# Patient Record
Sex: FEMALE | Race: BLACK OR AFRICAN AMERICAN | Employment: OTHER | RURAL
[De-identification: names, ages, dates, MRNs, and addresses within clinical notes are randomized per-mention and may not be internally consistent; named-entity substitution may affect disease eponyms.]

---

## 2016-06-06 LAB — HBA1C MFR BLD HPLC: 5.7 %

## 2017-06-23 LAB
HBA1C MFR BLD HPLC: 6.2 %
MICROALBUMIN UR TEST STR-MCNC: 3 MG/DL

## 2017-10-25 ENCOUNTER — OFFICE VISIT (OUTPATIENT)
Dept: FAMILY MEDICINE CLINIC | Age: 67
End: 2017-10-25

## 2017-10-25 VITALS
RESPIRATION RATE: 16 BRPM | OXYGEN SATURATION: 99 % | HEART RATE: 76 BPM | HEIGHT: 64 IN | WEIGHT: 155.4 LBS | DIASTOLIC BLOOD PRESSURE: 75 MMHG | BODY MASS INDEX: 26.53 KG/M2 | SYSTOLIC BLOOD PRESSURE: 158 MMHG

## 2017-10-25 DIAGNOSIS — Z23 ENCOUNTER FOR IMMUNIZATION: ICD-10-CM

## 2017-10-25 DIAGNOSIS — Z12.11 SCREENING FOR COLON CANCER: ICD-10-CM

## 2017-10-25 DIAGNOSIS — E11.9 CONTROLLED TYPE 2 DIABETES MELLITUS WITHOUT COMPLICATION, WITHOUT LONG-TERM CURRENT USE OF INSULIN (HCC): Primary | ICD-10-CM

## 2017-10-25 RX ORDER — BISMUTH SUBSALICYLATE 262 MG
1 TABLET,CHEWABLE ORAL DAILY
COMMUNITY
End: 2020-02-20 | Stop reason: ALTCHOICE

## 2017-10-25 RX ORDER — ATORVASTATIN CALCIUM 10 MG/1
10 TABLET, FILM COATED ORAL DAILY
COMMUNITY
Start: 2017-10-24 | End: 2018-03-29 | Stop reason: SDUPTHER

## 2017-10-25 NOTE — MR AVS SNAPSHOT
Visit Information Date & Time Provider Department Dept. Phone Encounter #  
 10/25/2017  3:00 PM Boni العلي Martell  Follow-up Instructions Return in about 2 months (around 12/25/2017), or if symptoms worsen or fail to improve. Upcoming Health Maintenance Date Due Hepatitis C Screening 1950 FOOT EXAM Q1 12/20/1961 EYE EXAM RETINAL OR DILATED Q1 12/20/1961 DTaP/Tdap/Td series (1 - Tdap) 12/20/1972 FOBT Q 1 YEAR AGE 50-75 12/20/2001 ZOSTER VACCINE AGE 60> 10/20/2011 BREAST CANCER SCRN MAMMOGRAM 6/29/2013 GLAUCOMA SCREENING Q2Y 12/20/2016 OSTEOPOROSIS SCREENING (DEXA) 12/20/2016 Pneumococcal 65+ Low/Medium Risk (1 of 2 - PCV13) 12/20/2016 MEDICARE YEARLY EXAM 12/20/2016 HEMOGLOBIN A1C Q6M 4/17/2018 LIPID PANEL Q1 10/17/2018 MICROALBUMIN Q1 10/25/2018 Allergies as of 10/25/2017  Review Complete On: 10/25/2017 By: Cyndee Adrian MD  
 No Known Allergies Current Immunizations  Reviewed on 4/18/2015 No immunizations on file. Not reviewed this visit You Were Diagnosed With   
  
 Codes Comments Controlled type 2 diabetes mellitus without complication, without long-term current use of insulin [facility]    -  Primary ICD-10-CM: E11.9 ICD-9-CM: 250.00 Screening for colon cancer     ICD-10-CM: Z12.11 ICD-9-CM: V76.51 Vitals BP Pulse Resp Height(growth percentile) Weight(growth percentile) SpO2  
 158/75 (BP 1 Location: Left arm, BP Patient Position: Sitting) 76 16 5' 4\" (1.626 m) 155 lb 6.4 oz (70.5 kg) 99% BMI OB Status Smoking Status 26.67 kg/m2 Hysterectomy Never Smoker Vitals History BMI and BSA Data Body Mass Index Body Surface Area  
 26.67 kg/m 2 1.78 m 2 Preferred Pharmacy Pharmacy Name Phone [facility], [address] [place] [address] [phone] Your Updated Medication List  
  
 This list is accurate as of: 10/25/17  4:04 PM.  Always use your most recent med list.  
  
  
  
  
 aspirin delayed-release 81 mg tablet Take 81 mg by mouth daily. atorvastatin 10 mg tablet Commonly known as:  LIPITOR Take 10 mg by mouth daily. DAILY PROBIOTIC PO Take  by mouth.  
  
 empagliflozin 25 mg tablet Commonly known as:  Glorizonia Manuel Take 1 Tab by mouth daily for 90 days. glipiZIDE 5 mg tablet Commonly known as:  Karol Factor Take 5 mg by mouth daily. lisinopril 5 mg tablet Commonly known as:  Janie Reasons Take 5 mg by mouth daily. multivitamin tablet Commonly known as:  ONE A DAY Take 1 Tab by mouth daily. pneumococcal 23-valent 25 mcg/0.5 mL injection Commonly known as:  PNEUMOVAX 23  
0.5 mL by IntraMUSCular route PRIOR TO DISCHARGE for 1 dose. PriLOSEC OTC 20 mg tablet Generic drug:  omeprazole Take 20 mg by mouth daily. varicella zoster vacine live 19,400 unit/0.65 mL Susr injection Commonly known as:  ZOSTAVAX  
1 Vial by SubCUTAneous route once for 1 dose. Prescriptions Printed Refills  
 pneumococcal 23-valent (PNEUMOVAX 23) 25 mcg/0.5 mL injection 0 Si.5 mL by IntraMUSCular route PRIOR TO DISCHARGE for 1 dose. Class: Print Route: IntraMUSCular  
 varicella zoster vacine live (ZOSTAVAX) 19,400 unit/0.65 mL susr injection 0 Si Vial by SubCUTAneous route once for 1 dose. Class: Print Route: SubCUTAneous Prescriptions Sent to Pharmacy Refills  
 empagliflozin (JARDIANCE) 25 mg tablet 0 Sig: Take 1 Tab by mouth daily for 90 days. Class: Normal  
 Pharmacy: [facility] [address] [place] [address], [address] [facility] #: [phone] Route: Oral  
  
We Performed the Following MICROALBUMIN, UR, RAND W/ MICROALBUMIN/CREA RATIO [id number] CPT(R)] OCCULT BLOOD, IMMUNOASSAY (FIT) [id number] CPT(R)] REFERRAL TO OPHTHALMOLOGY [REF57 Custom] Comments:  
 Needs annual screening diabetic eye exam.  
  
Follow-up Instructions Return in about 2 months (around 12/25/2017), or if symptoms worsen or fail to improve. Referral Information Referral ID Referred By Referred To [id number] [place] Doris [facility] [address] [facility], [address] Visits Status Start Date End Date 1 New Request 10/25/17 10/25/18 If your referral has a status of pending review or denied, additional information will be sent to support the outcome of this decision. Patient Instructions Diabetes Foot Health: Care Instructions Your Care Instructions When you have diabetes, your feet need extra care and attention. Diabetes can damage the nerve endings and blood vessels in your feet, making you less likely to notice when your feet are injured. Diabetes also limits your body's ability to fight infection and get blood to areas that need it. If you get a minor foot injury, it could become an ulcer or a serious infection. With good foot care, you can prevent most of these problems. Caring for your feet can be quick and easy. Most of the care can be done when you are bathing or getting ready for bed. Follow-up care is a key part of your treatment and safety. Be sure to make and go to all appointments, and call your doctor if you are having problems. Its also a good idea to know your test results and keep a list of the medicines you take. How can you care for yourself at home? · Keep your blood sugar close to normal by watching what and how much you eat, monitoring blood sugar, taking medicines if prescribed, and getting regular exercise. · Do not smoke. Smoking affects blood flow and can make foot problems worse. If you need help quitting, talk to your doctor about stop-smoking programs and medicines. These can increase your chances of quitting for good. · Eat a diet that is low in fats. High fat intake can cause fat to build up in your blood vessels and decrease blood flow. · Inspect your feet daily for blisters, cuts, cracks, or sores. If you cannot see well, use a mirror or have someone help you. · Take care of your feet: 
[facility] your feet every day. Use warm (not hot) water. Check the water temperature with your wrists or other part of your body, not your feet. ¨ Dry your feet well. Pat them dry. Do not rub the skin on your feet too hard. Dry well between your toes. If the skin on your feet stays moist, bacteria or a fungus can grow, which can lead to infection. ¨ Keep your skin soft. Use moisturizing skin cream to keep the skin on your feet soft and prevent calluses and cracks. But do not put the cream between your toes, and stop using any cream that causes a rash. ¨ Clean underneath your toenails carefully. Do not use a sharp object to clean underneath your toenails. Use the blunt end of a nail file or other rounded tool. ¨ Trim and file your toenails straight across to prevent ingrown toenails. Use a nail clipper, not scissors. Use an emery board to smooth the edges. · Change socks daily. Socks without seams are best, because seams often rub the feet. You can find socks for people with diabetes from specialty catalogs. · Look inside your shoes every day for things like gravel or torn linings, which could cause blisters or sores. · Buy shoes that fit well: 
¨ Look for shoes that have plenty of space around the toes. This helps prevent bunions and blisters. ¨ Try on shoes while wearing the kind of socks you will usually wear with the shoes. ¨ Avoid plastic shoes. They may rub your feet and cause blisters. Good shoes should be made of materials that are flexible and breathable, such as leather or cloth. ¨ Break in new shoes slowly by wearing them for no more than an hour a day for several days.  Take extra time to check your feet for red areas, blisters, or other problems after you wear new shoes. · Do not go barefoot. Do not wear sandals, and do not wear shoes with very thin soles. Thin soles are easy to puncture. They also do not protect your feet from hot pavement or cold weather. · Have your doctor check your feet during each visit. If you have a foot problem, see your doctor. Do not try to treat an early foot problem at home. Home remedies or treatments that you can buy without a prescription (such as corn removers) can be harmful. · Always get early treatment for foot problems. A minor irritation can lead to a major problem if not properly cared for early. When should you call for help? Call your doctor now or seek immediate medical care if: 
· You have a foot sore, an ulcer or break in the skin that is not healing after 4 days, bleeding corns or calluses, or an ingrown toenail. · You have blue or black areas, which can mean bruising or blood flow problems. · You have peeling skin or tiny blisters between your toes or cracking or oozing of the skin. · You have a fever for more than 24 hours and a foot sore. · You have new numbness or tingling in your feet that does not go away after you move your feet or change positions. · You have unexplained or unusual swelling of the foot or ankle. Watch closely for changes in your health, and be sure to contact your doctor if: 
· You cannot do proper foot care. Where can you learn more? Go to http://libby-raeann.info/. Enter A739 in the search box to learn more about \"Diabetes Foot Health: Care Instructions. \" Current as of: March 13, 2017 Content Version: 11.3 © 3264-4491 [employer]. Care instructions adapted under license by [employer] (which disclaims liability or warranty for this information).  If you have questions about a medical condition or this instruction, always ask your healthcare professional. Le[employer] disclaims any warranty or liability for your use of this information. Diabetes Foot Health: Care Instructions Your Care Instructions When you have diabetes, your feet need extra care and attention. Diabetes can damage the nerve endings and blood vessels in your feet, making you less likely to notice when your feet are injured. Diabetes also limits your body's ability to fight infection and get blood to areas that need it. If you get a minor foot injury, it could become an ulcer or a serious infection. With good foot care, you can prevent most of these problems. Caring for your feet can be quick and easy. Most of the care can be done when you are bathing or getting ready for bed. Follow-up care is a key part of your treatment and safety. Be sure to make and go to all appointments, and call your doctor if you are having problems. Its also a good idea to know your test results and keep a list of the medicines you take. How can you care for yourself at home? · Keep your blood sugar close to normal by watching what and how much you eat, monitoring blood sugar, taking medicines if prescribed, and getting regular exercise. · Do not smoke. Smoking affects blood flow and can make foot problems worse. If you need help quitting, talk to your doctor about stop-smoking programs and medicines. These can increase your chances of quitting for good. · Eat a diet that is low in fats. High fat intake can cause fat to build up in your blood vessels and decrease blood flow. · Inspect your feet daily for blisters, cuts, cracks, or sores. If you cannot see well, use a mirror or have someone help you. · Take care of your feet: 
[facility] your feet every day. Use warm (not hot) water. Check the water temperature with your wrists or other part of your body, not your feet. ¨ Dry your feet well. Pat them dry.  Do not rub the skin on your feet too hard. Dry well between your toes. If the skin on your feet stays moist, bacteria or a fungus can grow, which can lead to infection. ¨ Keep your skin soft. Use moisturizing skin cream to keep the skin on your feet soft and prevent calluses and cracks. But do not put the cream between your toes, and stop using any cream that causes a rash. ¨ Clean underneath your toenails carefully. Do not use a sharp object to clean underneath your toenails. Use the blunt end of a nail file or other rounded tool. ¨ Trim and file your toenails straight across to prevent ingrown toenails. Use a nail clipper, not scissors. Use an emery board to smooth the edges. · Change socks daily. Socks without seams are best, because seams often rub the feet. You can find socks for people with diabetes from specialty catalogs. · Look inside your shoes every day for things like gravel or torn linings, which could cause blisters or sores. · Buy shoes that fit well: 
¨ Look for shoes that have plenty of space around the toes. This helps prevent bunions and blisters. ¨ Try on shoes while wearing the kind of socks you will usually wear with the shoes. ¨ Avoid plastic shoes. They may rub your feet and cause blisters. Good shoes should be made of materials that are flexible and breathable, such as leather or cloth. ¨ Break in new shoes slowly by wearing them for no more than an hour a day for several days. Take extra time to check your feet for red areas, blisters, or other problems after you wear new shoes. · Do not go barefoot. Do not wear sandals, and do not wear shoes with very thin soles. Thin soles are easy to puncture. They also do not protect your feet from hot pavement or cold weather. · Have your doctor check your feet during each visit. If you have a foot problem, see your doctor. Do not try to treat an early foot problem at home.  Home remedies or treatments that you can buy without a prescription (such as corn removers) can be harmful. · Always get early treatment for foot problems. A minor irritation can lead to a major problem if not properly cared for early. When should you call for help? Call your doctor now or seek immediate medical care if: 
· You have a foot sore, an ulcer or break in the skin that is not healing after 4 days, bleeding corns or calluses, or an ingrown toenail. · You have blue or black areas, which can mean bruising or blood flow problems. · You have peeling skin or tiny blisters between your toes or cracking or oozing of the skin. · You have a fever for more than 24 hours and a foot sore. · You have new numbness or tingling in your feet that does not go away after you move your feet or change positions. · You have unexplained or unusual swelling of the foot or ankle. Watch closely for changes in your health, and be sure to contact your doctor if: 
· You cannot do proper foot care. Where can you learn more? Go to http://libby-raeann.info/. Enter A739 in the search box to learn more about \"Diabetes Foot Health: Care Instructions. \" Current as of: March 13, 2017 Content Version: 11.3 © 2700-4229 [employer]. Care instructions adapted under license by [employer]App[employer]Me (which disclaims liability or warranty for this information). If you have questions about a medical condition or this instruction, always ask your healthcare professional. [place] [address] any warranty or liability for your use of this information. Introducing [facility]! Jazmyne Breaux introduces Tag & See patient portal. Now you can access parts of your medical record, email your doctor's office, and request medication refills online. 1. In your internet browser, go to [email or web address] 2. Click on the First Time User? Click Here link in the Sign In box. You will see the New Member Sign Up page. 3. Enter your OrdrIt Access Code exactly as it appears below. You will not need to use this code after youve completed the sign-up process. If you do not sign up before the expiration date, you must request a new code. · OrdrIt Access Code: Prince Salcido Expires: 1/23/2018  3:53 PM 
 
4. Enter the last four digits of your Social Security Number (xxxx) and Date of Birth (mm/dd/yyyy) as indicated and click Submit. You will be taken to the next sign-up page. 5. Create a [employer] ID. This will be your OrdrIt login ID and cannot be changed, so think of one that is secure and easy to remember. 6. Create a OrdrIt password. You can change your password at any time. 7. Enter your Password Reset Question and Answer. This can be used at a later time if you forget your password. 8. Enter your e-mail address. You will receive e-mail notification when new information is available in [facility] [address] [facility] [address]. 9. Click Sign Up. You can now view and download portions of your medical record. 10. Click the Download Summary menu link to download a portable copy of your medical information. If you have questions, please visit the Frequently Asked Questions section of the OrdrIt website. Remember, OrdrIt is NOT to be used for urgent needs. For medical emergencies, dial 911. Now available from your iPhone and Android! Please provide this summary of care documentation to your next provider. Your primary care clinician is listed as Natalie Walsh. If you have any questions after today's visit, please call [phone].

## 2017-10-25 NOTE — PROGRESS NOTES
Chief Complaint   Patient presents with   [address]     new patient, would like to discuss elevated blood sugars

## 2017-10-25 NOTE — PROGRESS NOTES
CHIEF COMPLAINT:   Chief Complaint   Patient presents with   [facility]     new patient, would like to discuss elevated blood sugars       HISTORY OF PRESENT ILLNESS:       PAST MEDICAL HISTORY:  Past Medical History:   Diagnosis Date    Acid reflux     Colitis 04/16/2015    Diabetes mellitus, type II ([address]     Diverticulosis of colon 04/20/2015    Hypertension        PAST SURGICAL HISTORY:  Past Surgical History:   Procedure Laterality Date    ABDOMEN SURGERY PROC UNLISTED      HX COLONOSCOPY  04/20/2015    HX GYN      HX HYSTERECTOMY         MEDICATIONS:  Current Outpatient Prescriptions   Medication Sig Dispense Refill    atorvastatin (LIPITOR) 10 mg tablet Take 10 mg by mouth daily.  multivitamin (ONE A DAY) tablet Take 1 Tab by mouth daily.  L. ACIDOPHILUS/BIFID. ANIMALIS (DAILY PROBIOTIC PO) Take  by mouth.  lisinopril (PRINIVIL, ZESTRIL) 5 mg tablet Take 5 mg by mouth daily.  glipiZIDE (GLUCOTROL) 5 mg tablet Take 5 mg by mouth daily.  aspirin delayed-release 81 mg tablet Take 81 mg by mouth daily.  omeprazole (PRILOSEC OTC) 20 mg tablet Take 20 mg by mouth daily.  ESTROGENS, CONJUGATED/MEPROBAM (ESTROGENS CONJ-MEPROBAMATE PO) Take  by mouth. ALLERGIES:  No Known Allergies       SOCIAL HISTORY:   Social History    Marital status:      Spouse name: Ava Geren Number of children: 2    Years of education: N/A     Occupational History    Retired     Social History Main Topics    Smoking status: Never Smoker    Smokeless tobacco: Never Used    Alcohol use No    Drug use: No    Sexual activity: Not on file       FAMILY HISTORY:   Family History   Problem Relation Age of Onset    Other Mother      diverticulosis       REVIEW OF SYSTEMS:  Review of Systems - Negative except for documented in the HPI. PHYSICAL EXAMINATION:      LABORATORY DATA:    IMPRESSION AND PLAN:     ICD-10-CM ICD-9-CM    1.  Controlled type 2 diabetes mellitus without complication, without long-term current use of insulin (HCC) E11.9 250.00 REFERRAL TO OPHTHALMOLOGY      empagliflozin (JARDIANCE) 25 mg tablet      MICROALBUMIN, UR, RAND W/ MICROALBUMIN/CREA RATIO   2. Screening for colon cancer Z12.11 V76.51 OCCULT BLOOD, IMMUNOASSAY (FIT)   3. Encounter for immunization Z23 V03.89 ADMIN INFLUENZA VIRUS VAC      INFLUENZA VIRUS VACCINE, HIGH DOSE SEASONAL, PRESERVATIVE FREE     I have discussed the diagnosis with the patient and the intended treatment plan as seen in the above orders. The patient has received an after-visit summary and questions were answered concerning future plans. Asked to return should symptoms worsen or not improve with treatment. Any pending labs and studies will be relayed to patient when they become available. Pt verbalizes understanding of plan of care and denies further questions or concerns at this time. Follow-up Disposition:  Return in about 2 months (around 12/25/2017), or if symptoms worsen or fail to improve. Macie Aldrich

## 2017-10-25 NOTE — PATIENT INSTRUCTIONS
Diabetes Foot Health: Care Instructions  Your Care Instructions    When you have diabetes, your feet need extra care and attention. Diabetes can damage the nerve endings and blood vessels in your feet, making you less likely to notice when your feet are injured. Diabetes also limits your body's ability to fight infection and get blood to areas that need it. If you get a minor foot injury, it could become an ulcer or a serious infection. With good foot care, you can prevent most of these problems. Caring for your feet can be quick and easy. Most of the care can be done when you are bathing or getting ready for bed. Follow-up care is a key part of your treatment and safety. Be sure to make and go to all appointments, and call your doctor if you are having problems. Its also a good idea to know your test results and keep a list of the medicines you take. How can you care for yourself at home? · Keep your blood sugar close to normal by watching what and how much you eat, monitoring blood sugar, taking medicines if prescribed, and getting regular exercise. · Do not smoke. Smoking affects blood flow and can make foot problems worse. If you need help quitting, talk to your doctor about stop-smoking programs and medicines. These can increase your chances of quitting for good. · Eat a diet that is low in fats. High fat intake can cause fat to build up in your blood vessels and decrease blood flow. · Inspect your feet daily for blisters, cuts, cracks, or sores. If you cannot see well, use a mirror or have someone help you. · Take care of your feet:  [facility] your feet every day. Use warm (not hot) water. Check the water temperature with your wrists or other part of your body, not your feet. ¨ Dry your feet well. Pat them dry. Do not rub the skin on your feet too hard. Dry well between your toes. If the skin on your feet stays moist, bacteria or a fungus can grow, which can lead to infection.   ¨ Keep your skin soft. Use moisturizing skin cream to keep the skin on your feet soft and prevent calluses and cracks. But do not put the cream between your toes, and stop using any cream that causes a rash. ¨ Clean underneath your toenails carefully. Do not use a sharp object to clean underneath your toenails. Use the blunt end of a nail file or other rounded tool. ¨ Trim and file your toenails straight across to prevent ingrown toenails. Use a nail clipper, not scissors. Use an emery board to smooth the edges. · Change socks daily. Socks without seams are best, because seams often rub the feet. You can find socks for people with diabetes from specialty catalogs. · Look inside your shoes every day for things like gravel or torn linings, which could cause blisters or sores. · Buy shoes that fit well:  ¨ Look for shoes that have plenty of space around the toes. This helps prevent bunions and blisters. ¨ Try on shoes while wearing the kind of socks you will usually wear with the shoes. ¨ Avoid plastic shoes. They may rub your feet and cause blisters. Good shoes should be made of materials that are flexible and breathable, such as leather or cloth. ¨ Break in new shoes slowly by wearing them for no more than an hour a day for several days. Take extra time to check your feet for red areas, blisters, or other problems after you wear new shoes. · Do not go barefoot. Do not wear sandals, and do not wear shoes with very thin soles. Thin soles are easy to puncture. They also do not protect your feet from hot pavement or cold weather. · Have your doctor check your feet during each visit. If you have a foot problem, see your doctor. Do not try to treat an early foot problem at home. Home remedies or treatments that you can buy without a prescription (such as corn removers) can be harmful. · Always get early treatment for foot problems. A minor irritation can lead to a major problem if not properly cared for early.   When should you call for help? Call your doctor now or seek immediate medical care if:  · You have a foot sore, an ulcer or break in the skin that is not healing after 4 days, bleeding corns or calluses, or an ingrown toenail. · You have blue or black areas, which can mean bruising or blood flow problems. · You have peeling skin or tiny blisters between your toes or cracking or oozing of the skin. · You have a fever for more than 24 hours and a foot sore. · You have new numbness or tingling in your feet that does not go away after you move your feet or change positions. · You have unexplained or unusual swelling of the foot or ankle. Watch closely for changes in your health, and be sure to contact your doctor if:  · You cannot do proper foot care. Where can you learn more? Go to http://libbyAoxing Pharmaceuticalraeann.info/. Enter A739 in the search box to learn more about \"Diabetes Foot Health: Care Instructions. \"  Current as of: March 13, 2017  Content Version: 11.3  © 4703-6119 [employer]. Care instructions adapted under license by [employer] (which disclaims liability or warranty for this information). If you have questions about a medical condition or this instruction, always ask your healthcare professional. [address] any warranty or liability for your use of this information. Diabetes Foot Health: Care Instructions  Your Care Instructions    When you have diabetes, your feet need extra care and attention. Diabetes can damage the nerve endings and blood vessels in your feet, making you less likely to notice when your feet are injured. Diabetes also limits your body's ability to fight infection and get blood to areas that need it. If you get a minor foot injury, it could become an ulcer or a serious infection. With good foot care, you can prevent most of these problems. Caring for your feet can be quick and easy.  Most of the care can be done when you are bathing or getting ready for bed. Follow-up care is a key part of your treatment and safety. Be sure to make and go to all appointments, and call your doctor if you are having problems. Its also a good idea to know your test results and keep a list of the medicines you take. How can you care for yourself at home? · Keep your blood sugar close to normal by watching what and how much you eat, monitoring blood sugar, taking medicines if prescribed, and getting regular exercise. · Do not smoke. Smoking affects blood flow and can make foot problems worse. If you need help quitting, talk to your doctor about stop-smoking programs and medicines. These can increase your chances of quitting for good. · Eat a diet that is low in fats. High fat intake can cause fat to build up in your blood vessels and decrease blood flow. · Inspect your feet daily for blisters, cuts, cracks, or sores. If you cannot see well, use a mirror or have someone help you. · Take care of your feet:  [facility] your feet every day. Use warm (not hot) water. Check the water temperature with your wrists or other part of your body, not your feet. ¨ Dry your feet well. Pat them dry. Do not rub the skin on your feet too hard. Dry well between your toes. If the skin on your feet stays moist, bacteria or a fungus can grow, which can lead to infection. ¨ Keep your skin soft. Use moisturizing skin cream to keep the skin on your feet soft and prevent calluses and cracks. But do not put the cream between your toes, and stop using any cream that causes a rash. ¨ Clean underneath your toenails carefully. Do not use a sharp object to clean underneath your toenails. Use the blunt end of a nail file or other rounded tool. ¨ Trim and file your toenails straight across to prevent ingrown toenails. Use a nail clipper, not scissors. Use an emery board to smooth the edges. · Change socks daily. Socks without seams are best, because seams often rub the feet.  You can find socks for people with diabetes from specialty catalogs. · Look inside your shoes every day for things like gravel or torn linings, which could cause blisters or sores. · Buy shoes that fit well:  ¨ Look for shoes that have plenty of space around the toes. This helps prevent bunions and blisters. ¨ Try on shoes while wearing the kind of socks you will usually wear with the shoes. ¨ Avoid plastic shoes. They may rub your feet and cause blisters. Good shoes should be made of materials that are flexible and breathable, such as leather or cloth. ¨ Break in new shoes slowly by wearing them for no more than an hour a day for several days. Take extra time to check your feet for red areas, blisters, or other problems after you wear new shoes. · Do not go barefoot. Do not wear sandals, and do not wear shoes with very thin soles. Thin soles are easy to puncture. They also do not protect your feet from hot pavement or cold weather. · Have your doctor check your feet during each visit. If you have a foot problem, see your doctor. Do not try to treat an early foot problem at home. Home remedies or treatments that you can buy without a prescription (such as corn removers) can be harmful. · Always get early treatment for foot problems. A minor irritation can lead to a major problem if not properly cared for early. When should you call for help? Call your doctor now or seek immediate medical care if:  · You have a foot sore, an ulcer or break in the skin that is not healing after 4 days, bleeding corns or calluses, or an ingrown toenail. · You have blue or black areas, which can mean bruising or blood flow problems. · You have peeling skin or tiny blisters between your toes or cracking or oozing of the skin. · You have a fever for more than 24 hours and a foot sore. · You have new numbness or tingling in your feet that does not go away after you move your feet or change positions.   · You have unexplained or unusual swelling of the foot or ankle. Watch closely for changes in your health, and be sure to contact your doctor if:  · You cannot do proper foot care. Where can you learn more? Go to http://libby-raeann.info/. Enter A739 in the search box to learn more about \"Diabetes Foot Health: Care Instructions. \"  Current as of: March 13, 2017  Content Version: 11.3  © 2411-0038 [employer]. Care instructions adapted under license by [employer] (which disclaims liability or warranty for this information). If you have questions about a medical condition or this instruction, always ask your healthcare professional. [address] any warranty or liability for your use of this information.

## 2017-10-26 LAB
ALBUMIN/CREAT UR: ABNORMAL MG/G CREAT (ref 0–30)
CREAT UR-MCNC: 50 MG/DL
MICROALBUMIN UR-MCNC: <3 UG/ML

## 2017-11-21 ENCOUNTER — HOSPITAL ENCOUNTER (OUTPATIENT)
Dept: LAB | Age: 67
Discharge: HOME | End: 2017-11-21
Payer: MEDICARE

## 2017-11-21 ENCOUNTER — OFFICE VISIT (OUTPATIENT)
Dept: FAMILY MEDICINE CLINIC | Age: 67
End: 2017-11-21

## 2017-11-21 VITALS
BODY MASS INDEX: 25.08 KG/M2 | RESPIRATION RATE: 16 BRPM | HEIGHT: 64 IN | HEART RATE: 71 BPM | OXYGEN SATURATION: 100 % | SYSTOLIC BLOOD PRESSURE: 134 MMHG | TEMPERATURE: 97.5 F | WEIGHT: 146.9 LBS | DIASTOLIC BLOOD PRESSURE: 70 MMHG

## 2017-11-21 DIAGNOSIS — G89.29 CHRONIC MIDLINE LOW BACK PAIN WITHOUT SCIATICA: ICD-10-CM

## 2017-11-21 DIAGNOSIS — M54.50 CHRONIC MIDLINE LOW BACK PAIN WITHOUT SCIATICA: ICD-10-CM

## 2017-11-21 DIAGNOSIS — E11.9 CONTROLLED TYPE 2 DIABETES MELLITUS WITHOUT COMPLICATION, WITHOUT LONG-TERM CURRENT USE OF INSULIN (HCC): Primary | ICD-10-CM

## 2017-11-21 DIAGNOSIS — I10 BENIGN ESSENTIAL HYPERTENSION: ICD-10-CM

## 2017-11-21 PROBLEM — M85.862 OSTEOPENIA OF BOTH LOWER LEGS: Status: ACTIVE | Noted: 2017-11-21

## 2017-11-21 PROBLEM — M85.861 OSTEOPENIA OF BOTH LOWER LEGS: Status: ACTIVE | Noted: 2017-11-21

## 2017-11-21 PROBLEM — Z87.39 HISTORY OF RHEUMATOID ARTHRITIS: Status: ACTIVE | Noted: 2017-11-21

## 2017-11-21 PROCEDURE — 82270 OCCULT BLOOD FECES: CPT

## 2017-11-21 RX ORDER — CALCIUM CARBONATE 200(500)MG
1 TABLET,CHEWABLE ORAL DAILY
COMMUNITY
End: 2022-04-27

## 2017-11-21 RX ORDER — LANOLIN ALCOHOL/MO/W.PET/CERES
1000 CREAM (GRAM) TOPICAL DAILY
COMMUNITY
End: 2022-04-27

## 2017-11-21 NOTE — MR AVS SNAPSHOT
Visit Information Date & Time Provider Department Dept. Phone Encounter #  
 11/21/2017 11:00 AM Emma AndrewsBoni [phone] [id number] Upcoming Health Maintenance Date Due Hepatitis C Screening 1950 FOOT EXAM Q1 12/20/1961 EYE EXAM RETINAL OR DILATED Q1 12/20/1961 DTaP/Tdap/Td series (1 - Tdap) 12/20/1972 FOBT Q 1 YEAR AGE 50-75 12/20/2001 ZOSTER VACCINE AGE 60> 10/20/2011 BREAST CANCER SCRN MAMMOGRAM 6/29/2013 GLAUCOMA SCREENING Q2Y 12/20/2016 OSTEOPOROSIS SCREENING (DEXA) 12/20/2016 Pneumococcal 65+ Low/Medium Risk (1 of 2 - PCV13) 12/20/2016 MEDICARE YEARLY EXAM 12/20/2016 HEMOGLOBIN A1C Q6M 4/17/2018 LIPID PANEL Q1 10/17/2018 MICROALBUMIN Q1 10/25/2018 Allergies as of 11/21/2017  Review Complete On: 11/21/2017 By: Ade Aldridge No Known Allergies Current Immunizations  Reviewed on 11/21/2017 Name Date Influenza High Dose Vaccine PF 10/25/2017 Reviewed by Emma Anrdews MD on 11/21/2017 at 11:48 AM  
You Were Diagnosed With   
  
 Codes Comments Controlled type 2 diabetes mellitus without complication, without long-term current use of insulin ([facility]    -  Primary ICD-10-CM: E11.9 ICD-9-CM: 250.00 Benign essential hypertension     ICD-10-CM: I10 
ICD-9-CM: 048. 1 Chronic midline low back pain without sciatica     ICD-10-CM: M54.5, G89.29 ICD-9-CM: 724.2, 338.29 Vitals BP Pulse Temp Resp Height(growth percentile) Weight(growth percentile) 134/70 (BP 1 Location: Right arm, BP Patient Position: Sitting) 71 97.5 °F (36.4 °C) (Oral) 16 5' 4\" (1.626 m) 146 lb 14.4 oz (66.6 kg) SpO2 BMI OB Status Smoking Status 100% 25.22 kg/m2 Hysterectomy Never Smoker Vitals History BMI and BSA Data Body Mass Index Body Surface Area  
 25.22 kg/m 2 1.73 m 2 Preferred Pharmacy Pharmacy Name Phone [facility] [address] [place] [address] Guido Bowers [phone] Your Updated Medication List  
  
   
This list is accurate as of: 11/21/17 12:02 PM.  Always use your most recent med list.  
  
  
  
  
 aspirin delayed-release 81 mg tablet Take 81 mg by mouth daily. atorvastatin 10 mg tablet Commonly known as:  LIPITOR Take 10 mg by mouth daily. calcium carbonate 200 mg calcium (500 mg) Chew Commonly known as:  TUMS Take 1 Tab by mouth daily. cyanocobalamin 1,000 mcg tablet Take 1,000 mcg by mouth daily. DAILY PROBIOTIC PO Take  by mouth.  
  
 empagliflozin 25 mg tablet Commonly known as:  Lilia Thibodeaux Take 1 Tab by mouth daily for 90 days. glipiZIDE 5 mg tablet Commonly known as:  Faby Jorje Take 5 mg by mouth daily. lisinopril 5 mg tablet Commonly known as:  James Gan Take 5 mg by mouth daily. multivitamin tablet Commonly known as:  ONE A DAY Take 1 Tab by mouth daily. pneumococcal 23-valent 25 mcg/0.5 mL injection Commonly known as:  PNEUMOVAX 23  
0.5 mL by IntraMUSCular route PRIOR TO DISCHARGE for 1 dose. PriLOSEC OTC 20 mg tablet Generic drug:  omeprazole Take 20 mg by mouth daily. Patient Instructions Back Stretches: Exercises Your Care Instructions Here are some examples of exercises for stretching your back. Start each exercise slowly. Ease off the exercise if you start to have pain. Your doctor or physical therapist will tell you when you can start these exercises and which ones will work best for you. How to do the exercises Overhead stretch 1. Stand comfortably with your feet shoulder-width apart. 2. Looking straight ahead, raise both arms over your head and reach toward the ceiling. Do not allow your head to tilt back. 3. Hold for 15 to 30 seconds, then lower your arms to your sides. 4. Repeat 2 to 4 times. Side stretch 1. Stand comfortably with your feet shoulder-width apart. 2. Raise one arm over your head, and then lean to the other side. 3. Slide your hand down your leg as you let the weight of your arm gently stretch your side muscles. Hold for 15 to 30 seconds. 4. Repeat 2 to 4 times on each side. Press-up 1. Lie on your stomach, supporting your body with your forearms. 2. Press your elbows down into the floor to raise your upper back. As you do this, relax your stomach muscles and allow your back to arch without using your back muscles. As your press up, do not let your hips or pelvis come off the floor. 3. Hold for 15 to 30 seconds, then relax. 4. Repeat 2 to 4 times. Relax and rest 
 
1. Lie on your back with a rolled towel under your neck and a pillow under your knees. Extend your arms comfortably to your sides. 2. Relax and breathe normally. 3. Remain in this position for about 10 minutes. 4. If you can, do this 2 or 3 times each day. Follow-up care is a key part of your treatment and safety. Be sure to make and go to all appointments, and call your doctor if you are having problems. It's also a good idea to know your test results and keep a list of the medicines you take. Where can you learn more? Go to http://libby-raeann.info/. Enter L055 in the search box to learn more about \"Back Stretches: Exercises. \" Current as of: March 21, 2017 Content Version: 11.4 © 8761-7624 [employer]. Care instructions adapted under license by [employer] (which disclaims liability or warranty for this information). If you have questions about a medical condition or this instruction, always ask your healthcare professional. [place] [address] any warranty or liability for your use of this information. Introducing [facility]!    
 Paulo Keyes introduces [employer] patient portal. Now you can access parts of your medical record, email your doctor's office, and request medication refills online. 1. In your internet browser, go to [email or web address] 2. Click on the First Time User? Click Here link in the Sign In box. You will see the New Member Sign Up page. 3. Enter your ZenCard Access Code exactly as it appears below. You will not need to use this code after youve completed the sign-up process. If you do not sign up before the expiration date, you must request a new code. · ZenCard Access Code: Marie  Expires: 1/23/2018  2:53 PM 
 
4. Enter the last four digits of your Social Security Number (xxxx) and Date of Birth (mm/dd/yyyy) as indicated and click Submit. You will be taken to the next sign-up page. 5. Create a ZenCard ID. This will be your ZenCard login ID and cannot be changed, so think of one that is secure and easy to remember. 6. Create a ZenCard password. You can change your password at any time. 7. Enter your Password Reset Question and Answer. This can be used at a later time if you forget your password. 8. Enter your e-mail address. You will receive e-mail notification when new information is available in [address] 9. Click Sign Up. You can now view and download portions of your medical record. 10. Click the Download Summary menu link to download a portable copy of your medical information. If you have questions, please visit the Frequently Asked Questions section of the ZenCard website. Remember, ZenCard is NOT to be used for urgent needs. For medical emergencies, dial 911. Now available from your iPhone and Android! Please provide this summary of care documentation to your next provider. Your primary care clinician is listed as Natalie Walsh. If you have any questions after today's visit, please call [phone].

## 2017-11-21 NOTE — PROGRESS NOTES
Chief Complaint   Patient presents with    Medication Evaluation     discuss [place]   did not get the pneumovax 23 because [place] wanted to know if she \"got the first pneumococcal yet\"    Health Maintenance Due   Topic Date Due    Hepatitis C Screening  1950    FOOT EXAM Q1  12/20/1961    EYE EXAM RETINAL OR DILATED Q1  12/20/1961    DTaP/Tdap/Td series (1 - Tdap) 12/20/1972    FOBT Q 1 YEAR AGE 50-75  12/20/2001    ZOSTER VACCINE AGE 60>  10/20/2011    BREAST CANCER SCRN MAMMOGRAM  06/29/2013    GLAUCOMA SCREENING Q2Y  12/20/2016    OSTEOPOROSIS SCREENING (DEXA)  12/20/2016    Pneumococcal 65+ Low/Medium Risk (1 of 2 - PCV13) 12/20/2016    MEDICARE YEARLY EXAM  12/20/2016     Coordination of Care Questions    1. Have you been to the ER, urgent care clinic since your last visit? No       Hospitalized since your last visit? No    2. Have you seen or consulted any other health care providers outside of the [address] [place] [address] since your last visit? Include any pap smears or colon screening.  No

## 2017-11-21 NOTE — PATIENT INSTRUCTIONS

## 2017-11-28 LAB — HEMOCCULT STL QL IA: NEGATIVE

## 2017-12-08 ENCOUNTER — TELEPHONE (OUTPATIENT)
Dept: FAMILY MEDICINE CLINIC | Age: 67
End: 2017-12-08

## 2017-12-08 NOTE — TELEPHONE ENCOUNTER
Patient is call to see if medical records had come over from [facility] practice about the vaccines she had done. Please call patient back to advise.  [phone]

## 2017-12-11 NOTE — TELEPHONE ENCOUNTER
Ms. Clementina Ovalles was advised that we rec'd records of her office visit notes, however, the vaccines were not included. She was advised by the pharmacist at [facility] that she should have the Prevnar 13 done prior to the pneumococcal 23 and isn't certain if she ever had the prevnar 13 done. I called [facility] and they have faxed me a copy of her vaccine record and also her BDT, mammogram and breast bx reports. Pt rec'd Prevnar 13 on 06/23/2017. She questions if she should still have the Pneumococcal 23 done this year?

## 2017-12-11 NOTE — PROGRESS NOTES
CC:  Chief Complaint   Patient presents with    Medication Evaluation     discuss [address] Farhan Garcia is a 73 y.o. female. HPI Comments: 73F who presents for follow up. [facility] and this is her second visit. She has a h/o Type II DM and was started on Jardiance at her last visit. She reports that her BS have improved and that she did have a HBA1c done recently and it was 6.7. She also lost 10-lbs and feels better. No negative side effects from the medication. Medication Evaluation           ROS:  Review of Systems   All other systems reviewed and are negative. OBJECTIVE:  /70 (BP 1 Location: Right arm, BP Patient Position: Sitting)  Pulse 71  Temp 97.5 °F (36.4 °C) (Oral)   Resp 16  Ht 5' 4\" (1.626 m)  Wt 146 lb 14.4 oz (66.6 kg)  SpO2 100%  BMI 25.22 kg/m2  Physical Exam   Constitutional: She appears well-developed. HENT:   Head: Normocephalic. Eyes: Pupils are equal, round, and reactive to light. Neck: Normal range of motion. Cardiovascular: Normal rate and regular rhythm. Pulmonary/Chest: Effort normal and breath sounds normal.   Musculoskeletal: Normal range of motion. Neurological: She is alert. Nursing note and vitals reviewed. ASSESSMENT and PLAN    ICD-10-CM ICD-9-CM    1. Controlled type 2 diabetes mellitus without complication, without long-term current use of insulin (HCC) E11.9 250.00    2. Benign essential hypertension I10 401.1    3. Chronic midline low back pain without sciatica M54.5 724.2     G89.29 338.29      66F withe a h/o Type II DM, HLD, HTN and here for follow up after recent visit and starting Jardiance. She is doing well. Happy with the results and no concerning side effects. I have discussed the diagnosis with the patient and the intended treatment plan as seen in the above orders. The patient has received an after-visit summary and questions were answered concerning future plans. Asked to return should symptoms worsen or not improve with treatment. Any pending labs and studies will be relayed to patient when they become available. Pt verbalizes understanding of plan of care and denies further questions or concerns at this time. Follow-up Disposition:  Return in about 4 months (around 3/21/2018), or if symptoms worsen or fail to improve.

## 2017-12-12 NOTE — TELEPHONE ENCOUNTER
Pt was advised via detailed telephone message. She was advised to return call if she has any questions/concerns.

## 2018-01-24 ENCOUNTER — OFFICE VISIT (OUTPATIENT)
Dept: FAMILY MEDICINE CLINIC | Age: 68
End: 2018-01-24

## 2018-01-24 VITALS
SYSTOLIC BLOOD PRESSURE: 128 MMHG | HEART RATE: 88 BPM | WEIGHT: 145.2 LBS | OXYGEN SATURATION: 98 % | DIASTOLIC BLOOD PRESSURE: 70 MMHG | BODY MASS INDEX: 24.79 KG/M2 | HEIGHT: 64 IN | RESPIRATION RATE: 20 BRPM | TEMPERATURE: 98.2 F

## 2018-01-24 DIAGNOSIS — M79.641 BILATERAL HAND PAIN: ICD-10-CM

## 2018-01-24 DIAGNOSIS — M79.641 RIGHT HAND PAIN: Primary | ICD-10-CM

## 2018-01-24 DIAGNOSIS — R30.0 DYSURIA: ICD-10-CM

## 2018-01-24 DIAGNOSIS — R21 RASH OF GENITALIA: ICD-10-CM

## 2018-01-24 DIAGNOSIS — E11.9 CONTROLLED TYPE 2 DIABETES MELLITUS WITHOUT COMPLICATION, WITHOUT LONG-TERM CURRENT USE OF INSULIN (HCC): ICD-10-CM

## 2018-01-24 DIAGNOSIS — M79.642 BILATERAL HAND PAIN: ICD-10-CM

## 2018-01-24 LAB
BILIRUB UR QL STRIP: NEGATIVE
GLUCOSE UR-MCNC: ABNORMAL MG/DL
KETONES P FAST UR STRIP-MCNC: ABNORMAL MG/DL
PH UR STRIP: 5 [PH] (ref 4.6–8)
PROT UR QL STRIP: NEGATIVE
SP GR UR STRIP: 1 (ref 1–1.03)
UA UROBILINOGEN AMB POC: ABNORMAL (ref 0.2–1)
URINALYSIS CLARITY POC: CLEAR
URINALYSIS COLOR POC: YELLOW
URINE BLOOD POC: ABNORMAL
URINE LEUKOCYTES POC: NEGATIVE
URINE NITRITES POC: NEGATIVE

## 2018-01-24 RX ORDER — NYSTATIN 100000 [USP'U]/G
POWDER TOPICAL 4 TIMES DAILY
Qty: 1 BOTTLE | Refills: 3 | Status: SHIPPED | OUTPATIENT
Start: 2018-01-24 | End: 2018-01-31 | Stop reason: SDUPTHER

## 2018-01-24 RX ORDER — PREDNISONE 10 MG/1
TABLET ORAL
Qty: 21 TAB | Refills: 0 | Status: SHIPPED | OUTPATIENT
Start: 2018-01-24 | End: 2018-02-21 | Stop reason: ALTCHOICE

## 2018-01-24 RX ORDER — EMPAGLIFLOZIN 25 MG/1
TABLET, FILM COATED ORAL
Qty: 90 TAB | Refills: 0 | Status: SHIPPED | OUTPATIENT
Start: 2018-01-24 | End: 2018-04-24 | Stop reason: SDUPTHER

## 2018-01-24 NOTE — PATIENT INSTRUCTIONS
Rheumatoid Arthritis: Care Instructions  Your Care Instructions    Arthritis is a common health problem in which the joints are inflamed. There are many types of arthritis. In rheumatoid arthritis, the body's own immune system attacks the joints. This causes pain, stiffness, and swelling in the joints, especially in the hands and feet. It can become hard to open jars, write, and do other daily tasks. Sometimes rheumatoid arthritis can also cause bumps to form under the skin. Over time, rheumatoid arthritis can damage and deform joints. Early treatment with medicines may reduce your chances of having a lasting disability. Follow-up care is a key part of your treatment and safety. Be sure to make and go to all appointments, and call your doctor if you are having problems. It's also a good idea to know your test results and keep a list of the medicines you take. How can you care for yourself at home? · If your doctor recommends it, get more exercise. Walking is a good choice. If your knees or ankles hurt, try riding a stationary bike or swimming. · Move each joint gently through its full range of motion once or twice a day. · Rest joints when they are sore or overworked. Short rest breaks may help more than staying in bed. · Reach and stay at a healthy weight. Regular exercise and a healthy diet will help you do this. Extra weight can strain the joints, especially the knees and hips, and make the pain worse. Losing even a few pounds may help. · Get enough calcium and vitamin D to help prevent osteoporosis, which causes thin bones. Talk to your doctor about how much you should take. · Protect your joints from injury. Do not overuse them. Try to limit or avoid activities that cause joint pain or swelling. Use special kitchen tools and other self-help devices as well as walkers, splints, or canes if needed. · Use heat to ease pain. Take warm showers or baths. Use hot packs or a heating pad set on low.  Sleep under a warm electric blanket. · Put ice or a cold pack on the area for 10 to 20 minutes at a time. Put a thin cloth between the ice and your skin. · Take pain medicines exactly as directed. ¨ If the doctor gave you a prescription medicine for pain, take it as prescribed. ¨ If you are not taking a prescription pain medicine, ask your doctor if you can take an over-the-counter medicine. · Take an active role in managing your condition. Set up a treatment plan with your doctor, and learn as much as you can about rheumatoid arthritis. This will help you control pain and stay active. When should you call for help? Call your doctor now or seek immediate medical care if:  ? · You have a fever or a rash along with joint pain. ? · You have joint pain that is so severe that you cannot use the joint at all. ? · You have sudden swelling, redness, or pain in one or more joints, and you do not know why.   ? · You have back or neck pain along with weakness in your arms or legs. ? · You have a loss of bowel or bladder control. ? Watch closely for changes in your health, and be sure to contact your doctor if:  ? · You have joint pain that lasts for more than 6 weeks. ? · You have side effects from your arthritis medicines, such as stomach pain, nausea, heartburn, or dark and tarlike stools. Where can you learn more? Go to http://libby-raeann.info/. Enter K205 in the search box to learn more about \"Rheumatoid Arthritis: Care Instructions. \"  Current as of: October 31, 2016  Content Version: 11.4  © 8381-2166 [employer]. Care instructions adapted under license by [employer] (which disclaims liability or warranty for this information). If you have questions about a medical condition or this instruction, always ask your healthcare professional. [place] [address] any warranty or liability for your use of this information.        Rheumatoid Arthritis: Care Instructions  Your Care Instructions    Arthritis is a common health problem in which the joints are inflamed. There are many types of arthritis. In rheumatoid arthritis, the body's own immune system attacks the joints. This causes pain, stiffness, and swelling in the joints, especially in the hands and feet. It can become hard to open jars, write, and do other daily tasks. Sometimes rheumatoid arthritis can also cause bumps to form under the skin. Over time, rheumatoid arthritis can damage and deform joints. Early treatment with medicines may reduce your chances of having a lasting disability. Follow-up care is a key part of your treatment and safety. Be sure to make and go to all appointments, and call your doctor if you are having problems. It's also a good idea to know your test results and keep a list of the medicines you take. How can you care for yourself at home? · If your doctor recommends it, get more exercise. Walking is a good choice. If your knees or ankles hurt, try riding a stationary bike or swimming. · Move each joint gently through its full range of motion once or twice a day. · Rest joints when they are sore or overworked. Short rest breaks may help more than staying in bed. · Reach and stay at a healthy weight. Regular exercise and a healthy diet will help you do this. Extra weight can strain the joints, especially the knees and hips, and make the pain worse. Losing even a few pounds may help. · Get enough calcium and vitamin D to help prevent osteoporosis, which causes thin bones. Talk to your doctor about how much you should take. · Protect your joints from injury. Do not overuse them. Try to limit or avoid activities that cause joint pain or swelling. Use special kitchen tools and other self-help devices as well as walkers, splints, or canes if needed. · Use heat to ease pain. Take warm showers or baths. Use hot packs or a heating pad set on low.  Sleep under a warm electric blanket. · Put ice or a cold pack on the area for 10 to 20 minutes at a time. Put a thin cloth between the ice and your skin. · Take pain medicines exactly as directed. ¨ If the doctor gave you a prescription medicine for pain, take it as prescribed. ¨ If you are not taking a prescription pain medicine, ask your doctor if you can take an over-the-counter medicine. · Take an active role in managing your condition. Set up a treatment plan with your doctor, and learn as much as you can about rheumatoid arthritis. This will help you control pain and stay active. When should you call for help? Call your doctor now or seek immediate medical care if:  ? · You have a fever or a rash along with joint pain. ? · You have joint pain that is so severe that you cannot use the joint at all. ? · You have sudden swelling, redness, or pain in one or more joints, and you do not know why.   ? · You have back or neck pain along with weakness in your arms or legs. ? · You have a loss of bowel or bladder control. ? Watch closely for changes in your health, and be sure to contact your doctor if:  ? · You have joint pain that lasts for more than 6 weeks. ? · You have side effects from your arthritis medicines, such as stomach pain, nausea, heartburn, or dark and tarlike stools. Where can you learn more? Go to http://libby-raeann.info/. Enter K205 in the search box to learn more about \"Rheumatoid Arthritis: Care Instructions. \"  Current as of: October 31, 2016  Content Version: 11.4  © 1590-4286 [employer]. Care instructions adapted under license by [employer] (which disclaims liability or warranty for this information). If you have questions about a medical condition or this instruction, always ask your healthcare professional. [place] [address] any warranty or liability for your use of this information.        Vaginal Yeast Infection: Care Instructions  Your Care Instructions    A vaginal yeast infection is caused by too many yeast cells in the vagina. This is common in women of all ages. Itching, vaginal discharge and irritation, and other symptoms can bother you. But yeast infections don't often cause other health problems. Some medicines can increase your risk of getting a yeast infection. These include antibiotics, birth control pills, hormones, and steroids. You may also be more likely to get a yeast infection if you are pregnant, have diabetes, douche, or wear tight clothes. With treatment, most yeast infections get better in 2 to 3 days. Follow-up care is a key part of your treatment and safety. Be sure to make and go to all appointments, and call your doctor if you are having problems. It's also a good idea to know your test results and keep a list of the medicines you take. How can you care for yourself at home? · Take your medicines exactly as prescribed. Call your doctor if you think you are having a problem with your medicine. · Ask your doctor about over-the-counter (OTC) medicines for yeast infections. They may cost less than prescription medicines. If you use an OTC treatment, read and follow all instructions on the label. · Do not use tampons while using a vaginal cream or suppository. The tampons can absorb the medicine. Use pads instead. · Wear loose cotton clothing. Do not wear nylon or other fabric that holds body heat and moisture close to the skin. · Try sleeping without underwear. · Do not scratch. Relieve itching with a cold pack or a cool bath. · Do not wash your vaginal area more than once a day. Use plain water or a mild, unscented soap. Air-dry the vaginal area. · Change out of wet swimsuits after swimming. · Do not have sex until you have finished your treatment. · Do not douche. When should you call for help? Call your doctor now or seek immediate medical care if:  ? · You have unexpected vaginal bleeding.    ? · You have new or increased pain in your vagina or pelvis. ? Watch closely for changes in your health, and be sure to contact your doctor if:  ? · You have a fever. ? · You are not getting better after 2 days. ? · Your symptoms come back after you finish your medicines. Where can you learn more? Go to http://libby-raeann.info/. Enter S836 in the search box to learn more about \"Vaginal Yeast Infection: Care Instructions. \"  Current as of: October 13, 2016  Content Version: 11.4  © 7490-1981 [employer]. Care instructions adapted under license by [employer] (which disclaims liability or warranty for this information). If you have questions about a medical condition or this instruction, always ask your healthcare professional. [address] any warranty or liability for your use of this information. Vaginal Yeast Infection: Care Instructions  Your Care Instructions    A vaginal yeast infection is caused by too many yeast cells in the vagina. This is common in women of all ages. Itching, vaginal discharge and irritation, and other symptoms can bother you. But yeast infections don't often cause other health problems. Some medicines can increase your risk of getting a yeast infection. These include antibiotics, birth control pills, hormones, and steroids. You may also be more likely to get a yeast infection if you are pregnant, have diabetes, douche, or wear tight clothes. With treatment, most yeast infections get better in 2 to 3 days. Follow-up care is a key part of your treatment and safety. Be sure to make and go to all appointments, and call your doctor if you are having problems. It's also a good idea to know your test results and keep a list of the medicines you take. How can you care for yourself at home? · Take your medicines exactly as prescribed. Call your doctor if you think you are having a problem with your medicine.   · Ask your doctor about over-the-counter (OTC) medicines for yeast infections. They may cost less than prescription medicines. If you use an OTC treatment, read and follow all instructions on the label. · Do not use tampons while using a vaginal cream or suppository. The tampons can absorb the medicine. Use pads instead. · Wear loose cotton clothing. Do not wear nylon or other fabric that holds body heat and moisture close to the skin. · Try sleeping without underwear. · Do not scratch. Relieve itching with a cold pack or a cool bath. · Do not wash your vaginal area more than once a day. Use plain water or a mild, unscented soap. Air-dry the vaginal area. · Change out of wet swimsuits after swimming. · Do not have sex until you have finished your treatment. · Do not douche. When should you call for help? Call your doctor now or seek immediate medical care if:  ? · You have unexpected vaginal bleeding. ? · You have new or increased pain in your vagina or pelvis. ? Watch closely for changes in your health, and be sure to contact your doctor if:  ? · You have a fever. ? · You are not getting better after 2 days. ? · Your symptoms come back after you finish your medicines. Where can you learn more? Go to http://libby-raeann.info/. Enter I760 in the search box to learn more about \"Vaginal Yeast Infection: Care Instructions. \"  Current as of: October 13, 2016  Content Version: 11.4  © 0564-3919 [employer]. Care instructions adapted under license by [employer] (which disclaims liability or warranty for this information). If you have questions about a medical condition or this instruction, always ask your healthcare professional. [place] [address] any warranty or liability for your use of this information.

## 2018-01-24 NOTE — PROGRESS NOTES
CC:  Chief Complaint   Patient presents with    Joint Pain     Complains of left knee and hand pain; swelling in both hands     HISTORY OF PRESENT ILLNESS  Rody Che is a 78 y.o. female. HPI Comments: 70F with h/o Type II DM, well controlled. She also has a latent h/o RA and in the past, had been treated with MTX. Her previous Rheumatologist was a Dr. Babita Moreau. She was last on MTX in the 90's. Her symptoms and RA have been in remission until recently. She has started to notice swelling and tenderness of the MCP joints of the right hand especially along the 2nd and 3rd fingers. Her thumb on the right side is also swollen, but it is mostly the L-hand and she has noticed some hyperpigmentation over the joints. ROM is decreased. The area is tender and painful to touch. Joint Pain          ROS:  Review of Systems   Musculoskeletal: Positive for joint pain. All other systems reviewed and are negative. OBJECTIVE:  /70 (BP 1 Location: Left arm, BP Patient Position: Sitting) Comment: Manual  Pulse 88  Temp 98.2 °F (36.8 °C) (Oral)   Resp 20  Ht 5' 4\" (1.626 m)  Wt 145 lb 3.2 oz (65.9 kg)  SpO2 98%  BMI 24.92 kg/m2  Physical Exam   Cardiovascular: Normal rate and regular rhythm. Pulmonary/Chest: Effort normal and breath sounds normal.   Musculoskeletal:        Left knee: She exhibits bony tenderness. Tenderness found. Hands:  Nursing note and vitals reviewed.       LABS:  Results for orders placed or performed in visit on 01/24/18   AMB POC URINALYSIS DIP STICK AUTO W/O MICRO   Result Value Ref Range    Color (UA POC) Yellow     Clarity (UA POC) Clear     Glucose (UA POC) 2+ Negative    Bilirubin (UA POC) Negative Negative    Ketones (UA POC) Trace Negative    Specific gravity (UA POC) 1.005 1.001 - 1.035    Blood (UA POC) Trace Negative    pH (UA POC) 5.0 4.6 - 8.0    Protein (UA POC) Negative Negative    Urobilinogen (UA POC) 0.2 mg/dL 0.2 - 1    Nitrites (UA POC) Negative Negative Leukocyte esterase (UA POC) Negative Negative       ASSESSMENT and PLAN    ICD-10-CM ICD-9-CM    1. Right hand pain M79.641 729.5 XR HAND RT MIN 3 V      predniSONE (STERAPRED DS) 10 mg dose pack   2. Dysuria R30.0 788.1 AMB POC URINALYSIS DIP STICK AUTO W/O MICRO   3. Bilateral hand pain M79.641 729.5 XR HAND LT MIN 3 V    M79.642  XR HAND RT MIN 3 V   4. Rash of genitalia R21 782.1 nystatin (MYCOSTATIN) powder     67F with h/o RA and having increased pain and swelling in her hands especially over the MCP joints of the L-hand. She had been on MTX in the past. Has not had a flare of symptoms since 1990's. Also she c/o some dysuria, but UA dip was normal.     I have discussed the diagnosis with the patient and the intended treatment plan as seen in the above orders. The patient has received an after-visit summary and questions were answered concerning future plans. Asked to return should symptoms worsen or not improve with treatment. Any pending labs and studies will be relayed to patient when they become available. Pt verbalizes understanding of plan of care and denies further questions or concerns at this time. Follow-up Disposition:  Return if symptoms worsen or fail to improve.

## 2018-01-24 NOTE — PROGRESS NOTES
Identified pt with two pt identifiers(name and ). Chief Complaint   Patient presents with    Joint Pain     Complains of left knee and hand pain; swelling in both hands        Health Maintenance Due   Topic    FOOT EXAM Q1     EYE EXAM RETINAL OR DILATED Q1     DTaP/Tdap/Td series (1 - Tdap)    ZOSTER VACCINE AGE 60>     GLAUCOMA SCREENING Q2Y     MEDICARE YEARLY EXAM        Wt Readings from Last 3 Encounters:   18 145 lb 3.2 oz (65.9 kg)   17 146 lb 14.4 oz (66.6 kg)   10/25/17 155 lb 6.4 oz (70.5 kg)     Temp Readings from Last 3 Encounters:   18 98.2 °F (36.8 °C) (Oral)   17 97.5 °F (36.4 °C) (Oral)   04/20/15 98.1 °F (36.7 °C)     BP Readings from Last 3 Encounters:   18 128/70   17 134/70   10/25/17 158/75     Pulse Readings from Last 3 Encounters:   18 88   17 71   10/25/17 76         Learning Assessment:  :     No flowsheet data found. Depression Screening:  :     PHQ over the last two weeks 2018   Little interest or pleasure in doing things Not at all   Feeling down, depressed or hopeless Not at all   Total Score PHQ 2 0       Fall Risk Assessment:  :     Fall Risk Assessment, last 12 mths 2018   Able to walk? Yes   Fall in past 12 months? No       Abuse Screening:  :     No flowsheet data found. Coordination of Care Questionnaire:  :     1) Have you been to an emergency room, urgent care clinic since your last visit? no   Hospitalized since your last visit? no             2) Have you seen or consulted any other health care providers outside of [address] [place] [address] since your last visit? no  (Include any pap smears or colon screenings in this section.)    3) Do you have an Advance Directive on file? no  Are you interested in receiving information about Advance Directives? no    Reviewed record in preparation for visit and have obtained necessary documentation.   Medication reconciliation up to date and corrected with patient at this time.

## 2018-01-24 NOTE — MR AVS SNAPSHOT
[address] [place] [address] 
 
 
 [facility] [address] [facility] 
[phone] Patient: Reggie Mandujano MRN: [id number] GET:[id number]/41/[id number] Visit Information Date & Time Provider Department Dept. Phone Encounter #  
 1/24/2018  3:45 PM Yara [facility]janG[facility] [phone] [id number] Upcoming Health Maintenance Date Due  
 FOOT EXAM Q1 12/20/1961 EYE EXAM RETINAL OR DILATED Q1 12/20/1961 DTaP/Tdap/Td series (1 - Tdap) 7/17/2011 ZOSTER VACCINE AGE 60> 10/20/2011 GLAUCOMA SCREENING Q2Y 12/20/2016 MEDICARE YEARLY EXAM 12/20/2016 HEMOGLOBIN A1C Q6M 4/18/2018 Pneumococcal 65+ Low/Medium Risk (2 of 2 - PPSV23) 8/23/2018 LIPID PANEL Q1 10/17/2018 MICROALBUMIN Q1 10/25/2018 BREAST CANCER SCRN MAMMOGRAM 1/19/2019 Bone Densitometry 6/27/2019 COLONOSCOPY 4/16/2025 Allergies as of 1/24/2018  Review Complete On: 1/24/2018 By: Yara Lujan MD  
 No Known Allergies Current Immunizations  Reviewed on 11/21/2017 Name Date Influenza High Dose Vaccine PF 10/25/2017 Pneumococcal Conjugate (PCV-13) 8/23/2017, 6/23/2017 Td 7/16/2011 Not reviewed this visit You Were Diagnosed With   
  
 Codes Comments Right hand pain    -  Primary ICD-10-CM: L04.924 ICD-9-CM: 729.5 Dysuria     ICD-10-CM: R30.0 ICD-9-CM: 788.1 Bilateral hand pain     ICD-10-CM: M79.641, [id number] ICD-9-CM: 729.5 Rash of genitalia     ICD-10-CM: R21 
ICD-9-CM: 782.1 Vitals BP Pulse Temp Resp Height(growth percentile) Weight(growth percentile) 128/70 (BP 1 Location: Left arm, BP Patient Position: Sitting) 88 98.2 °F (36.8 °C) (Oral) 20 5' 4\" (1.626 m) 145 lb 3.2 oz (65.9 kg) SpO2 BMI OB Status Smoking Status 98% 24.92 kg/m2 Hysterectomy Never Smoker BMI and BSA Data Body Mass Index Body Surface Area 24.92 kg/m 2 1.73 m 2 Preferred Pharmacy Pharmacy Name Phone [address], [facility] Andry Arambula [phone] Your Updated Medication List  
  
   
This list is accurate as of: 1/24/18  5:09 PM.  Always use your most recent med list.  
  
  
  
  
 aspirin delayed-release 81 mg tablet Take 81 mg by mouth daily. atorvastatin 10 mg tablet Commonly known as:  LIPITOR Take 10 mg by mouth daily. calcium carbonate 200 mg calcium (500 mg) Chew Commonly known as:  TUMS Take 1 Tab by mouth daily. cyanocobalamin 1,000 mcg tablet Take 1,000 mcg by mouth daily. DAILY PROBIOTIC PO Take  by mouth. glipiZIDE 5 mg tablet Commonly known as:  Bridgett Nestle Take 5 mg by mouth daily. lisinopril 5 mg tablet Commonly known as:  Kaylyn Jose De Jesus Take 5 mg by mouth daily. multivitamin tablet Commonly known as:  ONE A DAY Take 1 Tab by mouth daily. nystatin powder Commonly known as:  MYCOSTATIN Apply  to affected area four (4) times daily. Apply until rash resolves. pneumococcal 23-valent 25 mcg/0.5 mL injection Commonly known as:  PNEUMOVAX 23  
0.5 mL by IntraMUSCular route PRIOR TO DISCHARGE for 1 dose. PriLOSEC OTC 20 mg tablet Generic drug:  omeprazole Take 20 mg by mouth daily. Prescriptions Sent to Pharmacy Refills  
 nystatin (MYCOSTATIN) powder 3 Sig: Apply  to affected area four (4) times daily. Apply until rash resolves. Class: Normal  
 [facility] [address], [address] [facility] #: [phone] Route: Topical  
  
We Performed the Following AMB POC URINALYSIS DIP STICK AUTO W/O MICRO [36052 CPT(R)] To-Do List   
 01/25/2018 Imaging:  XR HAND LT MIN 3 V   
  
 01/25/2018 Imaging:  XR HAND RT MIN 3 V Patient Instructions Rheumatoid Arthritis: Care Instructions Your Care Instructions Arthritis is a common health problem in which the joints are inflamed. There are many types of arthritis. In rheumatoid arthritis, the body's own immune system attacks the joints. This causes pain, stiffness, and swelling in the joints, especially in the hands and feet. It can become hard to open jars, write, and do other daily tasks. Sometimes rheumatoid arthritis can also cause bumps to form under the skin. Over time, rheumatoid arthritis can damage and deform joints. Early treatment with medicines may reduce your chances of having a lasting disability. Follow-up care is a key part of your treatment and safety. Be sure to make and go to all appointments, and call your doctor if you are having problems. It's also a good idea to know your test results and keep a list of the medicines you take. How can you care for yourself at home? · If your doctor recommends it, get more exercise. Walking is a good choice. If your knees or ankles hurt, try riding a stationary bike or swimming. · Move each joint gently through its full range of motion once or twice a day. · Rest joints when they are sore or overworked. Short rest breaks may help more than staying in bed. · Reach and stay at a healthy weight. Regular exercise and a healthy diet will help you do this. Extra weight can strain the joints, especially the knees and hips, and make the pain worse. Losing even a few pounds may help. · Get enough calcium and vitamin D to help prevent osteoporosis, which causes thin bones. Talk to your doctor about how much you should take. · Protect your joints from injury. Do not overuse them. Try to limit or avoid activities that cause joint pain or swelling. Use special kitchen tools and other self-help devices as well as walkers, splints, or canes if needed. · Use heat to ease pain. Take warm showers or baths. Use hot packs or a heating pad set on low. Sleep under a warm electric blanket. · Put ice or a cold pack on the area for 10 to 20 minutes at a time.  Put a thin cloth between the ice and your skin. · Take pain medicines exactly as directed. ¨ If the doctor gave you a prescription medicine for pain, take it as prescribed. ¨ If you are not taking a prescription pain medicine, ask your doctor if you can take an over-the-counter medicine. · Take an active role in managing your condition. Set up a treatment plan with your doctor, and learn as much as you can about rheumatoid arthritis. This will help you control pain and stay active. When should you call for help? Call your doctor now or seek immediate medical care if: 
? · You have a fever or a rash along with joint pain. ? · You have joint pain that is so severe that you cannot use the joint at all. ? · You have sudden swelling, redness, or pain in one or more joints, and you do not know why.  
? · You have back or neck pain along with weakness in your arms or legs. ? · You have a loss of bowel or bladder control. ? Watch closely for changes in your health, and be sure to contact your doctor if: 
? · You have joint pain that lasts for more than 6 weeks. ? · You have side effects from your arthritis medicines, such as stomach pain, nausea, heartburn, or dark and tarlike stools. Where can you learn more? Go to http://libby-raeann.info/. Enter K205 in the search box to learn more about \"Rheumatoid Arthritis: Care Instructions. \" Current as of: October 31, 2016 Content Version: 11.4 © 9941-2378 Micello. Care instructions adapted under license by [employer] (which disclaims liability or warranty for this information). If you have questions about a medical condition or this instruction, always ask your healthcare professional. [place] [address] any warranty or liability for your use of this information. Rheumatoid Arthritis: Care Instructions Your Care Instructions Arthritis is a common health problem in which the joints are inflamed. There are many types of arthritis. In rheumatoid arthritis, the body's own immune system attacks the joints. This causes pain, stiffness, and swelling in the joints, especially in the hands and feet. It can become hard to open jars, write, and do other daily tasks. Sometimes rheumatoid arthritis can also cause bumps to form under the skin. Over time, rheumatoid arthritis can damage and deform joints. Early treatment with medicines may reduce your chances of having a lasting disability. Follow-up care is a key part of your treatment and safety. Be sure to make and go to all appointments, and call your doctor if you are having problems. It's also a good idea to know your test results and keep a list of the medicines you take. How can you care for yourself at home? · If your doctor recommends it, get more exercise. Walking is a good choice. If your knees or ankles hurt, try riding a stationary bike or swimming. · Move each joint gently through its full range of motion once or twice a day. · Rest joints when they are sore or overworked. Short rest breaks may help more than staying in bed. · Reach and stay at a healthy weight. Regular exercise and a healthy diet will help you do this. Extra weight can strain the joints, especially the knees and hips, and make the pain worse. Losing even a few pounds may help. · Get enough calcium and vitamin D to help prevent osteoporosis, which causes thin bones. Talk to your doctor about how much you should take. · Protect your joints from injury. Do not overuse them. Try to limit or avoid activities that cause joint pain or swelling. Use special kitchen tools and other self-help devices as well as walkers, splints, or canes if needed. · Use heat to ease pain. Take warm showers or baths. Use hot packs or a heating pad set on low. Sleep under a warm electric blanket. · Put ice or a cold pack on the area for 10 to 20 minutes at a time. Put a thin cloth between the ice and your skin. · Take pain medicines exactly as directed. ¨ If the doctor gave you a prescription medicine for pain, take it as prescribed. ¨ If you are not taking a prescription pain medicine, ask your doctor if you can take an over-the-counter medicine. · Take an active role in managing your condition. Set up a treatment plan with your doctor, and learn as much as you can about rheumatoid arthritis. This will help you control pain and stay active. When should you call for help? Call your doctor now or seek immediate medical care if: 
? · You have a fever or a rash along with joint pain. ? · You have joint pain that is so severe that you cannot use the joint at all. ? · You have sudden swelling, redness, or pain in one or more joints, and you do not know why.  
? · You have back or neck pain along with weakness in your arms or legs. ? · You have a loss of bowel or bladder control. ? Watch closely for changes in your health, and be sure to contact your doctor if: 
? · You have joint pain that lasts for more than 6 weeks. ? · You have side effects from your arthritis medicines, such as stomach pain, nausea, heartburn, or dark and tarlike stools. Where can you learn more? Go to http://libby-raeann.info/. Enter K205 in the search box to learn more about \"Rheumatoid Arthritis: Care Instructions. \" Current as of: October 31, 2016 Content Version: 11.4 © 3843-2132 gdgt. Care instructions adapted under license by Robert Applebaum MD (which disclaims liability or warranty for this information). If you have questions about a medical condition or this instruction, always ask your healthcare professional. [place] [address] any warranty or liability for your use of this information. Vaginal Yeast Infection: Care Instructions Your Care Instructions A vaginal yeast infection is caused by too many yeast cells in the vagina. This is common in women of all ages. Itching, vaginal discharge and irritation, and other symptoms can bother you. But yeast infections don't often cause other health problems. Some medicines can increase your risk of getting a yeast infection. These include antibiotics, birth control pills, hormones, and steroids. You may also be more likely to get a yeast infection if you are pregnant, have diabetes, douche, or wear tight clothes. With treatment, most yeast infections get better in 2 to 3 days. Follow-up care is a key part of your treatment and safety. Be sure to make and go to all appointments, and call your doctor if you are having problems. It's also a good idea to know your test results and keep a list of the medicines you take. How can you care for yourself at home? · Take your medicines exactly as prescribed. Call your doctor if you think you are having a problem with your medicine. · Ask your doctor about over-the-counter (OTC) medicines for yeast infections. They may cost less than prescription medicines. If you use an OTC treatment, read and follow all instructions on the label. · Do not use tampons while using a vaginal cream or suppository. The tampons can absorb the medicine. Use pads instead. · Wear loose cotton clothing. Do not wear nylon or other fabric that holds body heat and moisture close to the skin. · Try sleeping without underwear. · Do not scratch. Relieve itching with a cold pack or a cool bath. · Do not wash your vaginal area more than once a day. Use plain water or a mild, unscented soap. Air-dry the vaginal area. · Change out of wet swimsuits after swimming. · Do not have sex until you have finished your treatment. · Do not douche. When should you call for help? Call your doctor now or seek immediate medical care if: 
? · You have unexpected vaginal bleeding. ? · You have new or increased pain in your vagina or pelvis. ? Watch closely for changes in your health, and be sure to contact your doctor if: 
? · You have a fever. ? · You are not getting better after 2 days. ? · Your symptoms come back after you finish your medicines. Where can you learn more? Go to http://libby-raeann.info/. Enter R324 in the search box to learn more about \"Vaginal Yeast Infection: Care Instructions. \" Current as of: October 13, 2016 Content Version: 11.4 © 5795-2567 Qpixel Technology. Care instructions adapted under license by Jukely (which disclaims liability or warranty for this information). If you have questions about a medical condition or this instruction, always ask your healthcare professional. [address] any warranty or liability for your use of this information. Vaginal Yeast Infection: Care Instructions Your Care Instructions A vaginal yeast infection is caused by too many yeast cells in the vagina. This is common in women of all ages. Itching, vaginal discharge and irritation, and other symptoms can bother you. But yeast infections don't often cause other health problems. Some medicines can increase your risk of getting a yeast infection. These include antibiotics, birth control pills, hormones, and steroids. You may also be more likely to get a yeast infection if you are pregnant, have diabetes, douche, or wear tight clothes. With treatment, most yeast infections get better in 2 to 3 days. Follow-up care is a key part of your treatment and safety. Be sure to make and go to all appointments, and call your doctor if you are having problems. It's also a good idea to know your test results and keep a list of the medicines you take. How can you care for yourself at home? · Take your medicines exactly as prescribed. Call your doctor if you think you are having a problem with your medicine. · Ask your doctor about over-the-counter (OTC) medicines for yeast infections. They may cost less than prescription medicines. If you use an OTC treatment, read and follow all instructions on the label. · Do not use tampons while using a vaginal cream or suppository. The tampons can absorb the medicine. Use pads instead. · Wear loose cotton clothing. Do not wear nylon or other fabric that holds body heat and moisture close to the skin. · Try sleeping without underwear. · Do not scratch. Relieve itching with a cold pack or a cool bath. · Do not wash your vaginal area more than once a day. Use plain water or a mild, unscented soap. Air-dry the vaginal area. · Change out of wet swimsuits after swimming. · Do not have sex until you have finished your treatment. · Do not douche. When should you call for help? Call your doctor now or seek immediate medical care if: 
? · You have unexpected vaginal bleeding. ? · You have new or increased pain in your vagina or pelvis. ? Watch closely for changes in your health, and be sure to contact your doctor if: 
? · You have a fever. ? · You are not getting better after 2 days. ? · Your symptoms come back after you finish your medicines. Where can you learn more? Go to http://libby-raeann.info/. Enter T177 in the search box to learn more about \"Vaginal Yeast Infection: Care Instructions. \" Current as of: October 13, 2016 Content Version: 11.4 © 7766-9596 InMyShow. Care instructions adapted under license by AdMoment (which disclaims liability or warranty for this information). If you have questions about a medical condition or this instruction, always ask your healthcare professional. [address] any warranty or liability for your use of this information. Introducing \[facility]\"" & HEALTH SERVICES!    
 Cande Roman introduces [facility] patient portal. Now you can access parts of your medical record, email your doctor's office, and request medication refills online. 1. In your internet browser, go to [email or web address]://[email or web address]. Cequint/[employer] 2. Click on the First Time User? Click Here link in the Sign In box. You will see the New Member Sign Up page. 3. Enter your Saborstudio Access Code exactly as it appears below. You will not need to use this code after youve completed the sign-up process. If you do not sign up before the expiration date, you must request a new code. · Saborstud[employer] Access Code: [email or web address] Expires: 4/24/2018  5:09 PM 
 
4. Enter the last four digits of your Social Security Number (xxxx) and Date of Birth (mm/dd/yyyy) as indicated and click Submit. You will be taken to the next sign-up page. 5. Create a Saborstudio ID. This will be your Saborstudio login ID and cannot be changed, so think of one that is secure and easy to remember. 6. Create a Saborstudio password. You can change your password at any time. 7. Enter your Password Reset Question and Answer. This can be used at a later time if you forget your password. 8. Enter your e-mail address. You will receive e-mail notification when new information is available in [address] 9. Click Sign Up. You can now view and download portions of your medical record. 10. Click the Download Summary menu link to download a portable copy of your medical information. If you have questions, please visit the Frequently Asked Questions section of the Saborstudio website. Remember, Sabor[employer] is NOT to be used for urgent needs. For medical emergencies, dial 911. Now available from your iPhone and Android! Please provide this summary of care documentation to your next provider. Your primary care clinician is listed as [address]. If you have any questions after today's visit, please call [phone].

## 2018-01-25 ENCOUNTER — TELEPHONE (OUTPATIENT)
Dept: FAMILY MEDICINE CLINIC | Age: 68
End: 2018-01-25

## 2018-01-25 ENCOUNTER — HOSPITAL ENCOUNTER (OUTPATIENT)
Dept: GENERAL RADIOLOGY | Age: 68
Discharge: HOME | End: 2018-01-25
Attending: INTERNAL MEDICINE
Payer: MEDICARE

## 2018-01-25 DIAGNOSIS — M79.641 BILATERAL HAND PAIN: ICD-10-CM

## 2018-01-25 DIAGNOSIS — R21 RASH OF GENITALIA: ICD-10-CM

## 2018-01-25 DIAGNOSIS — M79.642 BILATERAL HAND PAIN: ICD-10-CM

## 2018-01-25 DIAGNOSIS — M79.641 RIGHT HAND PAIN: ICD-10-CM

## 2018-01-25 DIAGNOSIS — B35.6 TINEA CRURIS: Primary | ICD-10-CM

## 2018-01-25 PROCEDURE — 73130 X-RAY EXAM OF HAND: CPT

## 2018-01-25 NOTE — TELEPHONE ENCOUNTER
Pharmacy needs a call about medication nystatin (MYCOSTATIN) powder needs more detailed instructions

## 2018-01-26 RX ORDER — NYSTATIN 100000 [USP'U]/G
POWDER TOPICAL 4 TIMES DAILY
Qty: 1 BOTTLE | Refills: 3 | Status: CANCELLED | OUTPATIENT
Start: 2018-01-26

## 2018-01-30 ENCOUNTER — TELEPHONE (OUTPATIENT)
Dept: FAMILY MEDICINE CLINIC | Age: 68
End: 2018-01-30

## 2018-01-31 DIAGNOSIS — R21 RASH OF GENITALIA: ICD-10-CM

## 2018-01-31 NOTE — TELEPHONE ENCOUNTER
Rec'd notification from pt's pharmacy yesterday that per insurance they required #grams/day and bottle size. This was corrected on paper and faxed back to luzmaria. I have entered the details so her next script will go through without incident.

## 2018-02-02 RX ORDER — NYSTATIN 100000 [USP'U]/G
POWDER TOPICAL 4 TIMES DAILY
Qty: 1 BOTTLE | Refills: 3
Start: 2018-02-02 | End: 2020-04-21 | Stop reason: ALTCHOICE

## 2018-02-21 ENCOUNTER — OFFICE VISIT (OUTPATIENT)
Dept: FAMILY MEDICINE CLINIC | Age: 68
End: 2018-02-21

## 2018-02-21 VITALS
HEART RATE: 77 BPM | TEMPERATURE: 98.1 F | RESPIRATION RATE: 20 BRPM | WEIGHT: 147.8 LBS | BODY MASS INDEX: 25.23 KG/M2 | DIASTOLIC BLOOD PRESSURE: 68 MMHG | SYSTOLIC BLOOD PRESSURE: 134 MMHG | OXYGEN SATURATION: 97 % | HEIGHT: 64 IN

## 2018-02-21 DIAGNOSIS — J01.90 ACUTE SINUSITIS, RECURRENCE NOT SPECIFIED, UNSPECIFIED LOCATION: Primary | ICD-10-CM

## 2018-02-21 RX ORDER — CEPHALEXIN 500 MG/1
500 CAPSULE ORAL 3 TIMES DAILY
Qty: 30 CAP | Refills: 0 | Status: SHIPPED | OUTPATIENT
Start: 2018-02-21 | End: 2018-03-03

## 2018-02-21 RX ORDER — PETROLATUM,WHITE/LANOLIN
OINTMENT (GRAM) TOPICAL 3 TIMES DAILY
COMMUNITY
End: 2018-05-16 | Stop reason: ALTCHOICE

## 2018-02-21 NOTE — PROGRESS NOTES
Subjective:   Can Ag is a 78 y.o. female who complains of congestion, sore throat, cough and hoarseness for 3 days, gradually worsening since that time. Brownish mucus produced. She denies a history of fevers, shortness of breath and wheezing. Evaluation to date: none. Treatment to date: OTC products. Patient does not smoke cigarettes. Relevant PMH: No pertinent additional PMH. Current Outpatient Prescriptions   Medication Sig Dispense Refill    glucosamine sulfate 500 mg capsule Take  by mouth three (3) times daily.  nystatin (MYCOSTATIN) powder Apply  to affected area four (4) times daily. Apply 1-4 grams every day until rash resolves. 1 bottle = 60 grams 1 Bottle 3    JARDIANCE 25 mg tablet TAKE ONE TABLET BY MOUTH DAILY 90 Tab 0    calcium carbonate (TUMS) 200 mg calcium (500 mg) chew Take 1 Tab by mouth daily.  cyanocobalamin 1,000 mcg tablet Take 1,000 mcg by mouth daily.  atorvastatin (LIPITOR) 10 mg tablet Take 10 mg by mouth daily.  multivitamin (ONE A DAY) tablet Take 1 Tab by mouth daily.  L. ACIDOPHILUS/BIFID. ANIMALIS (DAILY PROBIOTIC PO) Take  by mouth.  lisinopril (PRINIVIL, ZESTRIL) 5 mg tablet Take 5 mg by mouth daily.  glipiZIDE (GLUCOTROL) 5 mg tablet Take 5 mg by mouth daily.  aspirin delayed-release 81 mg tablet Take 81 mg by mouth daily.  omeprazole (PRILOSEC OTC) 20 mg tablet Take 20 mg by mouth daily.  pneumococcal 23-valent (PNEUMOVAX 23) 25 mcg/0.5 mL injection 0.5 mL by IntraMUSCular route PRIOR TO DISCHARGE for 1 dose. 0.5 mL 0     No Known Allergies     Review of Systems  Pertinent items are noted in HPI.     Objective:     Visit Vitals    /68 (BP 1 Location: Left arm, BP Patient Position: Sitting)  Comment: manual    Pulse 77    Temp 98.1 °F (36.7 °C) (Oral)    Resp 20    Ht 5' 4\" (1.626 m)    Wt 147 lb 12.8 oz (67 kg)    SpO2 97%    BMI 25.37 kg/m2     General:  alert, cooperative, no distress Eyes: negative   Ears: normal TM's and external ear canals AU   Sinuses: tenderness over bilateral maxillary and frontal   Mouth:  Lips, mucosa, and tongue normal. Teeth and gums normal   Neck: supple, symmetrical, trachea midline and no adenopathy. Heart: S1 and S2 normal, no murmurs noted. Lungs: clear to auscultation bilaterally   Abdomen:         Assessment/Plan:   sinusitis      ICD-10-CM ICD-9-CM    1. Acute sinusitis, recurrence not specified, unspecified location J01.90 461.9 cephALEXin (KEFLEX) 500 mg capsule   .

## 2018-02-21 NOTE — PROGRESS NOTES
Identified pt with two pt identifiers(name and ). Chief Complaint   Patient presents with    Sinus Infection     started this past weekend - took robitussin - [facility] even though he was negative for the flu     Ear Fullness     ears started feeling funny this morning     Laryngitis        Health Maintenance Due   Topic    FOOT EXAM Q1     EYE EXAM RETINAL OR DILATED Q1     DTaP/Tdap/Td series (1 - Tdap)    ZOSTER VACCINE AGE 60>     GLAUCOMA SCREENING Q2Y     MEDICARE YEARLY EXAM        Wt Readings from Last 3 Encounters:   18 147 lb 12.8 oz (67 kg)   18 145 lb 3.2 oz (65.9 kg)   17 146 lb 14.4 oz (66.6 kg)     Temp Readings from Last 3 Encounters:   18 98.1 °F (36.7 °C) (Oral)   18 98.2 °F (36.8 °C) (Oral)   17 97.5 °F (36.4 °C) (Oral)     BP Readings from Last 3 Encounters:   18 134/68   18 128/70   17 134/70     Pulse Readings from Last 3 Encounters:   18 77   18 88   17 71         Learning Assessment:  :     Learning Assessment 2018   PRIMARY LEARNER Patient   HIGHEST LEVEL OF EDUCATION - PRIMARY LEARNER  2 YEARS OF COLLEGE   BARRIERS PRIMARY LEARNER NONE   CO-LEARNER CAREGIVER No   PRIMARY LANGUAGE ENGLISH   LEARNER PREFERENCE PRIMARY OTHER (COMMENT)   ANSWERED BY self   RELATIONSHIP SELF       Depression Screening:  :     PHQ over the last two weeks 2018   Little interest or pleasure in doing things Not at all   Feeling down, depressed or hopeless Not at all   Total Score PHQ 2 0       Fall Risk Assessment:  :     Fall Risk Assessment, last 12 mths 2018   Able to walk? Yes   Fall in past 12 months? No       Abuse Screening:  :     Abuse Screening Questionnaire 2018   Do you ever feel afraid of your partner? N   Are you in a relationship with someone who physically or mentally threatens you? N   Is it safe for you to go home?  Benoit Molina Coordination of Care Questionnaire:  :     1) Have you been to an emergency room, urgent care clinic since your last visit? no   Hospitalized since your last visit? no             2) Have you seen or consulted any other health care providers outside of [address] [place] [address] since your last visit? no  (Include any pap smears or colon screenings in this section.)    3) Do you have an Advance Directive on file? no  Are you interested in receiving information about Advance Directives? Yes paper work given and explained. Patient is accompanied by granddaughter I have received verbal consent from Dequan Salazar to discuss any/all medical information while they are present in the room. Reviewed record in preparation for visit and have obtained necessary documentation. Medication reconciliation up to date and corrected with patient at this time.

## 2018-02-21 NOTE — PATIENT INSTRUCTIONS
Sinusitis: Care Instructions  Your Care Instructions    Sinusitis is an infection of the lining of the sinus cavities in your head. Sinusitis often follows a cold. It causes pain and pressure in your head and face. In most cases, sinusitis gets better on its own in 1 to 2 weeks. But some mild symptoms may last for several weeks. Sometimes antibiotics are needed. Follow-up care is a key part of your treatment and safety. Be sure to make and go to all appointments, and call your doctor if you are having problems. It's also a good idea to know your test results and keep a list of the medicines you take. How can you care for yourself at home? · Take an over-the-counter pain medicine, such as acetaminophen (Tylenol), ibuprofen (Advil, Motrin), or naproxen (Aleve). Read and follow all instructions on the label. · If the doctor prescribed antibiotics, take them as directed. Do not stop taking them just because you feel better. You need to take the full course of antibiotics. · Be careful when taking over-the-counter cold or flu medicines and Tylenol at the same time. Many of these medicines have acetaminophen, which is Tylenol. Read the labels to make sure that you are not taking more than the recommended dose. Too much acetaminophen (Tylenol) can be harmful. · Breathe warm, moist air from a steamy shower, a hot bath, or a sink filled with hot water. Avoid cold, dry air. Using a humidifier in your home may help. Follow the directions for cleaning the machine. · Use saline (saltwater) nasal washes to help keep your nasal passages open and wash out mucus and bacteria. You can buy saline nose drops at a grocery store or drugstore. Or you can make your own at home by adding 1 teaspoon of salt and 1 teaspoon of baking soda to 2 cups of distilled water. If you make your own, fill a bulb syringe with the solution, insert the tip into your nostril, and squeeze gently. Donny Evener your nose.   · Put a hot, wet towel or a warm gel pack on your face 3 or 4 times a day for 5 to 10 minutes each time. · Try a decongestant nasal spray like oxymetazoline (Afrin). Do not use it for more than 3 days in a row. Using it for more than 3 days can make your congestion worse. When should you call for help? Call your doctor now or seek immediate medical care if:  ? · You have new or worse swelling or redness in your face or around your eyes. ? · You have a new or higher fever. ? Watch closely for changes in your health, and be sure to contact your doctor if:  ? · You have new or worse facial pain. ? · The mucus from your nose becomes thicker (like pus) or has new blood in it. ? · You are not getting better as expected. Where can you learn more? Go to http://libby-raeann.info/. Enter G943 in the search box to learn more about \"Sinusitis: Care Instructions. \"  Current as of: May 12, 2017  Content Version: 11.4  © 4847-6263 Healthwise, Incorporated. Care instructions adapted under license by [employer] (which disclaims liability or warranty for this information). If you have questions about a medical condition or this instruction, always ask your healthcare professional. [place] [address] any warranty or liability for your use of this information.

## 2018-02-21 NOTE — MR AVS SNAPSHOT
[facility] 
 
 
 [place]  [address] [facility] 
[phone] Patient: Pastor Mcfadden MRN: [id number] JACOB:[birth year] Visit Information Date & Time Provider Department Dept. Phone Encounter #  
 [id number]  6:01 PM Boni Gutierrez [phone] Upcoming Health Maintenance Date Due  
 FOOT EXAM Q1 12/20/1961 EYE EXAM RETINAL OR DILATED Q1 12/20/1961 DTaP/Tdap/Td series (1 - Tdap) 7/17/2011 ZOSTER VACCINE AGE 60> 10/20/2011 GLAUCOMA SCREENING Q2Y 12/20/2016 MEDICARE YEARLY EXAM 12/20/2016 HEMOGLOBIN A1C Q6M 4/18/2018 Pneumococcal 65+ Low/Medium Risk (2 of 2 - PPSV23) 8/23/2018 LIPID PANEL Q1 10/17/2018 MICROALBUMIN Q1 10/25/2018 BREAST CANCER SCRN MAMMOGRAM 1/19/2019 Bone Densitometry 6/27/2019 COLONOSCOPY 4/16/2025 Allergies as of 2/21/2018  Review Complete On: 5/92/2197 By: Padmini Mcginnis MD  
 No Known Allergies Current Immunizations  Reviewed on 11/21/2017 Name Date Influenza High Dose Vaccine PF 10/25/2017 Pneumococcal Conjugate (PCV-13) 8/23/2017, 6/23/2017 Td 7/16/2011 Not reviewed this visit You Were Diagnosed With   
  
 Codes Comments Acute sinusitis, recurrence not specified, unspecified location    -  Primary ICD-10-CM: J01.90 ICD-9-CM: 461.9 Vitals BP Pulse Temp Resp Height(growth percentile) Weight(growth percentile) 134/68 (BP 1 Location: Left arm, BP Patient Position: Sitting) 77 98.1 °F (36.7 °C) (Oral) 20 5' 4\" (1.626 m) 147 lb 12.8 oz (67 kg) SpO2 BMI OB Status Smoking Status 97% 25.37 kg/m2 Hysterectomy Never Smoker Vitals History BMI and BSA Data Body Mass Index Body Surface Area  
 25.37 kg/m 2 1.74 m 2 Preferred Pharmacy Pharmacy Name Phone [facility] [address] [place] [address] [phone] Your Updated Medication List  
  
   
 This list is accurate as of 2/21/18  3:13 PM.  Always use your most recent med list.  
  
  
  
  
 aspirin delayed-release 81 mg tablet Take 81 mg by mouth daily. atorvastatin 10 mg tablet Commonly known as:  LIPITOR Take 10 mg by mouth daily. calcium carbonate 200 mg calcium (500 mg) Chew Commonly known as:  TUMS Take 1 Tab by mouth daily. cephALEXin 500 mg capsule Commonly known as:  [place] Click Take 1 Cap by mouth three (3) times daily for 10 days. cyanocobalamin 1,000 mcg tablet Take 1,000 mcg by mouth daily. DAILY PROBIOTIC PO Take  by mouth. glipiZIDE 5 mg tablet Commonly known as:  Ray Minor Take 5 mg by mouth daily. glucosamine sulfate 500 mg capsule Take  by mouth three (3) times daily. JARDIANCE 25 mg tablet Generic drug:  empagliflozin TAKE ONE TABLET BY MOUTH DAILY  
  
 lisinopril 5 mg tablet Commonly known as:  James Dubonnet Take 5 mg by mouth daily. multivitamin tablet Commonly known as:  ONE A DAY Take 1 Tab by mouth daily. nystatin powder Commonly known as:  MYCOSTATIN Apply  to affected area four (4) times daily. Apply 1-4 grams every day until rash resolves. 1 bottle = 60 grams  
  
 pneumococcal 23-valent 25 mcg/0.5 mL injection Commonly known as:  PNEUMOVAX 23  
0.5 mL by IntraMUSCular route PRIOR TO DISCHARGE for 1 dose. PriLOSEC OTC 20 mg tablet Generic drug:  omeprazole Take 20 mg by mouth daily. Prescriptions Sent to Pharmacy Refills  
 cephALEXin (KEFLEX) 500 mg capsule 0 Sig: Take 1 Cap by mouth three (3) times daily for 10 days. Class: Normal  
 Pharmacy: Tessa Bell [address] [place] [address], [facility] [place]: [phone] Route: Oral  
  
Patient Instructions Sinusitis: Care Instructions Your Care Instructions Sinusitis is an infection of the lining of the sinus cavities in your head. Sinusitis often follows a cold. It causes pain and pressure in your head and face. In most cases, sinusitis gets better on its own in 1 to 2 weeks. But some mild symptoms may last for several weeks. Sometimes antibiotics are needed. Follow-up care is a key part of your treatment and safety. Be sure to make and go to all appointments, and call your doctor if you are having problems. It's also a good idea to know your test results and keep a list of the medicines you take. How can you care for yourself at home? · Take an over-the-counter pain medicine, such as acetaminophen (Tylenol), ibuprofen (Advil, Motrin), or naproxen (Aleve). Read and follow all instructions on the label. · If the doctor prescribed antibiotics, take them as directed. Do not stop taking them just because you feel better. You need to take the full course of antibiotics. · Be careful when taking over-the-counter cold or flu medicines and Tylenol at the same time. Many of these medicines have acetaminophen, which is Tylenol. Read the labels to make sure that you are not taking more than the recommended dose. Too much acetaminophen (Tylenol) can be harmful. · Breathe warm, moist air from a steamy shower, a hot bath, or a sink filled with hot water. Avoid cold, dry air. Using a humidifier in your home may help. Follow the directions for cleaning the machine. · Use saline (saltwater) nasal washes to help keep your nasal passages open and wash out mucus and bacteria. You can buy saline nose drops at a grocery store or drugstore. Or you can make your own at home by adding 1 teaspoon of salt and 1 teaspoon of baking soda to 2 cups of distilled water. If you make your own, fill a bulb syringe with the solution, insert the tip into your nostril, and squeeze gently. Mitchell Makeda your nose. · Put a hot, wet towel or a warm gel pack on your face 3 or 4 times a day for 5 to 10 minutes each time. · Try a decongestant nasal spray like oxymetazoline (Afrin). Do not use it for more than 3 days in a row. Using it for more than 3 days can make your congestion worse. When should you call for help? Call your doctor now or seek immediate medical care if: 
? · You have new or worse swelling or redness in your face or around your eyes. ? · You have a new or higher fever. ? Watch closely for changes in your health, and be sure to contact your doctor if: 
? · You have new or worse facial pain. ? · The mucus from your nose becomes thicker (like pus) or has new blood in it. ? · You are not getting better as expected. Where can you learn more? Go to http://libby-raeann.info/. Enter Z098 in the search box to learn more about \"Sinusitis: Care Instructions. \" Current as of: May 12, 2017 Content Version: 11.4 © 2224-0523 [employer]. Care instructions adapted under license by [employer] (which disclaims liability or warranty for this information). If you have questions about a medical condition or this instruction, always ask your healthcare professional. [place] [address] any warranty or liability for your use of this information. Introducing [facility]! [employer] introduces Newsle patient portal. Now you can access parts of your medical record, email your doctor's office, and request medication refills online. 1. In your internet browser, go to [email or web address] "[email or web address]"/[employer] 2. Click on the First Time User? Click Here link in the Sign In box. You will see the New Member Sign Up page. 3. Enter your Newsle Access Code exactly as it appears below. You will not need to use this code after youve completed the sign-up process. If you do not sign up before the expiration date, you must request a new code. · Newsle Access Code: [email or web address] Expires: 4/24/2018  5:09 PM 
 
 4. Enter the last four digits of your Social Security Number (xxxx) and Date of Birth (mm/dd/yyyy) as indicated and click Submit. You will be taken to the next sign-up page. 5. Create a Enlightened Lifestyle ID. This will be your Enlightened Lifestyle login ID and cannot be changed, so think of one that is secure and easy to remember. 6. Create a Enlightened Lifestyle password. You can change your password at any time. 7. Enter your Password Reset Question and Answer. This can be used at a later time if you forget your password. 8. Enter your e-mail address. You will receive e-mail notification when new information is available in [address] 9. Click Sign Up. You can now view and download portions of your medical record. 10. Click the Download Summary menu link to download a portable copy of your medical information. If you have questions, please visit the Frequently Asked Questions section of the Enlightened Lifestyle website. Remember, Enlightened Lifestyle is NOT to be used for urgent needs. For medical emergencies, dial 911. Now available from your iPhone and Android! Please provide this summary of care documentation to your next provider. Your primary care clinician is listed as [address]. If you have any questions after today's visit, please call [phone].

## 2018-03-29 ENCOUNTER — OFFICE VISIT (OUTPATIENT)
Dept: FAMILY MEDICINE CLINIC | Age: 68
End: 2018-03-29

## 2018-03-29 ENCOUNTER — HOSPITAL ENCOUNTER (OUTPATIENT)
Dept: LAB | Age: 68
Discharge: HOME | End: 2018-03-29
Payer: MEDICARE

## 2018-03-29 VITALS
HEART RATE: 83 BPM | TEMPERATURE: 98.2 F | SYSTOLIC BLOOD PRESSURE: 134 MMHG | BODY MASS INDEX: 25.16 KG/M2 | DIASTOLIC BLOOD PRESSURE: 80 MMHG | OXYGEN SATURATION: 98 % | RESPIRATION RATE: 20 BRPM | HEIGHT: 64 IN | WEIGHT: 147.4 LBS

## 2018-03-29 DIAGNOSIS — E55.9 VITAMIN D DEFICIENCY: ICD-10-CM

## 2018-03-29 DIAGNOSIS — E78.00 PURE HYPERCHOLESTEROLEMIA: ICD-10-CM

## 2018-03-29 DIAGNOSIS — E11.9 CONTROLLED TYPE 2 DIABETES MELLITUS WITHOUT COMPLICATION, WITHOUT LONG-TERM CURRENT USE OF INSULIN (HCC): ICD-10-CM

## 2018-03-29 DIAGNOSIS — Z00.00 MEDICARE ANNUAL WELLNESS VISIT, SUBSEQUENT: Primary | ICD-10-CM

## 2018-03-29 DIAGNOSIS — I10 BENIGN ESSENTIAL HYPERTENSION: ICD-10-CM

## 2018-03-29 PROCEDURE — 36415 COLL VENOUS BLD VENIPUNCTURE: CPT

## 2018-03-29 PROCEDURE — 86141 C-REACTIVE PROTEIN HS: CPT

## 2018-03-29 PROCEDURE — 82306 VITAMIN D 25 HYDROXY: CPT

## 2018-03-29 PROCEDURE — 83036 HEMOGLOBIN GLYCOSYLATED A1C: CPT

## 2018-03-29 PROCEDURE — 85025 COMPLETE CBC W/AUTO DIFF WBC: CPT

## 2018-03-29 PROCEDURE — 80053 COMPREHEN METABOLIC PANEL: CPT

## 2018-03-29 PROCEDURE — 80061 LIPID PANEL: CPT

## 2018-03-29 RX ORDER — LISINOPRIL 5 MG/1
5 TABLET ORAL DAILY
Qty: 90 TAB | Refills: 1 | Status: SHIPPED | OUTPATIENT
Start: 2018-03-29 | End: 2018-06-27

## 2018-03-29 RX ORDER — ATORVASTATIN CALCIUM 10 MG/1
10 TABLET, FILM COATED ORAL DAILY
Qty: 90 TAB | Refills: 1 | Status: SHIPPED | OUTPATIENT
Start: 2018-03-29 | End: 2018-06-27

## 2018-03-29 RX ORDER — GLIPIZIDE 5 MG/1
5 TABLET ORAL DAILY
Qty: 90 TAB | Refills: 1 | Status: SHIPPED | OUTPATIENT
Start: 2018-03-29 | End: 2018-03-29

## 2018-03-29 NOTE — PROGRESS NOTES
Identified pt with two pt identifiers(name and ). Chief Complaint   Patient presents with   Geno Flores Annual Wellness Visit     Medicare wellness check        Health Maintenance Due   Topic    FOOT EXAM Q1     DTaP/Tdap/Td series (1 - Tdap)    ZOSTER VACCINE AGE 60>     MEDICARE YEARLY EXAM     HEMOGLOBIN A1C Q6M        Wt Readings from Last 3 Encounters:   18 147 lb 12.8 oz (67 kg)   18 145 lb 3.2 oz (65.9 kg)   17 146 lb 14.4 oz (66.6 kg)     Temp Readings from Last 3 Encounters:   18 98.1 °F (36.7 °C) (Oral)   18 98.2 °F (36.8 °C) (Oral)   17 97.5 °F (36.4 °C) (Oral)     BP Readings from Last 3 Encounters:   18 134/68   18 128/70   17 134/70     Pulse Readings from Last 3 Encounters:   18 77   18 88   17 71         Learning Assessment:  :     Learning Assessment 2018   PRIMARY LEARNER Patient   HIGHEST LEVEL OF EDUCATION - PRIMARY LEARNER  2 YEARS OF COLLEGE   BARRIERS PRIMARY LEARNER NONE   CO-LEARNER CAREGIVER No   PRIMARY LANGUAGE ENGLISH   LEARNER PREFERENCE PRIMARY OTHER (COMMENT)   ANSWERED BY self   RELATIONSHIP SELF       Depression Screening:  :     PHQ over the last two weeks 3/29/2018   Little interest or pleasure in doing things Not at all   Feeling down, depressed or hopeless Not at all   Total Score PHQ 2 0       Fall Risk Assessment:  :     Fall Risk Assessment, last 12 mths 3/29/2018   Able to walk? Yes   Fall in past 12 months? No       Abuse Screening:  :     Abuse Screening Questionnaire 2018   Do you ever feel afraid of your partner? N   Are you in a relationship with someone who physically or mentally threatens you? N   Is it safe for you to go home?  Y       Coordination of Care Questionnaire:  :     1) Have you been to an emergency room, urgent care clinic since your last visit? no   Hospitalized since your last visit? no             2) Have you seen or consulted any other health care providers outside of [facility] since your last visit? no  (Include any pap smears or colon screenings in this section.)    3) Do you have an Advance Directive on file? no  Are you interested in receiving information about Advance Directives? no    Reviewed record in preparation for visit and have obtained necessary documentation. Medication reconciliation up to date and corrected with patient at this time.

## 2018-03-29 NOTE — PROGRESS NOTES
This is the Subsequent Medicare Annual Wellness Exam, performed 12 months or more after the Initial AWV or the last Subsequent AWV    I have reviewed the patient's medical history in detail and updated the computerized patient record. History     Past Medical History:   Diagnosis Date    Acid reflux     Colitis 04/16/2015    Colitis     [facility] mellitus, [facility] II [facility] [address]     Diverticulosis of colon 04/20/2015    Hyperlipemia     Hypertension     Osteopenia     of the spine Dexa done 2014, 2017 and due 2019    RA (rheumatoid arthritis) ([facility] [address]       Past Surgical History:   Procedure Laterality Date    ABDOMEN SURGERY PROC UNLISTED      HX COLONOSCOPY  04/20/2015    Due 2023    HX GYN      HX HYSTERECTOMY      HX TOTAL ABDOMINAL HYSTERECTOMY      [place] BIOPSY OF BREAST, NEEDLE CORE  2017    benign findings - Fibrosis     Current Outpatient Prescriptions   Medication Sig Dispense Refill    atorvastatin (LIPITOR) 10 mg tablet Take 1 Tab by mouth daily for 90 days. 90 Tab 1    glipiZIDE (GLUCOTROL) 5 mg tablet Take 1 Tab by mouth daily for 90 days. 90 Tab 1    lisinopril (PRINIVIL, ZESTRIL) 5 mg tablet Take 1 Tab by mouth daily for 90 days. 90 Tab 1    BLOOD SUGAR DIAGNOSTIC (ONE TOUCH TEST) 1 Strip by SubCUTAneous route daily. Indications: fingerstick daily      glucose blood VI test strips (ONETOUCH ULTRA TEST) strip Test FB daily. Ell.9. Type II DM 50 Strip 5    glucosamine sulfate 500 mg capsule Take  by mouth three (3) times daily.  JARDIANCE 25 mg tablet TAKE ONE TABLET BY MOUTH DAILY 90 Tab 0    calcium carbonate (TUMS) 200 mg calcium (500 mg) chew Take 1 Tab by mouth daily.  cyanocobalamin 1,000 mcg tablet Take 1,000 mcg by mouth daily.  multivitamin (ONE A DAY) tablet Take 1 Tab by mouth daily.  L. ACIDOPHILUS/BIFID. ANIMALIS (DAILY PROBIOTIC PO) Take  by mouth.  aspirin delayed-release 81 mg tablet Take 81 mg by mouth daily.       omeprazole (PRILOSEC OTC) 20 mg tablet Take 20 mg by mouth daily.  nystatin (MYCOSTATIN) powder Apply  to affected area four (4) times daily. Apply 1-4 grams every day until rash resolves. 1 bottle = 60 grams 1 Bottle 3    pneumococcal 23-valent (PNEUMOVAX 23) 25 mcg/0.5 mL injection 0.5 mL by IntraMUSCular route PRIOR TO DISCHARGE for 1 dose. 0.5 mL 0     No Known Allergies  Family History   Problem Relation Age of Onset    Other Mother      diverticulosis    Heart Attack Mother     Heart Failure Father     Pulmonary Embolism Sister     Diabetes Brother     Cancer Brother      brain cancer     Social History   Substance Use Topics    Smoking status: Never Smoker    Smokeless tobacco: Never Used    Alcohol use No     Patient Active Problem List   Diagnosis Code    Diverticulosis K57.90    Infectious gastroenteritis and colitis A09    Benign essential hypertension I10    Diabetes mellitus type II, controlled ([facility] [address] E11.9    Anemia D64.9    Anemia associated with acute blood loss D62    History of rheumatoid arthritis Z87.39    Osteopenia of both lower legs M85.861, M85.862       Depression Risk Factor Screening:     PHQ over the last two weeks 3/29/2018   Little interest or pleasure in doing things Not at all   Feeling down, depressed or hopeless Not at all   Total Score PHQ 2 0     Alcohol Risk Factor Screening: You do not drink alcohol or very rarely. Functional Ability and Level of Safety:   Hearing Loss  Hearing is good. Activities of Daily Living  The home contains: handrails and grab bars  Patient does total self care    Fall Risk  Fall Risk Assessment, last 12 mths 3/29/2018   Able to walk? Yes   Fall in past 12 months?  No       Abuse Screen  Patient is not abused    Cognitive Screening   Evaluation of Cognitive Function:  Has your family/caregiver stated any concerns about your memory: no  Normal    Patient Care Team   Patient Care Team:  Faye Ortega MD as PCP - General (Internal Medicine)    Assessment/Plan   Education and counseling provided:  Are appropriate based on today's review and evaluation  End-of-Life planning (with patient's consent)  Screening Mammography  Colorectal cancer screening tests  Screening for glaucoma  Diabetes screening test    Diagnoses and all orders for this visit:    1. Medicare annual wellness visit, subsequent    2. Benign essential hypertension  -     lisinopril (PRINIVIL, ZESTRIL) 5 mg tablet; Take 1 Tab by mouth daily for 90 days. 3. Controlled type 2 diabetes mellitus without complication, without long-term current use of insulin (HCC)  -     glipiZIDE (GLUCOTROL) 5 mg tablet; Take 1 Tab by mouth daily for 90 days.  -     glucose blood VI test strips (ONETOUCH ULTRA TEST) strip; Test FB daily. Ell.9. Type II DM  -      DIABETES FOOT EXAM    4. Pure hypercholesterolemia  -     atorvastatin (LIPITOR) 10 mg tablet; Take 1 Tab by mouth daily for 90 days. Assessment and Plan:      ICD-10-CM ICD-9-CM    1. Medicare annual wellness visit, subsequent Z00.00 V70.0 Anticipatory guidance discussed. Immunizations reviewed  HM updated. 2. Benign essential hypertension I10 401.1 lisinopril (PRINIVIL, ZESTRIL) 5 mg tablet      METABOLIC PANEL, COMPREHENSIVE   3. Controlled type 2 diabetes mellitus without complication, without long-term current use of insulin (HCC) E11.9 250.00 glucose blood VI test strips (ONETOUCH ULTRA TEST) strip       DIABETES FOOT EXAM      HEMOGLOBIN A1C WITH EAG      METABOLIC PANEL, COMPREHENSIVE      CBC WITH AUTOMATED DIFF      DISCONTINUED: glipiZIDE (GLUCOTROL) 5 mg tablet   4. Pure hypercholesterolemia E78.00 272.0 atorvastatin (LIPITOR) 10 mg tablet      LIPID PANEL      CRP, HIGH SENSITIVITY   5. Vitamin D deficiency E55.9 268.9 VITAMIN D, 25 HYDROXY       Health Maintenance Due   Topic Date Due    DTaP/Tdap/Td series (1 - Tdap) Discussed    ZOSTER VACCINE AGE 60>  Discussed.       I have discussed the diagnosis with the patient and the intended treatment plan as seen in the above orders. The patient has received an after-visit summary and questions were answered concerning future plans. Asked to return should symptoms worsen or not improve with treatment. Any pending labs and studies will be relayed to patient when they become available. Pt verbalizes understanding of plan of care and denies further questions or concerns at this time. Follow-up Disposition:  Return if symptoms worsen or fail to improve. Return in about 1 year (around 3/29/2019), or if symptoms worsen or fail to improve.

## 2018-03-29 NOTE — PATIENT INSTRUCTIONS
Medicare Wellness Visit, Female    The best way to live healthy is to have a healthy lifestyle by eating a well-balanced diet, exercising regularly, limiting alcohol and stopping smoking. Regular physical exams and screening tests are another way to keep healthy. Preventive exams provided by your health care provider can find health problems before they become diseases or illnesses. Preventive services including immunizations, screening tests, monitoring and exams can help you take care of your own health. All people over age 72 should have a pneumovax  and and a prevnar shot to prevent pneumonia. These are once in a lifetime unless you and your provider decide differently. All people over 65 should have a yearly flu shot and a tetanus vaccine every 10 years. A bone mass density to screen for osteoporosis or thinning of the bones should be done every 2 years after 65. Screening for diabetes mellitus with a blood sugar test should be done every year. Glaucoma is a disease of the eye due to increased ocular pressure that can lead to blindness and it should be done every year by an eye professional.    Cardiovascular screening tests that check for elevated lipids (fatty part of blood) which can lead to heart disease and strokes should be done every 5 years. Colorectal screening that evaluates for blood or polyps in your colon should be done yearly as a stool test or every five years as a flexible sigmoidoscope or every 10 years as a colonoscopy up to age 76. Breast cancer screening with a mammogram is recommended biennially  for women age 54-69. Screening for cervical cancer with a pap smear and pelvic exam is recommended for women after age 72 years every 2 years up to age 79 or when the provider and patient decide to stop. If there is a history of cervical abnormalities or other increased risk for cancer then the test is recommended yearly.     Hepatitis C screening is also recommended for anyone born between [birth year] [facility]. A shingles vaccine is also recommended once in a lifetime after age 61. Your Medicare Wellness Exam is recommended annually. Here is a list of your current Health Maintenance items with a due date:  Health Maintenance Due   Topic Date Due    DTaP/Tdap/Td  (1 - Tdap) 07/17/2011    Shingles Vaccine  10/20/2011          Well Visit, Over 72: Care Instructions  Your Care Instructions    Physical exams can help you stay healthy. Your doctor has checked your overall health and may have suggested ways to take good care of yourself. He or she also may have recommended tests. At home, you can help prevent illness with healthy eating, regular exercise, and other steps. Follow-up care is a key part of your treatment and safety. Be sure to make and go to all appointments, and call your doctor if you are having problems. It's also a good idea to know your test results and keep a list of the medicines you take. How can you care for yourself at home? · Reach and stay at a healthy weight. This will lower your risk for many problems, such as obesity, diabetes, heart disease, and high blood pressure. · Get at least 30 minutes of exercise on most days of the week. Walking is a good choice. You also may want to do other activities, such as running, swimming, cycling, or playing tennis or team sports. · Do not smoke. Smoking can make health problems worse. If you need help quitting, talk to your doctor about stop-smoking programs and medicines. These can increase your chances of quitting for good. · Protect your skin from too much sun. When you're outdoors from 10 a.m. to 4 p.m., stay in the shade or cover up with clothing and a hat with a wide brim. Wear sunglasses that block UV rays. Even when it's cloudy, put broad-spectrum sunscreen (SPF 30 or higher) on any exposed skin. · See a dentist one or two times a year for checkups and to have your teeth cleaned.   · Wear a seat belt in the car. · Limit alcohol to 2 drinks a day for men and 1 drink a day for women. Too much alcohol can cause health problems. Follow your doctor's advice about when to have certain tests. These tests can spot problems early. For men and women  · Cholesterol. Your doctor will tell you how often to have this done based on your overall health and other things that can increase your risk for heart attack and stroke. · Blood pressure. Have your blood pressure checked during a routine doctor visit. Your doctor will tell you how often to check your blood pressure based on your age, your blood pressure results, and other factors. · Diabetes. Ask your doctor whether you should have tests for diabetes. · Vision. Experts recommend that you have yearly exams for glaucoma and other age-related eye problems. · Hearing. Tell your doctor if you notice any change in your hearing. You can have tests to find out how well you hear. · Colon cancer tests. Keep having colon cancer tests as your doctor recommends. You can have one of several types of tests. · Heart attack and stroke risk. At least every 4 to 6 years, you should have your risk for heart attack and stroke assessed. Your doctor uses factors such as your age, blood pressure, cholesterol, and whether you smoke or have diabetes to show what your risk for a heart attack or stroke is over the next 10 years. · Osteoporosis. Talk to your doctor about whether you should have a bone density test to find out whether you have thinning bones. Also ask your doctor about whether you should take calcium and vitamin D supplements. For women  · Pap test and pelvic exam. You may no longer need a Pap test. Talk with your doctor about whether to stop or continue to have Pap tests. · Breast exam and mammogram. Ask how often you should have a mammogram, which is an X-ray of your breasts. A mammogram can spot breast cancer before it can be felt and when it is easiest to treat.   · Thyroid disease. Talk to your doctor about whether to have your thyroid checked as part of a regular physical exam. Women have an increased chance of a thyroid problem. For men  · Prostate exam. Talk to your doctor about whether you should have a blood test (called a PSA test) for prostate cancer. Experts disagree on whether men should have this test. Some experts recommend that you discuss the benefits and risks of the test with your doctor. · Abdominal aortic aneurysm. Ask your doctor whether you should have a test to check for an aneurysm. You may need a test if you ever smoked or if your parent, brother, sister, or child has had an aneurysm. When should you call for help? Watch closely for changes in your health, and be sure to contact your doctor if you have any problems or symptoms that concern you. Where can you learn more? Go to http://libby-raeann.info/. Enter Q063 in the search box to learn more about \"Well Visit, Over 65: Care Instructions. \"  Current as of: May 12, 2017  Content Version: 11.4  © 1500-1077 [employer]. Care instructions adapted under license by [employer] (which disclaims liability or warranty for this information). If you have questions about a medical condition or this instruction, always ask your healthcare professional. [address] any warranty or liability for your use of this information.

## 2018-03-29 NOTE — MR AVS SNAPSHOT
[address] [place] [address] 
 
 
 [address] [facility] 
[phone] Patient: Olvin Mckinney MRN: [id number] [id number] Visit Information Date & Time Provider Department Dept. Phone Encounter #  
 3/29/2018 10:30 AM Boni Coronado  Upcoming Health Maintenance Date Due DTaP/Tdap/Td series (1 - Tdap) 7/17/2011 ZOSTER VACCINE AGE 60> 10/20/2011 Pneumococcal 65+ Low/Medium Risk (2 of 2 - PPSV23) 8/23/2018 HEMOGLOBIN A1C Q6M 9/29/2018 LIPID PANEL Q1 10/17/2018 MICROALBUMIN Q1 10/25/2018 EYE EXAM RETINAL OR DILATED Q1 12/12/2018 BREAST CANCER SCRN MAMMOGRAM 1/19/2019 FOOT EXAM Q1 3/29/2019 MEDICARE YEARLY EXAM 3/30/2019 Bone Densitometry 6/27/2019 GLAUCOMA SCREENING Q2Y 12/12/2019 COLONOSCOPY 4/16/2025 Allergies as of 3/29/2018  Review Complete On: 3/29/2018 By: Brian Stanley MD  
 No Known Allergies Current Immunizations  Reviewed on 11/21/2017 Name Date Influenza High Dose Vaccine PF 10/25/2017 Pneumococcal Conjugate (PCV-13) 8/23/2017, 6/23/2017 Td 7/16/2011 Not reviewed this visit You Were Diagnosed With   
  
 Codes Comments Medicare annual wellness visit, subsequent    -  Primary ICD-10-CM: Z00.00 ICD-9-CM: V70.0 Benign essential hypertension     ICD-10-CM: I10 
ICD-9-CM: 401.1 Controlled type 2 diabetes mellitus without complication, without long-term current use of insulin ([facility] [place] [address]     ICD-10-CM: E11.9 ICD-9-CM: 250.00 Pure hypercholesterolemia     ICD-10-CM: E78.00 ICD-9-CM: 272.0 Vitamin D deficiency     ICD-10-CM: E55.9 ICD-9-CM: 268.9 Vitals BP Pulse Temp Resp Height(growth percentile) Weight(growth percentile) 134/80 (BP 1 Location: Right arm, BP Patient Position: Sitting) 83 98.2 °F (36.8 °C) (Oral) 20 5' 4\" (1.626 m) 147 lb 6.4 oz (66.9 kg) SpO2 BMI OB Status Smoking Status 98% 25.3 kg/m2 Hysterectomy Never Smoker BMI and BSA Data Body Mass Index Body Surface Area  
 25.3 kg/m 2 1.74 m 2 Preferred Pharmacy Pharmacy Name Phone Lesa Gruber [facility], [address] [place], [place] [address]rashaun Mckay [facility] [phone] Your Updated Medication List  
  
   
This list is accurate as of 3/29/18 11:26 AM.  Always use your most recent med list.  
  
  
  
  
 aspirin delayed-release 81 mg tablet Take 81 mg by mouth daily. atorvastatin 10 mg tablet Commonly known as:  LIPITOR Take 1 Tab by mouth daily for 90 days. calcium carbonate 200 mg calcium (500 mg) Chew Commonly known as:  TUMS Take 1 Tab by mouth daily. cyanocobalamin 1,000 mcg tablet Take 1,000 mcg by mouth daily. DAILY PROBIOTIC PO Take  by mouth. glipiZIDE 5 mg tablet Commonly known as:  Aristeo Picking Take 1 Tab by mouth daily for 90 days. glucosamine sulfate 500 mg capsule Take  by mouth three (3) times daily. JARDIANCE 25 mg tablet Generic drug:  empagliflozin TAKE ONE TABLET BY MOUTH DAILY  
  
 lisinopril 5 mg tablet Commonly known as:  Leda Bills Take 1 Tab by mouth daily for 90 days. multivitamin tablet Commonly known as:  ONE A DAY Take 1 Tab by mouth daily. nystatin powder Commonly known as:  MYCOSTATIN Apply  to affected area four (4) times daily. Apply 1-4 grams every day until rash resolves. 1 bottle = 60 grams * ONE TOUCH TEST  
1 Strip by SubCUTAneous route daily. Indications: fingerstick daily * glucose blood VI test strips strip Commonly known as:  ONETOUCH ULTRA TEST Test FB daily. Ell.9. Type II DM  
  
 pneumococcal 23-valent 25 mcg/0.5 mL injection Commonly known as:  PNEUMOVAX 23  
0.5 mL by IntraMUSCular route PRIOR TO DISCHARGE for 1 dose. PriLOSEC OTC 20 mg tablet Generic drug:  omeprazole Take 20 mg by mouth daily. * Notice: This list has 2 medication(s) that are the same as other medications prescribed for you. Read the directions carefully, and ask your doctor or other care provider to review them with you. Prescriptions Sent to Pharmacy Refills  
 atorvastatin (LIPITOR) 10 mg tablet 1 Sig: Take 1 Tab by mouth daily for 90 days. Class: Normal  
 Pharmacy: [address] Ph #: [phone] Route: Oral  
 glipiZIDE (GLUCOTROL) 5 mg tablet 1 Sig: Take 1 Tab by mouth daily for 90 days. Class: Normal  
 Pharmacy: [address] Ph #: [phone] Route: Oral  
 lisinopril (PRINIVIL, ZESTRIL) 5 mg tablet 1 Sig: Take 1 Tab by mouth daily for 90 days. Class: Normal  
 Pharmacy: [address] Ph #: [phone] Route: Oral  
 glucose blood VI test strips (ONETOUCH ULTRA TEST) strip 5 Sig: Test FB daily. Ell.9. Type II DM Class: Normal  
 Pharmacy: [address] Ph #: [phone] We Performed the Following CBC WITH AUTOMATED DIFF [84703 CPT(R)] CRP, HIGH SENSITIVITY [46336 CPT(R)] HEMOGLOBIN A1C WITH EAG [69849 CPT(R)]  DIABETES FOOT EXAM [[facility] Custom] LIPID PANEL [58081 CPT(R)] METABOLIC PANEL, COMPREHENSIVE [13680 CPT(R)] VITAMIN D, 25 HYDROXY [id number] CPT(R)] Patient Instructions Medicare Wellness Visit, Female The best way to live healthy is to have a healthy lifestyle by eating a well-balanced diet, exercising regularly, limiting alcohol and stopping smoking. Regular physical exams and screening tests are another way to keep healthy. Preventive exams provided by your health care provider can find health problems before they become diseases or illnesses.  Preventive services including immunizations, screening tests, monitoring and exams can help you take care of your own health. All people over age 72 should have a pneumovax  and and a prevnar shot to prevent pneumonia. These are once in a lifetime unless you and your provider decide differently. All people over 65 should have a yearly flu shot and a tetanus vaccine every 10 years. A bone mass density to screen for osteoporosis or thinning of the bones should be done every 2 years after 65. Screening for diabetes mellitus with a blood sugar test should be done every year. Glaucoma is a disease of the eye due to increased ocular pressure that can lead to blindness and it should be done every year by an eye professional. 
 
Cardiovascular screening tests that check for elevated lipids (fatty part of blood) which can lead to heart disease and strokes should be done every 5 years. Colorectal screening that evaluates for blood or polyps in your colon should be done yearly as a stool test or every five years as a flexible sigmoidoscope or every 10 years as a colonoscopy up to age 76. Breast cancer screening with a mammogram is recommended biennially  for women age 54-69. Screening for cervical cancer with a pap smear and pelvic exam is recommended for women after age 72 years every 2 years up to age 79 or when the provider and patient decide to stop. If there is a history of cervical abnormalities or other increased risk for cancer then the test is recommended yearly. Hepatitis C screening is also recommended for anyone born between [birth year] through [address]. A shingles vaccine is also recommended once in a lifetime after age 61. Your Medicare Wellness Exam is recommended annually. Here is a list of your current Health Maintenance items with a due date: 
Health Maintenance Due Topic Date Due  
 DTaP/Tdap/Td  (1 - Tdap) 07/17/2011  Shingles Vaccine  10/20/2011 Well Visit, Over 72: Care Instructions Your Care Instructions Physical exams can help you stay healthy. Your doctor has checked your overall health and may have suggested ways to take good care of yourself. He or she also may have recommended tests. At home, you can help prevent illness with healthy eating, regular exercise, and other steps. Follow-up care is a key part of your treatment and safety. Be sure to make and go to all appointments, and call your doctor if you are having problems. It's also a good idea to know your test results and keep a list of the medicines you take. How can you care for yourself at home? · Reach and stay at a healthy weight. This will lower your risk for many problems, such as obesity, diabetes, heart disease, and high blood pressure. · Get at least 30 minutes of exercise on most days of the week. Walking is a good choice. You also may want to do other activities, such as running, swimming, cycling, or playing tennis or team sports. · Do not smoke. Smoking can make health problems worse. If you need help quitting, talk to your doctor about stop-smoking programs and medicines. These can increase your chances of quitting for good. · Protect your skin from too much sun. When you're outdoors from 10 a.m. to 4 p.m., stay in the shade or cover up with clothing and a hat with a wide brim. Wear sunglasses that block UV rays. Even when it's cloudy, put broad-spectrum sunscreen (SPF 30 or higher) on any exposed skin. · See a dentist one or two times a year for checkups and to have your teeth cleaned. · Wear a seat belt in the car. · Limit alcohol to 2 drinks a day for men and 1 drink a day for women. Too much alcohol can cause health problems. Follow your doctor's advice about when to have certain tests. These tests can spot problems early. For men and women · Cholesterol. Your doctor will tell you how often to have this done based on your overall health and other things that can increase your risk for heart attack and stroke. · Blood pressure. Have your blood pressure checked during a routine doctor visit. Your doctor will tell you how often to check your blood pressure based on your age, your blood pressure results, and other factors. · Diabetes. Ask your doctor whether you should have tests for diabetes. · Vision. Experts recommend that you have yearly exams for glaucoma and other age-related eye problems. · Hearing. Tell your doctor if you notice any change in your hearing. You can have tests to find out how well you hear. · Colon cancer tests. Keep having colon cancer tests as your doctor recommends. You can have one of several types of tests. · Heart attack and stroke risk. At least every 4 to 6 years, you should have your risk for heart attack and stroke assessed. Your doctor uses factors such as your age, blood pressure, cholesterol, and whether you smoke or have diabetes to show what your risk for a heart attack or stroke is over the next 10 years. · Osteoporosis. Talk to your doctor about whether you should have a bone density test to find out whether you have thinning bones. Also ask your doctor about whether you should take calcium and vitamin D supplements. For women · Pap test and pelvic exam. You may no longer need a Pap test. Talk with your doctor about whether to stop or continue to have Pap tests. · Breast exam and mammogram. Ask how often you should have a mammogram, which is an X-ray of your breasts. A mammogram can spot breast cancer before it can be felt and when it is easiest to treat. · Thyroid disease. Talk to your doctor about whether to have your thyroid checked as part of a regular physical exam. Women have an increased chance of a thyroid problem. For men · Prostate exam. Talk to your doctor about whether you should have a blood test (called a PSA test) for prostate cancer.  Experts disagree on whether men should have this test. Some experts recommend that you discuss the benefits and risks of the test with your doctor. · Abdominal aortic aneurysm. Ask your doctor whether you should have a test to check for an aneurysm. You may need a test if you ever smoked or if your parent, brother, sister, or child has had an aneurysm. When should you call for help? Watch closely for changes in your health, and be sure to contact your doctor if you have any problems or symptoms that concern you. Where can you learn more? Go to http://libby-raeann.info/. Enter B173 in the search box to learn more about \"Well Visit, Over 65: Care Instructions. \" Current as of: May 12, 2017 Content Version: 11.4 © 3957-3051 [employer]. Care instructions adapted under license by [employer] (which disclaims liability or warranty for this information). If you have questions about a medical condition or this instruction, always ask your healthcare professional. [place] [address] any warranty or liability for your use of this information. Introducing [facility]! [employer] introduces [employer] patient portal. Now you can access parts of your medical record, email your doctor's office, and request medication refills online. 1. In your internet browser, go to [email or web address] And Share. [email or web address] And Share 2. Click on the First Time User? Click Here link in the Sign In box. You will see the New Member Sign Up page. 3. Enter your [employer] Access Code exactly as it appears below. You will not need to use this code after youve completed the sign-up process. If you do not sign up before the expiration date, you must request a new code. · [employer] Access Code: [email or web address] Expires: 4/24/2018  6:09 PM 
 
4. Enter the last four digits of your Social Security Number (xxxx) and Date of Birth (mm/dd/yyyy) as indicated and click Submit. You will be taken to the next sign-up page. 5. Create a [employer]Design ID. This will be your [employer] login ID and cannot be changed, so think of one that is secure and easy to remember. 6. Create a [employer] password. You can change your password at any time. 7. Enter your Password Reset Question and Answer. This can be used at a later time if you forget your password. 8. Enter your e-mail address. You will receive e-mail notification when new information is available in [address] 9. Click Sign Up. You can now view and download portions of your medical record. 10. Click the Download Summary menu link to download a portable copy of your medical information. If you have questions, please visit the Frequently Asked Questions section of the [employer] website. Remember, [employer] is NOT to be used for urgent needs. For medical emergencies, dial 911. Now available from your iPhone and Android! Please provide this summary of care documentation to your next provider. Your primary care clinician is listed as [address]. If you have any questions after today's visit, please call [phone].

## 2018-03-30 LAB
25(OH)D3+25(OH)D2 SERPL-MCNC: 49.1 NG/ML (ref 30–100)
ALBUMIN SERPL-MCNC: 4.3 G/DL (ref 3.6–4.8)
ALBUMIN/GLOB SERPL: 1.3 {RATIO} (ref 1.2–2.2)
ALP SERPL-CCNC: 53 IU/L (ref 39–117)
ALT SERPL-CCNC: 17 IU/L (ref 0–32)
AST SERPL-CCNC: 14 IU/L (ref 0–40)
BASOPHILS # BLD AUTO: 0 X10E3/UL (ref 0–0.2)
BASOPHILS NFR BLD AUTO: 0 %
BILIRUB SERPL-MCNC: 0.7 MG/DL (ref 0–1.2)
BUN SERPL-MCNC: 24 MG/DL (ref 8–27)
BUN/CREAT SERPL: 23 (ref 12–28)
CALCIUM SERPL-MCNC: 9.7 MG/DL (ref 8.7–10.3)
CHLORIDE SERPL-SCNC: 101 MMOL/L (ref 96–106)
CHOLEST SERPL-MCNC: 130 MG/DL (ref 100–199)
CO2 SERPL-SCNC: 23 MMOL/L (ref 18–29)
CREAT SERPL-MCNC: 1.05 MG/DL (ref 0.57–1)
CRP SERPL HS-MCNC: 1.55 MG/L (ref 0–3)
EOSINOPHIL # BLD AUTO: 0.1 X10E3/UL (ref 0–0.4)
EOSINOPHIL NFR BLD AUTO: 1 %
ERYTHROCYTE [DISTWIDTH] IN BLOOD BY AUTOMATED COUNT: 12.7 % (ref 12.3–15.4)
EST. AVERAGE GLUCOSE BLD GHB EST-MCNC: 117 MG/DL
GFR SERPLBLD CREATININE-BSD FMLA CKD-EPI: 55 ML/MIN/1.73
GFR SERPLBLD CREATININE-BSD FMLA CKD-EPI: 64 ML/MIN/1.73
GLOBULIN SER CALC-MCNC: 3.2 G/DL (ref 1.5–4.5)
GLUCOSE SERPL-MCNC: 113 MG/DL (ref 65–99)
HBA1C MFR BLD: 5.7 % (ref 4.8–5.6)
HCT VFR BLD AUTO: 39.2 % (ref 34–46.6)
HDLC SERPL-MCNC: 54 MG/DL
HGB BLD-MCNC: 12.6 G/DL (ref 11.1–15.9)
IMM GRANULOCYTES # BLD: 0 X10E3/UL (ref 0–0.1)
IMM GRANULOCYTES NFR BLD: 0 %
INTERPRETATION, 910389: NORMAL
INTERPRETATION: NORMAL
LDLC SERPL CALC-MCNC: 66 MG/DL (ref 0–99)
LYMPHOCYTES # BLD AUTO: 1.2 X10E3/UL (ref 0.7–3.1)
LYMPHOCYTES NFR BLD AUTO: 30 %
Lab: NORMAL
MCH RBC QN AUTO: 29.6 PG (ref 26.6–33)
MCHC RBC AUTO-ENTMCNC: 32.1 G/DL (ref 31.5–35.7)
MCV RBC AUTO: 92 FL (ref 79–97)
MONOCYTES # BLD AUTO: 0.3 X10E3/UL (ref 0.1–0.9)
MONOCYTES NFR BLD AUTO: 8 %
NEUTROPHILS # BLD AUTO: 2.4 X10E3/UL (ref 1.4–7)
NEUTROPHILS NFR BLD AUTO: 61 %
PDF IMAGE, 910387: NORMAL
PLATELET # BLD AUTO: 225 X10E3/UL (ref 150–379)
POTASSIUM SERPL-SCNC: 4.1 MMOL/L (ref 3.5–5.2)
PROT SERPL-MCNC: 7.5 G/DL (ref 6–8.5)
RBC # BLD AUTO: 4.25 X10E6/UL (ref 3.77–5.28)
SODIUM SERPL-SCNC: 142 MMOL/L (ref 134–144)
TRIGL SERPL-MCNC: 48 MG/DL (ref 0–149)
VLDLC SERPL CALC-MCNC: 10 MG/DL (ref 5–40)
WBC # BLD AUTO: 3.9 X10E3/UL (ref 3.4–10.8)

## 2018-04-03 PROBLEM — E11.21 TYPE 2 DIABETES WITH NEPHROPATHY (HCC): Status: ACTIVE | Noted: 2018-04-03

## 2018-04-04 NOTE — PROGRESS NOTES
Labs are stable and improved. No major concerns. HBA1C is 5.7 and in the pre-diabetic range, but does not require any additional medication. Continue to work on diet and nutrition and return in 6-months for follow up and recheck of blood work.

## 2018-04-24 DIAGNOSIS — E11.9 CONTROLLED TYPE 2 DIABETES MELLITUS WITHOUT COMPLICATION, WITHOUT LONG-TERM CURRENT USE OF INSULIN (HCC): ICD-10-CM

## 2018-04-24 RX ORDER — EMPAGLIFLOZIN 25 MG/1
TABLET, FILM COATED ORAL
Qty: 90 TAB | Refills: 0 | Status: SHIPPED | OUTPATIENT
Start: 2018-04-24 | End: 2018-08-20 | Stop reason: SDUPTHER

## 2018-05-16 ENCOUNTER — OFFICE VISIT (OUTPATIENT)
Dept: FAMILY MEDICINE CLINIC | Age: 68
End: 2018-05-16

## 2018-05-16 ENCOUNTER — HOSPITAL ENCOUNTER (OUTPATIENT)
Dept: LAB | Age: 68
Discharge: HOME | End: 2018-05-16
Payer: MEDICARE

## 2018-05-16 VITALS
DIASTOLIC BLOOD PRESSURE: 75 MMHG | RESPIRATION RATE: 18 BRPM | TEMPERATURE: 98.1 F | WEIGHT: 147 LBS | HEART RATE: 75 BPM | BODY MASS INDEX: 25.1 KG/M2 | HEIGHT: 64 IN | OXYGEN SATURATION: 97 % | SYSTOLIC BLOOD PRESSURE: 137 MMHG

## 2018-05-16 DIAGNOSIS — M85.862 OSTEOPENIA OF BOTH LOWER LEGS: ICD-10-CM

## 2018-05-16 DIAGNOSIS — M85.861 OSTEOPENIA OF BOTH LOWER LEGS: ICD-10-CM

## 2018-05-16 DIAGNOSIS — E11.21 TYPE 2 DIABETES WITH NEPHROPATHY (HCC): ICD-10-CM

## 2018-05-16 DIAGNOSIS — D22.9 ATYPICAL MOLE: ICD-10-CM

## 2018-05-16 DIAGNOSIS — M79.89 BILATERAL HAND SWELLING: ICD-10-CM

## 2018-05-16 DIAGNOSIS — Z87.39 HISTORY OF RHEUMATOID ARTHRITIS: Primary | ICD-10-CM

## 2018-05-16 DIAGNOSIS — E11.9 CONTROLLED TYPE 2 DIABETES MELLITUS WITHOUT COMPLICATION, WITHOUT LONG-TERM CURRENT USE OF INSULIN (HCC): ICD-10-CM

## 2018-05-16 PROCEDURE — 86431 RHEUMATOID FACTOR QUANT: CPT

## 2018-05-16 PROCEDURE — 85651 RBC SED RATE NONAUTOMATED: CPT

## 2018-05-16 PROCEDURE — 86141 C-REACTIVE PROTEIN HS: CPT

## 2018-05-16 PROCEDURE — 36415 COLL VENOUS BLD VENIPUNCTURE: CPT

## 2018-05-16 RX ORDER — METHYLPREDNISOLONE 4 MG/1
TABLET ORAL
Qty: 1 DOSE PACK | Refills: 0 | Status: SHIPPED | OUTPATIENT
Start: 2018-05-16 | End: 2018-07-06 | Stop reason: ALTCHOICE

## 2018-05-16 RX ORDER — BLACK COHOSH ROOT 540 MG
1 CAPSULE ORAL
COMMUNITY
End: 2018-07-06 | Stop reason: ALTCHOICE

## 2018-05-16 RX ORDER — GLUCOSAMINE SULFATE 1500 MG
1000 POWDER IN PACKET (EA) ORAL DAILY
COMMUNITY
End: 2022-04-27

## 2018-05-16 RX ORDER — NICOTINE POLACRILEX 2 MG
1 GUM BUCCAL
COMMUNITY
End: 2020-05-07 | Stop reason: DRUGHIGH

## 2018-05-16 NOTE — MR AVS SNAPSHOT
[address] [place] [address] 
 
 
 [place]  [address] [facility] 
[phone] Patient: Alan Samuel MRN: [id number] ZFS:36/[id number] Visit Information Date & Time Provider Department Dept. Phone Encounter #  
 5/16/2018 11:30 AM Sloan Jaramillo 108 [phone] [id number] Upcoming Health Maintenance Date Due DTaP/Tdap/Td series (1 - Tdap) 7/17/2011 ZOSTER VACCINE AGE 60> 10/20/2011 Influenza Age 5 to Adult 8/1/2018 Pneumococcal 65+ Low/Medium Risk (2 of 2 - PPSV23) 8/23/2018 HEMOGLOBIN A1C Q6M 9/29/2018 MICROALBUMIN Q1 10/25/2018 EYE EXAM RETINAL OR DILATED Q1 12/12/2018 FOOT EXAM Q1 3/29/2019 LIPID PANEL Q1 3/29/2019 MEDICARE YEARLY EXAM 3/30/2019 Bone Densitometry 6/27/2019 GLAUCOMA SCREENING Q2Y 12/12/2019 BREAST CANCER SCRN MAMMOGRAM 3/15/2020 COLONOSCOPY 4/16/2025 Allergies as of 5/16/2018  Review Complete On: 5/16/2018 By: Clide Najjar, LPN No Known Allergies Current Immunizations  Reviewed on 11/21/2017 Name Date Influenza High Dose Vaccine PF 10/25/2017 Pneumococcal Conjugate (PCV-13) 8/23/2017, 6/23/2017 Td 7/16/2011 Not reviewed this visit You Were Diagnosed With   
  
 Codes Comments History of rheumatoid arthritis    -  Primary ICD-10-CM: Z87.39 
ICD-9-CM: V13.4 Osteopenia of both lower legs     ICD-10-CM: M85.861, D24.775 ICD-9-CM: 733.90 Bilateral hand swelling     ICD-10-CM: M79.89 ICD-9-CM: 729.81 Vitals BP Pulse Temp Resp Height(growth percentile) Weight(growth percentile)  
 137/75 (BP 1 Location: Left arm, BP Patient Position: Sitting) 75 98.1 °F (36.7 °C) (Oral) 18 5' 4\" (1.626 m) 147 lb (66.7 kg) SpO2 BMI OB Status Smoking Status 97% 25.23 kg/m2 Hysterectomy Never Smoker Vitals History BMI and BSA Data  Body Mass Index Body Surface Area  
 25.23 kg/m 2 1.74 m 2  
  
  
 Preferred Pharmacy Pharmacy Name Phone Karen Quintanilla [facility], [address] [place] [address] [phone] Your Updated Medication List  
  
   
This list is accurate as of 5/16/18 12:05 PM.  Always use your most recent med list.  
  
  
  
  
 aspirin delayed-release 81 mg tablet Take 81 mg by mouth daily. atorvastatin 10 mg tablet Commonly known as:  LIPITOR Take 1 Tab by mouth daily for 90 days. biotin 10,000 mcg Tbdi Take 1 Tab by mouth once over twenty-four (24) hours. black cohosh 540 mg Cap Take 1 Cap by mouth once over twenty-four (24) hours. calcium carbonate 200 mg calcium (500 mg) Chew Commonly known as:  TUMS Take 1 Tab by mouth daily. cyanocobalamin 1,000 mcg tablet Take 1,000 mcg by mouth daily. DAILY PROBIOTIC PO Take  by mouth. glipiZIDE SR 5 mg CR tablet Commonly known as:  GLUCOTROL XL Take 1 Tab by mouth daily for 90 days. glucose blood VI test strips strip Commonly known as:  ONETOUCH ULTRA TEST Test FB daily. Ell.9. Type II DM  
  
 JARDIANCE 25 mg tablet Generic drug:  empagliflozin TAKE ONE TABLET BY MOUTH DAILY  
  
 lisinopril 5 mg tablet Commonly known as:  David Littler Take 1 Tab by mouth daily for 90 days. MENOPAUSE SUPPORT -80 unit-mcg-mg Tab Generic drug:  B comp-E-folic [id number] Take  by mouth.  
  
 methylPREDNISolone 4 mg tablet Commonly known as:  Easley Peal Please take as directed. multivitamin tablet Commonly known as:  ONE A DAY Take 1 Tab by mouth daily. nystatin powder Commonly known as:  MYCOSTATIN Apply  to affected area four (4) times daily. Apply 1-4 grams every day until rash resolves. 1 bottle = 60 grams  
  
 pneumococcal 23-valent 25 mcg/0.5 mL injection Commonly known as:  PNEUMOVAX 23  
0.5 mL by IntraMUSCular route PRIOR TO DISCHARGE for 1 dose. PriLOSEC OTC 20 mg tablet Generic drug:  omeprazole Take 20 mg by mouth as needed. VITAMIN D3 1,000 unit Cap Generic drug:  cholecalciferol Take  by mouth daily. Prescriptions Sent to Pharmacy Refills  
 methylPREDNISolone (MEDROL DOSEPACK) 4 mg tablet 0 Sig: Please take as directed. Class: Normal  
 Pharmacy: Timoteo Ashford [facility], [facility].  #: [phone] We Performed the Following CRP, HIGH SENSITIVITY [42550 CPT(R)]   
 RA + CCP ABS [NAL68631 Custom] REFERRAL TO RHEUMATOLOGY [HSZ83 Custom] Comments:  
 70F with a h/o RA. She had been on MTX years ago. Recently, she has been having a flare of her arthritis in the hands. She also has some in her knees and shoulders. She has responded to Medrol dose ld in the past, but needs to be evaluated for DMARDS. Xrays done in January showed OA, but no erosive synovitis. However, she is having more and more swelling and tightness in her hands. SED RATE (ESR) [id number] CPT(R)] Referral Information Referral ID Referred By Referred To  
  
 [id number] Flor Farnsworth MD   
   [address] [place] [address], [address] [place] [address] Phone: [phone] Fax: [phone] Visits Status Start Date End Date 1 New Request 5/16/18 5/16/19 If your referral has a status of pending review or denied, additional information will be sent to support the outcome of this decision. Introducing [facility]! Agnes Sorenson introduces [employer] patient portal. Now you can access parts of your medical record, email your doctor's office, and request medication refills online. 1. In your internet browser, go to [email or web address] 2. Click on the First Time User? Click Here link in the Sign In box. You will see the New Member Sign Up page. 3. Enter your [employer] Access Code exactly as it appears below. You will not need to use this code after youve completed the sign-up process.  If you do not sign up before the expiration date, you must request a new code. · "[employer]" Access Code: [email or web address] Expires: 8/14/2018 12:05 PM 
 
4. Enter the last four digits of your Social Security Number (xxxx) and Date of Birth (mm/dd/yyyy) as indicated and click Submit. You will be taken to the next sign-up page. 5. Create a "[employer]" ID. This will be your "[employer]" login ID and cannot be changed, so think of one that is secure and easy to remember. 6. Create a "[employer]" password. You can change your password at any time. 7. Enter your Password Reset Question and Answer. This can be used at a later time if you forget your password. 8. Enter your e-mail address. You will receive e-mail notification when new information is available in [address] 9. Click Sign Up. You can now view and download portions of your medical record. 10. Click the Download Summary menu link to download a portable copy of your medical information. If you have questions, please visit the Frequently Asked Questions section of the "[employer]" website. Remember, "[employer]" is NOT to be used for urgent needs. For medical emergencies, dial 911. Now available from your iPhone and Android! Please provide this summary of care documentation to your next provider. Your primary care clinician is listed as [address]. If you have any questions after today's visit, please call [phone].

## 2018-05-16 NOTE — PROGRESS NOTES
Identified pt with two pt identifiers(name and ). Chief Complaint   Patient presents with    Joint Pain     in various places on her since the beginning of the month - feels she is having arthritis flare ups, however, she reports it is much better since weather has become warmer    Finger Pain     experiencing pain at the base of the pointer finger on the left hand - pain is 8/10, however, she reports it is much better since weather has become warmer        Health Maintenance Due   Topic    DTaP/Tdap/Td series (1 - Tdap)    ZOSTER VACCINE AGE 60>        Wt Readings from Last 3 Encounters:   18 147 lb (66.7 kg)   18 147 lb 6.4 oz (66.9 kg)   18 147 lb 12.8 oz (67 kg)     Temp Readings from Last 3 Encounters:   18 98.1 °F (36.7 °C) (Oral)   18 98.2 °F (36.8 °C) (Oral)   18 98.1 °F (36.7 °C) (Oral)     BP Readings from Last 3 Encounters:   18 137/75   18 134/80   18 134/68     Pulse Readings from Last 3 Encounters:   18 75   18 83   18 77         Learning Assessment:  :     Learning Assessment 2018   PRIMARY LEARNER Patient   HIGHEST LEVEL OF EDUCATION - PRIMARY LEARNER  2 YEARS OF COLLEGE   BARRIERS PRIMARY LEARNER NONE   CO-LEARNER CAREGIVER No   PRIMARY LANGUAGE ENGLISH   LEARNER PREFERENCE PRIMARY OTHER (COMMENT)   ANSWERED BY self   RELATIONSHIP SELF       Depression Screening:  :     PHQ over the last two weeks 3/29/2018   Little interest or pleasure in doing things Not at all   Feeling down, depressed or hopeless Not at all   Total Score PHQ 2 0       Fall Risk Assessment:  :     Fall Risk Assessment, last 12 mths 3/29/2018   Able to walk? Yes   Fall in past 12 months? No       Abuse Screening:  :     Abuse Screening Questionnaire 2018   Do you ever feel afraid of your partner? N   Are you in a relationship with someone who physically or mentally threatens you? N   Is it safe for you to go home?  Y       Coordination of Care Questionnaire:  :     1) Have you been to an emergency room, urgent care clinic since your last visit? no   Hospitalized since your last visit? no             2) Have you seen or consulted any other health care providers outside of [address] [place] [address] since your last visit? no  (Include any pap smears or colon screenings in this section.)    3) Do you have an Advance Directive on file? no  Are you interested in receiving information about Advance Directives? no    Patient is accompanied by self. Reviewed record in preparation for visit and have obtained necessary documentation. Medication reconciliation up to date and corrected with patient at this time.

## 2018-05-18 LAB
CCP IGA+IGG SERPL IA-ACNC: >250 UNITS (ref 0–19)
CRP SERPL HS-MCNC: 1.44 MG/L (ref 0–3)
ERYTHROCYTE [SEDIMENTATION RATE] IN BLOOD BY WESTERGREN METHOD: 4 MM/HR (ref 0–40)
RHEUMATOID FACT SERPL-ACNC: 22.8 IU/ML (ref 0–13.9)

## 2018-05-18 NOTE — PROGRESS NOTES
Patient definitely ha rheumatoid arthritis. I did speak with Dr. Priya Louise and they are going to get her in on June 13, 2018. This is a move up from September. In the meantime, she should take what I have prescribed. Inflammatory markers were normal, but she is having a flare.

## 2018-05-21 ENCOUNTER — TELEPHONE (OUTPATIENT)
Dept: RHEUMATOLOGY | Age: 68
End: 2018-05-21

## 2018-05-21 NOTE — TELEPHONE ENCOUNTER
----- Message from Nataliya Leigh MD sent at 5/21/2018  1:07 PM EDT -----  Regarding: RE: Urgent patient request  Austin Gomez patient as urgent NP    ----- Message -----     From: Araceli Huntley MD     Sent: 5/16/2018   1:12 PM       To: Cheikh Villalta MD, Nataliya Leigh MD  Subject: Urgent patient request                           Hi Dr. Junior Sanchez all is well. I am contacting you about a patient who is fairly new to us. She has a h/o RA per her report and years ago had been on MTX. She seemed to have gone into remission until recently. She has started to have swelling of the MCP joints L>R hand and also swelling in the knees. I would like to have her seen as soon as possible as it would appear that her RA has returned. I obtained some labs today and had done films on her hands in January that showed OA and demineralization. I am hopeful that she can get in before September. Thanks for your help.      Sincerely,   Best Cortés

## 2018-06-01 NOTE — PROGRESS NOTES
CC:  Chief Complaint   Patient presents with    Joint Pain     in various places on her since the beginning of the month - feels she is having arthritis flare ups, however, she reports it is much better since weather has become warmer    Finger Pain     experiencing pain at the base of the pointer finger on the left hand - pain is 8/10, however, she reports it is much better since weather has become warmer     HISTORY OF PRESENT ILLNESS  Graciela Cole is a 78 y.o. female. HPI Comments: 70F who is relatively new to our practice. She has a remote h/o RA and had been on MTX until the late 90's when seemingly, her symptoms and synovitis went into remission. She also has a h/o well controlled T2DM, HLD and HTN. She presents today with concerns about having worsening joint pain in the fingers of her hands and other joints. Some of the pain is slightly better since the weather is warmer. This feels like her previous arthritis flare ups. She is now having pain along the MCP joints of both hands with notable swelling and warmth today. Joint Pain      Finger Pain         ROS:  Review of Systems   Musculoskeletal: Positive for joint pain. All other systems reviewed and are negative. OBJECTIVE:  /75 (BP 1 Location: Left arm, BP Patient Position: Sitting)  Pulse 75  Temp 98.1 °F (36.7 °C) (Oral)   Resp 18  Ht 5' 4\" (1.626 m)  Wt 147 lb (66.7 kg)  SpO2 97%  BMI 25.23 kg/m2  Physical Exam   Musculoskeletal:        Hands:      ASSESSMENT and PLAN    ICD-10-CM ICD-9-CM    1. History of rheumatoid arthritis Z87.39 V13.4 REFERRAL TO RHEUMATOLOGY      RA + CCP ABS      methylPREDNISolone (MEDROL DOSEPACK) 4 mg tablet   2. Osteopenia of both lower legs M85.861 733.90 REFERRAL TO RHEUMATOLOGY    M85.862     3. Bilateral hand swelling M79.89 729.81 REFERRAL TO RHEUMATOLOGY      RA + CCP ABS      SED RATE (ESR)      CRP, HIGH SENSITIVITY      methylPREDNISolone (MEDROL DOSEPACK) 4 mg tablet   4. Atypical mole D22.9 216.9 REFERRAL TO DERMATOLOGY     67F with a h/o RA. She had been on MTX years ago. Recently, she has been having a flare of her arthritis in the hands. She also has some in her knees and shoulders. She has responded to Medrol dose ld in the past, but needs to be evaluated for DMARDS. Xrays done in January showed OA, but no erosive synovitis. However, she is having more and more swelling and tightness in her hands. Referral given to see Dr. Mary Hawkins (Rheumatology). I have discussed the diagnosis with the patient and the intended treatment plan as seen in the above orders. The patient has received an after-visit summary and questions were answered concerning future plans. Asked to return should symptoms worsen or not improve with treatment. Any pending labs and studies will be relayed to patient when they become available. Pt verbalizes understanding of plan of care and denies further questions or concerns at this time.      Follow-up Disposition:  Return if symptoms worsen or fail to improve.

## 2018-06-01 NOTE — ASSESSMENT & PLAN NOTE
Stable, based on history, physical exam and review of pertinent labs, studies and medications; meds reconciled; continue current treatment plan. Key Antihyperglycemic Medications             JARDIANCE 25 mg tablet  (Taking) TAKE ONE TABLET BY MOUTH DAILY    glipiZIDE SR (GLUCOTROL XL) 5 mg CR tablet  (Taking) Take 1 Tab by mouth daily for 90 days. Other Key Diabetic Medications             atorvastatin (LIPITOR) 10 mg tablet  (Taking) Take 1 Tab by mouth daily for 90 days. lisinopril (PRINIVIL, ZESTRIL) 5 mg tablet  (Taking) Take 1 Tab by mouth daily for 90 days.         Lab Results   Component Value Date/Time    Hemoglobin A1c 5.7 03/29/2018 11:33 AM    Hemoglobin A1c, External 6.7 10/17/2017    Glucose 113 03/29/2018 11:33 AM    Creatinine 1.05 03/29/2018 11:33 AM    Creatinine, External 0.91 10/17/2017    Microalb/Creat ratio (ug/mg creat.) Comment 10/25/2017 04:11 PM    Urine Microalbumin, External 3 06/23/2017    Cholesterol, total 130 03/29/2018 11:33 AM    HDL Cholesterol 54 03/29/2018 11:33 AM    LDL, calculated 66 03/29/2018 11:33 AM    LDL-C, External 63 10/17/2017    Triglyceride 48 03/29/2018 11:33 AM     Diabetic Foot and Eye Exam HM Status   Topic Date Due    Eye Exam  12/12/2018    Diabetic Foot Care  03/29/2019

## 2018-06-13 ENCOUNTER — OFFICE VISIT (OUTPATIENT)
Dept: RHEUMATOLOGY | Age: 68
End: 2018-06-13

## 2018-06-13 VITALS
RESPIRATION RATE: 16 BRPM | DIASTOLIC BLOOD PRESSURE: 75 MMHG | BODY MASS INDEX: 25.13 KG/M2 | SYSTOLIC BLOOD PRESSURE: 156 MMHG | TEMPERATURE: 98.1 F | OXYGEN SATURATION: 99 % | HEART RATE: 73 BPM | WEIGHT: 146.4 LBS

## 2018-06-13 DIAGNOSIS — M05.741 RHEUMATOID ARTHRITIS INVOLVING BOTH HANDS WITH POSITIVE RHEUMATOID FACTOR (HCC): Primary | ICD-10-CM

## 2018-06-13 DIAGNOSIS — M05.742 RHEUMATOID ARTHRITIS INVOLVING BOTH HANDS WITH POSITIVE RHEUMATOID FACTOR (HCC): Primary | ICD-10-CM

## 2018-06-13 NOTE — MR AVS SNAPSHOT
[address] [facility] 
[phone] Patient: Konrad Anders MRN: [id number]/[id number] Visit Information Date & Time Provider Department Dept. Phone Encounter #  
 6/13/2018  1:00 PM Dolly Mcburney, MD [address] [phone] [id number] Follow-up Instructions Return if symptoms worsen or fail to improve. Upcoming Health Maintenance Date Due DTaP/Tdap/Td series (1 - Tdap) 7/17/2011 ZOSTER VACCINE AGE 60> 10/20/2011 Influenza Age 5 to Adult 8/1/2018 Pneumococcal 65+ Low/Medium Risk (2 of 2 - PPSV23) 8/23/2018 HEMOGLOBIN A1C Q6M 9/29/2018 MICROALBUMIN Q1 10/25/2018 EYE EXAM RETINAL OR DILATED Q1 12/12/2018 FOOT EXAM Q1 3/29/2019 LIPID PANEL Q1 3/29/2019 MEDICARE YEARLY EXAM 3/30/2019 Bone Densitometry 6/27/2019 GLAUCOMA SCREENING Q2Y 12/12/2019 BREAST CANCER SCRN MAMMOGRAM 3/15/2020 COLONOSCOPY 4/16/2025 Allergies as of 6/13/2018  Review Complete On: 6/13/2018 By: Tenzin Kumar LPN No Known Allergies Current Immunizations  Reviewed on 11/21/2017 Name Date Influenza High Dose Vaccine PF 10/25/2017 Pneumococcal Conjugate (PCV-13) 8/23/2017, 6/23/2017 Td 7/16/2011 Not reviewed this visit Vitals BP Pulse Temp Resp Weight(growth percentile) SpO2  
 156/75 (BP 1 Location: Left arm, BP Patient Position: Sitting) 73 98.1 °F (36.7 °C) (Oral) 16 146 lb 6.4 oz (66.4 kg) 99% BMI OB Status Smoking Status 25.13 kg/m2 Hysterectomy Never Smoker BMI and BSA Data Body Mass Index Body Surface Area  
 25.13 kg/m 2 1.73 m 2 Preferred Pharmacy Pharmacy Name Phone Rai Model [address] [phone] Your Updated Medication List  
  
   
This list is accurate as of 6/13/18  1:49 PM.  Always use your most recent med list.  
  
  
  
 aspirin delayed-release 81 mg tablet Take 81 mg by mouth daily. atorvastatin 10 mg tablet Commonly known as:  LIPITOR Take 1 Tab by mouth daily for 90 days. biotin 10,000 mcg Tbdi Take 1 Tab by mouth once over twenty-four (24) hours. black cohosh 540 mg Cap Take 1 Cap by mouth once over twenty-four (24) hours. calcium carbonate 200 mg calcium (500 mg) Chew Commonly known as:  TUMS Take 1 Tab by mouth daily. cyanocobalamin 1,000 mcg tablet Take 1,000 mcg by mouth daily. DAILY PROBIOTIC PO Take  by mouth. glipiZIDE SR 5 mg CR tablet Commonly known as:  GLUCOTROL XL Take 1 Tab by mouth daily for 90 days. glucose blood VI test strips strip Commonly known as:  ONETOUCH ULTRA TEST Test FB daily. Ell.9. Type II DM  
  
 JARDIANCE 25 mg tablet Generic drug:  empagliflozin TAKE ONE TABLET BY MOUTH DAILY  
  
 lisinopril 5 mg tablet Commonly known as:  Alice Heart Take 1 Tab by mouth daily for 90 days. MENOPAUSE SUPPORT -80 unit-mcg-mg Tab Generic drug:  B comp-E-folic [id number] Take  by mouth.  
  
 methylPREDNISolone 4 mg tablet Commonly known as:  Murriel Him Please take as directed. multivitamin tablet Commonly known as:  ONE A DAY Take 1 Tab by mouth daily. nystatin powder Commonly known as:  MYCOSTATIN Apply  to affected area four (4) times daily. Apply 1-4 grams every day until rash resolves. 1 bottle = 60 grams  
  
 pneumococcal 23-valent 25 mcg/0.5 mL injection Commonly known as:  PNEUMOVAX 23  
0.5 mL by IntraMUSCular route PRIOR TO DISCHARGE for 1 dose. PriLOSEC OTC 20 mg tablet Generic drug:  omeprazole Take 20 mg by mouth as needed. VITAMIN D3 1,000 unit Cap Generic drug:  cholecalciferol Take  by mouth daily. Follow-up Instructions Return if symptoms worsen or fail to improve. Introducing [facility]! Rubina Stevenson introduces [employer] patient portal. Now you can access parts of your medical record, email your doctor's office, and request medication refills online. 1. In your internet browser, go to [email or web address] 2. Click on the First Time User? Click Here link in the Sign In box. You will see the New Member Sign Up page. 3. Enter your [employer] Access Code exactly as it appears below. You will not need to use this code after youve completed the sign-up process. If you do not sign up before the expiration date, you must request a new code. · [employer] Access Code: [email or web address] Expires: 8/14/2018 12:05 PM 
 
4. Enter the last four digits of your Social Security Number (xxxx) and Date of Birth (mm/dd/yyyy) as indicated and click Submit. You will be taken to the next sign-up page. 5. Create a [employer] ID. This will be your [employer] login ID and cannot be changed, so think of one that is secure and easy to remember. 6. Create a [employer] password. You can change your password at any time. 7. Enter your Password Reset Question and Answer. This can be used at a later time if you forget your password. 8. Enter your e-mail address. You will receive e-mail notification when new information is available in [address] 9. Click Sign Up. You can now view and download portions of your medical record. 10. Click the Download Summary menu link to download a portable copy of your medical information. If you have questions, please visit the Frequently Asked Questions section of the [employer] website. Remember, [employer] is NOT to be used for urgent needs. For medical emergencies, dial 911. Now available from your iPhone and Android! Please provide this summary of care documentation to your next provider. Your primary care clinician is listed as [address]. If you have any questions after today's visit, please call [phone].

## 2018-06-13 NOTE — PROGRESS NOTES
CHIEF COMPLAINT  The patient was sent for rheumatology consultation by Dr. Sarath Carreon MD for evaluation of joint pain. HISTORY OF PRESENT ILLNESS  This is a 78 y.o.  female. Today, the patient complains of pain in the joints. Location: Knee, Hands  Severity:  0 on a scale of 0-10  Timing: none at this time   Duration:  N/A  Context/Associated signs and symptoms: Patient comes in complaining of morning stiffness in left knee and hands. Her knee pain increases towards the end of the day. Her symptoms returned recently, siting the onset of \"cold weather\". Believes her pain relieved with warmer weather and with 2 steroid dose packs. She was diagnosed with RA in her 35s by Dr. Angelo Beal. She states she was treated into remission with Methotrexate for a couple years but tapered off. Prior to steroid treatment, she was unable to apply strength to a pen and write.     RHEUMATOLOGY REVIEW OF SYSTEMS   Positives as per HPI  Negatives as follows:  Ese Barnacle:  Denies unexplained persistent fevers, weight change, chronic fatigue  HEAD/EYES:   Denies eye redness, blurry vision or sudden loss of vision, dry eyes, HA, temporal artery pain  ENT:    Denies oral/nasal ulcers, recurrent sinus infections, dry mouth  RESPIRATORY:  No pleuritic pain, history of pleural effusions, hemoptysis, exertional dyspnea  CARDIOVASCULAR:  Denies chest pain, history of pericardial effusions  GASTRO:   Denies heartburn, esophageal dysmotility, abdominal pain, nausea, vomiting, diarrhea, blood in the stool  HEMATOLOGIC:  No easy bruising, purpura, swollen lymph nodes  SKIN:    Denies alopecia, ulcers, nodules, sun sensitivity, unexplained persistent rash   VASCULAR:   Denies edema, cyanosis, raynaud phenomenon  NEUROLOGIC:  Denies specific muscle weakness, paresthesias   PSYCHIATRIC:  No sleep disturbance / snoring, depression, anxiety  MSK:    No SI joint pain    MEDICAL AND SOCIAL HISTORY  This was reviewed with the patient and reviewed in the medical records. Past Medical History:   Diagnosis Date    Acid reflux     Colitis 04/16/2015    Colitis     Diabetes mellitus, type II ([facility] [address]     Diverticulosis of colon 04/20/2015    Hyperlipemia     Hypertension     Osteopenia     of the spine Dexa done 2014, 2017 and due 2019    RA (rheumatoid arthritis) ([facility] [address]      Past Surgical History:   Procedure Laterality Date    ABDOMEN SURGERY PROC UNLISTED      HX COLONOSCOPY  04/20/2015    Due 2023    HX GYN      HX HYSTERECTOMY      HX TOTAL ABDOMINAL HYSTERECTOMY      ME BIOPSY OF BREAST, NEEDLE CORE  2017    benign findings - Fibrosis     Social History   Substance Use Topics    Smoking status: Never Smoker    Smokeless tobacco: Never Used    Alcohol use No     Employment - Retired  Sleep - Good, no issues  Exercise - yes    FAMILY HISTORY  Rheumatoid arthritis -     MEDICATIONS  All the current medications were reviewed in detail. PHYSICAL EXAM  Blood pressure 156/75, pulse 73, temperature 98.1 °F (36.7 °C), temperature source Oral, resp. rate 16, weight 146 lb 6.4 oz (66.4 kg), SpO2 99 %. GENERAL APPEARANCE: Well-nourished adult in no acute distress. EYES: No scleral erythema, conjunctival injection. ENT: No oral ulcer, parotid enlargement. NECK: No adenopathy, thyroid enlargement. CARDIOVASCULAR: Heart rhythm is regular. No murmur, rub, gallop. CHEST: Normal vesicular breath sounds. No wheezes, rales, pleural friction rubs. ABDOMINAL: The abdomen is soft and nontender. Liver and spleen are nonpalpable. Bowel sounds are normal.  EXTREMITIES: There is no evidence of clubbing, cyanosis, edema. SKIN: No rash, palpable purpura, digital ulcer, abnormal thickening. NEUROLOGICAL: Normal gait and station, full strength in upper and lower extremities, normal sensation to light touch.   MUSCULOSKELETAL:   Upper extremities - full range of motion, no tenderness, no swelling, no synovial thickening and no deformity of joints except for Subtle synovial thickening in L 3rd MCP  Lower extremities - full range of motion, no tenderness, no swelling, no synovial thickening and no deformity of joints. Except for B/L bony prominence in knee. B/L Crepitus in knee. LABS, RADIOLOGY AND PROCEDURES  Previous labs reviewed -Yes  Previous radiology reviewed -Yes  Previous procedures reviewed -Yes  Previous medical records reviewed/summarized -Yes    Labs  RF, CCP positive    ASSESSMENT  1. Rheumatoid Arthritis - diagnosed 30+ years ago, treated to remission with Methotrexate (few years), positive RF, CCP (New problem - Stable disease) - The patient's recent symptoms are most likely unrelated to her disease and most likely from OA. It is possible this could be the beginning of disease recurrence so she should continue to monitor her symptoms. She does not require treatment or  regular follow up at this time. 2. OA - Her symptoms today are most likely related to her OA. Tylenol and ROM exercises are recommended for now. PLAN  1. Monitor Symptoms   2. Return JOSEN      Vy Medina MD  Adult and Pediatric Rheumatology     [facility] and [address] [facility], [address] [facility], Phone [phone], Fax [phone]   E-mail: [email or web address]    Visiting  of Pediatrics    Department of Pediatrics, [facility] of [address] [place] [address], [address] [place] [address], Phone [phone], Fax [phone]  E-mail: [email or web address]    There are no Patient Instructions on file for this visit.     cc:  Latonya Lynch MD

## 2018-06-23 NOTE — TELEPHONE ENCOUNTER
Patient is calling to get x-ray results.   Please call her back at [phone]
Pt was advised via TM her xray results showed IMPRESSION: No acute fracture or dislocation. She was advised to return call if she has any other add'l questions/concerns.
Stable

## 2018-07-06 ENCOUNTER — OFFICE VISIT (OUTPATIENT)
Dept: FAMILY MEDICINE CLINIC | Age: 68
End: 2018-07-06

## 2018-07-06 VITALS
WEIGHT: 148.8 LBS | HEIGHT: 64 IN | OXYGEN SATURATION: 99 % | TEMPERATURE: 98.2 F | RESPIRATION RATE: 20 BRPM | BODY MASS INDEX: 25.4 KG/M2 | HEART RATE: 69 BPM | SYSTOLIC BLOOD PRESSURE: 120 MMHG | DIASTOLIC BLOOD PRESSURE: 70 MMHG

## 2018-07-06 DIAGNOSIS — J01.90 ACUTE NON-RECURRENT SINUSITIS, UNSPECIFIED LOCATION: Primary | ICD-10-CM

## 2018-07-06 RX ORDER — FLUTICASONE PROPIONATE 50 MCG
SPRAY, SUSPENSION (ML) NASAL
Qty: 1 BOTTLE | Refills: 3 | Status: SHIPPED | OUTPATIENT
Start: 2018-07-06 | End: 2022-04-27

## 2018-07-06 RX ORDER — LISINOPRIL 10 MG/1
10 TABLET ORAL DAILY
COMMUNITY
End: 2020-02-20 | Stop reason: ALTCHOICE

## 2018-07-06 RX ORDER — AMOXICILLIN AND CLAVULANATE POTASSIUM 875; 125 MG/1; MG/1
1 TABLET, FILM COATED ORAL 2 TIMES DAILY
Qty: 20 TAB | Refills: 0 | Status: SHIPPED | OUTPATIENT
Start: 2018-07-06 | End: 2018-07-16

## 2018-07-06 RX ORDER — ATORVASTATIN CALCIUM 10 MG/1
10 TABLET, FILM COATED ORAL DAILY
COMMUNITY
End: 2018-09-24 | Stop reason: SDUPTHER

## 2018-07-06 RX ORDER — GLIPIZIDE 5 MG/1
5 TABLET, FILM COATED, EXTENDED RELEASE ORAL DAILY
COMMUNITY
End: 2018-09-24 | Stop reason: SDUPTHER

## 2018-07-06 NOTE — MR AVS SNAPSHOT
[address] [facility] 
 
 
 [facility] [address] [facility] 
[phone] Patient: Berenice Prasad MRN: [id number] Visit Information Date & Time Provider Department Dept. Phone Encounter #  
 4/6/4872 17:55 AM Boni Munoz [phone] [id number] Upcoming Health Maintenance Date Due DTaP/Tdap/Td series (1 - Tdap) 7/17/2011 ZOSTER VACCINE AGE 60> 10/20/2011 Influenza Age 5 to Adult 8/1/2018 Pneumococcal 65+ Low/Medium Risk (2 of 2 - PPSV23) 8/23/2018 HEMOGLOBIN A1C Q6M 9/29/2018 MICROALBUMIN Q1 10/25/2018 EYE EXAM RETINAL OR DILATED Q1 12/12/2018 FOOT EXAM Q1 3/29/2019 LIPID PANEL Q1 3/29/2019 MEDICARE YEARLY EXAM 3/30/2019 Bone Densitometry 6/27/2019 GLAUCOMA SCREENING Q2Y 12/12/2019 BREAST CANCER SCRN MAMMOGRAM 3/15/2020 COLONOSCOPY 4/16/2025 Allergies as of 7/6/2018  Review Complete On: 4/4/3098 By: Enrrique Burnette MD  
 No Known Allergies Current Immunizations  Reviewed on 11/21/2017 Name Date Influenza High Dose Vaccine PF 10/25/2017 Pneumococcal Conjugate (PCV-13) 8/23/2017, 6/23/2017 Td 7/16/2011 Not reviewed this visit You Were Diagnosed With   
  
 Codes Comments Acute non-recurrent sinusitis, unspecified location    -  Primary ICD-10-CM: J01.90 ICD-9-CM: 461.9 Vitals BP Pulse Temp Resp Height(growth percentile) Weight(growth percentile) 120/70 (BP 1 Location: Right arm, BP Patient Position: Sitting) 69 98.2 °F (36.8 °C) (Oral) 20 5' 4\" (1.626 m) 148 lb 12.8 oz (67.5 kg) SpO2 BMI OB Status Smoking Status 99% 25.54 kg/m2 Hysterectomy Never Smoker BMI and BSA Data Body Mass Index Body Surface Area 25.54 kg/m 2 1.75 m 2 Preferred Pharmacy Pharmacy Name Phone [facility] [address] [place] [address] [phone] Your Updated Medication List  
  
   
 This list is accurate as of 7/6/18 12:28 PM.  Always use your most recent med list.  
  
  
  
  
 amoxicillin-clavulanate 875-125 mg per tablet Commonly known as:  AUGMENTIN Take 1 Tab by mouth two (2) times a day for 10 days. aspirin delayed-release 81 mg tablet Take 81 mg by mouth daily. biotin 10,000 mcg Tbdi Take 1 Tab by mouth once over twenty-four (24) hours. calcium carbonate 200 mg calcium (500 mg) Chew Commonly known as:  TUMS Take 1 Tab by mouth daily. cyanocobalamin 1,000 mcg tablet Take 1,000 mcg by mouth daily. DAILY PROBIOTIC PO Take  by mouth. fluticasone 50 mcg/actuation nasal spray Commonly known as:  FLONASE  
2 sprays per nostril once a day  Indications: Allergic Rhinitis  
  
 glipiZIDE SR 5 mg CR tablet Commonly known as:  GLUCOTROL XL Take  by mouth daily. glucose blood VI test strips strip Commonly known as:  ONETOUCH ULTRA TEST Test FB daily. Ell.9. Type II DM  
  
 JARDIANCE 25 mg tablet Generic drug:  empagliflozin TAKE ONE TABLET BY MOUTH DAILY  
  
 LIPITOR 10 mg tablet Generic drug:  atorvastatin Take  by mouth daily. lisinopril 10 mg tablet Commonly known as:  Thersia Kubas Take  by mouth daily. multivitamin tablet Commonly known as:  ONE A DAY Take 1 Tab by mouth daily. nystatin powder Commonly known as:  MYCOSTATIN Apply  to affected area four (4) times daily. Apply 1-4 grams every day until rash resolves. 1 bottle = 60 grams  
  
 pneumococcal 23-valent 25 mcg/0.5 mL injection Commonly known as:  PNEUMOVAX 23  
0.5 mL by IntraMUSCular route PRIOR TO DISCHARGE for 1 dose. PriLOSEC OTC 20 mg tablet Generic drug:  omeprazole Take 20 mg by mouth as needed. VITAMIN D3 1,000 unit Cap Generic drug:  cholecalciferol Take  by mouth daily. Prescriptions Sent to Pharmacy  Refills  
 fluticasone (FLONASE) 50 mcg/actuation nasal spray 3  
 Si sprays per nostril once a day  Indications: Allergic Rhinitis Class: Normal  
 Pharmacy: [facility] [address] [place] [address]. Ph #: [phone]  
 amoxicillin-clavulanate (AUGMENTIN) 875-125 mg per tablet 0 Sig: Take 1 Tab by mouth two (2) times a day for 10 days. Class: Normal  
 Pharmacy: [facility] [address] [place] [address]. Ph #: [phone] Route: Oral  
  
Patient Instructions Sinusitis: Care Instructions Your Care Instructions Sinusitis is an infection of the lining of the sinus cavities in your head. Sinusitis often follows a cold. It causes pain and pressure in your head and face. In most cases, sinusitis gets better on its own in 1 to 2 weeks. But some mild symptoms may last for several weeks. Sometimes antibiotics are needed. Follow-up care is a key part of your treatment and safety. Be sure to make and go to all appointments, and call your doctor if you are having problems. It's also a good idea to know your test results and keep a list of the medicines you take. How can you care for yourself at home? · Take an over-the-counter pain medicine, such as acetaminophen (Tylenol), ibuprofen (Advil, Motrin), or naproxen (Aleve). Read and follow all instructions on the label. · If the doctor prescribed antibiotics, take them as directed. Do not stop taking them just because you feel better. You need to take the full course of antibiotics. · Be careful when taking over-the-counter cold or flu medicines and Tylenol at the same time. Many of these medicines have acetaminophen, which is Tylenol. Read the labels to make sure that you are not taking more than the recommended dose. Too much acetaminophen (Tylenol) can be harmful. · Breathe warm, moist air from a steamy shower, a hot bath, or a sink filled with hot water. Avoid cold, dry air. Using a humidifier in your home may help. Follow the directions for cleaning the machine. · Use saline (saltwater) nasal washes to help keep your nasal passages open and wash out mucus and bacteria. You can buy saline nose drops at a grocery store or drugstore. Or you can make your own at home by adding 1 teaspoon of salt and 1 teaspoon of baking soda to 2 cups of distilled water. If you make your own, fill a bulb syringe with the solution, insert the tip into your nostril, and squeeze gently. Srinivas Maizes your nose. · Put a hot, wet towel or a warm gel pack on your face 3 or 4 times a day for 5 to 10 minutes each time. · Try a decongestant nasal spray like oxymetazoline (Afrin). Do not use it for more than 3 days in a row. Using it for more than 3 days can make your congestion worse. When should you call for help? Call your doctor now or seek immediate medical care if: 
? · You have new or worse swelling or redness in your face or around your eyes. ? · You have a new or higher fever. ? Watch closely for changes in your health, and be sure to contact your doctor if: 
? · You have new or worse facial pain. ? · The mucus from your nose becomes thicker (like pus) or has new blood in it. ? · You are not getting better as expected. Where can you learn more? Go to http://libby-raeann.info/. Enter Z431 in the search box to learn more about \"Sinusitis: Care Instructions. \" Current as of: May 12, 2017 Content Version: 11.4 © 0165-3729 [employer]Avail. Care instructions adapted under license by [employer] (which disclaims liability or warranty for this information). If you have questions about a medical condition or this instruction, always ask your healthcare professional. [place] [address] any warranty or liability for your use of this information. Introducing [address]! Wendy Ayers introduces Availendar patient portal. Now you can access parts of your medical record, email your doctor's office, and request medication refills online. 1. In your internet browser, go to [email or web address] 2. Click on the First Time User? Click Here link in the Sign In box. You will see the New Member Sign Up page. 3. Enter your Apttus Access Code exactly as it appears below. You will not need to use this code after youve completed the sign-up process. If you do not sign up before the expiration date, you must request a new code. · Apttus Access Code: [email or web address] Expires: 8/14/2018 12:05 PM 
 
4. Enter the last four digits of your Social Security Number (xxxx) and Date of Birth (mm/dd/yyyy) as indicated and click Submit. You will be taken to the next sign-up page. 5. Create a Synqerat ID. This will be your Apttus login ID and cannot be changed, so think of one that is secure and easy to remember. 6. Create a Apttus password. You can change your password at any time. 7. Enter your Password Reset Question and Answer. This can be used at a later time if you forget your password. 8. Enter your e-mail address. You will receive e-mail notification when new information is available in [address] 9. Click Sign Up. You can now view and download portions of your medical record. 10. Click the Download Summary menu link to download a portable copy of your medical information. If you have questions, please visit the Frequently Asked Questions section of the Apttus website. Remember, Apttus is NOT to be used for urgent needs. For medical emergencies, dial 911. Now available from your iPhone and Android! Please provide this summary of care documentation to your next provider. Your primary care clinician is listed as [address]. If you have any questions after today's visit, please call [phone].

## 2018-07-06 NOTE — PROGRESS NOTES
Identified pt with two pt identifiers(name and ). Chief Complaint   Patient presents with    Sinus Infection     headache left side for last 5 days    Nasal Congestion    Sore Throat        Health Maintenance Due   Topic    DTaP/Tdap/Td series (1 - Tdap)    ZOSTER VACCINE AGE 60>        Wt Readings from Last 3 Encounters:   18 148 lb 12.8 oz (67.5 kg)   18 146 lb 6.4 oz (66.4 kg)   18 147 lb (66.7 kg)     Temp Readings from Last 3 Encounters:   18 98.2 °F (36.8 °C) (Oral)   18 98.1 °F (36.7 °C) (Oral)   18 98.1 °F (36.7 °C) (Oral)     BP Readings from Last 3 Encounters:   18 120/70   18 156/75   18 137/75     Pulse Readings from Last 3 Encounters:   18 69   18 73   18 75         Learning Assessment:  :     Learning Assessment 2018   PRIMARY LEARNER Patient Patient   HIGHEST LEVEL OF EDUCATION - PRIMARY LEARNER  - 2 YEARS OF COLLEGE   BARRIERS PRIMARY LEARNER - NONE   CO-LEARNER CAREGIVER No No   PRIMARY LANGUAGE ENGLISH ENGLISH   LEARNER PREFERENCE PRIMARY DEMONSTRATION OTHER (COMMENT)   ANSWERED BY patient self   RELATIONSHIP SELF SELF       Depression Screening:  :     PHQ over the last two weeks 3/29/2018   Little interest or pleasure in doing things Not at all   Feeling down, depressed or hopeless Not at all   Total Score PHQ 2 0       Fall Risk Assessment:  :     Fall Risk Assessment, last 12 mths 3/29/2018   Able to walk? Yes   Fall in past 12 months? No       Abuse Screening:  :     Abuse Screening Questionnaire 2018   Do you ever feel afraid of your partner? N   Are you in a relationship with someone who physically or mentally threatens you? N   Is it safe for you to go home?  Y       Coordination of Care Questionnaire:  :     1) Have you been to an emergency room, urgent care clinic since your last visit? no   Hospitalized since your last visit? no             2) Have you seen or consulted any other health care providers outside of [address] [place] [address] since your last visit? no  (Include any pap smears or colon screenings in this section.)    3) Do you have an Advance Directive on file? no  Are you interested in receiving information about Advance Directives? Yes paper work given and explained    Reviewed record in preparation for visit and have obtained necessary documentation. Medication reconciliation up to date and corrected with patient at this time.

## 2018-07-06 NOTE — PATIENT INSTRUCTIONS
Sinusitis: Care Instructions  Your Care Instructions    Sinusitis is an infection of the lining of the sinus cavities in your head. Sinusitis often follows a cold. It causes pain and pressure in your head and face. In most cases, sinusitis gets better on its own in 1 to 2 weeks. But some mild symptoms may last for several weeks. Sometimes antibiotics are needed. Follow-up care is a key part of your treatment and safety. Be sure to make and go to all appointments, and call your doctor if you are having problems. It's also a good idea to know your test results and keep a list of the medicines you take. How can you care for yourself at home? · Take an over-the-counter pain medicine, such as acetaminophen (Tylenol), ibuprofen (Advil, Motrin), or naproxen (Aleve). Read and follow all instructions on the label. · If the doctor prescribed antibiotics, take them as directed. Do not stop taking them just because you feel better. You need to take the full course of antibiotics. · Be careful when taking over-the-counter cold or flu medicines and Tylenol at the same time. Many of these medicines have acetaminophen, which is Tylenol. Read the labels to make sure that you are not taking more than the recommended dose. Too much acetaminophen (Tylenol) can be harmful. · Breathe warm, moist air from a steamy shower, a hot bath, or a sink filled with hot water. Avoid cold, dry air. Using a humidifier in your home may help. Follow the directions for cleaning the machine. · Use saline (saltwater) nasal washes to help keep your nasal passages open and wash out mucus and bacteria. You can buy saline nose drops at a grocery store or drugstore. Or you can make your own at home by adding 1 teaspoon of salt and 1 teaspoon of baking soda to 2 cups of distilled water. If you make your own, fill a bulb syringe with the solution, insert the tip into your nostril, and squeeze gently. Duana Glow your nose.   · Put a hot, wet towel or a warm gel pack on your face 3 or 4 times a day for 5 to 10 minutes each time. · Try a decongestant nasal spray like oxymetazoline (Afrin). Do not use it for more than 3 days in a row. Using it for more than 3 days can make your congestion worse. When should you call for help? Call your doctor now or seek immediate medical care if:  ? · You have new or worse swelling or redness in your face or around your eyes. ? · You have a new or higher fever. ? Watch closely for changes in your health, and be sure to contact your doctor if:  ? · You have new or worse facial pain. ? · The mucus from your nose becomes thicker (like pus) or has new blood in it. ? · You are not getting better as expected. Where can you learn more? Go to http://libby-raeann.info/. Enter G521 in the search box to learn more about \"Sinusitis: Care Instructions. \"  Current as of: May 12, 2017  Content Version: 11.4  © 4959-6665 Healthwise, Incorporated. Care instructions adapted under license by [employer] (which disclaims liability or warranty for this information). If you have questions about a medical condition or this instruction, always ask your healthcare professional. [address] any warranty or liability for your use of this information.

## 2018-07-06 NOTE — PROGRESS NOTES
Subjective:   Farhan Garcia is a 78 y.o. female who complains of congestion, sore throat, post nasal drip, headache, left sinus pain and brown nasal discharge for 4-5 days, gradually worsening since that time. She denies a history of fevers, shortness of breath and wheezing. Evaluation to date: none. Treatment to date: OTC products. Patient does not smoke cigarettes. Relevant PMH: DM.    Patient Active Problem List    Diagnosis Date Noted    Type 2 diabetes with nephropathy ([facility] 04/03/2018    History of rheumatoid arthritis 11/21/2017    Osteopenia of both lower legs 11/21/2017    Diverticulosis 04/20/2015    Infectious gastroenteritis and colitis 04/20/2015    Benign essential hypertension 04/20/2015    Diabetes mellitus type II, controlled ([facility] 04/20/2015    Anemia 04/20/2015    Anemia associated with acute blood loss 04/20/2015     Current Outpatient Prescriptions   Medication Sig Dispense Refill    lisinopril (PRINIVIL, ZESTRIL) 10 mg tablet Take  by mouth daily.  glipiZIDE SR (GLUCOTROL XL) 5 mg CR tablet Take  by mouth daily.  atorvastatin (LIPITOR) 10 mg tablet Take  by mouth daily.  biotin 10,000 mcg TbDi Take 1 Tab by mouth once over twenty-four (24) hours.  cholecalciferol (VITAMIN D3) 1,000 unit cap Take  by mouth daily.  JARDIANCE 25 mg tablet TAKE ONE TABLET BY MOUTH DAILY 90 Tab 0    glucose blood VI test strips (ONETOUCH ULTRA TEST) strip Test FB daily. Ell.9. Type II DM 50 Strip 5    calcium carbonate (TUMS) 200 mg calcium (500 mg) chew Take 1 Tab by mouth daily.  cyanocobalamin 1,000 mcg tablet Take 1,000 mcg by mouth daily.  multivitamin (ONE A DAY) tablet Take 1 Tab by mouth daily.  L. ACIDOPHILUS/BIFID. ANIMALIS (DAILY PROBIOTIC PO) Take  by mouth.  aspirin delayed-release 81 mg tablet Take 81 mg by mouth daily.  omeprazole (PRILOSEC OTC) 20 mg tablet Take 20 mg by mouth as needed.       nystatin (MYCOSTATIN) powder Apply to affected area four (4) times daily. Apply 1-4 grams every day until rash resolves. 1 bottle = 60 grams 1 Bottle 3    pneumococcal 23-valent (PNEUMOVAX 23) 25 mcg/0.5 mL injection 0.5 mL by IntraMUSCular route PRIOR TO DISCHARGE for 1 dose. 0.5 mL 0     No Known Allergies     Review of Systems  Pertinent items are noted in HPI. Objective:     Visit Vitals    /70 (BP 1 Location: Right arm, BP Patient Position: Sitting)  Comment: manual    Pulse 69    Temp 98.2 °F (36.8 °C) (Oral)    Resp 20    Ht 5' 4\" (1.626 m)    Wt 148 lb 12.8 oz (67.5 kg)    SpO2 99%    BMI 25.54 kg/m2     General:  alert, cooperative, no distress   Eyes: negative   Ears: normal TM's and external ear canals AU   Sinuses: tenderness over left frontal   Mouth:  Lips, mucosa, and tongue normal. Teeth and gums normal   Neck: supple, symmetrical, trachea midline and no adenopathy. Heart: S1 and S2 normal, no murmurs noted. Lungs: clear to auscultation bilaterally   Abdomen:         Assessment/Plan:         ICD-10-CM ICD-9-CM    1. Acute non-recurrent sinusitis, unspecified location J01.90 461.9 fluticasone (FLONASE) 50 mcg/actuation nasal spray      amoxicillin-clavulanate (AUGMENTIN) 875-125 mg per tablet   . Instructed to take Probiotic supplement with antibiotics. Patient Instructions          Sinusitis: Care Instructions  Your Care Instructions    Sinusitis is an infection of the lining of the sinus cavities in your head. Sinusitis often follows a cold. It causes pain and pressure in your head and face. In most cases, sinusitis gets better on its own in 1 to 2 weeks. But some mild symptoms may last for several weeks. Sometimes antibiotics are needed. Follow-up care is a key part of your treatment and safety. Be sure to make and go to all appointments, and call your doctor if you are having problems. It's also a good idea to know your test results and keep a list of the medicines you take.   How can you care for yourself at home? · Take an over-the-counter pain medicine, such as acetaminophen (Tylenol), ibuprofen (Advil, Motrin), or naproxen (Aleve). Read and follow all instructions on the label. · If the doctor prescribed antibiotics, take them as directed. Do not stop taking them just because you feel better. You need to take the full course of antibiotics. · Be careful when taking over-the-counter cold or flu medicines and Tylenol at the same time. Many of these medicines have acetaminophen, which is Tylenol. Read the labels to make sure that you are not taking more than the recommended dose. Too much acetaminophen (Tylenol) can be harmful. · Breathe warm, moist air from a steamy shower, a hot bath, or a sink filled with hot water. Avoid cold, dry air. Using a humidifier in your home may help. Follow the directions for cleaning the machine. · Use saline (saltwater) nasal washes to help keep your nasal passages open and wash out mucus and bacteria. You can buy saline nose drops at a grocery store or drugstore. Or you can make your own at home by adding 1 teaspoon of salt and 1 teaspoon of baking soda to 2 cups of distilled water. If you make your own, fill a bulb syringe with the solution, insert the tip into your nostril, and squeeze gently. Coralyn Lanse your nose. · Put a hot, wet towel or a warm gel pack on your face 3 or 4 times a day for 5 to 10 minutes each time. · Try a decongestant nasal spray like oxymetazoline (Afrin). Do not use it for more than 3 days in a row. Using it for more than 3 days can make your congestion worse. When should you call for help? Call your doctor now or seek immediate medical care if:  ? · You have new or worse swelling or redness in your face or around your eyes. ? · You have a new or higher fever. ? Watch closely for changes in your health, and be sure to contact your doctor if:  ? · You have new or worse facial pain.    ? · The mucus from your nose becomes thicker (like pus) or has new blood in it. ? · You are not getting better as expected. Where can you learn more? Go to http://libby-raeann.info/. Enter Y791 in the search box to learn more about \"Sinusitis: Care Instructions. \"  Current as of: May 12, 2017  Content Version: 11.4  © 8843-1602 [employer]. Care instructions adapted under license by [employer] (which disclaims liability or warranty for this information). If you have questions about a medical condition or this instruction, always ask your healthcare professional. [address] any warranty or liability for your use of this information.

## 2018-07-31 ENCOUNTER — OFFICE VISIT (OUTPATIENT)
Dept: FAMILY MEDICINE CLINIC | Age: 68
End: 2018-07-31

## 2018-07-31 VITALS
DIASTOLIC BLOOD PRESSURE: 70 MMHG | TEMPERATURE: 98 F | HEART RATE: 67 BPM | BODY MASS INDEX: 25.37 KG/M2 | HEIGHT: 64 IN | SYSTOLIC BLOOD PRESSURE: 120 MMHG | OXYGEN SATURATION: 100 % | WEIGHT: 148.6 LBS | RESPIRATION RATE: 20 BRPM

## 2018-07-31 DIAGNOSIS — N18.30 CKD (CHRONIC KIDNEY DISEASE) STAGE 3, GFR 30-59 ML/MIN (HCC): ICD-10-CM

## 2018-07-31 DIAGNOSIS — M15.9 PRIMARY OSTEOARTHRITIS INVOLVING MULTIPLE JOINTS: Primary | ICD-10-CM

## 2018-07-31 RX ORDER — DICLOFENAC SODIUM 10 MG/G
2 GEL TOPICAL 4 TIMES DAILY
Qty: 100 G | Refills: 2 | Status: SHIPPED | OUTPATIENT
Start: 2018-07-31 | End: 2018-09-24 | Stop reason: ALTCHOICE

## 2018-07-31 NOTE — PROGRESS NOTES
Identified pt with two pt identifiers(name and ). Chief Complaint   Patient presents with    Shoulder Pain     all painful on left side - she has seen the Rheumatologist - all started this last week    Knee Pain    Foot Pain        Health Maintenance Due   Topic    DTaP/Tdap/Td series (1 - Tdap)    ZOSTER VACCINE AGE 60>        Wt Readings from Last 3 Encounters:   18 148 lb 9.6 oz (67.4 kg)   18 148 lb 12.8 oz (67.5 kg)   18 146 lb 6.4 oz (66.4 kg)     Temp Readings from Last 3 Encounters:   18 98 °F (36.7 °C) (Oral)   18 98.2 °F (36.8 °C) (Oral)   18 98.1 °F (36.7 °C) (Oral)     BP Readings from Last 3 Encounters:   18 120/70   18 120/70   18 156/75     Pulse Readings from Last 3 Encounters:   18 67   18 69   18 73         Learning Assessment:  :     Learning Assessment 2018   PRIMARY LEARNER Patient Patient   HIGHEST LEVEL OF EDUCATION - PRIMARY LEARNER  - 2 YEARS OF COLLEGE   BARRIERS PRIMARY LEARNER - NONE   CO-LEARNER CAREGIVER No No   PRIMARY LANGUAGE ENGLISH ENGLISH   LEARNER PREFERENCE PRIMARY DEMONSTRATION OTHER (COMMENT)   ANSWERED BY patient self   RELATIONSHIP SELF SELF       Depression Screening:  :     PHQ over the last two weeks 3/29/2018   Little interest or pleasure in doing things Not at all   Feeling down, depressed, irritable, or hopeless Not at all   Total Score PHQ 2 0       Fall Risk Assessment:  :     Fall Risk Assessment, last 12 mths 3/29/2018   Able to walk? Yes   Fall in past 12 months? No       Abuse Screening:  :     Abuse Screening Questionnaire 2018   Do you ever feel afraid of your partner? N   Are you in a relationship with someone who physically or mentally threatens you? N   Is it safe for you to go home?  Y       Coordination of Care Questionnaire:  :     1) Have you been to an emergency room, urgent care clinic since your last visit? no   Hospitalized since your last visit? no 2) Have you seen or consulted any other health care providers outside of [facility] since your last visit? no  (Include any pap smears or colon screenings in this section.)    3) Do you have an Advance Directive on file? no  Are you interested in receiving information about Advance Directives? no    Reviewed record in preparation for visit and have obtained necessary documentation. Medication reconciliation up to date and corrected with patient at this time.

## 2018-07-31 NOTE — MR AVS SNAPSHOT
[facility] 
 
 
 [place]  [address] [facility] 
[phone] Patient: Cornelius Mulligan MRN: [id number] PQL:[id number]/40/[id number] Visit Information Date & Time Provider Department Dept. Phone Encounter #  
 2/40/4605 84:78 AM Boni Coronel [phone] [id number] Follow-up Instructions Return in about 2 months (around 9/30/2018). Upcoming Health Maintenance Date Due DTaP/Tdap/Td series (1 - Tdap) 7/17/2011 ZOSTER VACCINE AGE 60> 10/20/2011 Influenza Age 5 to Adult 8/1/2018 Pneumococcal 65+ Low/Medium Risk (2 of 2 - PPSV23) 8/23/2018 HEMOGLOBIN A1C Q6M 9/29/2018 MICROALBUMIN Q1 10/25/2018 EYE EXAM RETINAL OR DILATED Q1 12/12/2018 FOOT EXAM Q1 3/29/2019 LIPID PANEL Q1 3/29/2019 MEDICARE YEARLY EXAM 3/30/2019 Bone Densitometry 6/27/2019 GLAUCOMA SCREENING Q2Y 12/12/2019 BREAST CANCER SCRN MAMMOGRAM 3/15/2020 COLONOSCOPY 4/16/2025 Allergies as of 7/31/2018  Review Complete On: 0/49/6680 By: Markos Shepherd MD  
 No Known Allergies Current Immunizations  Reviewed on 11/21/2017 Name Date Influenza High Dose Vaccine PF 10/25/2017 Pneumococcal Conjugate (PCV-13) 8/23/2017, 6/23/2017 Td 7/16/2011 Not reviewed this visit You Were Diagnosed With   
  
 Codes Comments Primary osteoarthritis involving multiple joints    -  Primary ICD-10-CM: M15.0 ICD-9-CM: 715.09 Vitals BP Pulse Temp Resp Height(growth percentile) Weight(growth percentile) 120/70 (BP 1 Location: Right arm, BP Patient Position: Sitting) 67 98 °F (36.7 °C) (Oral) 20 5' 4\" (1.626 m) 148 lb 9.6 oz (67.4 kg) SpO2 BMI OB Status Smoking Status 100% 25.51 kg/m2 Hysterectomy Never Smoker BMI and BSA Data Body Mass Index Body Surface Area 25.51 kg/m 2 1.74 m 2 Preferred Pharmacy Pharmacy Name Phone Janet [facility] [address] [place] [address] [facility] [place] [phone] Your Updated Medication List  
  
   
This list is accurate as of 7/31/18 12:27 PM.  Always use your most recent med list.  
  
  
  
  
 aspirin delayed-release 81 mg tablet Take 81 mg by mouth daily. biotin 10,000 mcg Tbdi Take 1 Tab by mouth once over twenty-four (24) hours. calcium carbonate 200 mg calcium (500 mg) Chew Commonly known as:  TUMS Take 1 Tab by mouth daily. cyanocobalamin 1,000 mcg tablet Take 1,000 mcg by mouth daily. DAILY PROBIOTIC PO Take  by mouth. diclofenac 1 % Gel Commonly known as:  VOLTAREN Apply 2 g to affected area four (4) times daily. Apply to affected joints. fluticasone 50 mcg/actuation nasal spray Commonly known as:  FLONASE  
2 sprays per nostril once a day  Indications: Allergic Rhinitis  
  
 glipiZIDE SR 5 mg CR tablet Commonly known as:  GLUCOTROL XL Take  by mouth daily. glucose blood VI test strips strip Commonly known as:  ONETOUCH ULTRA TEST Test FB daily. Ell.9. Type II DM  
  
 JARDIANCE 25 mg tablet Generic drug:  empagliflozin TAKE ONE TABLET BY MOUTH DAILY  
  
 LIPITOR 10 mg tablet Generic drug:  atorvastatin Take  by mouth daily. lisinopril 10 mg tablet Commonly known as:  Gabriela Metcalf Take  by mouth daily. multivitamin tablet Commonly known as:  ONE A DAY Take 1 Tab by mouth daily. nystatin powder Commonly known as:  MYCOSTATIN Apply  to affected area four (4) times daily. Apply 1-4 grams every day until rash resolves. 1 bottle = 60 grams  
  
 pneumococcal 23-valent 25 mcg/0.5 mL injection Commonly known as:  PNEUMOVAX 23  
0.5 mL by IntraMUSCular route PRIOR TO DISCHARGE for 1 dose. PriLOSEC OTC 20 mg tablet Generic drug:  omeprazole Take 20 mg by mouth as needed. VITAMIN D3 1,000 unit Cap Generic drug:  cholecalciferol Take  by mouth daily. Prescriptions Sent to Pharmacy Refills  
 diclofenac (VOLTAREN) 1 % gel 2 Sig: Apply 2 g to affected area four (4) times daily. Apply to affected joints. Class: Normal  
 Pharmacy: Javier Gomez [facility], [facility] #: [phone] Route: Topical  
  
Follow-up Instructions Return in about 2 months (around 9/30/2018). Introducing [facility] SERVICES! John Rosa introduces [employer] patient portal. Now you can access parts of your medical record, email your doctor's office, and request medication refills online. 1. In your internet browser, go to [email or web address] 2. Click on the First Time User? Click Here link in the Sign In box. You will see the New Member Sign Up page. 3. Enter your PLTech Access Code exactly as it appears below. You will not need to use this code after youve completed the sign-up process. If you do not sign up before the expiration date, you must request a new code. · PLTech Access Code: [email or web address] Expires: 8/14/2018 12:05 PM 
 
4. Enter the last four digits of your Social Security Number (xxxx) and Date of Birth (mm/dd/yyyy) as indicated and click Submit. You will be taken to the next sign-up page. 5. Create a PLTech ID. This will be your PLTech login ID and cannot be changed, so think of one that is secure and easy to remember. 6. Create a PLTech password. You can change your password at any time. 7. Enter your Password Reset Question and Answer. This can be used at a later time if you forget your password. 8. Enter your e-mail address. You will receive e-mail notification when new information is available in [address] 9. Click Sign Up. You can now view and download portions of your medical record. 10. Click the Download Summary menu link to download a portable copy of your medical information.  
 
If you have questions, please visit the Frequently Asked Questions section of the 2Checkout. Remember, Audinatehart is NOT to be used for urgent needs. For medical emergencies, dial 911. Now available from your iPhone and Android! Please provide this summary of care documentation to your next provider. Your primary care clinician is listed as [address]. If you have any questions after today's visit, please call [phone].

## 2018-07-31 NOTE — PROGRESS NOTES
HISTORY OF PRESENT ILLNESS  Osiel Quick is a 78 y.o. female. HPI  C/O multiple joint pains: L shoulder, L knee, L foot, L hand. She has seen Dr. Sadia Patel and he feels she has OA rather than RA. He told her to take Acetaminophen but she is not taking regularly. Got better relief with IB but has mild CKD. She does some yard work which aggravate pain. Review of Systems   Musculoskeletal: Positive for joint pain. All other systems reviewed and are negative. Visit Vitals    /70 (BP 1 Location: Right arm, BP Patient Position: Sitting)  Comment: manual    Pulse 67    Temp 98 °F (36.7 °C) (Oral)    Resp 20    Ht 5' 4\" (1.626 m)    Wt 148 lb 9.6 oz (67.4 kg)    SpO2 100%    BMI 25.51 kg/m2       Physical Exam   Constitutional: She appears well-developed and well-nourished. Musculoskeletal:        Left shoulder: She exhibits tenderness and bony tenderness. Left knee: Tenderness found. Left foot: There is swelling. Feet:    Vitals reviewed. ASSESSMENT and PLAN    ICD-10-CM ICD-9-CM    1. Primary osteoarthritis involving multiple joints M15.0 715.09 diclofenac (VOLTAREN) 1 % gel   2. CKD (chronic kidney disease) stage 3, GFR 30-59 ml/min N18.3 585. 3      Due to CKD, will avoid oral NSAID's. Try topical Diclofenac gel with acetaminophen.   Plan to FU labs in end of Sept.

## 2018-08-20 DIAGNOSIS — E11.9 CONTROLLED TYPE 2 DIABETES MELLITUS WITHOUT COMPLICATION, WITHOUT LONG-TERM CURRENT USE OF INSULIN (HCC): ICD-10-CM

## 2018-08-20 RX ORDER — EMPAGLIFLOZIN 25 MG/1
TABLET, FILM COATED ORAL
Qty: 90 TAB | Refills: 0 | Status: SHIPPED | OUTPATIENT
Start: 2018-08-20 | End: 2018-09-24 | Stop reason: SDUPTHER

## 2018-09-24 ENCOUNTER — OFFICE VISIT (OUTPATIENT)
Dept: FAMILY MEDICINE CLINIC | Age: 68
End: 2018-09-24

## 2018-09-24 ENCOUNTER — HOSPITAL ENCOUNTER (OUTPATIENT)
Dept: LAB | Age: 68
Discharge: HOME | End: 2018-09-24
Payer: MEDICARE

## 2018-09-24 VITALS
OXYGEN SATURATION: 100 % | DIASTOLIC BLOOD PRESSURE: 70 MMHG | SYSTOLIC BLOOD PRESSURE: 139 MMHG | WEIGHT: 151 LBS | TEMPERATURE: 98.7 F | HEART RATE: 71 BPM | BODY MASS INDEX: 25.78 KG/M2 | RESPIRATION RATE: 16 BRPM | HEIGHT: 64 IN

## 2018-09-24 DIAGNOSIS — E11.9 CONTROLLED TYPE 2 DIABETES MELLITUS WITHOUT COMPLICATION, WITHOUT LONG-TERM CURRENT USE OF INSULIN (HCC): Primary | ICD-10-CM

## 2018-09-24 DIAGNOSIS — E78.00 PURE HYPERCHOLESTEROLEMIA: ICD-10-CM

## 2018-09-24 DIAGNOSIS — M05.79 RHEUMATOID ARTHRITIS INVOLVING MULTIPLE SITES WITH POSITIVE RHEUMATOID FACTOR (HCC): ICD-10-CM

## 2018-09-24 DIAGNOSIS — M25.50 MULTIPLE JOINT PAIN: ICD-10-CM

## 2018-09-24 DIAGNOSIS — I10 ESSENTIAL HYPERTENSION: ICD-10-CM

## 2018-09-24 PROCEDURE — 85651 RBC SED RATE NONAUTOMATED: CPT

## 2018-09-24 PROCEDURE — 86141 C-REACTIVE PROTEIN HS: CPT

## 2018-09-24 PROCEDURE — 36415 COLL VENOUS BLD VENIPUNCTURE: CPT

## 2018-09-24 RX ORDER — LISINOPRIL 5 MG/1
5 TABLET ORAL DAILY
Qty: 90 TAB | Refills: 1 | Status: SHIPPED | OUTPATIENT
Start: 2018-09-24 | End: 2019-03-25 | Stop reason: SDUPTHER

## 2018-09-24 RX ORDER — DICLOFENAC SODIUM 10 MG/G
4 GEL TOPICAL 4 TIMES DAILY
Qty: 100 G | Refills: 3 | Status: SHIPPED | OUTPATIENT
Start: 2018-09-24 | End: 2021-07-15 | Stop reason: SDUPTHER

## 2018-09-24 RX ORDER — GLIPIZIDE 5 MG/1
5 TABLET, FILM COATED, EXTENDED RELEASE ORAL DAILY
Qty: 90 TAB | Refills: 1 | Status: SHIPPED | OUTPATIENT
Start: 2018-09-24 | End: 2019-03-25 | Stop reason: SDUPTHER

## 2018-09-24 RX ORDER — LISINOPRIL 5 MG/1
5 TABLET ORAL DAILY
COMMUNITY
End: 2018-09-24 | Stop reason: SDUPTHER

## 2018-09-24 RX ORDER — ATORVASTATIN CALCIUM 10 MG/1
10 TABLET, FILM COATED ORAL DAILY
Qty: 90 TAB | Refills: 1 | Status: SHIPPED | OUTPATIENT
Start: 2018-09-24 | End: 2019-09-04 | Stop reason: SDUPTHER

## 2018-09-24 RX ORDER — PREDNISONE 10 MG/1
TABLET ORAL
Qty: 21 TAB | Refills: 0 | Status: SHIPPED | OUTPATIENT
Start: 2018-09-24 | End: 2019-01-29 | Stop reason: ALTCHOICE

## 2018-09-24 NOTE — MR AVS SNAPSHOT
[address] [facility] 
[phone] Patient: Elsi Smith MRN: [id number] CQN:74/75/6402 Visit Information Date & Time Provider Department Dept. Phone Encounter #  
 9/24/2018  2:15 PM Troy Gonzalez [address] [phone] [id number] Upcoming Health Maintenance Date Due Shingrix Vaccine Age 50> (1 of 2) 12/20/2001 DTaP/Tdap/Td series (1 - Tdap) 7/17/2011 Influenza Age 5 to Adult 8/1/2018 Pneumococcal 65+ Low/Medium Risk (2 of 2 - PPSV23) 8/23/2018 HEMOGLOBIN A1C Q6M 9/29/2018 MICROALBUMIN Q1 10/25/2018 EYE EXAM RETINAL OR DILATED Q1 12/12/2018 FOOT EXAM Q1 3/29/2019 LIPID PANEL Q1 3/29/2019 MEDICARE YEARLY EXAM 3/30/2019 Bone Densitometry 6/27/2019 GLAUCOMA SCREENING Q2Y 12/12/2019 BREAST CANCER SCRN MAMMOGRAM 3/15/2020 COLONOSCOPY 4/16/2025 Allergies as of 9/24/2018  Review Complete On: 9/24/2018 By: Troy Gonzalez MD  
 No Known Allergies Current Immunizations  Reviewed on 11/21/2017 Name Date Influenza High Dose Vaccine PF 10/25/2017 Pneumococcal Conjugate (PCV-13) 8/23/2017, 6/23/2017 Td 7/16/2011 Not reviewed this visit You Were Diagnosed With   
  
 Codes Comments Controlled type 2 diabetes mellitus without complication, without long-term current use of insulin ([facility]    -  Primary ICD-10-CM: E11.9 ICD-9-CM: 250.00 Pure hypercholesterolemia     ICD-10-CM: E78.00 ICD-9-CM: 272.0 Multiple joint pain     ICD-10-CM: M25.50 ICD-9-CM: 719.49 Rheumatoid arthritis involving multiple sites with positive rheumatoid factor (HCC)     ICD-10-CM: M05.79 ICD-9-CM: 714.0 Essential hypertension     ICD-10-CM: I10 
ICD-9-CM: 401.9 Vitals BP Pulse Temp Resp Height(growth percentile) Weight(growth percentile)  139/70 (BP 1 Location: Left arm, BP Patient Position: Sitting) 71 98.7 °F (37.1 °C) (Oral) 16 5' 4\" (1.626 m) 151 lb (68.5 kg) SpO2 BMI OB Status Smoking Status 100% 25.92 kg/m2 Hysterectomy Never Smoker Vitals History BMI and BSA Data Body Mass Index Body Surface Area  
 25.92 kg/m 2 1.76 m 2 Preferred Pharmacy Pharmacy Name Phone Tara Hives [address] [place] [address], [address] [place] [address] [facility] [phone] Your Updated Medication List  
  
   
This list is accurate as of 9/24/18  3:12 PM.  Always use your most recent med list.  
  
  
  
  
 aspirin delayed-release 81 mg tablet Take 81 mg by mouth daily. atorvastatin 10 mg tablet Commonly known as:  LIPITOR Take 1 Tab by mouth daily. biotin 10,000 mcg Tbdi Take 1 Tab by mouth once over twenty-four (24) hours. calcium carbonate 200 mg calcium (500 mg) Chew Commonly known as:  TUMS Take 1 Tab by mouth daily. cyanocobalamin 1,000 mcg tablet Take 1,000 mcg by mouth daily. DAILY PROBIOTIC PO Take 1 Tab by mouth once over twenty-four (24) hours. diclofenac 1 % Gel Commonly known as:  VOLTAREN Apply 4 g to affected area four (4) times daily. PLEASE GIVE GENERIC IF AVAILABLE.  
  
 empagliflozin 25 mg tablet Commonly known as:  JARDIANCE  
TAKE ONE TABLET BY MOUTH DAILY  
  
 fluticasone 50 mcg/actuation nasal spray Commonly known as:  FLONASE  
2 sprays per nostril once a day  Indications: Allergic Rhinitis  
  
 glipiZIDE SR 5 mg CR tablet Commonly known as:  GLUCOTROL XL Take 1 Tab by mouth daily. glucose blood VI test strips strip Commonly known as:  ONETOUCH ULTRA TEST Test FB daily. Ell.9. Type II DM  
  
 * lisinopril 10 mg tablet Commonly known as:  Ronalee Ruffing Take 10 mg by mouth daily. * lisinopril 5 mg tablet Commonly known as:  Ronalee Ruffing Take 1 Tab by mouth daily. multivitamin tablet Commonly known as:  ONE A DAY Take 1 Tab by mouth daily. nystatin powder Commonly known as:  MYCOSTATIN Apply  to affected area four (4) times daily. Apply 1-4 grams every day until rash resolves. 1 bottle = 60 grams  
  
 predniSONE 10 mg dose pack Commonly known as:  STERAPRED DS See administration instruction per 10mg dose pack PriLOSEC OTC 20 mg tablet Generic drug:  omeprazole Take 20 mg by mouth as needed. VITAMIN D3 1,000 unit Cap Generic drug:  cholecalciferol Take 1,000 Units by mouth daily. * Notice: This list has 2 medication(s) that are the same as other medications prescribed for you. Read the directions carefully, and ask your doctor or other care provider to review them with you. Prescriptions Sent to Pharmacy Refills  
 lisinopril (PRINIVIL, ZESTRIL) 5 mg tablet 1 Sig: Take 1 Tab by mouth daily. Class: Normal  
 Pharmacy: [address] Ph #: [phone] Route: Oral  
 empagliflozin (JARDIANCE) 25 mg tablet 1 Sig: TAKE ONE TABLET BY MOUTH DAILY Class: Normal  
 Pharmacy: [address] Ph #: [phone]  
 glipiZIDE SR (GLUCOTROL XL) 5 mg CR tablet 1 Sig: Take 1 Tab by mouth daily. Class: Normal  
 Pharmacy: [address] Ph #: [phone] Route: Oral  
 atorvastatin (LIPITOR) 10 mg tablet 1 Sig: Take 1 Tab by mouth daily. Class: Normal  
 Pharmacy: [address] Ph #: [phone] Route: Oral  
 diclofenac (VOLTAREN) 1 % gel 3 Sig: Apply 4 g to affected area four (4) times daily. PLEASE GIVE GENERIC IF AVAILABLE. Class: Normal  
 Pharmacy: [address] Ph #: [phone] Route: Topical  
 predniSONE (STERAPRED DS) 10 mg dose pack 0 Sig: See administration instruction per 10mg dose pack  Class: Normal  
 Pharmacy: [address], [address] [place] [address] #: [phone] We Performed the Following CRP, HIGH SENSITIVITY [92132 CPT(R)] SED RATE (ESR) [id number] CPT(R)] Introducing [facility] HEALTH SERVICES! [employer] introduces Daojia patient portal. Now you can access parts of your medical record, email your doctor's office, and request medication refills online. 1. In your internet browser, go to [email or web address] [employer]/"[employer] 2. Click on the First Time User? Click Here link in the Sign In box. You will see the New Member Sign Up page. 3. Enter your Daojia Access Code exactly as it appears below. You will not need to use this code after youve completed the sign-up process. If you do not sign up before the expiration date, you must request a new code. · Daojia Access Code: [email or web address] Expires: 12/20/2018  3:33 PM 
 
4. Enter the last four digits of your Social Security Number (xxxx) and Date of Birth (mm/dd/yyyy) as indicated and click Submit. You will be taken to the next sign-up page. 5. Create a Daojia ID. This will be your Daojia login ID and cannot be changed, so think of one that is secure and easy to remember. 6. Create a Daojia password. You can change your password at any time. 7. Enter your Password Reset Question and Answer. This can be used at a later time if you forget your password. 8. Enter your e-mail address. You will receive e-mail notification when new information is available in [address] 9. Click Sign Up. You can now view and download portions of your medical record. 10. Click the Download Summary menu link to download a portable copy of your medical information. If you have questions, please visit the Frequently Asked Questions section of the Daojia website. Remember, Daojia is NOT to be used for urgent needs. For medical emergencies, dial 911. Now available from your iPhone and Android! Please provide this summary of care documentation to your next provider. Your primary care clinician is listed as [address]. If you have any questions after today's visit, please call [phone].

## 2018-09-24 NOTE — PROGRESS NOTES
Chief Complaint   Patient presents with    Arthritis    Hand Pain     bilaterally - comes and goes - started in right hand and now left bothering her    Knee Pain     bilaterally    Medication Refill     She is a 78 y.o. female who presents with c/o worsening right hand and now left hand pain. She is also c/o bilateral knee pain. The pain is most significant during the morning and stiffness remains for most of the morning >30 minutes. In addition, she has not been able to consistently take NSAID's, due to CKD. She has had prednisone taper x 2 in the past and that REALLY helped her symptoms. I did send her to see Rheumatologist. I note that she had been treated with MTX about 30 years ago and it was felt that the MTX made her go into remission. However, over the past several months, her symptoms have been more frequent. They are minimally responding to Tylenol and she has noticed some difficulty with writing due to inability to bend some of the fingers on her right hand. At this point, it seems that either her RA is flaring or her OA is worsening. Her symptoms are more consistent with RA at this point. She needs to once again see the Rheumatologist and the possibility of starring DMARDS seems reasonable - if she meets the criteria. Reviewed PmHx, RxHx, FmHx, SocHx, AllgHx and updated and dated in the chart.     Patient Active Problem List    Diagnosis    Type 2 diabetes with nephropathy ([address]    History of rheumatoid arthritis    Osteopenia of both lower legs    Diverticulosis    Infectious gastroenteritis and colitis    Benign essential hypertension    Diabetes mellitus type II, controlled ([address]    Anemia    Anemia associated with acute blood loss       Review of Systems - negative except as listed above in the HPI    Objective:     Vitals:    09/24/18 1439 09/24/18 1445   BP: 151/75 139/70   Pulse: 75 71   Resp: 16    Temp: 98.7 °F (37.1 °C)    TempSrc: Oral    SpO2: 100%    Weight: 151 lb (68.5 kg)    Height: 5' 4\" (1.626 m)      Physical Examination:   General appearance - alert, well appearing, and in no distress, normal appearing weight and holding her hands. Uncomfortable  Chest - clear to auscultation, no wheezes, rales or rhonchi, symmetric air entry  Back exam - full range of motion, no tenderness, palpable spasm or pain on motion  Neurological - alert, oriented, normal speech, no focal findings or movement disorder noted  Musculoskeletal - joint tenderness over MCP joints both hands and knees. No obvious swelling or deformity. Decreased ROM and strength due to pain. Extremities - peripheral pulses normal, no pedal edema, no clubbing or cyanosis    Assessment/ Plan:       ICD-10-CM ICD-9-CM    1. Controlled type 2 diabetes mellitus without complication, without long-term current use of insulin ([facility]) E11.9 250.00 empagliflozin (JARDIANCE) 25 mg tablet      glipiZIDE SR (GLUCOTROL XL) 5 mg CR tablet   2. Pure hypercholesterolemia E78.00 272.0 atorvastatin (LIPITOR) 10 mg tablet   3. Multiple joint pain M25.50 719.49 diclofenac (VOLTAREN) 1 % gel      predniSONE (STERAPRED DS) 10 mg dose pack      SED RATE (ESR)      CRP, HIGH SENSITIVITY   4. Rheumatoid arthritis involving multiple sites with positive rheumatoid factor ([facility]) M05.79 714.0 diclofenac (VOLTAREN) 1 % gel      predniSONE (STERAPRED DS) 10 mg dose pack      SED RATE (ESR)      CRP, HIGH SENSITIVITY   5. Essential hypertension I10 401.9 lisinopril (PRINIVIL, ZESTRIL) 5 mg tablet     78 female who presents with c/o worsening right hand and now left hand pain. She is also c/o bilateral knee pain. The pain is most significant during the morning and stiffness remains for most of the morning >30 minutes. In addition, she has not been able to consistently take NSAID's, due to CKD. She has had prednisone taper x 2 in the past and that REALLY helped her symptoms.  Will give a new prescription and also discussed the possibility of seeing Rheumatology again to determine if her symptoms are now consistent with recurrent RA. I have discussed the diagnosis with the patient and the intended treatment plan as seen in the above orders. The patient has received an after-visit summary and questions were answered concerning future plans. Asked to return should symptoms worsen or not improve with treatment. Any pending labs and studies will be relayed to patient when they become available. Pt verbalizes understanding of plan of care and denies further questions or concerns at this time. Follow-up Disposition:  Return if symptoms worsen or fail to improve.     Jaclyn Burgess MD

## 2018-09-24 NOTE — PROGRESS NOTES
Identified pt with two pt identifiers(name and ). Chief Complaint   Patient presents with    Arthritis    Hand Pain     bilaterally - comes and goes - started in right hand and now left bothering her    Knee Pain     bilaterally        Health Maintenance Due   Topic    Shingrix Vaccine Age 50> (1 of 2)    DTaP/Tdap/Td series (1 - Tdap)    Influenza Age 5 to Adult     Pneumococcal 65+ Low/Medium Risk (2 of 2 - PPSV23)    HEMOGLOBIN A1C Q6M        Wt Readings from Last 3 Encounters:   18 151 lb (68.5 kg)   18 148 lb 9.6 oz (67.4 kg)   18 148 lb 12.8 oz (67.5 kg)     Temp Readings from Last 3 Encounters:   18 98.7 °F (37.1 °C) (Oral)   18 98 °F (36.7 °C) (Oral)   18 98.2 °F (36.8 °C) (Oral)     BP Readings from Last 3 Encounters:   18 139/70   18 120/70   18 120/70     Pulse Readings from Last 3 Encounters:   18 71   18 67   18 69         Learning Assessment:  :     Learning Assessment 2018   PRIMARY LEARNER Patient Patient   HIGHEST LEVEL OF EDUCATION - PRIMARY LEARNER  - 2 YEARS OF COLLEGE   BARRIERS PRIMARY LEARNER - NONE   CO-LEARNER CAREGIVER No No   PRIMARY LANGUAGE ENGLISH ENGLISH   LEARNER PREFERENCE PRIMARY DEMONSTRATION OTHER (COMMENT)   ANSWERED BY patient self   RELATIONSHIP SELF SELF       Depression Screening:  :     PHQ over the last two weeks 3/29/2018   Little interest or pleasure in doing things Not at all   Feeling down, depressed, irritable, or hopeless Not at all   Total Score PHQ 2 0       Fall Risk Assessment:  :     Fall Risk Assessment, last 12 mths 3/29/2018   Able to walk? Yes   Fall in past 12 months? No       Abuse Screening:  :     Abuse Screening Questionnaire 2018   Do you ever feel afraid of your partner? N   Are you in a relationship with someone who physically or mentally threatens you? N   Is it safe for you to go home?  Y       Coordination of Care Questionnaire:  :     1) Have you been to an emergency room, urgent care clinic since your last visit? no   Hospitalized since your last visit? no             2) Have you seen or consulted any other health care providers outside of [facility] since your last visit? no  (Include any pap smears or colon screenings in this section.)    3) Do you have an Advance Directive on file? no  Are you interested in receiving information about Advance Directives? no    Patient is accompanied by self. Reviewed record in preparation for visit and have obtained necessary documentation. Medication reconciliation up to date and corrected with patient at this time.

## 2018-09-26 LAB
CRP SERPL HS-MCNC: 1.5 MG/L (ref 0–3)
ERYTHROCYTE [SEDIMENTATION RATE] IN BLOOD BY WESTERGREN METHOD: 24 MM/HR (ref 0–40)

## 2018-09-26 NOTE — PROGRESS NOTES
CRP and ESR while elevated are still in the normal range. If the joint pain continues, then she will need to definitely follow up with Dr. Sonal Chapa.

## 2018-09-28 ENCOUNTER — TELEPHONE (OUTPATIENT)
Dept: FAMILY MEDICINE CLINIC | Age: 68
End: 2018-09-28

## 2018-09-28 NOTE — TELEPHONE ENCOUNTER
Letter went out today about lab results but pt stated nurse had called her please call pt at [phone]

## 2018-10-02 NOTE — TELEPHONE ENCOUNTER
Spoke with patient who stated she finished the Z-Pack and so far I'm feeling good. Advised patient that letter went out and she should be receiving it soon. Read Dr. Kemar Callahan recommendations to patient. Voiced understanding and appreciation for the call.

## 2019-01-29 ENCOUNTER — OFFICE VISIT (OUTPATIENT)
Dept: FAMILY MEDICINE CLINIC | Age: 69
End: 2019-01-29

## 2019-01-29 ENCOUNTER — HOSPITAL ENCOUNTER (OUTPATIENT)
Dept: LAB | Age: 69
Discharge: HOME | End: 2019-01-29
Payer: MEDICARE

## 2019-01-29 VITALS
RESPIRATION RATE: 20 BRPM | SYSTOLIC BLOOD PRESSURE: 144 MMHG | WEIGHT: 145 LBS | TEMPERATURE: 98.1 F | OXYGEN SATURATION: 95 % | BODY MASS INDEX: 24.75 KG/M2 | HEIGHT: 64 IN | DIASTOLIC BLOOD PRESSURE: 76 MMHG | HEART RATE: 82 BPM

## 2019-01-29 DIAGNOSIS — E11.9 CONTROLLED TYPE 2 DIABETES MELLITUS WITHOUT COMPLICATION, WITHOUT LONG-TERM CURRENT USE OF INSULIN (HCC): ICD-10-CM

## 2019-01-29 DIAGNOSIS — M65.321 TRIGGER INDEX FINGER OF RIGHT HAND: ICD-10-CM

## 2019-01-29 DIAGNOSIS — M05.79 RHEUMATOID ARTHRITIS INVOLVING MULTIPLE SITES WITH POSITIVE RHEUMATOID FACTOR (HCC): ICD-10-CM

## 2019-01-29 DIAGNOSIS — E11.21 TYPE 2 DIABETES WITH NEPHROPATHY (HCC): Primary | ICD-10-CM

## 2019-01-29 DIAGNOSIS — Z23 ENCOUNTER FOR IMMUNIZATION: ICD-10-CM

## 2019-01-29 PROCEDURE — 36415 COLL VENOUS BLD VENIPUNCTURE: CPT

## 2019-01-29 PROCEDURE — 83036 HEMOGLOBIN GLYCOSYLATED A1C: CPT

## 2019-01-29 PROCEDURE — 82043 UR ALBUMIN QUANTITATIVE: CPT

## 2019-01-29 PROCEDURE — 85025 COMPLETE CBC W/AUTO DIFF WBC: CPT

## 2019-01-29 PROCEDURE — 80053 COMPREHEN METABOLIC PANEL: CPT

## 2019-01-29 RX ORDER — PREDNISONE 10 MG/1
TABLET ORAL
Qty: 21 TAB | Refills: 0 | Status: SHIPPED | OUTPATIENT
Start: 2019-01-29 | End: 2019-05-28 | Stop reason: CLARIF

## 2019-01-29 NOTE — PROGRESS NOTES
Chief Complaint: 
Chief Complaint Patient presents with  
 Hand Swelling Complains of bilateral hand swelling and joint pain. Moves from one joint to another  Shoulder Pain Complains of aggravating right shoulder pain that increases when lying down. History of Present Illness: 
She is a 80 y.o. female who presents with c/o worsening R-hand and shoulder pain that seems to increase when lying down. She is also having worsening of her RA and having significant pain in her hands and multiple joints, but mostly her hands. She is also here for follow up of type II DM and needs to have fasting labs today. She would also like to get her flu shot done today as well. Lastly, she has worsening trigger finger of the index and 3rd finger of the right hand. She can barely bend the fingers when she tries to write. Reviewed PmHx, RxHx, FmHx, SocHx, AllgHx and updated and dated in the chart. Patient Active Problem List  
 Diagnosis  Rheumatoid arthritis involving multiple sites with positive rheumatoid factor ([address]  Type 2 diabetes with nephropathy ([address]  History of rheumatoid arthritis  Osteopenia of both lower legs  Diverticulosis  Infectious gastroenteritis and colitis  Benign essential hypertension  Diabetes mellitus type II, controlled ([address]  Anemia  Anemia associated with acute blood loss Review of Systems - negative except as listed above in the HPI Objective:  
 
Vitals:  
 01/29/19 1315 BP: 144/76 Pulse: 82 Resp: 20 Temp: 98.1 °F (36.7 °C) TempSrc: Oral  
SpO2: 95% Weight: 145 lb (65.8 kg) Height: 5' 4\" (1.626 m) Physical Examination:  
General appearance - alert, well appearing, and in no distress Mental status - alert, oriented to person, place, and time Chest - clear to auscultation, no wheezes, rales or rhonchi, symmetric air entry Heart - normal rate, regular rhythm, normal S1, S2, no murmurs, rubs, clicks or gallops Neurological - alert, oriented, normal speech, no focal findings or movement disorder noted Musculoskeletal - abnormal exam of right hand and decreased ROM fingers. Extremities - peripheral pulses normal, no pedal edema, no clubbing or cyanosis,   
 
Assessment/ Plan:  
68F with worsening RA symptoms, type II DM and trigger finger. Will proceed as outlined below. Diagnoses and all orders for this visit: 
 
1. Type 2 diabetes with nephropathy (HCC) -     MICROALBUMIN, UR, RAND W/ MICROALB/CREAT RATIO 
-     HEMOGLOBIN A1C WITH EAG 
-     CBC WITH AUTOMATED DIFF 
-     METABOLIC PANEL, COMPREHENSIVE 2. Rheumatoid arthritis involving multiple sites with positive rheumatoid factor ([facility] 
-     REFERRAL TO RHEUMATOLOGY (may need to be started on DMARDs) -     predniSONE (STERAPRED DS) 10 mg dose pack; See administration instruction per 10mg dose pack 3. Encounter for immunization 
-     INFLUENZA VACCINE INACTIVATED (IIV), SUBUNIT, ADJUVANTED, IM 4. Trigger index finger of right hand 
-     REFERRAL TO ORTHOPEDICS 5. Controlled type 2 diabetes mellitus without complication, without long-term current use of insulin (HCC) 
-     empagliflozin (JARDIANCE) 25 mg tablet; TAKE ONE TABLET BY MOUTH DAILY I have discussed the diagnosis with the patient and the intended treatment plan as seen in the above orders. The patient has received an after-visit summary and questions were answered concerning future plans. Asked to return should symptoms worsen or not improve with treatment. Any pending labs and studies will be relayed to patient when they become available. Pt verbalizes understanding of plan of care and denies further questions or concerns at this time. Follow-up Disposition: 
Return if symptoms worsen or fail to improve.  
 
Tere Malone MD

## 2019-01-30 LAB
ALBUMIN SERPL-MCNC: 4.5 G/DL (ref 3.6–4.8)
ALBUMIN/CREAT UR: <3.9 MG/G CREAT (ref 0–30)
ALBUMIN/GLOB SERPL: 1.5 {RATIO} (ref 1.2–2.2)
ALP SERPL-CCNC: 57 IU/L (ref 39–117)
ALT SERPL-CCNC: 14 IU/L (ref 0–32)
AST SERPL-CCNC: 13 IU/L (ref 0–40)
BASOPHILS # BLD AUTO: 0 X10E3/UL (ref 0–0.2)
BASOPHILS NFR BLD AUTO: 0 %
BILIRUB SERPL-MCNC: 0.6 MG/DL (ref 0–1.2)
BUN SERPL-MCNC: 22 MG/DL (ref 8–27)
BUN/CREAT SERPL: 21 (ref 12–28)
CALCIUM SERPL-MCNC: 9.6 MG/DL (ref 8.7–10.3)
CHLORIDE SERPL-SCNC: 104 MMOL/L (ref 96–106)
CO2 SERPL-SCNC: 20 MMOL/L (ref 20–29)
CREAT SERPL-MCNC: 1.04 MG/DL (ref 0.57–1)
CREAT UR-MCNC: 76 MG/DL
EOSINOPHIL # BLD AUTO: 0 X10E3/UL (ref 0–0.4)
EOSINOPHIL NFR BLD AUTO: 1 %
ERYTHROCYTE [DISTWIDTH] IN BLOOD BY AUTOMATED COUNT: 12.7 % (ref 12.3–15.4)
EST. AVERAGE GLUCOSE BLD GHB EST-MCNC: 111 MG/DL
GLOBULIN SER CALC-MCNC: 3 G/DL (ref 1.5–4.5)
GLUCOSE SERPL-MCNC: 95 MG/DL (ref 65–99)
HBA1C MFR BLD: 5.5 % (ref 4.8–5.6)
HCT VFR BLD AUTO: 38.2 % (ref 34–46.6)
HGB BLD-MCNC: 12.4 G/DL (ref 11.1–15.9)
IMM GRANULOCYTES # BLD AUTO: 0 X10E3/UL (ref 0–0.1)
IMM GRANULOCYTES NFR BLD AUTO: 0 %
INTERPRETATION: NORMAL
LYMPHOCYTES # BLD AUTO: 1.3 X10E3/UL (ref 0.7–3.1)
LYMPHOCYTES NFR BLD AUTO: 30 %
Lab: NORMAL
MCH RBC QN AUTO: 29.8 PG (ref 26.6–33)
MCHC RBC AUTO-ENTMCNC: 32.5 G/DL (ref 31.5–35.7)
MCV RBC AUTO: 92 FL (ref 79–97)
MICROALBUMIN UR-MCNC: <3 UG/ML
MONOCYTES # BLD AUTO: 0.4 X10E3/UL (ref 0.1–0.9)
MONOCYTES NFR BLD AUTO: 9 %
NEUTROPHILS # BLD AUTO: 2.7 X10E3/UL (ref 1.4–7)
NEUTROPHILS NFR BLD AUTO: 60 %
PLATELET # BLD AUTO: 248 X10E3/UL (ref 150–379)
POTASSIUM SERPL-SCNC: 3.9 MMOL/L (ref 3.5–5.2)
PROT SERPL-MCNC: 7.5 G/DL (ref 6–8.5)
RBC # BLD AUTO: 4.16 X10E6/UL (ref 3.77–5.28)
SODIUM SERPL-SCNC: 143 MMOL/L (ref 134–144)
WBC # BLD AUTO: 4.4 X10E3/UL (ref 3.4–10.8)

## 2019-02-08 ENCOUNTER — TELEPHONE (OUTPATIENT)
Dept: FAMILY MEDICINE CLINIC | Age: 69
End: 2019-02-08

## 2019-02-08 NOTE — LETTER
2/13/2019 10:25 AM 
 
Ms. Wyatt Smith [address] [address] [phone] Dear Ms. Hernan Queen, Please see enclosed lab results. 1. Labs greatly improved/stable. 2. No worrisome features. 3. Repeat labs in 4-6 months. Sincerely, Mami Terrell MD

## 2019-02-11 ENCOUNTER — OFFICE VISIT (OUTPATIENT)
Dept: RHEUMATOLOGY | Age: 69
End: 2019-02-11

## 2019-02-11 VITALS
HEART RATE: 80 BPM | OXYGEN SATURATION: 97 % | SYSTOLIC BLOOD PRESSURE: 123 MMHG | HEIGHT: 64 IN | BODY MASS INDEX: 25.03 KG/M2 | DIASTOLIC BLOOD PRESSURE: 79 MMHG | RESPIRATION RATE: 18 BRPM | TEMPERATURE: 98.2 F | WEIGHT: 146.6 LBS

## 2019-02-11 DIAGNOSIS — M05.741 RHEUMATOID ARTHRITIS INVOLVING BOTH HANDS WITH POSITIVE RHEUMATOID FACTOR (HCC): Primary | ICD-10-CM

## 2019-02-11 DIAGNOSIS — M05.742 RHEUMATOID ARTHRITIS INVOLVING BOTH HANDS WITH POSITIVE RHEUMATOID FACTOR (HCC): Primary | ICD-10-CM

## 2019-02-11 RX ORDER — PREDNISONE 5 MG/1
5 TABLET ORAL 2 TIMES DAILY
Qty: 60 TAB | Refills: 0 | Status: SHIPPED | OUTPATIENT
Start: 2019-02-11 | End: 2019-05-28 | Stop reason: CLARIF

## 2019-02-11 RX ORDER — CIPROFLOXACIN 500 MG/1
TABLET ORAL 2 TIMES DAILY
COMMUNITY
End: 2019-05-07 | Stop reason: ALTCHOICE

## 2019-02-11 NOTE — PROGRESS NOTES
RHEUMATOLOGY PROBLEM LIST AND CHIEF COMPLAINT 1. Rheumatoid Arthritis - diagnosed 30+ years ago, treated to remission with Methotrexate (few years), positive RF, CCP HISTORY OF PRESENT ILLNESS This is a 80 y.o.  female. Today, the patient complains of pain in the joints. Location: knee, hands Severity:  2 on a scale of 0-10 Timing: none at this time Duration:  N/A Context/Associated signs and symptoms: The patient complains of a RA flare. Her hands were really stiff in the morning and her wrists as well. She given a steroid pack, which significantly improved her symptoms and was completed last week. RHEUMATOLOGY REVIEW OF SYSTEMS Positives as per history Negatives as follows: 
CONSTITUTlONAL:  Denies unexplained persistent fevers, weight change, chronic fatigue HEAD/EYES:   Denies eye redness, blurry vision or sudden loss of vision, dry eyes, HA, temporal artery pain ENT:    Denies oral/nasal ulcers, recurrent sinus infections, dry mouth RESPIRATORY:  No pleuritic pain, history of pleural effusions, hemoptysis, exertional dyspnea CARDIOVASCULAR:  Denies chest pain, history of pericardial effusions GASTRO:   Denies heartburn, esophageal dysmotility, abdominal pain, nausea, vomiting, diarrhea, blood in the stool HEMATOLOGIC:  No easy bruising, purpura, swollen lymph nodes SKIN:    Denies alopecia, ulcers, nodules, sun sensitivity, unexplained persistent rash VASCULAR:   Denies edema, cyanosis, raynaud phenomenon NEUROLOGIC:  Denies specific muscle weakness, paresthesias PSYCHIATRIC:  No sleep disturbance / snoring, depression, anxiety MSK:    No SI joint pain PAST MEDICAL HISTORY Reviewed with patient, significant changes in medical history - no PHYSICAL EXAM 
Blood pressure 123/79, pulse 80, temperature 98.2 °F (36.8 °C), temperature source Oral, resp. rate 18, height 5' 4\" (1.626 m), weight 146 lb 9.6 oz (66.5 kg), SpO2 97 %. GENERAL APPEARANCE: Well-nourished adult in no acute distress. EYES: No scleral erythema, conjunctival injection. ENT: No oral ulcer, parotid enlargement. NECK: No adenopathy, thyroid enlargement. CARDIOVASCULAR: Heart rhythm is regular. No murmur, rub, gallop. CHEST: Normal vesicular breath sounds. No wheezes, rales, pleural friction rubs. ABDOMINAL: The abdomen is soft and nontender. Liver and spleen are nonpalpable. Bowel sounds are normal. 
EXTREMITIES: There is no evidence of clubbing, cyanosis, edema. SKIN: No rash, palpable purpura, digital ulcer, abnormal thickening. NEUROLOGICAL: Normal gait and station, full strength in upper and lower extremities, normal sensation to light touch. MUSCULOSKELETAL:  
Upper extremities - B/L wrist warmth. Subtle synovial thickening in L 3rd MCP Lower extremities - full range of motion, no tenderness, no swelling, no synovial thickening and no deformity of joints. Except for B/L bony prominence in knee. B/L Crepitus in knee. OA changes in her feet. LABS, RADIOLOGY AND PROCEDURES Previous labs reviewed -Yes Previous radiology reviewed -Yes Previous procedures reviewed -Yes Previous medical records reviewed/summarized -Yes Labs 
RF, CCP positive ASSESSMENT1. Rheumatoid Arthritis - (Established problem -  Progressive disease) - Her symptoms sound inflammatory based on her response to prednisone. We discussed two possible options, including waiting for her disease to flare now that she is off steroids or to restart Methotrexate given inflammation in her wrist today. She agreed to withhold treatment for now and return in 6 weeks. This was the first flare in a long time. If she flares prior to her next visit, we will start prednisone 10 mg daily and restart Methotrexate. She should return in 6 weeks for a follow up. 2. OA - Some of her symptoms today are most likely related to her OA. Tylenol and ROM exercises are recommended for now.  NSAIDs are contraindicated due to kidney disease. PLAN 1. Prednisone 10 mg daily for a flare 2. Consider restarting Methotrexate 3. Return in 6 weeks Vy Shin MD 
Adult and Pediatric Rheumatology [facility] 
[address] [place] [address], [address] [place] [address], Phone [phone], Fax [phone] E-mail: Chet Khan  of Pediatrics Department of Pediatrics, [facility] of [address] [place] [address], [address] [place] [address], Phone [phone], Fax [phone] E-mail: [email or web address] There are no Patient Instructions on file for this visit. cc: John Castro MD 
 
Written by anne Jasso, as dictated by Luana Jovel.  Luis Shin M.D.

## 2019-02-13 NOTE — TELEPHONE ENCOUNTER
I explained this to her and she asked when to schedule Medicare Wellness. She would like labs sent to her for her records. I told her I would. 1. Labs greatly improved/stable. 2. No worrisome features. 3. Repeat in 4-6 months.   (Routing comment)

## 2019-03-25 DIAGNOSIS — I10 ESSENTIAL HYPERTENSION: ICD-10-CM

## 2019-03-25 DIAGNOSIS — E11.9 CONTROLLED TYPE 2 DIABETES MELLITUS WITHOUT COMPLICATION, WITHOUT LONG-TERM CURRENT USE OF INSULIN (HCC): ICD-10-CM

## 2019-03-25 RX ORDER — LISINOPRIL 5 MG/1
TABLET ORAL
Qty: 90 TAB | Refills: 0 | Status: SHIPPED | OUTPATIENT
Start: 2019-03-25 | End: 2019-06-21 | Stop reason: SDUPTHER

## 2019-03-25 RX ORDER — GLIPIZIDE 5 MG/1
TABLET, FILM COATED, EXTENDED RELEASE ORAL
Qty: 90 TAB | Refills: 0 | Status: SHIPPED | OUTPATIENT
Start: 2019-03-25 | End: 2019-06-21 | Stop reason: SDUPTHER

## 2019-04-03 ENCOUNTER — OFFICE VISIT (OUTPATIENT)
Dept: RHEUMATOLOGY | Age: 69
End: 2019-04-03

## 2019-04-03 VITALS
OXYGEN SATURATION: 97 % | RESPIRATION RATE: 16 BRPM | SYSTOLIC BLOOD PRESSURE: 147 MMHG | BODY MASS INDEX: 24.75 KG/M2 | HEART RATE: 65 BPM | TEMPERATURE: 98.1 F | WEIGHT: 144.2 LBS | DIASTOLIC BLOOD PRESSURE: 74 MMHG

## 2019-04-03 DIAGNOSIS — M05.741 RHEUMATOID ARTHRITIS INVOLVING BOTH HANDS WITH POSITIVE RHEUMATOID FACTOR (HCC): Primary | ICD-10-CM

## 2019-04-03 DIAGNOSIS — M05.742 RHEUMATOID ARTHRITIS INVOLVING BOTH HANDS WITH POSITIVE RHEUMATOID FACTOR (HCC): Primary | ICD-10-CM

## 2019-04-03 RX ORDER — PREDNISONE 5 MG/1
5 TABLET ORAL 2 TIMES DAILY
Qty: 60 TAB | Refills: 0 | Status: SHIPPED | OUTPATIENT
Start: 2019-04-03 | End: 2019-05-28 | Stop reason: CLARIF

## 2019-04-03 RX ORDER — DEXTROMETHORPHAN HYDROBROMIDE, GUAIFENESIN 5; 100 MG/5ML; MG/5ML
650 LIQUID ORAL EVERY 8 HOURS
COMMUNITY
End: 2021-05-24

## 2019-04-03 RX ORDER — GUAIFENESIN AND PHENYLEPHRINE HCL 400; 10 MG/1; MG/1
TABLET ORAL AS NEEDED
COMMUNITY
End: 2020-02-20 | Stop reason: ALTCHOICE

## 2019-04-03 RX ORDER — LEFLUNOMIDE 20 MG/1
20 TABLET ORAL DAILY
Qty: 30 TAB | Refills: 6 | Status: SHIPPED | OUTPATIENT
Start: 2019-04-03 | End: 2019-05-03

## 2019-04-03 NOTE — PROGRESS NOTES
RHEUMATOLOGY PROBLEM LIST AND CHIEF COMPLAINT1. Rheumatoid Arthritis - diagnosed 30+ years ago, treated to remission with Methotrexate (few years), positive RF, CCP Therapy History:  
Prior DMARDs: Methotrexate (past response, put into remission) Current NSAIDs: Tylenol Arthritis Current DMARDs: Leflunomide (ordered 4/2018) HISTORY OF PRESENT ILLNESS This is a 80 y.o.  female. Today, the patient complains of pain in the joints. Location: knee, hands Severity:  2 on a scale of 0-10 Timing: none at this time Duration:  N/A Context/Associated signs and symptoms: The patient took a steroid dose pack, which improved her symptoms. She is currently on prednisone 10 mg daily for the last 2 weeks. She complains of some wrist, knee and ankle pain today. She takes tylenol Arthritis, which improves these symptoms. RHEUMATOLOGY REVIEW OF SYSTEMS Positives as per history Negatives as follows: 
CONSTITUTlONAL:  Denies unexplained persistent fevers, weight change, chronic fatigue HEAD/EYES:   Denies eye redness, blurry vision or sudden loss of vision, dry eyes, HA, temporal artery pain ENT:    Denies oral/nasal ulcers, recurrent sinus infections, dry mouth RESPIRATORY:  No pleuritic pain, history of pleural effusions, hemoptysis, exertional dyspnea CARDIOVASCULAR:  Denies chest pain, history of pericardial effusions GASTRO:   Denies heartburn, esophageal dysmotility, abdominal pain, nausea, vomiting, diarrhea, blood in the stool HEMATOLOGIC:  No easy bruising, purpura, swollen lymph nodes SKIN:    Denies alopecia, ulcers, nodules, sun sensitivity, unexplained persistent rash VASCULAR:   Denies edema, cyanosis, raynaud phenomenon NEUROLOGIC:  Denies specific muscle weakness, paresthesias PSYCHIATRIC:  No sleep disturbance / snoring, depression, anxiety MSK:    No morning stiffness >1 hour, SI joint pain PAST MEDICAL HISTORY Reviewed with patient, significant changes in medical history - no PHYSICAL EXAM 
Blood pressure 147/74, pulse 65, temperature 98.1 °F (36.7 °C), temperature source Oral, resp. rate 16, weight 144 lb 3.2 oz (65.4 kg), SpO2 97 %. GENERAL APPEARANCE: Well-nourished adult in no acute distress. EYES: No scleral erythema, conjunctival injection. ENT: No oral ulcer, parotid enlargement. NECK: No adenopathy, thyroid enlargement. CARDIOVASCULAR: Heart rhythm is regular. No murmur, rub, gallop. CHEST: Normal vesicular breath sounds. No wheezes, rales, pleural friction rubs. ABDOMINAL: The abdomen is soft and nontender. Liver and spleen are nonpalpable. Bowel sounds are normal. 
EXTREMITIES: There is no evidence of clubbing, cyanosis, edema. SKIN: No rash, palpable purpura, digital ulcer, abnormal thickening. NEUROLOGICAL: Normal gait and station, full strength in upper and lower extremities, normal sensation to light touch. MUSCULOSKELETAL:  
Upper extremities - B/L wrist fullness. Subtle synovial thickening in L 3rd MCP Lower extremities - full range of motion, no tenderness, no swelling, no synovial thickening and no deformity of joints except for B/L bony prominence in knee. B/L Crepitus in knee. OA changes in her feet. LABS, RADIOLOGY AND PROCEDURES Previous labs reviewed -Yes Previous radiology reviewed -Yes Previous procedures reviewed -Yes Previous medical records reviewed/summarized -Yes Labs 
RF, CCP positive ASSESSMENT1. Rheumatoid Arthritis - (Established problem -  Progressive disease) - Her disease has improved but she continues to present with active disease. I will start her on Leflunomide 20 mg daily. Escalation to a biologic is not necessary at this time. I want her to taper the prednisone down by 2.5 mg q2 weeks, and keep this PRN if she flares . She should continue with Tylenol Arthritis 650 mg daily. She should return in 6 weeks for a follow up. 2. OA - Some of her symptoms today are most likely related to her OA. Tylenol and ROM exercises are recommended for now. NSAIDs are contraindicated due to kidney disease - unchanged. 3. New medication - Leflunomide - A written summary, as prepared by the [employer] of Rheumatology was provided. The patient was given the opportunity to ask questions, and verbalized understanding of the following: The most common side effect reported is diarrhea (20% of patients) which improves with time. Less common side effects are nausea, abdominal pain, indigestion, rash, or hair loss. Abnormal LFT's and blood count abnormalities can occur so we will routinely check labs. Rarely pulmonary issues can occur. The patient should not receive live vaccines while receiving leflunomide, should not drink alcohol, should not become pregnant if female. Men and women of child bearing age should know that there is serious birth defects from this drug. It lasts a long time in the body so therefore it is not recommended to anyone who is planning to have children in the near future. PLAN 1. Start Leflunomide 20 mg daily 2. Prednisone taper as outlined below 3. Tylenol Arthritis daily 4. Return in 6 weeks Vy Ross MD 
Adult and Pediatric Rheumatology [address], [address] [facility], Phone [phone], Fax [phone] E-mail: Monse Garcia  of Pediatrics Department of Pediatrics, [facility] of [address] [place] [address], [address] [place] [address], Phone [phone], Fax [phone] E-mail: [email or web address] Patient Instructions Prednisone 7.5mg x 2 weeks Prednisone 5mg x 2 weeks Prednisone 2.5mg x 2 weeks Arava 20mg daily  
 
 
 
cc: Franca Stewart MD 
 
Written by anne Sharma, as dictated by Lisa Cole.  Javier Ross M.D.

## 2019-04-03 NOTE — PROGRESS NOTES
Chief Complaint Patient presents with  Joint Pain 1. Have you been to the ER, urgent care clinic since your last visit? Hospitalized since your last visit? no 
 
2. Have you seen or consulted any other health care providers outside of the [address] [place] [address] since your last visit? Include any pap smears or colon screening.  no

## 2019-04-12 DIAGNOSIS — E11.9 CONTROLLED TYPE 2 DIABETES MELLITUS WITHOUT COMPLICATION, WITHOUT LONG-TERM CURRENT USE OF INSULIN (HCC): ICD-10-CM

## 2019-05-07 ENCOUNTER — HOSPITAL ENCOUNTER (OUTPATIENT)
Dept: LAB | Age: 69
Discharge: HOME | End: 2019-05-07
Payer: MEDICARE

## 2019-05-07 ENCOUNTER — OFFICE VISIT (OUTPATIENT)
Dept: FAMILY MEDICINE CLINIC | Age: 69
End: 2019-05-07

## 2019-05-07 VITALS
HEART RATE: 77 BPM | RESPIRATION RATE: 18 BRPM | TEMPERATURE: 98.3 F | SYSTOLIC BLOOD PRESSURE: 136 MMHG | DIASTOLIC BLOOD PRESSURE: 74 MMHG | BODY MASS INDEX: 23.83 KG/M2 | OXYGEN SATURATION: 98 % | HEIGHT: 64 IN | WEIGHT: 139.6 LBS

## 2019-05-07 DIAGNOSIS — E11.9 ENCOUNTER FOR DIABETIC FOOT EXAM (HCC): ICD-10-CM

## 2019-05-07 DIAGNOSIS — I10 ESSENTIAL HYPERTENSION: ICD-10-CM

## 2019-05-07 DIAGNOSIS — Z78.0 POST-MENOPAUSE: ICD-10-CM

## 2019-05-07 DIAGNOSIS — E78.00 PURE HYPERCHOLESTEROLEMIA: ICD-10-CM

## 2019-05-07 DIAGNOSIS — E55.9 VITAMIN D DEFICIENCY: ICD-10-CM

## 2019-05-07 DIAGNOSIS — Z00.00 MEDICARE ANNUAL WELLNESS VISIT, SUBSEQUENT: Primary | ICD-10-CM

## 2019-05-07 DIAGNOSIS — Z13.820 SCREENING FOR OSTEOPOROSIS: ICD-10-CM

## 2019-05-07 DIAGNOSIS — E11.21 TYPE 2 DIABETES WITH NEPHROPATHY (HCC): ICD-10-CM

## 2019-05-07 PROCEDURE — 84443 ASSAY THYROID STIM HORMONE: CPT

## 2019-05-07 PROCEDURE — 85025 COMPLETE CBC W/AUTO DIFF WBC: CPT

## 2019-05-07 PROCEDURE — 80053 COMPREHEN METABOLIC PANEL: CPT

## 2019-05-07 PROCEDURE — 82306 VITAMIN D 25 HYDROXY: CPT

## 2019-05-07 PROCEDURE — 36415 COLL VENOUS BLD VENIPUNCTURE: CPT

## 2019-05-07 PROCEDURE — 83036 HEMOGLOBIN GLYCOSYLATED A1C: CPT

## 2019-05-07 PROCEDURE — 80061 LIPID PANEL: CPT

## 2019-05-07 RX ORDER — LEFLUNOMIDE 20 MG/1
TABLET ORAL
COMMUNITY
Start: 2019-05-03 | End: 2020-09-25

## 2019-05-07 NOTE — PROGRESS NOTES
Lola Whitlock is a 80 y.o. female Chief Complaint Patient presents with [facility] Annual Wellness Visit 1. Have you been to the ER, urgent care clinic since your last visit? Hospitalized since your last visit? No 
M 
2. Have you seen or consulted any other health care providers outside of the [address] [place] [address] since your last visit? Include any pap smears or colon screening. No 
 
 
Visit Vitals /74 (BP 1 Location: Right arm, BP Patient Position: Sitting) Pulse 77 Temp 98.3 °F (36.8 °C) (Oral) Resp 18 Ht 5' 4\" (1.626 m) Wt 139 lb 9.6 oz (63.3 kg) SpO2 98% BMI 23.96 kg/m² Health Maintenance Due Topic Date Due  Shingrix Vaccine Age 50> (1 of 2) 12/20/2001  DTaP/Tdap/Td series (1 - Tdap) 07/17/2011  Pneumococcal 65+ years (2 of 2 - PPSV23) 08/23/2018  
 FOOT EXAM Q1  03/29/2019  LIPID PANEL Q1  03/29/2019  MEDICARE YEARLY EXAM  03/30/2019 Medication Reconciliation completed, changes noted.   Please  Update medication list.

## 2019-05-07 NOTE — PATIENT INSTRUCTIONS

## 2019-05-07 NOTE — PROGRESS NOTES
This is the Subsequent Medicare Annual Wellness Exam, performed 12 months or more after the Initial AWV or the last Subsequent AWV I have reviewed the patient's medical history in detail and updated the computerized patient record. History Past Medical History:  
Diagnosis Date  Acid reflux  Colitis 04/16/2015  Colitis  Diabetes mellitus, type II ([facility]  Diverticulosis of colon 04/20/2015  Hyperlipemia  Hypertension  Osteopenia   
 of the spine Dexa done 2014, 2017 and due 2019  RA (rheumatoid arthritis) ([facility] [address] Past Surgical History:  
Procedure Laterality Date [address] UNLISTED  HX COLONOSCOPY  04/20/2015 Due 2023  HX GYN    
 HX HYSTERECTOMY  HX TOTAL ABDOMINAL HYSTERECTOMY  AL BIOPSY OF BREAST, NEEDLE CORE  2017  
 benign findings - Fibrosis Current Outpatient Medications Medication Sig Dispense Refill  leflunomide (ARAVA) 20 mg tablet  PNV NL.[id number] fum/folic ac (PRENATAL PO) Take  by mouth.  pneumococcal 23-valent (PNEUMOVAX 23) 25 mcg/0.5 mL injection 0.5 mL by IntraMUSCular route PRIOR TO DISCHARGE for 1 dose. 0.5 mL 0  
 varicella-zoster recombinant, PF, (SHINGRIX, PF,) 50 mcg/0.5 mL susr injection 0.5 mL by IntraMUSCular route once for 1 dose. 0.5 mL 0  
 diph,pertuss,acel,,tetanus vac,PF, (ADACEL) 2 Lf-(2.5-5-3-5 mcg)-5Lf/0.5 mL syrg vaccine 0.5 mL by IntraMUSCular route once for 1 dose. 0.5 mL 0  
 ONETOUCH ULTRA BLUE TEST STRIP strip USE TO TEST DAILY AS DIRECTED (Patient taking differently: USE TO TEST DAILY AS DIRECTED, patient try to check everyday) 50 Strip 4  turmeric root extract 500 mg cap Take  by mouth as needed.  glipiZIDE SR (GLUCOTROL XL) 5 mg CR tablet TAKE ONE TABLET BY MOUTH DAILY 90 Tab 0  
 lisinopril (PRINIVIL, ZESTRIL) 5 mg tablet TAKE ONE TABLET BY MOUTH DAILY 90 Tab 0  predniSONE (DELTASONE) 5 mg tablet Take 1 Tab by mouth two (2) times a day.  60 Tab 0  
  empagliflozin (JARDIANCE) 25 mg tablet TAKE ONE TABLET BY MOUTH DAILY 90 Tab 1  
 atorvastatin (LIPITOR) 10 mg tablet Take 1 Tab by mouth daily. 90 Tab 1  
 diclofenac (VOLTAREN) 1 % gel Apply 4 g to affected area four (4) times daily. PLEASE GIVE GENERIC IF AVAILABLE. 100 g 3  
 fluticasone (FLONASE) 50 mcg/actuation nasal spray 2 sprays per nostril once a day  Indications: Allergic Rhinitis (Patient taking differently: as needed. 2 sprays per nostril once a day  Indications: inflammation of the nose due to an allergy) 1 Bottle 3  
 biotin 1 mg cap Take 1 Tab by mouth once over twenty-four (24) hours.  cholecalciferol (VITAMIN D3) 1,000 unit cap Take 1,000 Units by mouth daily.  calcium carbonate (TUMS) 200 mg calcium (500 mg) chew Take 1 Tab by mouth daily.  cyanocobalamin 1,000 mcg tablet Take 1,000 mcg by mouth daily.  L. ACIDOPHILUS/BIFID. ANIMALIS (DAILY PROBIOTIC PO) Take 1 Tab by mouth once over twenty-four (24) hours.  aspirin delayed-release 81 mg tablet Take 81 mg by mouth daily.  omeprazole (PRILOSEC OTC) 20 mg tablet Take 20 mg by mouth as needed.  acetaminophen (TYLENOL ARTHRITIS PAIN) 650 mg TbER Take 650 mg by mouth every eight (8) hours. Indications: provider changed  predniSONE (DELTASONE) 5 mg tablet Take 1 Tab by mouth two (2) times a day. (Patient not taking: Reported on 5/7/2019) 60 Tab 0  predniSONE (STERAPRED DS) 10 mg dose pack See administration instruction per 10mg dose pack (Patient not taking: Reported on 5/7/2019) 21 Tab 0  
 lisinopril (PRINIVIL, ZESTRIL) 10 mg tablet Take 10 mg by mouth daily. Indications: provider change  nystatin (MYCOSTATIN) powder Apply  to affected area four (4) times daily. Apply 1-4 grams every day until rash resolves. 1 bottle = 60 grams (Patient not taking: Reported on 5/7/2019) 1 Bottle 3  
 multivitamin (ONE A DAY) tablet Take 1 Tab by mouth daily. Indications: self discontinue No Known Allergies Family History Problem Relation Age of Onset  Other Mother   
     diverticulosis  Heart Attack Mother  Heart Failure Father  Pulmonary Embolism Sister  Diabetes Brother  Cancer Brother   
     brain cancer Social History Tobacco Use  Smoking status: Never Smoker  Smokeless tobacco: Never Used Substance Use Topics  Alcohol use: No  
 
Patient Active Problem List  
Diagnosis Code  Diverticulosis K57.90  Infectious gastroenteritis and colitis A09  Benign essential hypertension I10  
 Diabetes mellitus type II, controlled ([facility] [address] E11.9  Anemia D64.9  Anemia associated with acute blood loss D62  
 History of rheumatoid arthritis Z87.39  
 Osteopenia of both lower legs M85.861, M85.862  Type 2 diabetes with nephropathy (HCC) E11.21  
 Rheumatoid arthritis involving multiple sites with positive rheumatoid factor (HCC) M05.79 Depression Risk Factor Screening:  
 
3 most recent PHQ Screens 5/7/2019 Little interest or pleasure in doing things Not at all Feeling down, depressed, irritable, or hopeless Not at all Total Score PHQ 2 0 Alcohol Risk Factor Screening: You do not drink alcohol or very rarely. Functional Ability and Level of Safety:  
Hearing Loss Hearing is good. Activities of Daily Living The home contains: handrails and grab bars Patient does total self care Fall Risk Fall Risk Assessment, last 12 mths 5/7/2019 Able to walk? Yes Fall in past 12 months? No  
 
 
Abuse Screen Patient is not abused Cognitive Screening Evaluation of Cognitive Function: 
Has your family/caregiver stated any concerns about your memory: no 
Normal  
 
OBJECTIVE: 
/74 (BP 1 Location: Right arm, BP Patient Position: Sitting)   Pulse 77   Temp 98.3 °F (36.8 °C) (Oral)   Resp 18   Ht 5' 4\" (1.626 m)   Wt 139 lb 9.6 oz (63.3 kg)   SpO2 98%   BMI 23.96 kg/m² Appearance: The patient is well developed, well nourished, provides a coherent history and is in no acute distress. Cranial Nerves:   Intact visual fields. Fundi are benign. TULIO, EOM's full, no nystagmus, no ptosis. Facial sensation is normal. Corneal reflexes are intact. Facial movement is symmetric. Hearing is normal bilaterally. Palate is midline with normal sternocleidomastoid and trapezius muscles are normal. Tongue is midline. Motor:  5/5 strength in upper and lower proximal and distal muscles. Normal bulk and tone. No fasciculations. Reflexes:   Deep tendon reflexes 2+/4 and symmetrical.  
Sensory:   Normal to touch, pinprick and vibration. Gait:  Normal gait. Tremor:   No tremor noted. Cerebellar:  No cerebellar signs present. Neurovascular:  Normal heart sounds and regular rhythm, peripheral pulses intact, and no carotid bruits. Diabetic foot exam: 
Examination of the feet reveals warm, good capillary refill, nail exam normal nails without lesions and onychomycosis of the toenails, normal monofilament exam, normal sensory exam and onychomycosis. Patient Care Team  
Patient Care Team: 
Ronald Paniagua MD as PCP - General (Internal Medicine) Assessment/Plan Education and counseling provided: 
Are appropriate based on today's review and evaluation Diagnoses and all orders for this visit: 
 
1. Medicare annual wellness visit, subsequent Anticipatory guidance discussed. Immunizations reviewed HM updated. 2. Essential hypertension Stable. No change in medications. 3. Type 2 diabetes with nephropathy (HCC) 
-     HEMOGLOBIN A1C WITH EAG 
-     CBC WITH AUTOMATED DIFF 
-     THYROID CASCADE PROFILE 
-     METABOLIC PANEL, COMPREHENSIVE 4. Pure hypercholesterolemia -     LIPID PANEL 5. Vitamin D deficiency 
-     VITAMIN D, 25 HYDROXY 6. Post-menopause -     DEXA BONE DENSITY STUDY AXIAL; Future 7. Screening for osteoporosis -     DEXA BONE DENSITY STUDY AXIAL; Future 8. Encounter for diabetic foot exam ([facility] [address] 
-     [address] [facility] Topic Date Due  Shingrix Vaccine Age 50> (1 of 2) Discussed/ordered  DTaP/Tdap/Td series (1 - Tdap) Discussed/ordered  Pneumococcal 65+ years (2 of 2 - PPSV23) Discussed/ordered  LIPID PANEL Q1  Discussed/ordered  Bone Densitometry  06/27/2019  
 HEMOGLOBIN A1C Q6M  07/29/2019  Influenza Age 5 to Adult  08/01/2019  GLAUCOMA SCREENING Q2Y  12/12/2019  
 EYE EXAM RETINAL OR DILATED  12/12/2019  MICROALBUMIN Q1  01/29/2020  BREAST CANCER SCRN MAMMOGRAM  03/15/2020  MEDICARE YEARLY EXAM  05/07/2020  
 FOOT EXAM Q1  05/07/2020  COLONOSCOPY  04/16/2025  Hepatitis C Screening  Completed  Bone Densitometry (Dexa) Screening  Completed I have discussed the diagnosis with the patient and the intended treatment plan as seen in the above orders. The patient has received an after-visit summary and questions were answered concerning future plans. Asked to return should symptoms worsen or not improve with treatment. Any pending labs and studies will be relayed to patient when they become available. Pt verbalizes understanding of plan of care and denies further questions or concerns at this time. Follow-up and Dispositions · Return in about 1 year (around 5/7/2020), for AWV and prn every 6-months for chronic medical problems. Cortes Moreau MD 
 
Patient Instructions Well Visit, Over 72: Care Instructions Your Care Instructions Physical exams can help you stay healthy. Your doctor has checked your overall health and may have suggested ways to take good care of yourself. He or she also may have recommended tests. At home, you can help prevent illness with healthy eating, regular exercise, and other steps. Follow-up care is a key part of your treatment and safety.  Be sure to make and go to all appointments, and call your doctor if you are having problems. It's also a good idea to know your test results and keep a list of the medicines you take. How can you care for yourself at home? · Reach and stay at a healthy weight. This will lower your risk for many problems, such as obesity, diabetes, heart disease, and high blood pressure. · Get at least 30 minutes of exercise on most days of the week. Walking is a good choice. You also may want to do other activities, such as running, swimming, cycling, or playing tennis or team sports. · Do not smoke. Smoking can make health problems worse. If you need help quitting, talk to your doctor about stop-smoking programs and medicines. These can increase your chances of quitting for good. · Protect your skin from too much sun. When you're outdoors from 10 a.m. to 4 p.m., stay in the shade or cover up with clothing and a hat with a wide brim. Wear sunglasses that block UV rays. Even when it's cloudy, put broad-spectrum sunscreen (SPF 30 or higher) on any exposed skin. · See a dentist one or two times a year for checkups and to have your teeth cleaned. · Wear a seat belt in the car. · Limit alcohol to 2 drinks a day for men and 1 drink a day for women. Too much alcohol can cause health problems. Follow your doctor's advice about when to have certain tests. These tests can spot problems early. For men and women · Cholesterol. Your doctor will tell you how often to have this done based on your overall health and other things that can increase your risk for heart attack and stroke. · Blood pressure. Have your blood pressure checked during a routine doctor visit. Your doctor will tell you how often to check your blood pressure based on your age, your blood pressure results, and other factors. · Diabetes. Ask your doctor whether you should have tests for diabetes. · Vision.  Experts recommend that you have yearly exams for glaucoma and other age-related eye problems. · Hearing. Tell your doctor if you notice any change in your hearing. You can have tests to find out how well you hear. · Colon cancer tests. Keep having colon cancer tests as your doctor recommends. You can have one of several types of tests. · Heart attack and stroke risk. At least every 4 to 6 years, you should have your risk for heart attack and stroke assessed. Your doctor uses factors such as your age, blood pressure, cholesterol, and whether you smoke or have diabetes to show what your risk for a heart attack or stroke is over the next 10 years. · Osteoporosis. Talk to your doctor about whether you should have a bone density test to find out whether you have thinning bones. Also ask your doctor about whether you should take calcium and vitamin D supplements. For women · Pap test and pelvic exam. You may no longer need a Pap test. Talk with your doctor about whether to stop or continue to have Pap tests. · Breast exam and mammogram. Ask how often you should have a mammogram, which is an X-ray of your breasts. A mammogram can spot breast cancer before it can be felt and when it is easiest to treat. · Thyroid disease. Talk to your doctor about whether to have your thyroid checked as part of a regular physical exam. Women have an increased chance of a thyroid problem. For men · Prostate exam. Talk to your doctor about whether you should have a blood test (called a PSA test) for prostate cancer. Experts disagree on whether men should have this test. Some experts recommend that you discuss the benefits and risks of the test with your doctor. · Abdominal aortic aneurysm. Ask your doctor whether you should have a test to check for an aneurysm. You may need a test if you ever smoked or if your parent, brother, sister, or child has had an aneurysm. When should you call for help?  
Watch closely for changes in your health, and be sure to contact your doctor if you have any problems or symptoms that concern you. Where can you learn more? Go to http://libby-raeann.info/. Enter I109 in the search box to learn more about \"Well Visit, Over 65: Care Instructions. \" Current as of: March 28, 2018 Content Version: 11.9 © 0527-6133 [employer]. Care instructions adapted under license by [employer] (which disclaims liability or warranty for this information). If you have questions about a medical condition or this instruction, always ask your healthcare professional. [address] any warranty or liability for your use of this information.

## 2019-05-08 LAB
25(OH)D3+25(OH)D2 SERPL-MCNC: 51.5 NG/ML (ref 30–100)
ALBUMIN SERPL-MCNC: 4.6 G/DL (ref 3.6–4.8)
ALBUMIN/GLOB SERPL: 1.7 {RATIO} (ref 1.2–2.2)
ALP SERPL-CCNC: 48 IU/L (ref 39–117)
ALT SERPL-CCNC: 18 IU/L (ref 0–32)
AST SERPL-CCNC: 16 IU/L (ref 0–40)
BASOPHILS # BLD AUTO: 0 X10E3/UL (ref 0–0.2)
BASOPHILS NFR BLD AUTO: 0 %
BILIRUB SERPL-MCNC: 0.8 MG/DL (ref 0–1.2)
BUN SERPL-MCNC: 26 MG/DL (ref 8–27)
BUN/CREAT SERPL: 28 (ref 12–28)
CALCIUM SERPL-MCNC: 9.9 MG/DL (ref 8.7–10.3)
CHLORIDE SERPL-SCNC: 106 MMOL/L (ref 96–106)
CHOLEST SERPL-MCNC: 158 MG/DL (ref 100–199)
CO2 SERPL-SCNC: 22 MMOL/L (ref 20–29)
CREAT SERPL-MCNC: 0.93 MG/DL (ref 0.57–1)
EOSINOPHIL # BLD AUTO: 0 X10E3/UL (ref 0–0.4)
EOSINOPHIL NFR BLD AUTO: 0 %
ERYTHROCYTE [DISTWIDTH] IN BLOOD BY AUTOMATED COUNT: 13.4 % (ref 12.3–15.4)
EST. AVERAGE GLUCOSE BLD GHB EST-MCNC: 126 MG/DL
GLOBULIN SER CALC-MCNC: 2.7 G/DL (ref 1.5–4.5)
GLUCOSE SERPL-MCNC: 116 MG/DL (ref 65–99)
HBA1C MFR BLD: 6 % (ref 4.8–5.6)
HCT VFR BLD AUTO: 38.8 % (ref 34–46.6)
HDLC SERPL-MCNC: 68 MG/DL
HGB BLD-MCNC: 12.3 G/DL (ref 11.1–15.9)
IMM GRANULOCYTES # BLD AUTO: 0 X10E3/UL (ref 0–0.1)
IMM GRANULOCYTES NFR BLD AUTO: 0 %
INTERPRETATION, 910389: NORMAL
LDLC SERPL CALC-MCNC: 72 MG/DL (ref 0–99)
LYMPHOCYTES # BLD AUTO: 0.8 X10E3/UL (ref 0.7–3.1)
LYMPHOCYTES NFR BLD AUTO: 23 %
Lab: NORMAL
MCH RBC QN AUTO: 31.1 PG (ref 26.6–33)
MCHC RBC AUTO-ENTMCNC: 31.7 G/DL (ref 31.5–35.7)
MCV RBC AUTO: 98 FL (ref 79–97)
MONOCYTES # BLD AUTO: 0.3 X10E3/UL (ref 0.1–0.9)
MONOCYTES NFR BLD AUTO: 8 %
NEUTROPHILS # BLD AUTO: 2.4 X10E3/UL (ref 1.4–7)
NEUTROPHILS NFR BLD AUTO: 69 %
PLATELET # BLD AUTO: 200 X10E3/UL (ref 150–379)
POTASSIUM SERPL-SCNC: 4.5 MMOL/L (ref 3.5–5.2)
PROT SERPL-MCNC: 7.3 G/DL (ref 6–8.5)
RBC # BLD AUTO: 3.95 X10E6/UL (ref 3.77–5.28)
SODIUM SERPL-SCNC: 145 MMOL/L (ref 134–144)
TRIGL SERPL-MCNC: 88 MG/DL (ref 0–149)
TSH SERPL DL<=0.005 MIU/L-ACNC: 1.06 UIU/ML (ref 0.45–4.5)
VLDLC SERPL CALC-MCNC: 18 MG/DL (ref 5–40)
WBC # BLD AUTO: 3.5 X10E3/UL (ref 3.4–10.8)

## 2019-05-09 ENCOUNTER — TELEPHONE (OUTPATIENT)
Dept: FAMILY MEDICINE CLINIC | Age: 69
End: 2019-05-09

## 2019-05-09 NOTE — PROGRESS NOTES
Labs are stable. HBA1C = 6.0 which is stable, but slightly increased from 5.5. Repeat in 4-months. Other labs are excellent. Thanks,  
Dr. Tadeo Middleton

## 2019-05-09 NOTE — TELEPHONE ENCOUNTER
----- Message from Aditya Stock MD sent at 5/9/2019  8:00 AM EDT -----  Labs are stable. HBA1C = 6.0 which is stable, but slightly increased from 5.5. Repeat in 4-months. Other labs are excellent.    Thanks,   Dr. Chayito Swartz

## 2019-05-28 ENCOUNTER — OFFICE VISIT (OUTPATIENT)
Dept: RHEUMATOLOGY | Age: 69
End: 2019-05-28

## 2019-05-28 VITALS
HEART RATE: 91 BPM | SYSTOLIC BLOOD PRESSURE: 134 MMHG | OXYGEN SATURATION: 97 % | RESPIRATION RATE: 16 BRPM | WEIGHT: 140.8 LBS | BODY MASS INDEX: 24.17 KG/M2 | DIASTOLIC BLOOD PRESSURE: 72 MMHG | TEMPERATURE: 98.1 F

## 2019-05-28 DIAGNOSIS — M05.741 RHEUMATOID ARTHRITIS INVOLVING BOTH HANDS WITH POSITIVE RHEUMATOID FACTOR (HCC): Primary | ICD-10-CM

## 2019-05-28 DIAGNOSIS — M05.742 RHEUMATOID ARTHRITIS INVOLVING BOTH HANDS WITH POSITIVE RHEUMATOID FACTOR (HCC): Primary | ICD-10-CM

## 2019-05-28 RX ORDER — LEFLUNOMIDE 20 MG/1
20 TABLET ORAL DAILY
Qty: 90 TAB | Refills: 2 | Status: SHIPPED | OUTPATIENT
Start: 2019-05-28 | End: 2019-08-26

## 2019-05-28 NOTE — PROGRESS NOTES
RHEUMATOLOGY PROBLEM LIST AND CHIEF COMPLAINT  1. Rheumatoid Arthritis - diagnosed 30+ years ago, treated to remission with Methotrexate (few years), positive RF, CCP, flare in 2019    Therapy History:   Prior DMARDs: Methotrexate (past response, put into remission)  Current NSAIDs: Tylenol Arthritis  Current DMARDs: Leflunomide (started 4/2019-current)    HISTORY OF PRESENT ILLNESS  This is a 80 y.o.  female. Today, the patient complains of no pain in the joints. Location: none  Severity:  0 on a scale of 0-10  Timing: none at this time   Duration:  N/A  Context/Associated signs and symptoms: Pt is doing well today. Her symptoms have improved since starting Leflunomide in April. She does not complain of joint pain today. She off prednisone with no issues. She continues with Tylenol Arthritis PRN OA pain.     RHEUMATOLOGY REVIEW OF SYSTEMS   Positives as per history  Negatives as follows:  Yeni Ramoslet:  Denies unexplained persistent fevers, weight change, chronic fatigue  HEAD/EYES:   Denies eye redness, blurry vision or sudden loss of vision, dry eyes, HA, temporal artery pain  ENT:    Denies oral/nasal ulcers, recurrent sinus infections, dry mouth  RESPIRATORY:  No pleuritic pain, history of pleural effusions, hemoptysis, exertional dyspnea  CARDIOVASCULAR:  Denies chest pain, history of pericardial effusions  GASTRO:   Denies heartburn, esophageal dysmotility, abdominal pain, nausea, vomiting, diarrhea, blood in the stool  HEMATOLOGIC:  No easy bruising, purpura, swollen lymph nodes  SKIN:    Denies alopecia, ulcers, nodules, sun sensitivity, unexplained persistent rash   VASCULAR:   Denies edema, cyanosis, raynaud phenomenon  NEUROLOGIC:  Denies specific muscle weakness, paresthesias   PSYCHIATRIC:  No sleep disturbance / snoring, depression, anxiety  MSK:    No morning stiffness >1 hour, SI joint pain, persistent joint swelling, persistent joint pain    PAST MEDICAL HISTORY  Reviewed with patient, significant changes in medical history - no    PHYSICAL EXAM  Blood pressure 134/72, pulse 91, temperature 98.1 °F (36.7 °C), temperature source Oral, resp. rate 16, weight 140 lb 12.8 oz (63.9 kg), SpO2 97 %. GENERAL APPEARANCE: Well-nourished adult in no acute distress. EYES: No scleral erythema, conjunctival injection. ENT: No oral ulcer, parotid enlargement. NECK: No adenopathy, thyroid enlargement. CARDIOVASCULAR: Heart rhythm is regular. No murmur, rub, gallop. CHEST: Normal vesicular breath sounds. No wheezes, rales, pleural friction rubs. ABDOMINAL: The abdomen is soft and nontender. Liver and spleen are nonpalpable. Bowel sounds are normal.  EXTREMITIES: There is no evidence of clubbing, cyanosis, edema. SKIN: No rash, palpable purpura, digital ulcer, abnormal thickening. NEUROLOGICAL: Normal gait and station, full strength in upper and lower extremities, normal sensation to light touch. MUSCULOSKELETAL:   Upper extremities - full range of motion, no tenderness, no swelling, no synovial thickening and no deformity of joints  Lower extremities - full range of motion, no tenderness, no swelling, no synovial thickening and no deformity of joints except for B/L bony prominence in knee. B/L Crepitus in knee. OA changes in her feet. LABS, RADIOLOGY AND PROCEDURES  Previous labs reviewed -Yes  Previous radiology reviewed -Yes  Previous procedures reviewed -Yes  Previous medical records reviewed/summarized -Yes    Labs  RF, CCP positive    ASSESSMENT  1. Rheumatoid Arthritis - (Established problem -  Stable disease) - The patient has responded very well to Leflunomide; we will continue her on this medication. Her exam is fairly normal today. She should return in 4 months for a follow up. 2. OA - Some of her symptoms today are  related to her OA. Tylenol and ROM exercises are recommended for now. NSAIDs are contraindicated due to kidney disease - unchanged.   3. Drug therapy monitoring for toxicity (leflunomide) - CBC, BUN, Cr, AST, ALT and albumin every 3 months. PLAN  1. Leflunomide 20 mg daily  2. Tylenol PRN  3. Return in 4 months    Vy Hickman MD  Adult and Pediatric Rheumatology     [address] [place] [address], Phone [phone], Fax [phone]   E-mail: [email or web address]    Visiting  of Pediatrics    Department of Pediatrics, [facility] of [address] [place] [address], Phone [phone], Fax [phone]  E-mail: [email or web address]    There are no Patient Instructions on file for this visit. cc:  Anil Kaiser MD    Written by anne Castellano, as dictated by Elvis Hightower.  Lauren Hickman M.D.

## 2019-05-28 NOTE — PROGRESS NOTES
Chief Complaint   Patient presents with    Joint Pain     1. Have you been to the ER, urgent care clinic since your last visit? Hospitalized since your last visit? No    2. Have you seen or consulted any other health care providers outside of the [address] [place] [address] since your last visit? Include any pap smears or colon screening.  No

## 2019-06-21 DIAGNOSIS — E11.9 CONTROLLED TYPE 2 DIABETES MELLITUS WITHOUT COMPLICATION, WITHOUT LONG-TERM CURRENT USE OF INSULIN (HCC): ICD-10-CM

## 2019-06-21 DIAGNOSIS — I10 ESSENTIAL HYPERTENSION: ICD-10-CM

## 2019-06-21 RX ORDER — LISINOPRIL 5 MG/1
TABLET ORAL
Qty: 90 TAB | Refills: 0 | Status: SHIPPED | OUTPATIENT
Start: 2019-06-21 | End: 2019-09-13 | Stop reason: SDUPTHER

## 2019-06-21 RX ORDER — GLIPIZIDE 5 MG/1
TABLET, FILM COATED, EXTENDED RELEASE ORAL
Qty: 90 TAB | Refills: 0 | Status: SHIPPED | OUTPATIENT
Start: 2019-06-21 | End: 2019-09-13 | Stop reason: SDUPTHER

## 2019-07-26 ENCOUNTER — HOSPITAL ENCOUNTER (OUTPATIENT)
Dept: MAMMOGRAPHY | Age: 69
Discharge: HOME | End: 2019-07-26
Attending: INTERNAL MEDICINE
Payer: MEDICARE

## 2019-07-26 DIAGNOSIS — Z78.0 POST-MENOPAUSE: ICD-10-CM

## 2019-07-26 DIAGNOSIS — Z13.820 SCREENING FOR OSTEOPOROSIS: ICD-10-CM

## 2019-07-26 PROCEDURE — 77080 DXA BONE DENSITY AXIAL: CPT

## 2019-08-21 ENCOUNTER — TELEPHONE (OUTPATIENT)
Dept: FAMILY MEDICINE CLINIC | Age: 69
End: 2019-08-21

## 2019-08-28 NOTE — TELEPHONE ENCOUNTER
Relayed information to patient and patient verbalized understanding. This patient is osteopenic using the World Health Organization criteria   10 year probability of major osteoporotic fracture: 3.7%   10 year probability of hip fracture: 0.3%     Recommendation is vitamin D 2000 international units daily and Calcium 800 mgs. Low weight bearing exercises (walking, swimming, lifting light weights). Also, recheck of bone density in 2-years.      Thanks,   Dr. Keshav Rashid

## 2019-09-04 DIAGNOSIS — E78.00 PURE HYPERCHOLESTEROLEMIA: ICD-10-CM

## 2019-09-05 RX ORDER — ATORVASTATIN CALCIUM 10 MG/1
TABLET, FILM COATED ORAL
Qty: 90 TAB | Refills: 0 | Status: SHIPPED | OUTPATIENT
Start: 2019-09-05 | End: 2019-12-11 | Stop reason: SDUPTHER

## 2019-09-13 DIAGNOSIS — I10 ESSENTIAL HYPERTENSION: ICD-10-CM

## 2019-09-13 DIAGNOSIS — E11.9 CONTROLLED TYPE 2 DIABETES MELLITUS WITHOUT COMPLICATION, WITHOUT LONG-TERM CURRENT USE OF INSULIN (HCC): ICD-10-CM

## 2019-09-14 RX ORDER — GLIPIZIDE 5 MG/1
TABLET, FILM COATED, EXTENDED RELEASE ORAL
Qty: 90 TAB | Refills: 0 | Status: SHIPPED | OUTPATIENT
Start: 2019-09-14 | End: 2019-12-26

## 2019-09-14 RX ORDER — LISINOPRIL 5 MG/1
TABLET ORAL
Qty: 90 TAB | Refills: 0 | Status: SHIPPED | OUTPATIENT
Start: 2019-09-14 | End: 2019-12-11 | Stop reason: SDUPTHER

## 2019-10-03 ENCOUNTER — OFFICE VISIT (OUTPATIENT)
Dept: FAMILY MEDICINE CLINIC | Age: 69
End: 2019-10-03

## 2019-10-03 ENCOUNTER — HOSPITAL ENCOUNTER (OUTPATIENT)
Dept: LAB | Age: 69
Discharge: HOME | End: 2019-10-03
Payer: MEDICARE

## 2019-10-03 VITALS
TEMPERATURE: 98.3 F | OXYGEN SATURATION: 95 % | SYSTOLIC BLOOD PRESSURE: 164 MMHG | WEIGHT: 136 LBS | BODY MASS INDEX: 23.22 KG/M2 | RESPIRATION RATE: 20 BRPM | HEIGHT: 64 IN | DIASTOLIC BLOOD PRESSURE: 82 MMHG | HEART RATE: 83 BPM

## 2019-10-03 DIAGNOSIS — E11.9 CONTROLLED TYPE 2 DIABETES MELLITUS WITHOUT COMPLICATION, WITHOUT LONG-TERM CURRENT USE OF INSULIN (HCC): ICD-10-CM

## 2019-10-03 DIAGNOSIS — Z23 ENCOUNTER FOR IMMUNIZATION: ICD-10-CM

## 2019-10-03 DIAGNOSIS — E11.21 TYPE 2 DIABETES WITH NEPHROPATHY (HCC): Primary | ICD-10-CM

## 2019-10-03 DIAGNOSIS — E78.00 PURE HYPERCHOLESTEROLEMIA: ICD-10-CM

## 2019-10-03 DIAGNOSIS — E55.9 VITAMIN D DEFICIENCY: ICD-10-CM

## 2019-10-03 PROCEDURE — 85025 COMPLETE CBC W/AUTO DIFF WBC: CPT

## 2019-10-03 PROCEDURE — 82306 VITAMIN D 25 HYDROXY: CPT

## 2019-10-03 PROCEDURE — 83036 HEMOGLOBIN GLYCOSYLATED A1C: CPT

## 2019-10-03 PROCEDURE — 36415 COLL VENOUS BLD VENIPUNCTURE: CPT

## 2019-10-03 PROCEDURE — 80053 COMPREHEN METABOLIC PANEL: CPT

## 2019-10-03 PROCEDURE — 80061 LIPID PANEL: CPT

## 2019-10-03 NOTE — PROGRESS NOTES
Patient presents for evaluation of diabetic medication. Follow up visit. Complains of right hip pain s/p a fall at home down the stairs months ago.

## 2019-10-03 NOTE — PROGRESS NOTES
CC:  Chief Complaint   Patient presents with    Follow Up Chronic Condition     Follow up for diabetes and evaluation of medications for diabetes    Hip Pain     Complains of right hip pain with pains radiating into leg; s/p fall with injury to right hip     Subjective:     Pauline Jennings is a 80 y.o. female seen for follow up of diabetes. She also has hypertension and hyperlipidemia. Diabetic Review of Systems - medication compliance: compliant all of the time, diabetic diet compliance: compliant all of the time, home glucose monitoring: is performed regularly. Other symptoms and concerns:   R-hip pain into leg and continued swelling and pain of the hands. She will be following up with Rheumatology soon to see if her DMARD's for RA are helpful at this time. Patient Active Problem List    Diagnosis Date Noted    Rheumatoid arthritis involving multiple sites with positive rheumatoid factor ([facility] 01/29/2019    Type 2 diabetes with nephropathy ([facility] 04/03/2018    History of rheumatoid arthritis 11/21/2017    Osteopenia of both lower legs 11/21/2017    Diverticulosis 04/20/2015    Infectious gastroenteritis and colitis 04/20/2015    Benign essential hypertension 04/20/2015    Diabetes mellitus type II, controlled ([facility] 04/20/2015    Anemia 04/20/2015    Anemia associated with acute blood loss 04/20/2015     Current Outpatient Medications   Medication Sig Dispense Refill    calcium-cholecalciferol, d3, (CALCIUM 600 + D) 600-125 mg-unit tab Take 1 Tab by mouth two (2) times daily (with meals). Indications: post-menopausal osteoporosis prevention      lisinopril (PRINIVIL, ZESTRIL) 5 mg tablet TAKE ONE TABLET BY MOUTH DAILY 90 Tab 0    glipiZIDE SR (GLUCOTROL XL) 5 mg CR tablet TAKE ONE TABLET BY MOUTH DAILY 90 Tab 0    atorvastatin (LIPITOR) 10 mg tablet TAKE ONE TABLET BY MOUTH DAILY 90 Tab 0    leflunomide (ARAVA) 20 mg tablet       PNV JI.29/[id number] fum/folic ac (PRENATAL PO) Take  by mouth.  ONETOUCH ULTRA BLUE TEST STRIP strip USE TO TEST DAILY AS DIRECTED (Patient taking differently: USE TO TEST DAILY AS DIRECTED, patient try to check everyday) 50 Strip 4    acetaminophen (TYLENOL ARTHRITIS PAIN) 650 mg TbER Take 650 mg by mouth every eight (8) hours. Indications: provider changed      empagliflozin (JARDIANCE) 25 mg tablet TAKE ONE TABLET BY MOUTH DAILY 90 Tab 1    diclofenac (VOLTAREN) 1 % gel Apply 4 g to affected area four (4) times daily. PLEASE GIVE GENERIC IF AVAILABLE. 100 g 3    biotin 1 mg cap Take 1 Tab by mouth once over twenty-four (24) hours.  cholecalciferol (VITAMIN D3) 1,000 unit cap Take 1,000 Units by mouth daily.  cyanocobalamin 1,000 mcg tablet Take 1,000 mcg by mouth daily.  L. ACIDOPHILUS/BIFID. ANIMALIS (DAILY PROBIOTIC PO) Take 1 Tab by mouth once over twenty-four (24) hours.  aspirin delayed-release 81 mg tablet Take 81 mg by mouth daily.  empagliflozin (JARDIANCE) 25 mg tablet TAKE ONE TABLET BY MOUTH DAILY 30 Tab 0    pneumococcal 23-valent (PNEUMOVAX 23) 25 mcg/0.5 mL injection 0.5 mL by IntraMUSCular route PRIOR TO DISCHARGE for 1 dose. 0.5 mL 0    turmeric root extract 500 mg cap Take  by mouth as needed.  lisinopril (PRINIVIL, ZESTRIL) 10 mg tablet Take 10 mg by mouth daily. Indications: provider change      fluticasone (FLONASE) 50 mcg/actuation nasal spray 2 sprays per nostril once a day  Indications: Allergic Rhinitis (Patient taking differently: as needed. 2 sprays per nostril once a day  Indications: inflammation of the nose due to an allergy) 1 Bottle 3    nystatin (MYCOSTATIN) powder Apply  to affected area four (4) times daily. Apply 1-4 grams every day until rash resolves. 1 bottle = 60 grams 1 Bottle 3    calcium carbonate (TUMS) 200 mg calcium (500 mg) chew Take 1 Tab by mouth daily.  multivitamin (ONE A DAY) tablet Take 1 Tab by mouth daily.  Indications: self discontinue      omeprazole (PRILOSEC OTC) 20 mg tablet Take 20 mg by mouth as needed. No Known Allergies  Past Medical History:   Diagnosis Date    Acid reflux     Colitis 04/16/2015    Colitis     Diabetes mellitus, type II ([facility]     Diverticulosis of colon 04/20/2015    Hyperlipemia     Hypertension     Osteopenia     of the spine Dexa done 2014, 2017 and due 2019    RA (rheumatoid arthritis) ([facility]      Past Surgical History:   Procedure Laterality Date    ABDOMEN SURGERY PROC UNLISTED      HX COLONOSCOPY  04/20/2015    Due 2023    HX GYN      HX HYSTERECTOMY      HX TOTAL ABDOMINAL HYSTERECTOMY      NV BIOPSY OF BREAST, NEEDLE CORE  2017    benign findings - Fibrosis     Family History   Problem Relation Age of Onset    Other Mother         diverticulosis    Heart Attack Mother     Heart Failure Father     Pulmonary Embolism Sister     Diabetes Brother     Cancer Brother         brain cancer     Social History     Tobacco Use    Smoking status: Never Smoker    Smokeless tobacco: Never Used   Substance Use Topics    Alcohol use: No           Review of Systems  Pertinent items are noted in HPI. Objective:     Visit Vitals  /82   Pulse 83   Temp 98.3 °F (36.8 °C) (Oral)   Resp 20   Ht 5' 4\" (1.626 m)   Wt 136 lb (61.7 kg)   SpO2 95%   BMI 23.34 kg/m²     Appearance: alert, well appearing, and in no distress and normal appearing weight. Exam: heart sounds normal rate, regular rhythm, normal S1, S2, no murmurs, rubs, clicks or gallops, S1 and S2 normal, chest clear, no hepatosplenomegaly, no carotid bruits  Lab review: orders written for new lab studies as appropriate; see orders. Assessment/Plan:     Diagnoses and all orders for this visit:    1. Type 2 diabetes with nephropathy (HCC)  -     HEMOGLOBIN A1C WITH EAG    2.  Encounter for immunization  -     PNEUMOCOCCAL POLYSACCHARIDE VACCINE, 23-VALENT, ADULT OR IMMUNOSUPPRESSED PT DOSE,  -     ADMIN INFLUENZA VIRUS VAC  -     ADMIN PNEUMOCOCCAL VACCINE  -     INFLUENZA VACCINE INACTIVATED (IIV), SUBUNIT, ADJUVANTED, IM    3. Controlled type 2 diabetes mellitus without complication, without long-term current use of insulin ([facility]    4. Vitamin D deficiency  -     VITAMIN D, 25 HYDROXY    5. Pure hypercholesterolemia  -     LIPID PANEL  -     METABOLIC PANEL, COMPREHENSIVE  -     CBC WITH AUTOMATED DIFF    I have discussed the diagnosis with the patient and the intended treatment plan as seen in the above orders. The patient has received an after-visit summary and questions were answered concerning future plans. Asked to return should symptoms worsen or not improve with treatment. Any pending labs and studies will be relayed to patient when they become available. Pt verbalizes understanding of plan of care and denies further questions or concerns at this time. Follow-up and Dispositions    · Return if symptoms worsen or fail to improve. Hima Flores MD    Patient Instructions          Preventing Falls: Care Instructions  Your Care Instructions    Getting around your home safely can be a challenge if you have injuries or health problems that make it easy for you to fall. Loose rugs and furniture in walkways are among the dangers for many older people who have problems walking or who have poor eyesight. People who have conditions such as arthritis, osteoporosis, or dementia also have to be careful not to fall. You can make your home safer with a few simple measures. Follow-up care is a key part of your treatment and safety. Be sure to make and go to all appointments, and call your doctor if you are having problems. It's also a good idea to know your test results and keep a list of the medicines you take. How can you care for yourself at home? Taking care of yourself  · You may get dizzy if you do not drink enough water. To prevent dehydration, drink plenty of fluids, enough so that your urine is light yellow or clear like water.  Choose water and other caffeine-free clear liquids. If you have kidney, heart, or liver disease and have to limit fluids, talk with your doctor before you increase the amount of fluids you drink. · Exercise regularly to improve your strength, muscle tone, and balance. Walk if you can. Swimming may be a good choice if you cannot walk easily. · Have your vision and hearing checked each year or any time you notice a change. If you have trouble seeing and hearing, you might not be able to avoid objects and could lose your balance. · Know the side effects of the medicines you take. Ask your doctor or pharmacist whether the medicines you take can affect your balance. Sleeping pills or sedatives can affect your balance. · Limit the amount of alcohol you drink. Alcohol can impair your balance and other senses. · Ask your doctor whether calluses or corns on your feet need to be removed. If you wear loose-fitting shoes because of calluses or corns, you can lose your balance and fall. · Talk to your doctor if you have numbness in your feet. Preventing falls at home  · Remove raised doorway thresholds, throw rugs, and clutter. Repair loose carpet or raised areas in the floor. · Move furniture and electrical cords to keep them out of walking paths. · Use nonskid floor wax, and wipe up spills right away, especially on ceramic tile floors. · If you use a walker or cane, put rubber tips on it. If you use crutches, clean the bottoms of them regularly with an abrasive pad, such as steel wool. · Keep your house well lit, especially [facility], and outside walkways. Use night-lights in areas such as hallways and bathrooms. Add extra light switches or use remote switches (such as switches that go on or off when you clap your hands) to make it easier to turn lights on if you have to get up during the night. · Install sturdy handrails on stairways.   · Move items in your cabinets so that the things you use a lot are on the lower shelves (about waist level). · Keep a cordless phone and a flashlight with new batteries by your bed. If possible, put a phone in each of the main rooms of your house, or carry a cell phone in case you fall and cannot reach a phone. Or, you can wear a device around your neck or wrist. You push a button that sends a signal for help. · Wear low-heeled shoes that fit well and give your feet good support. Use footwear with nonskid soles. Check the heels and soles of your shoes for wear. Repair or replace worn heels or soles. · Do not wear socks without shoes on wood floors. · Walk on the grass when the sidewalks are slippery. If you live in an area that gets snow and ice in the winter, sprinkle salt on slippery steps and sidewalks. Preventing falls in the bath  · Install grab bars and nonskid mats inside and outside your shower or tub and near the toilet and sinks. · Use shower chairs and bath benches. · Use a hand-held shower head that will allow you to sit while showering. · Get into a tub or shower by putting the weaker leg in first. Get out of a tub or shower with your strong side first.  · Repair loose toilet seats and consider installing a raised toilet seat to make getting on and off the toilet easier. · Keep your bathroom door unlocked while you are in the shower. Where can you learn more? Go to http://libby-raeann.info/. Enter 0476 [id number] 69 [id number] in the search box to learn more about \"Preventing Falls: Care Instructions. \"  Current as of: November 7, 2018  Content Version: 12.2  © 2212-5365 [employer]. Care instructions adapted under license by [employer] (which disclaims liability or warranty for this information). If you have questions about a medical condition or this instruction, always ask your healthcare professional. [address] any warranty or liability for your use of this information.          How to Get Up Safely After a Fall: Care Instructions  Your Care Instructions    If you have injuries, health problems, or other reasons that may make it easy for you to fall at home, it is a good idea to learn how to get up safely after a fall. Learning how to get up correctly can help you avoid making an injury worse. Also, knowing what to do if you cannot get up can help you stay safe until help arrives. Follow-up care is a key part of your treatment and safety. Be sure to make and go to all appointments, and call your doctor if you are having problems. It's also a good idea to know your test results and keep a list of the medicines you take. How can you care for yourself after a fall? If you think you can get up  First lie still for a few minutes and think about how you feel. If your body feels okay and you think you can get up safely, follow the rest of the steps below:  1. Look for a chair or other piece of furniture that is close to you. 2. Roll onto your side and rest. Roll by turning your head in the direction you want to roll, move your shoulder and arm, then hip and leg in the same direction. 3. Lie still for a moment to let your blood pressure adjust.  4. Slowly push your upper body up, lift your head, and take a moment to rest.  5. Slowly get up on your hands and knees, and crawl to the chair or other stable piece of furniture. 6. Put your hands on the chair. 7. Move one foot forward, and place it flat on the floor. Your other leg should be bent with the knee on the floor. 8. Rise slowly, turn your body, and sit in the chair. Stay seated for a bit and think about how you feel. Call for help. Even if you feel okay, let someone know what happened to you. You might not know that you have a serious injury. If you cannot get up  1. If you think you are injured after a fall or you cannot get up, try not to panic. 2. Call out for help.   3. If you have a phone within reach or you have an emergency call device, use it to call for help.  4. If you do not have a phone within reach, try to slide yourself toward it. If you cannot get to the phone, try to slide toward a door or window or a place where you think you can be heard. 5. [place] or use an object to make noise so someone might hear you. 6. If you can reach something that you can use for a pillow, place it under your head. Try to stay warm by covering yourself with a blanket or clothing while you wait for help. When should you call for help? Call 911 anytime you think you may need emergency care. For example, call if:    · You passed out (lost consciousness).     · You cannot get up after a fall.     · You have severe pain.    Call your doctor now or seek immediate medical care if:    · You have new or worse pain.     · You are dizzy or lightheaded.     · You hit your head.    Watch closely for changes in your health, and be sure to contact your doctor if:    · You do not get better as expected. Where can you learn more? Go to http://libby-raeann.info/. Enter M116 in the search box to learn more about \"How to Get Up Safely After a Fall: Care Instructions. \"  Current as of: November 7, 2018  Content Version: 12.2  © 2225-8790 [employer]. Care instructions adapted under license by [employer] (which disclaims liability or warranty for this information). If you have questions about a medical condition or this instruction, always ask your healthcare professional. [place] [address] any warranty or liability for your use of this information.

## 2019-10-03 NOTE — PATIENT INSTRUCTIONS
Preventing Falls: Care Instructions Your Care Instructions Getting around your home safely can be a challenge if you have injuries or health problems that make it easy for you to fall. Loose rugs and furniture in walkways are among the dangers for many older people who have problems walking or who have poor eyesight. People who have conditions such as arthritis, osteoporosis, or dementia also have to be careful not to fall. You can make your home safer with a few simple measures. Follow-up care is a key part of your treatment and safety. Be sure to make and go to all appointments, and call your doctor if you are having problems. It's also a good idea to know your test results and keep a list of the medicines you take. How can you care for yourself at home? Taking care of yourself · You may get dizzy if you do not drink enough water. To prevent dehydration, drink plenty of fluids, enough so that your urine is light yellow or clear like water. Choose water and other caffeine-free clear liquids. If you have kidney, heart, or liver disease and have to limit fluids, talk with your doctor before you increase the amount of fluids you drink. · Exercise regularly to improve your strength, muscle tone, and balance. Walk if you can. Swimming may be a good choice if you cannot walk easily. · Have your vision and hearing checked each year or any time you notice a change. If you have trouble seeing and hearing, you might not be able to avoid objects and could lose your balance. · Know the side effects of the medicines you take. Ask your doctor or pharmacist whether the medicines you take can affect your balance. Sleeping pills or sedatives can affect your balance. · Limit the amount of alcohol you drink. Alcohol can impair your balance and other senses. · Ask your doctor whether calluses or corns on your feet need to be removed.  If you wear loose-fitting shoes because of calluses or corns, you can lose your balance and fall. · Talk to your doctor if you have numbness in your feet. Preventing falls at home · Remove raised doorway thresholds, throw rugs, and clutter. Repair loose carpet or raised areas in the floor. · Move furniture and electrical cords to keep them out of walking paths. · Use nonskid floor wax, and wipe up spills right away, especially on ceramic tile floors. · If you use a walker or cane, put rubber tips on it. If you use crutches, clean the bottoms of them regularly with an abrasive pad, such as steel wool. · Keep your house well lit, especially [facility], and outside walkways. Use night-lights in areas such as hallways and bathrooms. Add extra light switches or use remote switches (such as switches that go on or off when you clap your hands) to make it easier to turn lights on if you have to get up during the night. · Install sturdy handrails on stairways. · Move items in your cabinets so that the things you use a lot are on the lower shelves (about waist level). · Keep a cordless phone and a flashlight with new batteries by your bed. If possible, put a phone in each of the main rooms of your house, or carry a cell phone in case you fall and cannot reach a phone. Or, you can wear a device around your neck or wrist. You push a button that sends a signal for help. · Wear low-heeled shoes that fit well and give your feet good support. Use footwear with nonskid soles. Check the heels and soles of your shoes for wear. Repair or replace worn heels or soles. · Do not wear socks without shoes on wood floors. · Walk on the grass when the sidewalks are slippery. If you live in an area that gets snow and ice in the winter, sprinkle salt on slippery steps and sidewalks. Preventing falls in the bath · Install grab bars and nonskid mats inside and outside your shower or tub and near the toilet and sinks. · Use shower chairs and bath benches. · Use a hand-held shower head that will allow you to sit while showering. · Get into a tub or shower by putting the weaker leg in first. Get out of a tub or shower with your strong side first. 
· Repair loose toilet seats and consider installing a raised toilet seat to make getting on and off the toilet easier. · Keep your bathroom door unlocked while you are in the shower. Where can you learn more? Go to http://libby-raeann.info/. Enter 0476 [id number] in the search box to learn more about \"Preventing Falls: Care Instructions. \" Current as of: November 7, 2018 Content Version: 12.2 © 3807-8245 [employer]. Care instructions adapted under license by [employer] (which disclaims liability or warranty for this information). If you have questions about a medical condition or this instruction, always ask your healthcare professional. [place] [address] any warranty or liability for your use of this information. How to Get Up Safely After a Fall: Care Instructions Your Care Instructions If you have injuries, health problems, or other reasons that may make it easy for you to fall at home, it is a good idea to learn how to get up safely after a fall. Learning how to get up correctly can help you avoid making an injury worse. Also, knowing what to do if you cannot get up can help you stay safe until help arrives. Follow-up care is a key part of your treatment and safety. Be sure to make and go to all appointments, and call your doctor if you are having problems. It's also a good idea to know your test results and keep a list of the medicines you take. How can you care for yourself after a fall? If you think you can get up First lie still for a few minutes and think about how you feel. If your body feels okay and you think you can get up safely, follow the rest of the steps below: 1. Look for a chair or other piece of furniture that is close to you. 2. Roll onto your side and rest. Roll by turning your head in the direction you want to roll, move your shoulder and arm, then hip and leg in the same direction. 3. Lie still for a moment to let your blood pressure adjust. 
4. Slowly push your upper body up, lift your head, and take a moment to rest. 
5. Slowly get up on your hands and knees, and crawl to the chair or other stable piece of furniture. 6. Put your hands on the chair. 7. Move one foot forward, and place it flat on the floor. Your other leg should be bent with the knee on the floor. 8. Rise slowly, turn your body, and sit in the chair. Stay seated for a bit and think about how you feel. Call for help. Even if you feel okay, let someone know what happened to you. You might not know that you have a serious injury. If you cannot get up 1. If you think you are injured after a fall or you cannot get up, try not to panic. 2. Call out for help. 3. If you have a phone within reach or you have an emergency call device, use it to call for help. 4. If you do not have a phone within reach, try to slide yourself toward it. If you cannot get to the phone, try to slide toward a door or window or a place where you think you can be heard. 5. [place] or use an object to make noise so someone might hear you. 6. If you can reach something that you can use for a pillow, place it under your head. Try to stay warm by covering yourself with a blanket or clothing while you wait for help. When should you call for help? Call 911 anytime you think you may need emergency care. For example, call if: 
  · You passed out (lost consciousness).  
  · You cannot get up after a fall.  
  · You have severe pain.  
 Call your doctor now or seek immediate medical care if: 
  · You have new or worse pain.  
  · You are dizzy or lightheaded.  
  · You hit your head.  
 Watch closely for changes in your health, and be sure to contact your doctor if:   · You do not get better as expected. Where can you learn more? Go to http://libby-raeann.info/. Enter K224 in the search box to learn more about \"How to Get Up Safely After a Fall: Care Instructions. \" Current as of: November 7, 2018 Content Version: 12.2 © 7593-6717 [employer]. Care instructions adapted under license by [employer] (which disclaims liability or warranty for this information). If you have questions about a medical condition or this instruction, always ask your healthcare professional. [address] any warranty or liability for your use of this information.

## 2019-10-04 LAB
25(OH)D3+25(OH)D2 SERPL-MCNC: 56.2 NG/ML (ref 30–100)
ALBUMIN SERPL-MCNC: 4.6 G/DL (ref 3.6–4.8)
ALBUMIN/GLOB SERPL: 1.8 {RATIO} (ref 1.2–2.2)
ALP SERPL-CCNC: 53 IU/L (ref 39–117)
ALT SERPL-CCNC: 23 IU/L (ref 0–32)
AST SERPL-CCNC: 17 IU/L (ref 0–40)
BASOPHILS # BLD AUTO: 0 X10E3/UL (ref 0–0.2)
BASOPHILS NFR BLD AUTO: 0 %
BILIRUB SERPL-MCNC: 0.6 MG/DL (ref 0–1.2)
BUN SERPL-MCNC: 21 MG/DL (ref 8–27)
BUN/CREAT SERPL: 22 (ref 12–28)
CALCIUM SERPL-MCNC: 9.9 MG/DL (ref 8.7–10.3)
CHLORIDE SERPL-SCNC: 106 MMOL/L (ref 96–106)
CHOLEST SERPL-MCNC: 144 MG/DL (ref 100–199)
CO2 SERPL-SCNC: 22 MMOL/L (ref 20–29)
CREAT SERPL-MCNC: 0.94 MG/DL (ref 0.57–1)
EOSINOPHIL # BLD AUTO: 0 X10E3/UL (ref 0–0.4)
EOSINOPHIL NFR BLD AUTO: 1 %
ERYTHROCYTE [DISTWIDTH] IN BLOOD BY AUTOMATED COUNT: 12.7 % (ref 12.3–15.4)
EST. AVERAGE GLUCOSE BLD GHB EST-MCNC: 120 MG/DL
GLOBULIN SER CALC-MCNC: 2.6 G/DL (ref 1.5–4.5)
GLUCOSE SERPL-MCNC: 105 MG/DL (ref 65–99)
HBA1C MFR BLD: 5.8 % (ref 4.8–5.6)
HCT VFR BLD AUTO: 39.1 % (ref 34–46.6)
HDLC SERPL-MCNC: 51 MG/DL
HGB BLD-MCNC: 12.8 G/DL (ref 11.1–15.9)
IMM GRANULOCYTES # BLD AUTO: 0 X10E3/UL (ref 0–0.1)
IMM GRANULOCYTES NFR BLD AUTO: 0 %
INTERPRETATION, 910389: NORMAL
LDLC SERPL CALC-MCNC: 73 MG/DL (ref 0–99)
LYMPHOCYTES # BLD AUTO: 0.9 X10E3/UL (ref 0.7–3.1)
LYMPHOCYTES NFR BLD AUTO: 27 %
Lab: NORMAL
MCH RBC QN AUTO: 30.4 PG (ref 26.6–33)
MCHC RBC AUTO-ENTMCNC: 32.7 G/DL (ref 31.5–35.7)
MCV RBC AUTO: 93 FL (ref 79–97)
MONOCYTES # BLD AUTO: 0.3 X10E3/UL (ref 0.1–0.9)
MONOCYTES NFR BLD AUTO: 9 %
NEUTROPHILS # BLD AUTO: 2.1 X10E3/UL (ref 1.4–7)
NEUTROPHILS NFR BLD AUTO: 63 %
PLATELET # BLD AUTO: 180 X10E3/UL (ref 150–450)
POTASSIUM SERPL-SCNC: 4.1 MMOL/L (ref 3.5–5.2)
PROT SERPL-MCNC: 7.2 G/DL (ref 6–8.5)
RBC # BLD AUTO: 4.21 X10E6/UL (ref 3.77–5.28)
SODIUM SERPL-SCNC: 145 MMOL/L (ref 134–144)
TRIGL SERPL-MCNC: 98 MG/DL (ref 0–149)
VLDLC SERPL CALC-MCNC: 20 MG/DL (ref 5–40)
WBC # BLD AUTO: 3.3 X10E3/UL (ref 3.4–10.8)

## 2019-11-05 DIAGNOSIS — E11.9 CONTROLLED TYPE 2 DIABETES MELLITUS WITHOUT COMPLICATION, WITHOUT LONG-TERM CURRENT USE OF INSULIN (HCC): ICD-10-CM

## 2019-12-03 ENCOUNTER — OFFICE VISIT (OUTPATIENT)
Dept: RHEUMATOLOGY | Age: 69
End: 2019-12-03

## 2019-12-03 VITALS
OXYGEN SATURATION: 98 % | WEIGHT: 135.4 LBS | HEART RATE: 77 BPM | RESPIRATION RATE: 16 BRPM | SYSTOLIC BLOOD PRESSURE: 143 MMHG | BODY MASS INDEX: 23.24 KG/M2 | TEMPERATURE: 98.2 F | DIASTOLIC BLOOD PRESSURE: 79 MMHG

## 2019-12-03 DIAGNOSIS — M05.742 RHEUMATOID ARTHRITIS INVOLVING BOTH HANDS WITH POSITIVE RHEUMATOID FACTOR (HCC): Primary | ICD-10-CM

## 2019-12-03 DIAGNOSIS — M05.741 RHEUMATOID ARTHRITIS INVOLVING BOTH HANDS WITH POSITIVE RHEUMATOID FACTOR (HCC): Primary | ICD-10-CM

## 2019-12-03 DIAGNOSIS — M70.61 TROCHANTERIC BURSITIS OF RIGHT HIP: ICD-10-CM

## 2019-12-03 RX ORDER — PREDNISONE 5 MG/1
TABLET ORAL
Qty: 30 TAB | Refills: 1 | Status: SHIPPED | OUTPATIENT
Start: 2019-12-03 | End: 2020-01-14 | Stop reason: ALTCHOICE

## 2019-12-03 RX ORDER — CYCLOBENZAPRINE HCL 10 MG
TABLET ORAL
COMMUNITY
Start: 2019-11-06 | End: 2020-02-20 | Stop reason: ALTCHOICE

## 2019-12-03 NOTE — PROGRESS NOTES
Chief Complaint   Patient presents with    Joint Pain     1. Have you been to the ER, urgent care clinic since your last visit? Hospitalized since your last visit? No    2. Have you seen or consulted any other health care providers outside of the [address] [place] [address] since your last visit? Include any pap smears or colon screening.  No

## 2019-12-03 NOTE — PROGRESS NOTES
RHEUMATOLOGY PROBLEM LIST AND CHIEF COMPLAINT  1. Rheumatoid Arthritis - diagnosed 30+ years ago, treated to remission with Methotrexate (few years), positive RF, CCP, flare in 2019    Therapy History:   Prior DMARDs: Methotrexate (past response, put into remission)  Current NSAIDs: Tylenol Arthritis  Current DMARDs: Leflunomide (started 4/2019-current)    HISTORY OF PRESENT ILLNESS  This is a 80 y.o.  female. Today, the patient complains of joint pain. Location: right hip  Severity:  5 on a scale of 0-10  Timing: none at this time   Duration:  6 months  Context/Associated signs and symptoms: today the patient reports having a fall one year ago, landing on her hip. States that the right is painful at all times and is unable to sleep on that side. Notes that the left knee became symptomatic this morning. She has been continuing with Leflunomide.     RHEUMATOLOGY REVIEW OF SYSTEMS   Positives as per history  Negatives as follows:  Herndonmickey Lopezs:  Denies unexplained persistent fevers, weight change, chronic fatigue  HEAD/EYES:   Denies eye redness, blurry vision or sudden loss of vision, dry eyes, HA, temporal artery pain  ENT:    Denies oral/nasal ulcers, recurrent sinus infections, dry mouth  RESPIRATORY:  No pleuritic pain, history of pleural effusions, hemoptysis, exertional dyspnea  CARDIOVASCULAR:  Denies chest pain, history of pericardial effusions  GASTRO:   Denies heartburn, esophageal dysmotility, abdominal pain, nausea, vomiting, diarrhea, blood in the stool  HEMATOLOGIC:  No easy bruising, purpura, swollen lymph nodes  SKIN:    Denies alopecia, ulcers, nodules, sun sensitivity, unexplained persistent rash   VASCULAR:   Denies edema, cyanosis, raynaud phenomenon  NEUROLOGIC:  Denies specific muscle weakness, paresthesias   PSYCHIATRIC:  No sleep disturbance / snoring, depression, anxiety  MSK:    No morning stiffness >1 hour, SI joint pain, persistent joint swelling, persistent joint pain    PAST MEDICAL HISTORY  Reviewed with patient, significant changes in medical history - no    PHYSICAL EXAM  Blood pressure 143/79, pulse 77, temperature 98.2 °F (36.8 °C), temperature source Oral, resp. rate 16, weight 135 lb 6.4 oz (61.4 kg), SpO2 98 %. GENERAL APPEARANCE: Well-nourished adult in no acute distress. EYES: No scleral erythema, conjunctival injection. ENT: No oral ulcer, parotid enlargement. NECK: No adenopathy, thyroid enlargement. CARDIOVASCULAR: Heart rhythm is regular. No murmur, rub, gallop. CHEST: Normal vesicular breath sounds. No wheezes, rales, pleural friction rubs. ABDOMINAL: The abdomen is soft and nontender. Liver and spleen are nonpalpable. Bowel sounds are normal.  EXTREMITIES: There is no evidence of clubbing, cyanosis, edema. SKIN: No rash, palpable purpura, digital ulcer, abnormal thickening. NEUROLOGICAL: Normal gait and station, full strength in upper and lower extremities, normal sensation to light touch. MUSCULOSKELETAL:   Upper extremities - Right wrist warmth, synovitis, swelling, and dROM. Lower extremities - B/L bony prominence in knee. B/L Crepitus in knee. OA changes in her feet. Right lateral thigh pain. LABS, RADIOLOGY AND PROCEDURES  Previous labs reviewed -Yes  Previous radiology reviewed -Yes  Previous procedures reviewed -Yes  Previous medical records reviewed/summarized -Yes    ASSESSMENT  1. Rheumatoid Arthritis - (Established problem -  Stable disease) - The patient's left knee pain is most likely due to osteoarthritic changes and the right hip pain is most likely due to greater trochanteric bursitis. She will continue with Leflunomide and will begin Prednisone taper for anti-inflammatory pain relief. Prednisone will be beneficial for the right wrist pain and inflammation as well. 2. OA - Some of her symptoms today are  related to her OA. Tylenol and ROM exercises are recommended for now.  NSAIDs are contraindicated due to kidney disease - unchanged. 3. Drug therapy monitoring for toxicity (leflunomide) - CBC, BUN, Cr, AST, ALT and albumin every 3 months. 4. Trochanteric bursitis -  The pain and discomfort over the lateral thigh is most likely coming from inflammation of the bursa. There is pain on the outside of the hip and thigh, pain with lying on the affected side, pain with pressure on the lateral thigh, pain with activity and pain with walking up stairs. These are all classic for this diagnosis. We reviewed rehabilitation exercises with the patient (piriformas stretch, iliotibial band stretch, straight leg stretch, wall squat with ball and gluteal strengthening). There is also relief from bursa injection with corticosteroids and lidocaine. PLAN  1. Leflunomide 20 mg daily  2. 12 day Prednisone taper, will consider plaquenil if wrist remains inflamed   2. Tylenol PRN  3. Return in 2 months    Vy Domínguez MD  Adult and Pediatric Rheumatology     [address] [place] [address], Phone [phone], Fax [phone]   E-mail: [email or web address]    Visiting  of Pediatrics    Department of Pediatrics, [facility] of [address] [place] [address], [address] [place] [address], Phone [phone], Fax [phone]  E-mail: [email or web address]    There are no Patient Instructions on file for this visit. cc:  Pankaj Yoo MD    Written by anne Celeste, as dictated by Candy Green.  Taya Domínguez M.D.

## 2019-12-11 DIAGNOSIS — E78.00 PURE HYPERCHOLESTEROLEMIA: ICD-10-CM

## 2019-12-11 DIAGNOSIS — I10 ESSENTIAL HYPERTENSION: ICD-10-CM

## 2019-12-11 DIAGNOSIS — E11.9 CONTROLLED TYPE 2 DIABETES MELLITUS WITHOUT COMPLICATION, WITHOUT LONG-TERM CURRENT USE OF INSULIN (HCC): ICD-10-CM

## 2019-12-11 RX ORDER — LISINOPRIL 5 MG/1
TABLET ORAL
Qty: 90 TAB | Refills: 0 | Status: SHIPPED | OUTPATIENT
Start: 2019-12-11 | End: 2020-02-20 | Stop reason: SDUPTHER

## 2019-12-11 RX ORDER — ATORVASTATIN CALCIUM 10 MG/1
TABLET, FILM COATED ORAL
Qty: 90 TAB | Refills: 0 | Status: SHIPPED | OUTPATIENT
Start: 2019-12-11 | End: 2020-02-20 | Stop reason: SDUPTHER

## 2019-12-26 DIAGNOSIS — E11.9 CONTROLLED TYPE 2 DIABETES MELLITUS WITHOUT COMPLICATION, WITHOUT LONG-TERM CURRENT USE OF INSULIN (HCC): ICD-10-CM

## 2019-12-26 RX ORDER — GLIPIZIDE 5 MG/1
TABLET, FILM COATED, EXTENDED RELEASE ORAL
Qty: 90 TAB | Refills: 0 | Status: SHIPPED | OUTPATIENT
Start: 2019-12-26 | End: 2020-02-20 | Stop reason: SDUPTHER

## 2020-01-14 ENCOUNTER — OFFICE VISIT (OUTPATIENT)
Dept: RHEUMATOLOGY | Age: 70
End: 2020-01-14

## 2020-01-14 VITALS
DIASTOLIC BLOOD PRESSURE: 81 MMHG | HEART RATE: 81 BPM | BODY MASS INDEX: 23.41 KG/M2 | TEMPERATURE: 98.3 F | WEIGHT: 136.4 LBS | SYSTOLIC BLOOD PRESSURE: 159 MMHG | OXYGEN SATURATION: 94 % | RESPIRATION RATE: 16 BRPM

## 2020-01-14 DIAGNOSIS — M70.61 TROCHANTERIC BURSITIS OF RIGHT HIP: ICD-10-CM

## 2020-01-14 DIAGNOSIS — M05.741 RHEUMATOID ARTHRITIS INVOLVING BOTH HANDS WITH POSITIVE RHEUMATOID FACTOR (HCC): Primary | ICD-10-CM

## 2020-01-14 DIAGNOSIS — M05.742 RHEUMATOID ARTHRITIS INVOLVING BOTH HANDS WITH POSITIVE RHEUMATOID FACTOR (HCC): Primary | ICD-10-CM

## 2020-01-14 RX ORDER — PREDNISONE 5 MG/1
5 TABLET ORAL DAILY
Qty: 30 TAB | Refills: 1 | Status: SHIPPED | OUTPATIENT
Start: 2020-01-14 | End: 2020-03-30

## 2020-01-14 RX ORDER — HYDROXYCHLOROQUINE SULFATE 200 MG/1
300 TABLET, FILM COATED ORAL DAILY
Qty: 45 TAB | Refills: 6 | Status: SHIPPED | OUTPATIENT
Start: 2020-01-14 | End: 2020-07-14

## 2020-01-14 NOTE — PROGRESS NOTES
Chief Complaint   Patient presents with    Joint Pain     1. Have you been to the ER, urgent care clinic since your last visit? Hospitalized since your last visit? No    2. Have you seen or consulted any other health care providers outside of the [address] [place] [address] since your last visit? Include any pap smears or colon screening.  No

## 2020-01-14 NOTE — PROGRESS NOTES
RHEUMATOLOGY PROBLEM LIST AND CHIEF COMPLAINT  1. Rheumatoid Arthritis - diagnosed 30+ years ago, treated to remission with Methotrexate (few years), positive RF, CCP, flare in 2019    Therapy History:   Prior DMARDs: Methotrexate (past response, put into remission)  Current NSAIDs: Tylenol Arthritis  Current DMARDs: Leflunomide (started 4/2019-current)    HISTORY OF PRESENT ILLNESS  This is a 77 y.o.  female. Today, the patient complains of joint pain. Location: wrist  Severity:  4 on a scale of 0-10  Timing: none at this time   Duration: 2 month  Context/Associated signs and symptoms: The patient complains today of right wrist pain and stiffness and reduced range of motion of right second and third digit. She continues with Leflunomide 20 mg daily and has been taking Prednisone 5 mg PRN. She additionally continues to complain of pain over the right lateral thigh. She has not been consistent with range of motion exercises.      RHEUMATOLOGY REVIEW OF SYSTEMS   Positives as per history  Negatives as follows:  Ray Godfrey:  Denies unexplained persistent fevers, weight change, chronic fatigue  HEAD/EYES:   Denies eye redness, blurry vision or sudden loss of vision, dry eyes, HA, temporal artery pain  ENT:    Denies oral/nasal ulcers, recurrent sinus infections, dry mouth  RESPIRATORY:  No pleuritic pain, history of pleural effusions, hemoptysis, exertional dyspnea  CARDIOVASCULAR:  Denies chest pain, history of pericardial effusions  GASTRO:   Denies heartburn, esophageal dysmotility, abdominal pain, nausea, vomiting, diarrhea, blood in the stool  HEMATOLOGIC:  No easy bruising, purpura, swollen lymph nodes  SKIN:    Denies alopecia, ulcers, nodules, sun sensitivity, unexplained persistent rash   VASCULAR:   Denies edema, cyanosis, raynaud phenomenon  NEUROLOGIC:  Denies specific muscle weakness, paresthesias   PSYCHIATRIC:  No sleep disturbance / snoring, depression, anxiety  MSK:    No morning stiffness >1 hour, SI joint pain, persistent joint swelling, persistent joint pain    PAST MEDICAL HISTORY  Reviewed with patient, significant changes in medical history - no    PHYSICAL EXAM  Blood pressure 159/81, pulse 81, temperature 98.3 °F (36.8 °C), temperature source Oral, resp. rate 16, weight 136 lb 6.4 oz (61.9 kg), SpO2 94 %. GENERAL APPEARANCE: Well-nourished adult in no acute distress. EYES: No scleral erythema, conjunctival injection. ENT: No oral ulcer, parotid enlargement. NECK: No adenopathy, thyroid enlargement. CARDIOVASCULAR: Heart rhythm is regular. No murmur, rub, gallop. CHEST: Normal vesicular breath sounds. No wheezes, rales, pleural friction rubs. ABDOMINAL: The abdomen is soft and nontender. Liver and spleen are nonpalpable. Bowel sounds are normal.  EXTREMITIES: There is no evidence of clubbing, cyanosis, edema. SKIN: No rash, palpable purpura, digital ulcer, abnormal thickening. NEUROLOGICAL: Normal gait and station, full strength in upper and lower extremities, normal sensation to light touch. MUSCULOSKELETAL:   Upper extremities - Right wrist warmth, tenderness, swelling, and dROM - improved   Lower extremities - B/L bony prominence in knee. B/L Crepitus in knee. OA changes in her feet. Right lateral thigh pain. LABS, RADIOLOGY AND PROCEDURES  Previous labs reviewed -Yes  Previous radiology reviewed -Yes  Previous procedures reviewed -Yes  Previous medical records reviewed/summarized -Yes    ASSESSMENT  1. Rheumatoid Arthritis - (Established problem -  Stable disease) - The patient's continues to have inflammation present in the right wrist despite the prednisone taper. I recommend she add Plaquenil 300 mg daily which is relatively mild, but should control the disease. The most common side effect is nausea in the first week of beginning the medication, for this reason she should begin with 200 mg daily for one week before starting 300 mg.  She should continue on Leflunomide 20 mg daily and take Prednisone 5 mg daily for three weeks. The prednisone will be tapered to 2.5 mg for three weeks before she discontinues this. No labs are needed today. She should return in 3 months. 2. OA -  Tylenol and ROM exercises were recommended. NSAIDs are contraindicated due to kidney disease - unchanged. 3. Drug therapy monitoring for toxicity (leflunomide, plaquenil ) - CBC, BUN, Cr, AST, ALT and albumin every 3 months; eye exams every 6-12 months for retinal toxicity. 4. Trochanteric bursitis -  Rehabilitation exercises including piriformas stretch, iliotibial band stretch, straight leg stretch, wall squat with ball and gluteal strengthening were recommended. If symptoms persist we will consider a bursa injection with corticosteroids and lidocaine. 5. New medication  Plaquenil - A written summary, as prepared by the [employer] of Rheumatology was provided. The patient was given the opportunity to ask questions, and verbalized understanding of the following: The medication may not improve symptoms for 1-2 months, but it may take up to 6 months before full benefits are experienced. It is very well tolerated, and serious side effects are rare. Common side effects were discussed; nausea and diarrhea, which can improve by taking with food. Less common side effects that were discussed; skin rashes, changes in skin pigment, hair changes, anemia and weakness. Visual changes or loss of vision are also rare; we have dosed accordingly and she will have yearly eye exams. PLAN  1. Leflunomide 20 mg daily  2. Start Plaquenil 200 mg daily q7 days; then 300 mg daily indefinetly  3. Prednisone 5 mg daily q3 weeks; taper to 2.5 mg daily q3 weeks  4. Tylenol PRN  5. ROM exercises  6. Return in 3 months    Vy Hickman MD  Adult and Pediatric Rheumatology     [address] [place] [address], [address] [place] [address], Phone [phone], Fax [phone]   E-mail: [email or web address]    Visiting  of Pediatrics    Department of Pediatrics, [facility], [address] [place] [address], Phone [phone], Fax [phone]  E-mail: [email or web address]    There are no Patient Instructions on file for this visit. cc:  Garfield Paulson MD    Written by anne Andrade, as dictated by Tez Panchal.  Sadia Patel M.D.

## 2020-01-14 NOTE — PATIENT INSTRUCTIONS
Prednisone 5mg (1 tablets) x 3 weeks  Prednisone 2.5mg (1/2 tablets) x 3 weeks    Plaquenil 200mg (1 tablet) x 1 week then  Plaquenil 300mg (1 1/2 tablets)

## 2020-01-16 DIAGNOSIS — E11.9 CONTROLLED TYPE 2 DIABETES MELLITUS WITHOUT COMPLICATION, WITHOUT LONG-TERM CURRENT USE OF INSULIN (HCC): ICD-10-CM

## 2020-02-20 ENCOUNTER — OFFICE VISIT (OUTPATIENT)
Dept: FAMILY MEDICINE CLINIC | Age: 70
End: 2020-02-20

## 2020-02-20 VITALS
WEIGHT: 136 LBS | DIASTOLIC BLOOD PRESSURE: 74 MMHG | TEMPERATURE: 98.5 F | SYSTOLIC BLOOD PRESSURE: 128 MMHG | HEIGHT: 64 IN | HEART RATE: 92 BPM | BODY MASS INDEX: 23.22 KG/M2 | RESPIRATION RATE: 16 BRPM | OXYGEN SATURATION: 97 %

## 2020-02-20 DIAGNOSIS — E11.21 TYPE 2 DIABETES WITH NEPHROPATHY (HCC): ICD-10-CM

## 2020-02-20 DIAGNOSIS — R30.0 DYSURIA: ICD-10-CM

## 2020-02-20 DIAGNOSIS — E78.00 PURE HYPERCHOLESTEROLEMIA: ICD-10-CM

## 2020-02-20 DIAGNOSIS — J32.0 LEFT MAXILLARY SINUSITIS: Primary | ICD-10-CM

## 2020-02-20 DIAGNOSIS — M05.79 RHEUMATOID ARTHRITIS INVOLVING MULTIPLE SITES WITH POSITIVE RHEUMATOID FACTOR (HCC): ICD-10-CM

## 2020-02-20 DIAGNOSIS — E11.9 CONTROLLED TYPE 2 DIABETES MELLITUS WITHOUT COMPLICATION, WITHOUT LONG-TERM CURRENT USE OF INSULIN (HCC): ICD-10-CM

## 2020-02-20 DIAGNOSIS — I10 ESSENTIAL HYPERTENSION: ICD-10-CM

## 2020-02-20 LAB
ALBUMIN UR QL STRIP: 30 MG/L
BILIRUB UR QL STRIP: NEGATIVE
CREATININE, URINE POC: 50 MG/DL
GLUCOSE UR-MCNC: NORMAL MG/DL
KETONES P FAST UR STRIP-MCNC: NEGATIVE MG/DL
MICROALBUMIN/CREAT RATIO POC: NORMAL MG/G
PH UR STRIP: 5.5 [PH] (ref 4.6–8)
PROT UR QL STRIP: NEGATIVE
SP GR UR STRIP: 1.01 (ref 1–1.03)
UA UROBILINOGEN AMB POC: NORMAL (ref 0.2–1)
URINALYSIS CLARITY POC: CLEAR
URINALYSIS COLOR POC: YELLOW
URINE BLOOD POC: NEGATIVE
URINE LEUKOCYTES POC: NEGATIVE
URINE NITRITES POC: NEGATIVE

## 2020-02-20 RX ORDER — LISINOPRIL 5 MG/1
TABLET ORAL
Qty: 90 TAB | Refills: 1 | Status: SHIPPED | OUTPATIENT
Start: 2020-02-20 | End: 2020-09-14

## 2020-02-20 RX ORDER — AMOXICILLIN AND CLAVULANATE POTASSIUM 875; 125 MG/1; MG/1
1 TABLET, FILM COATED ORAL 2 TIMES DAILY
Qty: 10 TAB | Refills: 0 | Status: SHIPPED | OUTPATIENT
Start: 2020-02-20 | End: 2020-02-20 | Stop reason: SDUPTHER

## 2020-02-20 RX ORDER — ATORVASTATIN CALCIUM 10 MG/1
TABLET, FILM COATED ORAL
Qty: 90 TAB | Refills: 1 | Status: SHIPPED | OUTPATIENT
Start: 2020-02-20 | End: 2020-03-10

## 2020-02-20 RX ORDER — GLIPIZIDE 5 MG/1
TABLET, FILM COATED, EXTENDED RELEASE ORAL
Qty: 90 TAB | Refills: 1 | Status: SHIPPED | OUTPATIENT
Start: 2020-02-20 | End: 2020-09-25

## 2020-02-20 RX ORDER — AMOXICILLIN AND CLAVULANATE POTASSIUM 875; 125 MG/1; MG/1
1 TABLET, FILM COATED ORAL 2 TIMES DAILY
Qty: 20 TAB | Refills: 0 | Status: SHIPPED | OUTPATIENT
Start: 2020-02-20 | End: 2020-03-01

## 2020-02-20 NOTE — PROGRESS NOTES
Chief Complaint:  Chief Complaint   Patient presents with    Sinus Infection     Patient reports nasal drainage, scratchy throat, fatigue. Patient reports treatment with otc with temporary relief. History of Present Illness:  68F who c/o nasal congestion, mid-facial pain and pressure, PND with sore throat, fatigue. The patient has a h/o RA with + factor, T2DM (well controlled). Symptoms started a few weeks ago. She reports that she did have her flu shot this year. Immunizations by Immunization family     Influenza Vaccine 10/25/2017 1/29/2019 10/3/2019     Pneumococcal Conjugate (PCV) 6/23/2017 8/23/2017      Pneumococcal Polysaccharide (PPSV-23) 10/3/2019       Td 7/16/2011           Reviewed PmHx, RxHx, FmHx, SocHx, AllgHx and updated and dated in the chart. Patient Active Problem List    Diagnosis    Rheumatoid arthritis involving multiple sites with positive rheumatoid factor (HCC)    Type 2 diabetes with nephropathy (HCC)    History of rheumatoid arthritis    Osteopenia of both lower legs    Diverticulosis    Infectious gastroenteritis and colitis    Benign essential hypertension    Diabetes mellitus type II, controlled ([facility] [address]    Anemia    Anemia associated with acute blood loss       Review of Systems - negative except as listed above in the HPI    Objective:     Vitals:    02/20/20 1528   BP: 128/74   Pulse: 92   Resp: 16   Temp: 98.5 °F (36.9 °C)   TempSrc: Oral   SpO2: 97%   Weight: 136 lb (61.7 kg)   Height: 5' 4\" (1.626 m)     Physical Examination:   General appearance - alert, well appearing, and in no distress  Mental status - alert, oriented to person, place, and time  Nose - Nasal exam - mucosal congestion, mucosal erythema and sinus tenderness noted bilaterally.   Chest - clear to auscultation, no wheezes, rales or rhonchi, symmetric air entry  Heart - normal rate, regular rhythm, normal S1, S2, no murmurs, rubs, clicks or gallops  Neurological - alert, oriented, normal speech, no focal findings or movement disorder noted  Musculoskeletal - no joint tenderness, deformity or swelling  Extremities - peripheral pulses normal, no pedal edema, no clubbing or cyanosis    Assessment/ Plan:   Diagnoses and all orders for this visit:    1. Left maxillary sinusitis  -     amoxicillin-clavulanate (AUGMENTIN) 875-125 mg per tablet; Take 1 Tab by mouth two (2) times a day for 10 days. 2. Controlled type 2 diabetes mellitus without complication, without long-term current use of insulin (HCC)  -     AMB POC URINE, MICROALBUMIN, SEMIQUANT (3 RESULTS)  -     glipiZIDE SR (GLUCOTROL XL) 5 mg CR tablet; TAKE ONE TABLET BY MOUTH DAILY    3. Dysuria  -     AMB POC URINALYSIS DIP STICK MANUAL W/O MICRO    4. Pure hypercholesterolemia  -     atorvastatin (LIPITOR) 10 mg tablet; TAKE ONE TABLET BY MOUTH DAILY    5. Essential hypertension  -     lisinopril (PRINIVIL, ZESTRIL) 5 mg tablet; TAKE ONE TABLET BY MOUTH DAILY    6. Type 2 diabetes with nephropathy [facility])  Assessment & Plan:  Stable, based on history, physical exam and review of pertinent labs, studies and medications; meds reconciled; continue current treatment plan.   Key Antihyperglycemic Medications             glipiZIDE SR (GLUCOTROL XL) 5 mg CR tablet (Taking) TAKE ONE TABLET BY MOUTH DAILY    empagliflozin (JARDIANCE) 25 mg tablet (Taking) TAKE ONE TABLET BY MOUTH DAILY        Other Key Diabetic Medications             atorvastatin (LIPITOR) 10 mg tablet (Taking) TAKE ONE TABLET BY MOUTH DAILY    lisinopril (PRINIVIL, ZESTRIL) 5 mg tablet (Taking) TAKE ONE TABLET BY MOUTH DAILY        Lab Results   Component Value Date/Time    Hemoglobin A1c 5.8 10/03/2019 12:28 PM    Glucose 105 10/03/2019 12:28 PM    Creatinine 0.94 10/03/2019 12:28 PM    Microalbumin/creat ratio (POC)  02/20/2020 03:49 PM    Microalb/Creat ratio (ug/mg creat.) <3.9 01/29/2019 02:16 PM    Cholesterol, total 144 10/03/2019 12:28 PM    HDL Cholesterol 51 10/03/2019 12:28 PM LDL, calculated 73 10/03/2019 12:28 PM    Triglyceride 98 10/03/2019 12:28 PM     Diabetic Foot and Eye Exam HM Status   Topic Date Due    Eye Exam  12/12/2019    Diabetic Foot Care  05/07/2020     7. Rheumatoid arthritis involving multiple sites with positive rheumatoid factor (HCC)  Assessment & Plan:  Stable, based on history, physical exam and review of pertinent labs, studies and medications; meds reconciled; continue current treatment plan. Lab Results   Component Value Date/Time    WBC 3.3 10/03/2019 12:28 PM    HGB 12.8 10/03/2019 12:28 PM    HCT 39.1 10/03/2019 12:28 PM    PLATELET 149 44/91/0385 12:28 PM    Creatinine 0.94 10/03/2019 12:28 PM    BUN 21 10/03/2019 12:28 PM    Potassium 4.1 10/03/2019 12:28 PM     I have discussed the diagnosis with the patient and the intended treatment plan as seen in the above orders. The patient has received an after-visit summary and questions were answered concerning future plans. Asked to return should symptoms worsen or not improve with treatment. Any pending labs and studies will be relayed to patient when they become available. Pt verbalizes understanding of plan of care and denies further questions or concerns at this time. Follow-up and Dispositions    · Return if symptoms worsen or fail to improve. Arnol Barrientos MD      Patient Instructions     SINUSITIS    1. Please take the AUGMENTIN 1 tablet 2 x daily x 10 days  2. Please use the FLONASE (nasal spray) 2 squirts in each nostril just 1 x per day. 3. Please use NORMAL SALINE nasal spray (AYR) (buy this over the counter) use 2-3 squirts in each nostril every 1-2 hours while awake. This will help to really clear up and move the secretions down.      Health Maintenance   Topic Date Due    Medicare Yearly Exam  05/07/2020
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
## 2020-02-20 NOTE — PATIENT INSTRUCTIONS
SINUSITIS 1. Please take the AUGMENTIN 1 tablet 2 x daily x 10 days 2. Please use the FLONASE (nasal spray) 2 squirts in each nostril just 1 x per day. 3. Please use NORMAL SALINE nasal spray (AYR) (buy this over the counter) use 2-3 squirts in each nostril every 1-2 hours while awake. This will help to really clear up and move the secretions down. Health Maintenance Topic Date Due  Medicare Yearly Exam  05/07/2020

## 2020-02-23 NOTE — ASSESSMENT & PLAN NOTE
Stable, based on history, physical exam and review of pertinent labs, studies and medications; meds reconciled; continue current treatment plan.   Key Antihyperglycemic Medications             glipiZIDE SR (GLUCOTROL XL) 5 mg CR tablet (Taking) TAKE ONE TABLET BY MOUTH DAILY    empagliflozin (JARDIANCE) 25 mg tablet (Taking) TAKE ONE TABLET BY MOUTH DAILY        Other Key Diabetic Medications             atorvastatin (LIPITOR) 10 mg tablet (Taking) TAKE ONE TABLET BY MOUTH DAILY    lisinopril (PRINIVIL, ZESTRIL) 5 mg tablet (Taking) TAKE ONE TABLET BY MOUTH DAILY        Lab Results   Component Value Date/Time    Hemoglobin A1c 5.8 10/03/2019 12:28 PM    Glucose 105 10/03/2019 12:28 PM    Creatinine 0.94 10/03/2019 12:28 PM    Microalbumin/creat ratio (POC)  02/20/2020 03:49 PM    Microalb/Creat ratio (ug/mg creat.) <3.9 01/29/2019 02:16 PM    Cholesterol, total 144 10/03/2019 12:28 PM    HDL Cholesterol 51 10/03/2019 12:28 PM    LDL, calculated 73 10/03/2019 12:28 PM    Triglyceride 98 10/03/2019 12:28 PM     Diabetic Foot and Eye Exam HM Status   Topic Date Due    Eye Exam  12/12/2019    Diabetic Foot Care  05/07/2020
Stable, based on history, physical exam and review of pertinent labs, studies and medications; meds reconciled; continue current treatment plan.   Lab Results   Component Value Date/Time    WBC 3.3 10/03/2019 12:28 PM    HGB 12.8 10/03/2019 12:28 PM    HCT 39.1 10/03/2019 12:28 PM    PLATELET 119 59/56/5314 12:28 PM    Creatinine 0.94 10/03/2019 12:28 PM    BUN 21 10/03/2019 12:28 PM    Potassium 4.1 10/03/2019 12:28 PM
Contusion of left foot, initial encounter

## 2020-03-10 DIAGNOSIS — E78.00 PURE HYPERCHOLESTEROLEMIA: ICD-10-CM

## 2020-03-10 RX ORDER — ATORVASTATIN CALCIUM 10 MG/1
TABLET, FILM COATED ORAL
Qty: 90 TAB | Refills: 0 | Status: SHIPPED | OUTPATIENT
Start: 2020-03-10 | End: 2020-11-30

## 2020-03-30 RX ORDER — PREDNISONE 5 MG/1
TABLET ORAL
Qty: 30 TAB | Refills: 0 | Status: SHIPPED | OUTPATIENT
Start: 2020-03-30 | End: 2020-04-21 | Stop reason: SDUPTHER

## 2020-04-21 ENCOUNTER — VIRTUAL VISIT (OUTPATIENT)
Dept: RHEUMATOLOGY | Age: 70
End: 2020-04-21

## 2020-04-21 DIAGNOSIS — M05.742 RHEUMATOID ARTHRITIS INVOLVING BOTH HANDS WITH POSITIVE RHEUMATOID FACTOR (HCC): Primary | ICD-10-CM

## 2020-04-21 DIAGNOSIS — M70.61 TROCHANTERIC BURSITIS OF RIGHT HIP: ICD-10-CM

## 2020-04-21 DIAGNOSIS — M05.741 RHEUMATOID ARTHRITIS INVOLVING BOTH HANDS WITH POSITIVE RHEUMATOID FACTOR (HCC): Primary | ICD-10-CM

## 2020-04-21 RX ORDER — PREDNISONE 5 MG/1
TABLET ORAL
Qty: 30 TAB | Refills: 1 | Status: SHIPPED | OUTPATIENT
Start: 2020-04-21 | End: 2020-05-07 | Stop reason: DRUGHIGH

## 2020-04-21 NOTE — PROGRESS NOTES
Due to the recent COVID19 outbreak, this patient's appointment today was converted to a telephone/video visit. Current outbreak of COVID19- we discussed the current CDC recommendations of practicing social distancing, only going out for needed trips to the store/pharmacy and avoiding groups of 10 or more. Discussed that people with chronic disease, advanced age and immunosuppressive medications are likely at increased risk of severe disease if they were to contract the virus. At this time, we are not recommending stopping these medications in asymptomatic people. We reviewed the previous plan from the last visit. Below is a synopsis of what was covered during the phone/video call. Current rheumatology medications: Leflunomide 20 mg daily, Plaquenil 300 mg daily, and Prednisone 5 mg     RHEUMATOLOGY PROBLEM LIST AND CHIEF COMPLAINT  1. Rheumatoid Arthritis - diagnosed 30+ years ago, treated to remission with Methotrexate (few years), positive RF, CCP, flare in 2019    Therapy History:   Prior DMARDs: Methotrexate (past response, put into remission)  Current NSAIDs: Tylenol Arthritis  Current DMARDs: Leflunomide (started 4/2019-current), Plaquenil (1/2020-current)    HISTORY OF PRESENT ILLNESS  This is a 77 y.o.  female. Today, the patient complains of no joint pain. Location: none  Timing: none at this time   Duration: 3 month  Context/Associated signs and symptoms: The patient reports she is doing well. She states she has been compliant with leflunomide 20 mg daily and Plaquenil 300 mg daily. The patient reports she has continued on Prednisone 5 mg daily since her last visit. She reports she has no further swelling in the wrist, but continues to have some decreased range of motion with difficulty writing. She denies joint tenderness of stiffness.      RHEUMATOLOGY REVIEW OF SYSTEMS   Positives as per history  Negatives as follows:  CONSTITUTlONAL:  Denies unexplained persistent fevers, weight change, chronic fatigue  HEAD/EYES:   Denies eye redness, blurry vision or sudden loss of vision, dry eyes, HA, temporal artery pain  ENT:    Denies oral/nasal ulcers, recurrent sinus infections, dry mouth  RESPIRATORY:  No pleuritic pain, history of pleural effusions, hemoptysis, exertional dyspnea  CARDIOVASCULAR:  Denies chest pain, history of pericardial effusions  GASTRO:   Denies heartburn, esophageal dysmotility, abdominal pain, nausea, vomiting, diarrhea, blood in the stool  HEMATOLOGIC:  No easy bruising, purpura, swollen lymph nodes  SKIN:    Denies alopecia, ulcers, nodules, sun sensitivity, unexplained persistent rash   VASCULAR:   Denies edema, cyanosis, raynaud phenomenon  NEUROLOGIC:  Denies specific muscle weakness, paresthesias   PSYCHIATRIC:  No sleep disturbance / snoring, depression, anxiety  MSK:    No morning stiffness >1 hour, SI joint pain, persistent joint swelling, persistent joint pain    PAST MEDICAL HISTORY  Reviewed with patient, significant changes in medical history - no    PHYSICAL EXAM  The patient was not fully examined. Upper extremities - Right wrist dROM   Lower extremities - B/L bony prominence in knee. B/L Crepitus in knee. OA changes in her feet. Right lateral thigh pain. LABS, RADIOLOGY AND PROCEDURES  Previous labs reviewed -Yes  Previous radiology reviewed -Yes  Previous procedures reviewed -Yes  Previous medical records reviewed/summarized -Yes    ASSESSMENT  1. Rheumatoid Arthritis - (Established problem -  Stable disease) - The patient has had some improvement on exam, however she did not taper the Prednisone as directed. She should decrease the Prednisone to 2.5 mg daily for three weeks and then stop the Prednisone. Patient was advised to remain on Leflunomide 20 mg daily and Plaquenil 300 mg daily. Based on her response to tapering the steroids escalation of treatment may be needed. She should return in 6 weeks. I will order labs at next visit. 2. OA -  Tylenol and ROM exercises were recommended. NSAIDs are contraindicated due to kidney disease - unchanged. 3. Drug therapy monitoring for toxicity (leflunomide, plaquenil ) - CBC, BUN, Cr, AST, ALT and albumin every 3 months; eye exams every 6-12 months for retinal toxicity. 4. Trochanteric bursitis -  The patient did not complain of this today. She should continue rehabilitation exercises including piriformas stretch, iliotibial band stretch, straight leg stretch, wall squat with ball and gluteal strengthening as needed. PLAN  1. Leflunomide 20 mg daily  2. Plaquenil 300 mg daily  3. Decrease Prednisone to 2.5 mg daily q3 weeks then stop  4. Tylenol PRN  5. Return in 6 weeks    Vy Da Silva MD  Adult and Pediatric Rheumatology     [facility], [address] [place] [address], Phone [phone], Fax [phone]    Visiting  of Pediatrics    Department of Pediatrics, [facility] of [address] [place] [address], [address] [place] [address], Phone [phone], Fax [phone]    There are no Patient Instructions on file for this visit. cc:  Clari Negrete MD    Written by anne Anton, as dictated by Sunday Jordana Da Silva M.D. Total face-to face time was 25 minutes, greater than 50% of which was spent in counseling and coordination of care. The diagnosis, treatment and various other items were discussed in detail: Test results, medication options, possible side effects, lifestyle changes.

## 2020-04-30 ENCOUNTER — TELEPHONE (OUTPATIENT)
Dept: FAMILY MEDICINE CLINIC | Age: 70
End: 2020-04-30

## 2020-04-30 DIAGNOSIS — E11.9 CONTROLLED TYPE 2 DIABETES MELLITUS WITHOUT COMPLICATION, WITHOUT LONG-TERM CURRENT USE OF INSULIN (HCC): Primary | ICD-10-CM

## 2020-04-30 NOTE — TELEPHONE ENCOUNTER
Please resend script to [facility] for [facility] strip needs diagnosis code for it to be filled

## 2020-05-07 ENCOUNTER — VIRTUAL VISIT (OUTPATIENT)
Dept: FAMILY MEDICINE CLINIC | Age: 70
End: 2020-05-07

## 2020-05-07 DIAGNOSIS — E11.21 TYPE 2 DIABETES WITH NEPHROPATHY (HCC): ICD-10-CM

## 2020-05-07 DIAGNOSIS — E78.2 MIXED HYPERLIPIDEMIA: ICD-10-CM

## 2020-05-07 DIAGNOSIS — E11.9 CONTROLLED TYPE 2 DIABETES MELLITUS WITHOUT COMPLICATION, WITHOUT LONG-TERM CURRENT USE OF INSULIN (HCC): ICD-10-CM

## 2020-05-07 DIAGNOSIS — Z00.00 MEDICARE ANNUAL WELLNESS VISIT, SUBSEQUENT: Primary | ICD-10-CM

## 2020-05-07 DIAGNOSIS — E55.9 VITAMIN D DEFICIENCY: ICD-10-CM

## 2020-05-07 DIAGNOSIS — I10 ESSENTIAL HYPERTENSION: ICD-10-CM

## 2020-05-07 DIAGNOSIS — M05.79 RHEUMATOID ARTHRITIS INVOLVING MULTIPLE SITES WITH POSITIVE RHEUMATOID FACTOR (HCC): ICD-10-CM

## 2020-05-07 RX ORDER — PREDNISONE 2.5 MG/1
2.5 TABLET ORAL
Qty: 5 TAB | Refills: 0
Start: 2020-05-07 | End: 2020-05-12

## 2020-05-07 NOTE — PROGRESS NOTES
CC:  Chief Complaint   Patient presents with   [address] [place] [address] Annual Wellness Visit    Follow-up     Consent: Enmanuel Glynn, who was seen by synchronous (real-time) audio-video technology, and/or her healthcare decision maker, is aware that this patient-initiated, Telehealth encounter on 5/7/2020 is a billable service, with coverage as determined by her insurance carrier. She is aware that she may receive a bill and has provided verbal consent to proceed: Yes. I spent at least 15 minutes with this established patient, and >50% of the time was spent counseling and/or coordinating care regarding AWV and general follow up  [address]  This is the Subsequent Medicare Annual Wellness Exam, performed 12 months or more after the Initial AWV or the last Subsequent AWV    I have reviewed the patient's medical history in detail and updated the computerized patient record. History     69F with h/o Type II DM, HTN and RA who presents for routine follow up and AWV. She has been well. Currently, she is weaining from Prednisone and is on 2.5 mgs daily until 5/11/2020. She continues to be followed by Dr. Eli Bahena (rheumatology). No other worrisome symptoms or findings are recorded. Medications are UTD and we have reivewed her HM.      Patient Active Problem List   Diagnosis Code    Diverticulosis K57.90    Infectious gastroenteritis and colitis A09    Benign essential hypertension I10    Diabetes mellitus type II, controlled ([address] E11.9    Anemia D64.9    Anemia associated with acute blood loss D62    History of rheumatoid arthritis Z87.39    Osteopenia of both lower legs M85.861, M85.862    Type 2 diabetes with nephropathy (HCC) E11.21    Rheumatoid arthritis involving multiple sites with positive rheumatoid factor (HCC) M05.79     Past Medical History:   Diagnosis Date    Acid reflux     Colitis 04/16/2015    Colitis     Diabetes mellitus, type II ([address]     Diverticulosis of colon 04/20/2015    Hyperlipemia     Hypertension  Osteopenia     of the spine Dexa done 2014, 2017 and due 2019    RA (rheumatoid arthritis) ([facility]       Past Surgical History:   Procedure Laterality Date    ABDOMEN SURGERY PROC UNLISTED      HX COLONOSCOPY  04/20/2015    Due 2023    HX GYN      HX HYSTERECTOMY      HX TOTAL ABDOMINAL HYSTERECTOMY      IN BIOPSY OF BREAST, NEEDLE CORE  2017    benign findings - Fibrosis     Current Outpatient Medications   Medication Sig Dispense Refill    atorvastatin (LIPITOR) 10 mg tablet TAKE ONE TABLET BY MOUTH DAILY 90 Tab 0    lisinopril (PRINIVIL, ZESTRIL) 5 mg tablet TAKE ONE TABLET BY MOUTH DAILY 90 Tab 1    glipiZIDE SR (GLUCOTROL XL) 5 mg CR tablet TAKE ONE TABLET BY MOUTH DAILY 90 Tab 1    empagliflozin (JARDIANCE) 25 mg tablet TAKE ONE TABLET BY MOUTH DAILY 90 Tab 1    hydroxychloroquine (PLAQUENIL) 200 mg tablet Take 1.5 Tabs by mouth daily. 45 Tab 6    leflunomide (ARAVA) 20 mg tablet       PNV BT.07/RTUKFET fum/folic ac (PRENATAL PO) Take  by mouth.  acetaminophen (TYLENOL ARTHRITIS PAIN) 650 mg TbER Take 650 mg by mouth every eight (8) hours. Indications: provider changed      diclofenac (VOLTAREN) 1 % gel Apply 4 g to affected area four (4) times daily. PLEASE GIVE GENERIC IF AVAILABLE. 100 g 3    fluticasone (FLONASE) 50 mcg/actuation nasal spray 2 sprays per nostril once a day  Indications: Allergic Rhinitis (Patient taking differently: as needed. 2 sprays per nostril once a day  Indications: inflammation of the nose due to an allergy) 1 Bottle 3    cholecalciferol (VITAMIN D3) 1,000 unit cap Take 1,000 Units by mouth daily.  calcium carbonate (TUMS) 200 mg calcium (500 mg) chew Take 1 Tab by mouth daily. PRN      cyanocobalamin 1,000 mcg tablet Take 1,000 mcg by mouth daily.  aspirin delayed-release 81 mg tablet Take 81 mg by mouth daily.  glucose blood VI test strips (OneTouch Ultra Blue Test Strip) strip Check daily FBS. E11.9 50 Strip 5    L. ACIDOPHILUS/BIFID. ANIMALIS (DAILY PROBIOTIC PO) Take 1 Tab by mouth once over twenty-four (24) hours. No Known Allergies    Family History   Problem Relation Age of Onset    Other Mother         diverticulosis    Heart Attack Mother     Heart Failure Father     Pulmonary Embolism Sister     Diabetes Brother     Cancer Brother         brain cancer     Social History     Tobacco Use    Smoking status: Never Smoker    Smokeless tobacco: Never Used   Substance Use Topics    Alcohol use: No       Depression Risk Factor Screening:     3 most recent PHQ Screens 2/20/2020   Little interest or pleasure in doing things Not at all   Feeling down, depressed, irritable, or hopeless Not at all   Total Score PHQ 2 0       Alcohol Risk Factor Screening:   Do you average 1 drink per night or more than 7 drinks a week:  No    On any one occasion in the past three months have you have had more than 3 drinks containing alcohol:  No      Functional Ability and Level of Safety:   Hearing: Hearing is good. Activities of Daily Living: The home contains: handrails and grab bars  Patient does total self care    Ambulation: with no difficulty    Fall Risk:  Fall Risk Assessment, last 12 mths 2/20/2020   Able to walk? Yes   Fall in past 12 months?  No   Fall with injury? -   Number of falls in past 12 months -   Fall Risk Score -       Abuse Screen:  Patient is not abused    Cognitive Screening   Has your family/caregiver stated any concerns about your memory: no      Patient Care Team   Patient Care Team:  Redd Lopez MD as PCP - General (Internal Medicine)  Redd Lopez MD as PCP - REHABILITATION [facility] Empaneled Provider    Assessment/Plan   Education and counseling provided:  Are appropriate based on today's review and evaluation  End-of-Life planning (with patient's consent)  Pneumococcal Vaccine  Screening Mammography  Colorectal cancer screening tests  Cardiovascular screening blood test  Bone mass measurement (DEXA)  Screening for glaucoma  Diabetes screening test    Diagnoses and all orders for this visit:    1. Medicare annual wellness visit, subsequent   Anticipatory guidance discussed. Immunizations reviewed   HM updated. 2. Rheumatoid arthritis involving multiple sites with positive rheumatoid factor (HCC)  -     predniSONE (DELTASONE) 2.5 mg tablet; Take 1 Tab by mouth daily (with breakfast) for 5 days. Until 5/11/2020. Then off.    3. Type 2 diabetes with nephropathy ([facility]  -     METABOLIC PANEL, COMPREHENSIVE; Future  -     HEMOGLOBIN A1C WITH EAG; Future    4. Essential hypertension   Well controlled. 5. Controlled type 2 diabetes mellitus without complication, without long-term current use of insulin (HCC)  -     METABOLIC PANEL, COMPREHENSIVE; Future  -     HEMOGLOBIN A1C WITH EAG; Future    6. Mixed hyperlipidemia  -     CBC WITH AUTOMATED DIFF; Future  -     LIPID PANEL; Future    7. Vitamin D deficiency  -     VITAMIN D, 25 HYDROXY; Future    Health Maintenance Due   Topic Date Due    DTaP/Tdap/Td series (1 - Tdap) Discussed    Shingrix Vaccine Age 50> (1 of 2) Discussed    Eye Exam Retinal or Dilated  Discussed    Foot Exam Q1  Discussed       Objective:   Vital Signs: (As obtained by patient/caregiver at home)  There were no vitals taken for this visit. General: alert, cooperative, no distress   Mental  status: normal mood, behavior, speech, dress, motor activity, and thought processes, able to follow commands   HENT: NCAT   Neck: no visualized mass   Resp: no respiratory distress   Neuro: no gross deficits   Skin: no discoloration or lesions of concern on visible areas   Psychiatric: normal affect, consistent with stated mood, no evidence of hallucinations     We discussed the expected course, resolution and complications of the diagnosis(es) in detail. Medication risks, benefits, costs, interactions, and alternatives were discussed as indicated.   I advised her to contact the office if her condition worsens, changes or fails to improve as anticipated. She expressed understanding with the diagnosis(es) and plan. Anna Polk is a 77 y.o. female being evaluated by a video visit encounter for concerns as above. A caregiver was present when appropriate. Due to this being a TeleHealth encounter (During NLKRQ-05 public health emergency), evaluation of the following organ systems was limited: Vitals/Constitutional/EENT/Resp/CV/GI//MS/Neuro/Skin/Heme-Lymph-Imm. Pursuant to the emergency declaration under the [facility], [facility] waiver authority and the [employer] Act, this Virtual  Visit was conducted, with patient's (and/or legal guardian's) consent, to reduce the patient's risk of exposure to COVID-19 and provide necessary medical care. Services were provided through a video synchronous discussion virtually to substitute for in-person clinic visit. Patient and provider were located at their individual homes. Follow-up and Dispositions    · Return in about 1 year (around 5/7/2021), or if symptoms worsen or fail to improve, for Follow up 6 months for chronic issues.          John Wise MD

## 2020-05-27 ENCOUNTER — VIRTUAL VISIT (OUTPATIENT)
Dept: FAMILY MEDICINE CLINIC | Age: 70
End: 2020-05-27

## 2020-05-27 DIAGNOSIS — J32.0 LEFT MAXILLARY SINUSITIS: Primary | ICD-10-CM

## 2020-05-27 RX ORDER — DOXYCYCLINE 100 MG/1
100 CAPSULE ORAL 2 TIMES DAILY
Qty: 20 CAP | Refills: 0 | Status: SHIPPED | OUTPATIENT
Start: 2020-05-27 | End: 2020-06-06

## 2020-05-27 NOTE — PROGRESS NOTES
CC:  Chief Complaint   Patient presents with    Pressure Behind the Eyes     Shalini Weldon is a 77 y.o. female who was seen by synchronous (real-time) audio-video technology on 5/27/2020. Consent: Shalini Weldon, who was seen by synchronous (real-time) audio-video technology, and/or her healthcare decision maker, is aware that this patient-initiated, Telehealth encounter on 5/27/2020 is a billable service, with coverage as determined by her insurance carrier. She is aware that she may receive a bill and has provided verbal consent to proceed: Yes. Assessment & Plan:   Diagnoses and all orders for this visit:    1. Left maxillary sinusitis  -     doxycycline (VIBRAMYCIN) 100 mg capsule; Take 1 Cap by mouth two (2) times a day for 10 days  1. Please take the antibiotics  2. Please use the FLONASE (nasal spray) 2 squirts in each nostril just 1 x per day. 3. Please use NORMAL SALINE nasal spray (buy this over the counter) use 2-3 squirts in each nostril every 1-2 hours while awake. This will help to really clear up and move the secretions down. The patient does NOT have a fever. She does NOT have lower respiratory symptoms or SOB. She  has had no close contact with a laboratory confirmed COVID-19 patient within the last 14 days of her symptoms. She has not traveled to or from affected geographical areas with in the past 14 days of symptoms ([place], [place], [place], [facility], [place] or suspected high risk states). Discussed exposure protocols and quarantine with CDC Guidelines What to do if you are sick with coronavirus disease 2019 Patient who was given an opportunity for questions and concerns. The patient agrees to contact the [facility] exposure line [phone], local [facility] department [address]  ([phone] and PCP office for questions related to their healthcare.      I spent at least 23 minutes on this visit with this established patient. ([id number])    Subjective:   Tee Palomino Isabella Michael is a 77 y.o. female who was seen for Pressure Behind the Eyes    69F with h/o RA, T2DM and recurrent sinusitis. She presents today with slight headache and pressure on the L-side and blood nasal secretions/discolored. She has been using normal saline and Flonase, but the symptoms are not improving. She denies fever, chills. She denies cough or SOB. She also reports that she is going to have her labs done at [facility] when she feels better. In addition, she reports that she has a mammogram scheduled for June 9th at [address] [place] [address] (3-D mammogram as well). Prior to Admission medications    Medication Sig Start Date End Date Taking? Authorizing Provider   doxycycline (VIBRAMYCIN) 100 mg capsule Take 1 Cap by mouth two (2) times a day for 10 days. 5/27/20 6/6/20 Yes Jose James MD   glucose blood VI test strips (OneTouch Ultra Blue Test Strip) strip Check daily FBS. E11.9 4/30/20   Jose James MD   atorvastatin (LIPITOR) 10 mg tablet TAKE ONE TABLET BY MOUTH DAILY 3/10/20   Jose James MD   lisinopril (PRINIVIL, ZESTRIL) 5 mg tablet TAKE ONE TABLET BY MOUTH DAILY 2/20/20   Jose James MD   glipiZIDE SR (GLUCOTROL XL) 5 mg CR tablet TAKE ONE TABLET BY MOUTH DAILY 2/20/20   Jose Jmaes MD   empagliflozin (JARDIANCE) 25 mg tablet TAKE ONE TABLET BY MOUTH DAILY 1/16/20   Jose James MD   hydroxychloroquine (PLAQUENIL) 200 mg tablet Take 1.5 Tabs by mouth daily. 1/14/20   Johanna Loo MD   leflunomide (ARAVA) 20 mg tablet  5/3/19   Provider, Historical   PNV HQ.26/LGQYFRQ fum/folic ac (PRENATAL PO) Take  by mouth. Provider, Historical   acetaminophen (TYLENOL ARTHRITIS PAIN) 650 mg TbER Take 650 mg by mouth every eight (8) hours. Indications: provider changed    Provider, Historical   diclofenac (VOLTAREN) 1 % gel Apply 4 g to affected area four (4) times daily.  PLEASE GIVE GENERIC IF AVAILABLE. 9/24/18   Jose James MD   fluticasone [facility]) 50 mcg/actuation nasal spray 2 sprays per nostril once a day  Indications: Allergic Rhinitis  Patient taking differently: as needed. 2 sprays per nostril once a day  Indications: inflammation of the nose due to an allergy 0/6/11   Favian Jamison MD   cholecalciferol (VITAMIN D3) 1,000 unit cap Take 1,000 Units by mouth daily. Provider, Historical   calcium carbonate (TUMS) 200 mg calcium (500 mg) chew Take 1 Tab by mouth daily. PRN    Provider, Historical   cyanocobalamin 1,000 mcg tablet Take 1,000 mcg by mouth daily. Provider, Historical   L. ACIDOPHILUS/BIFID. ANIMALIS (DAILY PROBIOTIC PO) Take 1 Tab by mouth once over twenty-four (24) hours. Provider, Historical   aspirin delayed-release 81 mg tablet Take 81 mg by mouth daily.     Other, MD Nunu     No Known Allergies    Patient Active Problem List   Diagnosis Code    Diverticulosis K57.90    Infectious gastroenteritis and colitis A09    Benign essential hypertension I10    Diabetes mellitus type II, controlled ([address] E11.9    Anemia D64.9    Anemia associated with acute blood loss D62    History of rheumatoid arthritis Z87.39    Osteopenia of both lower legs M85.861, M85.862    Type 2 diabetes with nephropathy (HCC) E11.21    Rheumatoid arthritis involving multiple sites with positive rheumatoid factor (HCC) M05.79     Past Medical History:   Diagnosis Date    Acid reflux     Colitis 04/16/2015    Colitis     Diabetes mellitus, type II ([address]     Diverticulosis of colon 04/20/2015    Hyperlipemia     Hypertension     Osteopenia     of the spine Dexa done 2014, 2017 and due 2019    RA (rheumatoid arthritis) ([address]      Past Surgical History:   Procedure Laterality Date    ABDOMEN SURGERY PROC UNLISTED      HX COLONOSCOPY  04/20/2015    Due 2023    HX GYN      HX HYSTERECTOMY      HX TOTAL ABDOMINAL HYSTERECTOMY      [place] BIOPSY OF BREAST, NEEDLE CORE  2017    benign findings - Fibrosis     Family History   Problem Relation Age of Onset    Other Mother diverticulosis    Heart Attack Mother     Heart Failure Father     Pulmonary Embolism Sister     Diabetes Brother     Cancer Brother         brain cancer     Social History     Tobacco Use    Smoking status: Never Smoker    Smokeless tobacco: Never Used   Substance Use Topics    Alcohol use: No       Review of Systems   Constitutional: Negative for fever. HENT: Positive for congestion and sinus pain. Negative for sore throat. Respiratory: Negative for cough and shortness of breath. All other systems reviewed and are negative. Objective:   Vital Signs: (As obtained by patient/caregiver at home)  There were no vitals taken for this visit. General: alert, cooperative, no distress   Mental  status: normal mood, behavior, speech, dress, motor activity, and thought processes, able to follow commands   HENT: NCAT   Neck: no visualized mass   Resp: no respiratory distress   Neuro: no gross deficits   Skin: no discoloration or lesions of concern on visible areas   Psychiatric: normal affect, consistent with stated mood, no evidence of hallucinations       We discussed the expected course, resolution and complications of the diagnosis(es) in detail. Medication risks, benefits, costs, interactions, and alternatives were discussed as indicated. I advised her to contact the office if her condition worsens, changes or fails to improve as anticipated. She expressed understanding with the diagnosis(es) and plan. Amos Gamboa is a 77 y.o. female who was evaluated by a video visit encounter for concerns as above. Patient identification was verified prior to start of the visit. A caregiver was present when appropriate. Due to this being a TeleHealth encounter (During YXMQE-92 public health emergency), evaluation of the following organ systems was limited: Vitals/Constitutional/EENT/Resp/CV/GI//MS/Neuro/Skin/Heme-Lymph-Imm.   Pursuant to the emergency declaration under the [address] and the National Emergencies Act, [facility] waiver authority and the [employer] Act, this Virtual  Visit was conducted, with patient's (and/or legal guardian's) consent, to reduce the patient's risk of exposure to COVID-19 and provide necessary medical care. Services were provided through a video synchronous discussion virtually to substitute for in-person clinic visit. Patient and provider were located at their individual homes. Follow-up and Dispositions    · Return if symptoms worsen or fail to improve.        Vandana Mckinnon MD

## 2020-06-10 ENCOUNTER — TELEPHONE (OUTPATIENT)
Dept: FAMILY MEDICINE CLINIC | Age: 70
End: 2020-06-10

## 2020-06-10 NOTE — TELEPHONE ENCOUNTER
----- Message from Clary Fregoso sent at 6/10/2020 10:56 AM EDT -----  Regarding: /Telephone  General Message/Vendor Calls    Caller's first and last name:      Reason for call:  Pt requesting for her lab orders to be faxed to her. Callback required yes/no and why:  Yes, to let her know if the lab orders can be faxed to [phone] so that she can take them to a lab cy to have them done.     Best contact number(s):  [phone]    Details to clarify the request:      Clary Fregoso

## 2020-06-18 ENCOUNTER — HOSPITAL ENCOUNTER (OUTPATIENT)
Dept: LAB | Age: 70
Discharge: HOME | End: 2020-06-18
Payer: MEDICARE

## 2020-06-18 PROCEDURE — 85025 COMPLETE CBC W/AUTO DIFF WBC: CPT

## 2020-06-18 PROCEDURE — 80053 COMPREHEN METABOLIC PANEL: CPT

## 2020-06-18 PROCEDURE — 83036 HEMOGLOBIN GLYCOSYLATED A1C: CPT

## 2020-06-18 PROCEDURE — 82306 VITAMIN D 25 HYDROXY: CPT

## 2020-06-18 PROCEDURE — 36415 COLL VENOUS BLD VENIPUNCTURE: CPT

## 2020-06-18 PROCEDURE — 80061 LIPID PANEL: CPT

## 2020-06-19 LAB
25(OH)D3+25(OH)D2 SERPL-MCNC: 42.1 NG/ML (ref 30–100)
ALBUMIN SERPL-MCNC: 4.4 G/DL (ref 3.8–4.8)
ALBUMIN/GLOB SERPL: 1.9 {RATIO} (ref 1.2–2.2)
ALP SERPL-CCNC: 42 IU/L (ref 39–117)
ALT SERPL-CCNC: 28 IU/L (ref 0–32)
AST SERPL-CCNC: 23 IU/L (ref 0–40)
BASOPHILS # BLD AUTO: 0 X10E3/UL (ref 0–0.2)
BASOPHILS NFR BLD AUTO: 1 %
BILIRUB SERPL-MCNC: 0.5 MG/DL (ref 0–1.2)
BUN SERPL-MCNC: 27 MG/DL (ref 8–27)
BUN/CREAT SERPL: 21 (ref 12–28)
CALCIUM SERPL-MCNC: 9.4 MG/DL (ref 8.7–10.3)
CHLORIDE SERPL-SCNC: 107 MMOL/L (ref 96–106)
CHOLEST SERPL-MCNC: 147 MG/DL (ref 100–199)
CO2 SERPL-SCNC: 25 MMOL/L (ref 20–29)
CREAT SERPL-MCNC: 1.29 MG/DL (ref 0.57–1)
EOSINOPHIL # BLD AUTO: 0.1 X10E3/UL (ref 0–0.4)
EOSINOPHIL NFR BLD AUTO: 2 %
ERYTHROCYTE [DISTWIDTH] IN BLOOD BY AUTOMATED COUNT: 10.9 % (ref 11.7–15.4)
EST. AVERAGE GLUCOSE BLD GHB EST-MCNC: 117 MG/DL
GLOBULIN SER CALC-MCNC: 2.3 G/DL (ref 1.5–4.5)
GLUCOSE SERPL-MCNC: 161 MG/DL (ref 65–99)
HBA1C MFR BLD: 5.7 % (ref 4.8–5.6)
HCT VFR BLD AUTO: 38 % (ref 34–46.6)
HDLC SERPL-MCNC: 67 MG/DL
HGB BLD-MCNC: 12.4 G/DL (ref 11.1–15.9)
IMM GRANULOCYTES # BLD AUTO: 0 X10E3/UL (ref 0–0.1)
IMM GRANULOCYTES NFR BLD AUTO: 0 %
INTERPRETATION, 910389: NORMAL
INTERPRETATION: NORMAL
LDLC SERPL CALC-MCNC: 67 MG/DL (ref 0–99)
LYMPHOCYTES # BLD AUTO: 0.8 X10E3/UL (ref 0.7–3.1)
LYMPHOCYTES NFR BLD AUTO: 27 %
Lab: NORMAL
MCH RBC QN AUTO: 30.3 PG (ref 26.6–33)
MCHC RBC AUTO-ENTMCNC: 32.6 G/DL (ref 31.5–35.7)
MCV RBC AUTO: 93 FL (ref 79–97)
MONOCYTES # BLD AUTO: 0.4 X10E3/UL (ref 0.1–0.9)
MONOCYTES NFR BLD AUTO: 12 %
NEUTROPHILS # BLD AUTO: 1.7 X10E3/UL (ref 1.4–7)
NEUTROPHILS NFR BLD AUTO: 58 %
PDF IMAGE, 910387: NORMAL
PLATELET # BLD AUTO: 158 X10E3/UL (ref 150–450)
POTASSIUM SERPL-SCNC: 4.7 MMOL/L (ref 3.5–5.2)
PROT SERPL-MCNC: 6.7 G/DL (ref 6–8.5)
RBC # BLD AUTO: 4.09 X10E6/UL (ref 3.77–5.28)
SODIUM SERPL-SCNC: 145 MMOL/L (ref 134–144)
TRIGL SERPL-MCNC: 67 MG/DL (ref 0–149)
VLDLC SERPL CALC-MCNC: 13 MG/DL (ref 5–40)
WBC # BLD AUTO: 3 X10E3/UL (ref 3.4–10.8)

## 2020-09-14 DIAGNOSIS — I10 ESSENTIAL HYPERTENSION: ICD-10-CM

## 2020-09-14 RX ORDER — LISINOPRIL 5 MG/1
TABLET ORAL
Qty: 90 TAB | Refills: 0 | Status: SHIPPED | OUTPATIENT
Start: 2020-09-14 | End: 2020-11-30

## 2020-09-25 DIAGNOSIS — E11.9 CONTROLLED TYPE 2 DIABETES MELLITUS WITHOUT COMPLICATION, WITHOUT LONG-TERM CURRENT USE OF INSULIN (HCC): ICD-10-CM

## 2020-09-25 RX ORDER — GLIPIZIDE 5 MG/1
TABLET, FILM COATED, EXTENDED RELEASE ORAL
Qty: 90 TAB | Refills: 0 | Status: SHIPPED | OUTPATIENT
Start: 2020-09-25 | End: 2020-12-23

## 2020-10-01 ENCOUNTER — OFFICE VISIT (OUTPATIENT)
Dept: FAMILY MEDICINE CLINIC | Age: 70
End: 2020-10-01
Payer: MEDICARE

## 2020-10-01 VITALS
RESPIRATION RATE: 20 BRPM | TEMPERATURE: 98.5 F | OXYGEN SATURATION: 98 % | HEART RATE: 84 BPM | WEIGHT: 144.2 LBS | BODY MASS INDEX: 24.62 KG/M2 | HEIGHT: 64 IN | DIASTOLIC BLOOD PRESSURE: 76 MMHG | SYSTOLIC BLOOD PRESSURE: 118 MMHG

## 2020-10-01 DIAGNOSIS — L60.0 INGROWN TOENAIL: ICD-10-CM

## 2020-10-01 DIAGNOSIS — J34.89 SINUS DRAINAGE: Primary | ICD-10-CM

## 2020-10-01 PROCEDURE — 1090F PRES/ABSN URINE INCON ASSESS: CPT | Performed by: INTERNAL MEDICINE

## 2020-10-01 PROCEDURE — G8752 SYS BP LESS 140: HCPCS | Performed by: INTERNAL MEDICINE

## 2020-10-01 PROCEDURE — G8399 PT W/DXA RESULTS DOCUMENT: HCPCS | Performed by: INTERNAL MEDICINE

## 2020-10-01 PROCEDURE — 99213 OFFICE O/P EST LOW 20 MIN: CPT | Performed by: INTERNAL MEDICINE

## 2020-10-01 PROCEDURE — G8427 DOCREV CUR MEDS BY ELIG CLIN: HCPCS | Performed by: INTERNAL MEDICINE

## 2020-10-01 PROCEDURE — 1101F PT FALLS ASSESS-DOCD LE1/YR: CPT | Performed by: INTERNAL MEDICINE

## 2020-10-01 PROCEDURE — G8754 DIAS BP LESS 90: HCPCS | Performed by: INTERNAL MEDICINE

## 2020-10-01 PROCEDURE — G9899 SCRN MAM PERF RSLTS DOC: HCPCS | Performed by: INTERNAL MEDICINE

## 2020-10-01 PROCEDURE — 3017F COLORECTAL CA SCREEN DOC REV: CPT | Performed by: INTERNAL MEDICINE

## 2020-10-01 PROCEDURE — G8536 NO DOC ELDER MAL SCRN: HCPCS | Performed by: INTERNAL MEDICINE

## 2020-10-01 PROCEDURE — G8420 CALC BMI NORM PARAMETERS: HCPCS | Performed by: INTERNAL MEDICINE

## 2020-10-01 PROCEDURE — G8432 DEP SCR NOT DOC, RNG: HCPCS | Performed by: INTERNAL MEDICINE

## 2020-10-01 PROCEDURE — G0463 HOSPITAL OUTPT CLINIC VISIT: HCPCS | Performed by: INTERNAL MEDICINE

## 2020-10-01 RX ORDER — DOXYCYCLINE 100 MG/1
100 TABLET ORAL 2 TIMES DAILY
Qty: 20 TAB | Refills: 0 | Status: SHIPPED | OUTPATIENT
Start: 2020-10-01 | End: 2020-10-11

## 2020-10-01 RX ORDER — ASCORBIC ACID 500 MG
500 TABLET ORAL DAILY
COMMUNITY
End: 2022-04-27

## 2020-10-01 NOTE — PROGRESS NOTES
Chief Complaint   Patient presents with    Toe Pain     big toes     Sinus Infection     yellow mucus and drainage x 4 days    Immunization/Injection     flu       Subjective:   Melita Vargas is a 77 y.o. female who presents today for follow up and has c/o pain and discomfort of the L-big toe and possible mildly ingrown toe nail. She has a h/o T2DM and needs diabetic foot care. No open or draining lesions. She also is complaining  of congestion, nasal blockage, post nasal drip and sinus pressure for 5 days. She denies a history of chest pain, chills, dizziness, fatigue, fevers, nausea, shortness of breath, sweats, vomiting and weakness and denies a history of asthma. Patient does not smoke cigarettes. She had planned to get the flu shot today, but we will hold off. Patient Active Problem List    Diagnosis Date Noted    Rheumatoid arthritis involving multiple sites with positive rheumatoid factor ([email or web address] 01/29/2019    Type 2 diabetes with nephropathy ([email or web address] 04/03/2018    History of rheumatoid arthritis 11/21/2017    Osteopenia of both lower legs 11/21/2017    Diverticulosis 04/20/2015    Infectious gastroenteritis and colitis 04/20/2015    Benign essential hypertension 04/20/2015    Diabetes mellitus type II, controlled ([email or web address] 04/20/2015    Anemia 04/20/2015    Anemia associated with acute blood loss 04/20/2015         Current Outpatient Medications   Medication Sig Dispense Refill    ascorbic acid, vitamin C, (Vitamin C) 500 mg tablet Take 500 mg by mouth daily.       glipiZIDE SR (GLUCOTROL XL) 5 mg CR tablet TAKE ONE TABLET BY MOUTH DAILY 90 Tab 0    leflunomide (ARAVA) 20 mg tablet TAKE ONE TABLET BY MOUTH DAILY 90 Tab 0    lisinopriL (PRINIVIL, ZESTRIL) 5 mg tablet TAKE ONE TABLET BY MOUTH DAILY 90 Tab 0    empagliflozin (Jardiance) 25 mg tablet TAKE ONE TABLET BY MOUTH DAILY 90 Tab 0    hydrOXYchloroQUINE (PLAQUENIL) 200 mg tablet TAKE ONE AND ONE-HALF (1 & 1/2) TABLET BY MOUTH DAILY 45 Tab 3    glucose blood VI test strips (OneTouch Ultra Blue Test Strip) strip Check daily FBS. E11.9 50 Strip 5    atorvastatin (LIPITOR) 10 mg tablet TAKE ONE TABLET BY MOUTH DAILY 90 Tab 0    PNV BG.19/ROGVWRR fum/folic ac (PRENATAL PO) Take  by mouth.  acetaminophen (TYLENOL ARTHRITIS PAIN) 650 mg TbER Take 650 mg by mouth every eight (8) hours. Indications: provider changed      diclofenac (VOLTAREN) 1 % gel Apply 4 g to affected area four (4) times daily. PLEASE GIVE GENERIC IF AVAILABLE. 100 g 3    fluticasone (FLONASE) 50 mcg/actuation nasal spray 2 sprays per nostril once a day  Indications: Allergic Rhinitis (Patient taking differently: as needed. 2 sprays per nostril once a day  Indications: inflammation of the nose due to an allergy) 1 Bottle 3    cholecalciferol (VITAMIN D3) 1,000 unit cap Take 1,000 Units by mouth daily.  calcium carbonate (TUMS) 200 mg calcium (500 mg) chew Take 1 Tab by mouth daily. PRN      cyanocobalamin 1,000 mcg tablet Take 1,000 mcg by mouth daily.  L. ACIDOPHILUS/BIFID. ANIMALIS (DAILY PROBIOTIC PO) Take 1 Tab by mouth once over twenty-four (24) hours.  aspirin delayed-release 81 mg tablet Take 81 mg by mouth daily.            No Known Allergies      Past Medical History:   Diagnosis Date    Acid reflux     Colitis 04/16/2015    Colitis     Diabetes mellitus, type II ([facility]     Diverticulosis of colon 04/20/2015    Hyperlipemia     Hypertension     Osteopenia     of the spine Dexa done 2014, 2017 and due 2019    RA (rheumatoid arthritis) ([facility]          Past Surgical History:   Procedure Laterality Date    ABDOMEN SURGERY PROC UNLISTED      HX COLONOSCOPY  04/20/2015    Due 2023    HX GYN      HX HYSTERECTOMY      HX TOTAL ABDOMINAL HYSTERECTOMY      UT BIOPSY OF BREAST, NEEDLE CORE  2017    benign findings - Fibrosis         Family History   Problem Relation Age of Onset    Other Mother         diverticulosis    Heart Attack Mother     Heart Failure Father     Pulmonary Embolism Sister     Diabetes Brother     Cancer Brother         brain cancer         Social History     Tobacco Use    Smoking status: Never Smoker    Smokeless tobacco: Never Used   Substance Use Topics    Alcohol use: No          Review of Systems    Pertinent items are noted in HPI. Objective:     Vitals:    10/01/20 1057   BP: 118/76   Pulse: 84   Resp: 20   Temp: 98.5 °F (36.9 °C)   TempSrc: Oral   SpO2: 98%   Weight: 144 lb 3.2 oz (65.4 kg)   Height: 5' 4\" (1.626 m)       EXAM:  She appears well, vital signs are as noted. Ears normal.  Throat and pharynx normal.  Neck supple. No adenopathy in the neck. Nose is congested. Sinuses tenderbilaterally. The chest is clear, without wheezes or rales. Assessment/ Plan:   Diagnoses and all orders for this visit:    1. Sinus drainage  -     doxycycline (ADOXA) 100 mg tablet; Take 1 Tab by mouth two (2) times a day for 10 days  -  Please take the antibioticsas directed. 2. Please use the FLONASE (nasal spray) 2 squirts in each nostril just 1 x per day. -  Please use NORMAL SALINE nasal spray (buy this over the counter) use 2-3 squirts in each nostril every 1-2 hours while awake. This will help to really clear up and move the secretions down. The patient does NOT have a fever. She does NOT  have lower respiratory symptoms or SOB. She has had no close contact with a laboratory confirmed COVID-19 patient within the last 14 days of her symptoms. She has not traveled to or from affected geographical areas with in the past 14 days of symptoms. Discussed exposure protocols and quarantine with CDC Guidelines What to do if you are sick with coronavirus disease 2019 Patient who was given an opportunity for questions and concerns.  The patient agrees to contact the [facility] exposure line [phone], local health department [address]  [phone] and PCP office for questions related to their healthcare. 2. Ingrown toenail  -     REFERRAL TO PODIATRY    I have discussed the diagnosis with the patient and the intended treatment plan as seen in the above orders. The patient has received an after-visit summary and questions were answered concerning future plans. Asked to return should symptoms worsen or not improve with treatment. Any pending labs and studies will be relayed to patient when they become available. Pt verbalizes understanding of plan of care and denies further questions or concerns at this time. Follow-up and Dispositions    · Return in about 2 months (around 12/1/2020), or if symptoms worsen or fail to improve, for Follow up Type II DM, Fasting labs. Zayra Washburn MD    Patient Instructions          Sinusitis: Care Instructions  Your Care Instructions     Sinusitis is an infection of the lining of the sinus cavities in your head. Sinusitis often follows a cold. It causes pain and pressure in your head and face. In most cases, sinusitis gets better on its own in 1 to 2 weeks. But some mild symptoms may last for several weeks. Sometimes antibiotics are needed. Follow-up care is a key part of your treatment and safety. Be sure to make and go to all appointments, and call your doctor if you are having problems. It's also a good idea to know your test results and keep a list of the medicines you take. How can you care for yourself at home? · Take an over-the-counter pain medicine, such as acetaminophen (Tylenol), ibuprofen (Advil, Motrin), or naproxen (Aleve). Read and follow all instructions on the label. · If the doctor prescribed antibiotics, take them as directed. Do not stop taking them just because you feel better. You need to take the full course of antibiotics. · Be careful when taking over-the-counter cold or flu medicines and Tylenol at the same time. Many of these medicines have acetaminophen, which is Tylenol.  Read the labels to make sure that you are not taking more than the recommended dose. Too much acetaminophen (Tylenol) can be harmful. · Breathe warm, moist air from a steamy shower, a hot bath, or a sink filled with hot water. Avoid cold, dry air. Using a humidifier in your home may help. Follow the directions for cleaning the machine. · Use saline (saltwater) nasal washes to help keep your nasal passages open and wash out mucus and bacteria. You can buy saline nose drops at a grocery store or drugstore. Or you can make your own at home by adding 1 teaspoon of salt and 1 teaspoon of baking soda to 2 cups of distilled water. If you make your own, fill a bulb syringe with the solution, insert the tip into your nostril, and squeeze gently. Thena Number your nose. · Put a hot, wet towel or a warm gel pack on your face 3 or 4 times a day for 5 to 10 minutes each time. · Try a decongestant nasal spray like oxymetazoline (Afrin). Do not use it for more than 3 days in a row. Using it for more than 3 days can make your congestion worse. When should you call for help? Call your doctor now or seek immediate medical care if:    · You have new or worse swelling or redness in your face or around your eyes.     · You have a new or higher fever. Watch closely for changes in your health, and be sure to contact your doctor if:    · You have new or worse facial pain.     · The mucus from your nose becomes thicker (like pus) or has new blood in it.     · You are not getting better as expected. Where can you learn more? Go to http://www.gray.com/  Enter Z226 in the search box to learn more about \"Sinusitis: Care Instructions. \"  Current as of: April 15, 2020               Content Version: 12.6  © 7734-3901 [employer]. Care instructions adapted under license by [employer] (which disclaims liability or warranty for this information).  If you have questions about a medical condition or this instruction, always ask your healthcare professional. [place] [address] any warranty or liability for your use of this information.

## 2020-10-01 NOTE — PROGRESS NOTES
Identified pt with two pt identifiers(name and ). Chief Complaint   Patient presents with    Toe Pain     big toes     Sinus Infection     yellow mucus and drainage x 4 days    Immunization/Injection     flu        Health Maintenance Due   Topic    DTaP/Tdap/Td series (1 - Tdap)    Shingrix Vaccine Age 49> (1 of 2)    Eye Exam Retinal or Dilated     Foot Exam Q1     Flu Vaccine (1)       Wt Readings from Last 3 Encounters:   10/01/20 144 lb 3.2 oz (65.4 kg)   20 136 lb (61.7 kg)   20 136 lb 6.4 oz (61.9 kg)     Temp Readings from Last 3 Encounters:   10/01/20 98.5 °F (36.9 °C) (Oral)   20 98.5 °F (36.9 °C) (Oral)   20 98.3 °F (36.8 °C) (Oral)     BP Readings from Last 3 Encounters:   10/01/20 118/76   20 128/74   20 159/81     Pulse Readings from Last 3 Encounters:   10/01/20 84   20 92   20 81         Learning Assessment:  :     Learning Assessment 2020 12/3/2019 2019 4/3/2019 2018 2018   PRIMARY LEARNER Patient Patient Patient Patient Patient Patient   HIGHEST LEVEL OF EDUCATION - PRIMARY LEARNER  - - - - - 2 YEARS OF COLLEGE   BARRIERS PRIMARY LEARNER - - - - - NONE   CO-LEARNER CAREGIVER No No No No No No   PRIMARY LANGUAGE ENGLISH ENGLISH ENGLISH ENGLISH ENGLISH ENGLISH   LEARNER PREFERENCE PRIMARY DEMONSTRATION DEMONSTRATION DEMONSTRATION DEMONSTRATION DEMONSTRATION OTHER (COMMENT)   ANSWERED BY patient patient patient  patient patient self   RELATIONSHIP SELF SELF SELF SELF SELF SELF       Depression Screening:  :     3 most recent PHQ Screens 2020   Little interest or pleasure in doing things Not at all   Feeling down, depressed, irritable, or hopeless Not at all   Total Score PHQ 2 0       Fall Risk Assessment:  :     Fall Risk Assessment, last 12 mths 2020   Able to walk? Yes   Fall in past 12 months?  No   Fall with injury? -   Number of falls in past 12 months -   Fall Risk Score -       Abuse Screening:  : Abuse Screening Questionnaire 10/1/2020 5/7/2019 2/21/2018   Do you ever feel afraid of your partner? N N N   Are you in a relationship with someone who physically or mentally threatens you? N N N   Is it safe for you to go home? Y Y Y       Coordination of Care Questionnaire:  :     1) Have you been to an emergency room, urgent care clinic since your last visit? no   Hospitalized since your last visit? no             2) Have you seen or consulted any other health care providers outside of [address] [place] [address] since your last visit? no  (Include any pap smears or colon screenings in this section.)    3) Do you have an Advance Directive on file? no  Are you interested in receiving information about Advance Directives? no    Reviewed record in preparation for visit and have obtained necessary documentation.

## 2020-10-01 NOTE — PATIENT INSTRUCTIONS
Sinusitis: Care Instructions  Your Care Instructions     Sinusitis is an infection of the lining of the sinus cavities in your head. Sinusitis often follows a cold. It causes pain and pressure in your head and face. In most cases, sinusitis gets better on its own in 1 to 2 weeks. But some mild symptoms may last for several weeks. Sometimes antibiotics are needed. Follow-up care is a key part of your treatment and safety. Be sure to make and go to all appointments, and call your doctor if you are having problems. It's also a good idea to know your test results and keep a list of the medicines you take. How can you care for yourself at home? · Take an over-the-counter pain medicine, such as acetaminophen (Tylenol), ibuprofen (Advil, Motrin), or naproxen (Aleve). Read and follow all instructions on the label. · If the doctor prescribed antibiotics, take them as directed. Do not stop taking them just because you feel better. You need to take the full course of antibiotics. · Be careful when taking over-the-counter cold or flu medicines and Tylenol at the same time. Many of these medicines have acetaminophen, which is Tylenol. Read the labels to make sure that you are not taking more than the recommended dose. Too much acetaminophen (Tylenol) can be harmful. · Breathe warm, moist air from a steamy shower, a hot bath, or a sink filled with hot water. Avoid cold, dry air. Using a humidifier in your home may help. Follow the directions for cleaning the machine. · Use saline (saltwater) nasal washes to help keep your nasal passages open and wash out mucus and bacteria. You can buy saline nose drops at a grocery store or drugstore. Or you can make your own at home by adding 1 teaspoon of salt and 1 teaspoon of baking soda to 2 cups of distilled water. If you make your own, fill a bulb syringe with the solution, insert the tip into your nostril, and squeeze gently. Shawn Lamp your nose.   · Put a hot, wet towel or a warm gel pack on your face 3 or 4 times a day for 5 to 10 minutes each time. · Try a decongestant nasal spray like oxymetazoline (Afrin). Do not use it for more than 3 days in a row. Using it for more than 3 days can make your congestion worse. When should you call for help? Call your doctor now or seek immediate medical care if:    · You have new or worse swelling or redness in your face or around your eyes.     · You have a new or higher fever. Watch closely for changes in your health, and be sure to contact your doctor if:    · You have new or worse facial pain.     · The mucus from your nose becomes thicker (like pus) or has new blood in it.     · You are not getting better as expected. Where can you learn more? Go to http://www.gray.com/  Enter C251 in the search box to learn more about \"Sinusitis: Care Instructions. \"  Current as of: April 15, 2020               Content Version: 12.6  © 2006-2020 [employer]. Care instructions adapted under license by [employer] (which disclaims liability or warranty for this information). If you have questions about a medical condition or this instruction, always ask your healthcare professional. [place] [address] any warranty or liability for your use of this information.

## 2020-10-06 ENCOUNTER — TELEPHONE (OUTPATIENT)
Dept: FAMILY MEDICINE CLINIC | Age: 70
End: 2020-10-06

## 2020-10-06 DIAGNOSIS — J32.0 LEFT MAXILLARY SINUSITIS: Primary | ICD-10-CM

## 2020-10-06 RX ORDER — AMOXICILLIN AND CLAVULANATE POTASSIUM 875; 125 MG/1; MG/1
1 TABLET, FILM COATED ORAL 2 TIMES DAILY
Qty: 20 TAB | Refills: 0 | Status: SHIPPED | OUTPATIENT
Start: 2020-10-06 | End: 2020-10-16

## 2020-10-06 NOTE — TELEPHONE ENCOUNTER
Spoke to pt and relayed message. She understood    I will send Augmentin instead. However, if not feeling better on the new antibiotic, will need to be seen.

## 2020-10-06 NOTE — TELEPHONE ENCOUNTER
The pt is calling because she feels the medication is not working well for her and would like to have something else called in for her.     mobile- [phone]    Or [phone]

## 2020-10-15 DIAGNOSIS — M05.742 RHEUMATOID ARTHRITIS INVOLVING BOTH HANDS WITH POSITIVE RHEUMATOID FACTOR (HCC): ICD-10-CM

## 2020-10-15 DIAGNOSIS — M05.741 RHEUMATOID ARTHRITIS INVOLVING BOTH HANDS WITH POSITIVE RHEUMATOID FACTOR (HCC): ICD-10-CM

## 2020-10-16 ENCOUNTER — OFFICE VISIT (OUTPATIENT)
Dept: RHEUMATOLOGY | Age: 70
End: 2020-10-16
Payer: MEDICARE

## 2020-10-16 VITALS
WEIGHT: 146.4 LBS | BODY MASS INDEX: 25.13 KG/M2 | HEART RATE: 81 BPM | DIASTOLIC BLOOD PRESSURE: 79 MMHG | TEMPERATURE: 97.2 F | RESPIRATION RATE: 16 BRPM | SYSTOLIC BLOOD PRESSURE: 154 MMHG | OXYGEN SATURATION: 96 %

## 2020-10-16 DIAGNOSIS — M05.742 RHEUMATOID ARTHRITIS INVOLVING BOTH HANDS WITH POSITIVE RHEUMATOID FACTOR (HCC): ICD-10-CM

## 2020-10-16 DIAGNOSIS — M05.741 RHEUMATOID ARTHRITIS INVOLVING BOTH HANDS WITH POSITIVE RHEUMATOID FACTOR (HCC): ICD-10-CM

## 2020-10-16 DIAGNOSIS — M70.61 TROCHANTERIC BURSITIS OF RIGHT HIP: Primary | ICD-10-CM

## 2020-10-16 PROCEDURE — G8753 SYS BP > OR = 140: HCPCS | Performed by: PEDIATRICS

## 2020-10-16 PROCEDURE — 3017F COLORECTAL CA SCREEN DOC REV: CPT | Performed by: PEDIATRICS

## 2020-10-16 PROCEDURE — G0463 HOSPITAL OUTPT CLINIC VISIT: HCPCS | Performed by: PEDIATRICS

## 2020-10-16 PROCEDURE — G8754 DIAS BP LESS 90: HCPCS | Performed by: PEDIATRICS

## 2020-10-16 PROCEDURE — 1101F PT FALLS ASSESS-DOCD LE1/YR: CPT | Performed by: PEDIATRICS

## 2020-10-16 PROCEDURE — G8399 PT W/DXA RESULTS DOCUMENT: HCPCS | Performed by: PEDIATRICS

## 2020-10-16 PROCEDURE — G8419 CALC BMI OUT NRM PARAM NOF/U: HCPCS | Performed by: PEDIATRICS

## 2020-10-16 PROCEDURE — G8510 SCR DEP NEG, NO PLAN REQD: HCPCS | Performed by: PEDIATRICS

## 2020-10-16 PROCEDURE — G9899 SCRN MAM PERF RSLTS DOC: HCPCS | Performed by: PEDIATRICS

## 2020-10-16 PROCEDURE — G8536 NO DOC ELDER MAL SCRN: HCPCS | Performed by: PEDIATRICS

## 2020-10-16 PROCEDURE — G8427 DOCREV CUR MEDS BY ELIG CLIN: HCPCS | Performed by: PEDIATRICS

## 2020-10-16 PROCEDURE — 99214 OFFICE O/P EST MOD 30 MIN: CPT | Performed by: PEDIATRICS

## 2020-10-16 PROCEDURE — 1090F PRES/ABSN URINE INCON ASSESS: CPT | Performed by: PEDIATRICS

## 2020-10-16 RX ORDER — TERBINAFINE HYDROCHLORIDE 250 MG/1
TABLET ORAL
COMMUNITY
Start: 2020-10-15 | End: 2022-04-27

## 2020-10-16 NOTE — PROGRESS NOTES
Chief Complaint   Patient presents with    Joint Pain     1. Have you been to the ER, urgent care clinic since your last visit? Hospitalized since your last visit? No    2. Have you seen or consulted any other health care providers outside of the [address] [place] [address] since your last visit? Include any pap smears or colon screening.  No

## 2020-10-16 NOTE — PROGRESS NOTES
RHEUMATOLOGY PROBLEM LIST AND CHIEF COMPLAINT  1. Rheumatoid Arthritis - diagnosed 30+ years ago, treated to remission with Methotrexate (few years), positive RF, CCP, flare in 2019    Therapy History:   Prior DMARDs: Methotrexate (past response, put into remission)  Current NSAIDs: Tylenol Arthritis  Current DMARDs: Leflunomide (started 4/2019-current), Plaquenil (1/2020-current)    HISTORY OF PRESENT ILLNESS  This is a 77 y.o.  female. Today, the patient complains of no joint pain. Location: none  Timing: none at this time   Duration: 3 month  Context/Associated signs and symptoms: The patient reports doing well today with no swelling in her wrists and ankles. Her chief complaint is some difficulty writing. Notes intermittent left knee pain associated with weather changes and treats with Tylenol PRN with good effect. She continues on Leflunomide 20 mg daily and Plaquenil 300 mg daily. She states she tapered off Prednisone in May 2020. Labs reviewed included mildly lowered kidney function and blood counts.      RHEUMATOLOGY REVIEW OF SYSTEMS   Positives as per history  Negatives as follows:  Josue Rowe:  Denies unexplained persistent fevers, weight change, chronic fatigue  HEAD/EYES:   Denies eye redness, blurry vision or sudden loss of vision, dry eyes, HA, temporal artery pain  ENT:    Denies oral/nasal ulcers, recurrent sinus infections, dry mouth  RESPIRATORY:  No pleuritic pain, history of pleural effusions, hemoptysis, exertional dyspnea  CARDIOVASCULAR:  Denies chest pain, history of pericardial effusions  GASTRO:   Denies heartburn, esophageal dysmotility, abdominal pain, nausea, vomiting, diarrhea, blood in the stool  HEMATOLOGIC:  No easy bruising, purpura, swollen lymph nodes  SKIN:    Denies alopecia, ulcers, nodules, sun sensitivity, unexplained persistent rash   VASCULAR:   Denies edema, cyanosis, raynaud phenomenon  NEUROLOGIC:  Denies specific muscle weakness, paresthesias PSYCHIATRIC:  No sleep disturbance / snoring, depression, anxiety  MSK:    No morning stiffness >1 hour, SI joint pain, persistent joint swelling, persistent joint pain    PAST MEDICAL HISTORY  Reviewed with patient, significant changes in medical history - no    PHYSICAL EXAM  Blood pressure (!) 154/79, pulse 81, temperature 97.2 °F (36.2 °C), temperature source Temporal, resp. rate 16, weight 146 lb 6.4 oz (66.4 kg), SpO2 96 %. GENERAL APPEARANCE: Well-nourished, no acute distress  EYES: No scleral erythema, conjunctival injection  ENT: No oral ulcer, parotid enlargement  NECK: No adenopathy, thyroid enlargement  CARDIOVASCULAR: Heart rhythm is regular. No murmur, rub, gallop  CHEST: Normal vesicular breath sounds. No wheezes, rales, pleural friction rubs  ABDOMINAL: The abdomen is soft and nontender. Bowel sounds are normal  EXTREMITIES: There is no evidence of clubbing, cyanosis, edema  SKIN: No rash, palpable purpura, digital ulcer, abnormal thickening, normal nailfold capillaries   NEUROLOGICAL: Normal gait and station, full strength in upper and lower extremities,  normal sensation to light touch  MUSCULOSKELETAL:   Upper extremities - Right wrist dROM with mild fullness   Lower extremities - B/L bony prominence in knee. B/L Crepitus in knee. OA changes in her feet. LABS, RADIOLOGY AND PROCEDURES  Previous labs reviewed -Yes  Previous radiology reviewed -Yes  Previous procedures reviewed -Yes  Previous medical records reviewed/summarized -Yes    ASSESSMENT  1. Rheumatoid Arthritis - The patient continues to do well except for her right wrist discomfort and dROM. Patient was advised to remain on Leflunomide 20 mg daily and Plaquenil 300 mg daily. If she notices any morning stiffness of her right wrist, she can increase her Plaquenil to 400 mg daily. Labs are not needed today as they will be completed next month with her primary care. Follow up in 5-6 months.    2. OA - I suspect her knee discomfort is due to her OA. Tylenol and ROM exercises were recommended. NSAIDs are contraindicated due to kidney disease - unchanged. 3. Drug therapy monitoring for toxicity (leflunomide, plaquenil ) - CBC, BUN, Cr, AST, ALT and albumin every 3 months; eye exams every 6-12 months for retinal toxicity. 4. Trochanteric bursitis -  The patient did not complain of this today. She should continue rehabilitation exercises including piriformas stretch, iliotibial band stretch, straight leg stretch, wall squat with ball and gluteal strengthening as needed. PLAN  1. Leflunomide 20 mg daily  2. Plaquenil 300 mg daily  3. Tylenol PRN  4. Return in 5-6 months    Vy Colón MD  Adult and Pediatric Rheumatology     [place], [place], [address] [facility], Phone [phone], Fax [phone]    Visiting  of Pediatrics    Department of Pediatrics, [facility] of [address] [place] [address], [address] [place] [address], Phone [phone], Fax [phone]    There are no Patient Instructions on file for this visit. cc:  Helio Castelan MD    Written by anne Hargrove, as dictated by Angela Head.  Juwan Colón M.D.

## 2020-10-26 DIAGNOSIS — E11.9 CONTROLLED TYPE 2 DIABETES MELLITUS WITHOUT COMPLICATION, WITHOUT LONG-TERM CURRENT USE OF INSULIN (HCC): ICD-10-CM

## 2020-10-26 RX ORDER — HYDROXYCHLOROQUINE SULFATE 200 MG/1
TABLET, FILM COATED ORAL
Qty: 45 TAB | Refills: 3 | Status: SHIPPED | OUTPATIENT
Start: 2020-10-26 | End: 2022-04-27

## 2020-11-29 DIAGNOSIS — I10 ESSENTIAL HYPERTENSION: ICD-10-CM

## 2020-11-29 DIAGNOSIS — E78.00 PURE HYPERCHOLESTEROLEMIA: ICD-10-CM

## 2020-11-29 DIAGNOSIS — E11.9 CONTROLLED TYPE 2 DIABETES MELLITUS WITHOUT COMPLICATION, WITHOUT LONG-TERM CURRENT USE OF INSULIN (HCC): ICD-10-CM

## 2020-11-30 RX ORDER — ATORVASTATIN CALCIUM 10 MG/1
TABLET, FILM COATED ORAL
Qty: 90 TAB | Refills: 0 | Status: SHIPPED | OUTPATIENT
Start: 2020-11-30 | End: 2021-02-28

## 2020-11-30 RX ORDER — LISINOPRIL 5 MG/1
TABLET ORAL
Qty: 90 TAB | Refills: 0 | Status: SHIPPED | OUTPATIENT
Start: 2020-11-30 | End: 2021-02-28

## 2020-12-03 DIAGNOSIS — E11.9 CONTROLLED TYPE 2 DIABETES MELLITUS WITHOUT COMPLICATION, WITHOUT LONG-TERM CURRENT USE OF INSULIN (HCC): ICD-10-CM

## 2020-12-23 DIAGNOSIS — E11.9 CONTROLLED TYPE 2 DIABETES MELLITUS WITHOUT COMPLICATION, WITHOUT LONG-TERM CURRENT USE OF INSULIN (HCC): ICD-10-CM

## 2020-12-23 RX ORDER — GLIPIZIDE 5 MG/1
TABLET, FILM COATED, EXTENDED RELEASE ORAL
Qty: 90 TAB | Refills: 0 | Status: SHIPPED | OUTPATIENT
Start: 2020-12-23 | End: 2020-12-29

## 2020-12-28 RX ORDER — LEFLUNOMIDE 20 MG/1
TABLET ORAL
Qty: 90 TAB | Refills: 0 | Status: SHIPPED | OUTPATIENT
Start: 2020-12-28 | End: 2020-12-29

## 2020-12-29 DIAGNOSIS — E11.9 CONTROLLED TYPE 2 DIABETES MELLITUS WITHOUT COMPLICATION, WITHOUT LONG-TERM CURRENT USE OF INSULIN (HCC): ICD-10-CM

## 2020-12-29 DIAGNOSIS — M05.742 RHEUMATOID ARTHRITIS INVOLVING BOTH HANDS WITH POSITIVE RHEUMATOID FACTOR (HCC): Primary | ICD-10-CM

## 2020-12-29 DIAGNOSIS — M05.741 RHEUMATOID ARTHRITIS INVOLVING BOTH HANDS WITH POSITIVE RHEUMATOID FACTOR (HCC): Primary | ICD-10-CM

## 2020-12-29 RX ORDER — GLIPIZIDE 5 MG/1
TABLET, FILM COATED, EXTENDED RELEASE ORAL
Qty: 90 TAB | Refills: 0 | Status: SHIPPED | OUTPATIENT
Start: 2020-12-29 | End: 2021-04-01

## 2020-12-30 ENCOUNTER — TELEPHONE (OUTPATIENT)
Dept: RHEUMATOLOGY | Age: 70
End: 2020-12-30

## 2020-12-30 NOTE — TELEPHONE ENCOUNTER
----- Message from Donna White MD sent at 12/29/2020  7:17 PM EST -----  Please let patient know that she should get labs in the next month. Ordered for her to mail to her house.

## 2020-12-30 NOTE — TELEPHONE ENCOUNTER
Left voicemail informing pt per Dr Russ Hazel that pt needs to have labs collected within the next month, also informed the lab orders has been mailed to the address on file

## 2021-01-05 ENCOUNTER — HOSPITAL ENCOUNTER (OUTPATIENT)
Dept: LAB | Age: 71
Discharge: HOME | End: 2021-01-05
Payer: MEDICARE

## 2021-01-05 PROCEDURE — 80053 COMPREHEN METABOLIC PANEL: CPT

## 2021-01-05 PROCEDURE — 36415 COLL VENOUS BLD VENIPUNCTURE: CPT

## 2021-01-05 PROCEDURE — 85007 BL SMEAR W/DIFF WBC COUNT: CPT

## 2021-01-07 LAB
ALBUMIN SERPL-MCNC: 4.8 G/DL (ref 3.8–4.8)
ALBUMIN/GLOB SERPL: 1.9 {RATIO} (ref 1.2–2.2)
ALP SERPL-CCNC: 49 IU/L (ref 39–117)
ALT SERPL-CCNC: 30 IU/L (ref 0–32)
AST SERPL-CCNC: 25 IU/L (ref 0–40)
BASOPHILS # BLD MANUAL: 0 X10E3/UL (ref 0–0.2)
BASOPHILS NFR BLD MANUAL: 1 %
BILIRUB SERPL-MCNC: 0.6 MG/DL (ref 0–1.2)
BUN SERPL-MCNC: 27 MG/DL (ref 8–27)
BUN/CREAT SERPL: 22 (ref 12–28)
CALCIUM SERPL-MCNC: 9.6 MG/DL (ref 8.7–10.3)
CHLORIDE SERPL-SCNC: 108 MMOL/L (ref 96–106)
CO2 SERPL-SCNC: 25 MMOL/L (ref 20–29)
CREAT SERPL-MCNC: 1.21 MG/DL (ref 0.57–1)
DIFFERENTIAL COMMENT, 115260: ABNORMAL
EOSINOPHIL # BLD MANUAL: 0 X10E3/UL (ref 0–0.4)
EOSINOPHIL NFR BLD MANUAL: 1 %
ERYTHROCYTE [DISTWIDTH] IN BLOOD BY AUTOMATED COUNT: 11.2 % (ref 11.7–15.4)
GLOBULIN SER CALC-MCNC: 2.5 G/DL (ref 1.5–4.5)
GLUCOSE SERPL-MCNC: 93 MG/DL (ref 65–99)
HCT VFR BLD AUTO: 38.1 % (ref 34–46.6)
HGB BLD-MCNC: 12.6 G/DL (ref 11.1–15.9)
LYMPHOCYTES # BLD MANUAL: 0.9 X10E3/UL (ref 0.7–3.1)
LYMPHOCYTES NFR BLD MANUAL: 30 %
MCH RBC QN AUTO: 30.6 PG (ref 26.6–33)
MCHC RBC AUTO-ENTMCNC: 33.1 G/DL (ref 31.5–35.7)
MCV RBC AUTO: 93 FL (ref 79–97)
MONOCYTES # BLD MANUAL: 0.3 X10E3/UL (ref 0.1–0.9)
MONOCYTES NFR BLD MANUAL: 12 %
NEUTROPHILS # BLD MANUAL: 1.6 X10E3/UL (ref 1.4–7)
NEUTROPHILS NFR BLD MANUAL: 56 %
PLATELET # BLD AUTO: 166 X10E3/UL (ref 150–450)
PLATELET BLD QL SMEAR: ADEQUATE
POTASSIUM SERPL-SCNC: 3.9 MMOL/L (ref 3.5–5.2)
PROT SERPL-MCNC: 7.3 G/DL (ref 6–8.5)
RBC # BLD AUTO: 4.12 X10E6/UL (ref 3.77–5.28)
RBC MORPH BLD: ABNORMAL
SODIUM SERPL-SCNC: 144 MMOL/L (ref 134–144)
WBC # BLD AUTO: 2.9 X10E3/UL (ref 3.4–10.8)

## 2021-01-25 ENCOUNTER — TELEPHONE (OUTPATIENT)
Dept: RHEUMATOLOGY | Age: 71
End: 2021-01-25

## 2021-01-25 NOTE — TELEPHONE ENCOUNTER
----- Message from Russell Sanchez MD sent at 1/23/2021 10:36 AM EST -----  Regarding: RE: Dr. Nathaly Wong are stable  ----- Message -----  From: Reymundo Fleming LPN  Sent: 0/29/6293   4:59 PM EST  To: Russell Sanchez MD  Subject: FW: Dr. Vandana John                            ----- Message -----  From: Lenward Angelucci  Sent: 1/22/2021  10:34 AM EST  To: Izabel Willis LPN  Subject: FW: Dr. Vandana John                            ----- Message -----  From: Leatha Guillermo  Sent: 1/22/2021  10:19 AM EST  To: [facility] Front Office Pool  Subject: Dr. Vandana John                              Pt request for a call back from the practice in regards to her [facility] test results. Best contact number is [phone].

## 2021-01-25 NOTE — TELEPHONE ENCOUNTER
Spoke to pt informed pt per Dr Rena Jarvis that her labs are stable, pt verbally acknowledged understanding

## 2021-01-28 ENCOUNTER — VIRTUAL VISIT (OUTPATIENT)
Dept: FAMILY MEDICINE CLINIC | Age: 71
End: 2021-01-28
Payer: MEDICARE

## 2021-01-28 DIAGNOSIS — J32.9 RECURRENT SINUSITIS: Primary | ICD-10-CM

## 2021-01-28 DIAGNOSIS — E11.21 TYPE 2 DIABETES WITH NEPHROPATHY (HCC): ICD-10-CM

## 2021-01-28 DIAGNOSIS — M05.742 RHEUMATOID ARTHRITIS INVOLVING BOTH HANDS WITH POSITIVE RHEUMATOID FACTOR (HCC): ICD-10-CM

## 2021-01-28 DIAGNOSIS — E78.00 PURE HYPERCHOLESTEROLEMIA: ICD-10-CM

## 2021-01-28 DIAGNOSIS — M05.741 RHEUMATOID ARTHRITIS INVOLVING BOTH HANDS WITH POSITIVE RHEUMATOID FACTOR (HCC): ICD-10-CM

## 2021-01-28 DIAGNOSIS — L30.4 INTERTRIGO: ICD-10-CM

## 2021-01-28 DIAGNOSIS — E55.9 VITAMIN D DEFICIENCY: ICD-10-CM

## 2021-01-28 PROCEDURE — G9899 SCRN MAM PERF RSLTS DOC: HCPCS | Performed by: INTERNAL MEDICINE

## 2021-01-28 PROCEDURE — G8428 CUR MEDS NOT DOCUMENT: HCPCS | Performed by: INTERNAL MEDICINE

## 2021-01-28 PROCEDURE — 99213 OFFICE O/P EST LOW 20 MIN: CPT | Performed by: INTERNAL MEDICINE

## 2021-01-28 PROCEDURE — 1090F PRES/ABSN URINE INCON ASSESS: CPT | Performed by: INTERNAL MEDICINE

## 2021-01-28 PROCEDURE — 2022F DILAT RTA XM EVC RTNOPTHY: CPT | Performed by: INTERNAL MEDICINE

## 2021-01-28 PROCEDURE — G0463 HOSPITAL OUTPT CLINIC VISIT: HCPCS | Performed by: INTERNAL MEDICINE

## 2021-01-28 PROCEDURE — 3017F COLORECTAL CA SCREEN DOC REV: CPT | Performed by: INTERNAL MEDICINE

## 2021-01-28 PROCEDURE — G8432 DEP SCR NOT DOC, RNG: HCPCS | Performed by: INTERNAL MEDICINE

## 2021-01-28 PROCEDURE — G8756 NO BP MEASURE DOC: HCPCS | Performed by: INTERNAL MEDICINE

## 2021-01-28 PROCEDURE — G8399 PT W/DXA RESULTS DOCUMENT: HCPCS | Performed by: INTERNAL MEDICINE

## 2021-01-28 PROCEDURE — 1101F PT FALLS ASSESS-DOCD LE1/YR: CPT | Performed by: INTERNAL MEDICINE

## 2021-01-28 PROCEDURE — 3046F HEMOGLOBIN A1C LEVEL >9.0%: CPT | Performed by: INTERNAL MEDICINE

## 2021-01-28 RX ORDER — AMOXICILLIN AND CLAVULANATE POTASSIUM 875; 125 MG/1; MG/1
1 TABLET, FILM COATED ORAL 2 TIMES DAILY
Qty: 20 TAB | Refills: 0 | Status: SHIPPED | OUTPATIENT
Start: 2021-01-28 | End: 2021-02-07

## 2021-01-28 RX ORDER — NYSTATIN 100000 [USP'U]/G
POWDER TOPICAL 4 TIMES DAILY
Qty: 1 BOTTLE | Refills: 5 | Status: SHIPPED | OUTPATIENT
Start: 2021-01-28 | End: 2022-04-27

## 2021-01-28 NOTE — PROGRESS NOTES
Chief Complaint   Patient presents with    Sinus Infection         Brit Montejo is a 72 y.o. female who was seen by synchronous (real-time) audio-video technology on 1/28/2021 for Sinus Infection        Assessment & Plan:   Diagnoses and all orders for this visit:    1. Recurrent sinusitis  -     amoxicillin-clavulanate (AUGMENTIN) 875-125 mg per tablet; Take 1 Tab by mouth two (2) times a day for 10 days. - Consider COVID-19 testing should symptoms not improve. Recommended the following supportive care:  1. Zinc 220 mgs daily. 2. Vitamin D 2949-5731 international units daily. 3. Vitamin C 500 mgs 2 x daily. 4. Please use the FLONASE (nasal spray) 2 squirts in each nostril just 1 x per day. 5. Please use NORMAL SALINE (AYR) nasal spray (buy this over the counter) use 2-3 squirts in each nostril every 1-2 hours while awake. This will help to really clear up and move the secretions down. 2. Rheumatoid arthritis involving both hands with positive rheumatoid factor (HCC)   Assessment & Plan:  Stable, based on history, physical exam and review of pertinent labs, studies and medications; meds reconciled; continue current treatment plan, lifestyle modifications recommended, medication compliance emphasized. This is condition is also followed by a specialist - Dr. Yvette Luque    3. Type 2 diabetes with nephropathy [facility])  Assessment & Plan:  Stable, based on history, physical exam and review of pertinent labs, studies and medications; meds reconciled; continue current treatment plan, lifestyle modifications recommended, medication compliance emphasized. Orders:  -     HEMOGLOBIN A1C WITH EAG; Future    4. Vitamin D deficiency  -     VITAMIN D, 25 HYDROXY; Future    5. Pure hypercholesterolemia  -     LIPID PANEL; Future    6. Intertrigo  -     nystatin (MYCOSTATIN) powder; Apply  to affected area four (4) times daily. I spent at least 25 minutes on this visit with this established patient.     Subjective: 70F who presents with recurrent sinusitis symptoms - nasal congestion, mild mid-facial pressure. She has a h/o T2DM, RA and HLD with essential hypertension. She denies loss of taste or smell. She has not yet had the COVID-19 vaccination. However, she reports that her 5year old grandchild was exposed to COVID-19 in school. She has no symptoms. Encouraged that grandchild, who is home in isolation, wear a mask in common areas of the home. Given the patients chronic medical condition, she is at high risk for serious adverse side effects if she should contract COVID. Recommended the following supportive care:  1. Zinc 220 mgs daily. 2. Vitamin D 9242-8334 international units daily. 3. Vitamin C 500 mgs 2 x daily. Patient Active Problem List    Diagnosis Date Noted    Rheumatoid arthritis involving multiple sites with positive rheumatoid factor ([facility] 01/29/2019    Type 2 diabetes with nephropathy ([facility] 04/03/2018    History of rheumatoid arthritis 11/21/2017    Osteopenia of both lower legs 11/21/2017    Diverticulosis 04/20/2015    Infectious gastroenteritis and colitis 04/20/2015    Benign essential hypertension 04/20/2015    Diabetes mellitus type II, controlled ([facility] 04/20/2015    Anemia 04/20/2015    Anemia associated with acute blood loss 04/20/2015     Current Outpatient Medications   Medication Sig Dispense Refill    amoxicillin-clavulanate (AUGMENTIN) 875-125 mg per tablet Take 1 Tab by mouth two (2) times a day for 10 days. 20 Tab 0    nystatin (MYCOSTATIN) powder Apply  to affected area four (4) times daily.  1 Bottle 5    glipiZIDE SR (GLUCOTROL XL) 5 mg CR tablet TAKE ONE TABLET BY MOUTH DAILY 90 Tab 0    leflunomide (ARAVA) 20 mg tablet TAKE ONE TABLET BY MOUTH DAILY 90 Tab 0    empagliflozin (Jardiance) 25 mg tablet TAKE ONE TABLET BY MOUTH DAILY 30 Tab 0    atorvastatin (LIPITOR) 10 mg tablet TAKE ONE TABLET BY MOUTH DAILY 90 Tab 0    lisinopriL (PRINIVIL, ZESTRIL) 5 mg tablet TAKE ONE TABLET BY MOUTH DAILY 90 Tab 0    hydrOXYchloroQUINE (PLAQUENIL) 200 mg tablet TAKE ONE AND ONE-HALF (1 & 1/2) TABLET BY MOUTH DAILY 45 Tab 3    ascorbic acid, vitamin C, (Vitamin C) 500 mg tablet Take 500 mg by mouth daily.  glucose blood VI test strips (OneTouch Ultra Blue Test Strip) strip Check daily FBS. E11.9 50 Strip 5    PNV RW.94/STXUTAF fum/folic ac (PRENATAL PO) Take  by mouth.  acetaminophen (TYLENOL ARTHRITIS PAIN) 650 mg TbER Take 650 mg by mouth every eight (8) hours. Indications: provider changed      diclofenac (VOLTAREN) 1 % gel Apply 4 g to affected area four (4) times daily. PLEASE GIVE GENERIC IF AVAILABLE. 100 g 3    cholecalciferol (VITAMIN D3) 1,000 unit cap Take 1,000 Units by mouth daily.  calcium carbonate (TUMS) 200 mg calcium (500 mg) chew Take 1 Tab by mouth daily. PRN      cyanocobalamin 1,000 mcg tablet Take 1,000 mcg by mouth daily.  L. ACIDOPHILUS/BIFID. ANIMALIS (DAILY PROBIOTIC PO) Take 1 Tab by mouth once over twenty-four (24) hours.  aspirin delayed-release 81 mg tablet Take 81 mg by mouth daily.  terbinafine HCL (LAMISIL) 250 mg tablet       fluticasone (FLONASE) 50 mcg/actuation nasal spray 2 sprays per nostril once a day  Indications: Allergic Rhinitis (Patient taking differently: as needed.  2 sprays per nostril once a day  Indications: inflammation of the nose due to an allergy) 1 Bottle 3     No Known Allergies  Past Medical History:   Diagnosis Date    Acid reflux     Colitis 04/16/2015    Colitis     Diabetes mellitus, type II ([address]     Diverticulosis of colon 04/20/2015    Hyperlipemia     Hypertension     Osteopenia     of the spine Dexa done 2014, 2017 and due 2019    RA (rheumatoid arthritis) ([address]        Past Surgical History:   Procedure Laterality Date    ABDOMEN SURGERY PROC UNLISTED      HX COLONOSCOPY  04/20/2015    Due 2023    HX GYN      HX HYSTERECTOMY      HX TOTAL ABDOMINAL HYSTERECTOMY  [place] BIOPSY OF BREAST, NEEDLE CORE  2017    benign findings - Fibrosis       Family History   Problem Relation Age of Onset    Other Mother         diverticulosis    Heart Attack Mother     Heart Failure Father     Pulmonary Embolism Sister     Diabetes Brother     Cancer Brother         brain cancer       Social History     Tobacco Use    Smoking status: Never Smoker    Smokeless tobacco: Never Used   Substance Use Topics    Alcohol use: No       Review of Systems   Constitutional: Negative. HENT: Positive for congestion and sinus pain. Eyes: Negative. Respiratory: Positive for cough. Cardiovascular: Negative. Gastrointestinal: Negative. Genitourinary: Negative. Musculoskeletal: Negative. Skin: Negative. Neurological: Negative. Endo/Heme/Allergies: Negative. Psychiatric/Behavioral: Negative. All other systems reviewed and are negative. Objective: There were no vitals taken for this visit. General: alert, cooperative, no distress   Mental  status: normal mood, behavior, speech, dress, motor activity, and thought processes, able to follow commands   HENT: NCAT   Neck: no visualized mass   Resp: no respiratory distress   Neuro: no gross deficits   Skin: no discoloration or lesions of concern on visible areas   Psychiatric: normal affect, consistent with stated mood, no evidence of hallucinations       We discussed the expected course, resolution and complications of the diagnosis(es) in detail. Medication risks, benefits, costs, interactions, and alternatives were discussed as indicated. I advised her to contact the office if her condition worsens, changes or fails to improve as anticipated. She expressed understanding with the diagnosis(es) and plan.      Rashid Callejas, who was evaluated through a patient-initiated, synchronous (real-time) audio-video encounter, and/or her healthcare decision maker, is aware that it is a billable service, with coverage as determined by her insurance carrier. She provided verbal consent to proceed: Yes, and patient identification was verified. It was conducted pursuant to the emergency declaration under the [address] [place] [address] and the [employer]. A caregiver was present when appropriate. Ability to conduct physical exam was limited. I was in the office. The patient was at home. Follow-up and Dispositions    · Return if symptoms worsen or fail to improve.          Leah Apgar, MD

## 2021-01-28 NOTE — ASSESSMENT & PLAN NOTE
Stable, based on history, physical exam and review of pertinent labs, studies and medications; meds reconciled; continue current treatment plan, lifestyle modifications recommended, medication compliance emphasized.

## 2021-01-28 NOTE — ASSESSMENT & PLAN NOTE
Stable, based on history, physical exam and review of pertinent labs, studies and medications; meds reconciled; continue current treatment plan, medication compliance emphasized. , This condition is managed by Specialist.

## 2021-02-01 DIAGNOSIS — E11.9 CONTROLLED TYPE 2 DIABETES MELLITUS WITHOUT COMPLICATION, WITHOUT LONG-TERM CURRENT USE OF INSULIN (HCC): ICD-10-CM

## 2021-02-04 ENCOUNTER — TELEPHONE (OUTPATIENT)
Dept: FAMILY MEDICINE CLINIC | Age: 71
End: 2021-02-04

## 2021-02-04 NOTE — TELEPHONE ENCOUNTER
Spoke to pt and let her know that Dr Axel Li is out of the office till Monday. I let her know that we do not have samples or coupons in the office. She also stated that she doesn't qualify for manufacture help as she is on medicare. I let her know I would send this message to Dr Axel Li when she is back at the office to see if there is an alternative medication. She said it was over $400 for 30 pills.

## 2021-02-04 NOTE — TELEPHONE ENCOUNTER
----- Message from Yusra Collins sent at 2/4/2021 12:58 PM EST -----  Regarding: Medication Cost Concerns  Contact: [phone]  General Message/Vendor Calls    Caller's first and last name: NA      Reason for call: Requesting more affordable medication for Jardiance or for assistance such as samples,coupons or promotions for insurance to cover medication cost.      Callback required yes/no and why: Yes, confirmation of options for assistance with medication cost.      Best contact number(s):[phone]      Details to clarify the request: Patient advising upon refilling medication Jardiance at pharmacy, was unable to refill 30 day supply due to cost being too much with insurance plan. Patient advising was only able to get a 10 day supply due to cost and is seeking either assistance or a possible alternative medication that may be more affordable with insurance for herself.  Please call to advise of options to assist.      Yusra Collins

## 2021-02-09 ENCOUNTER — LAB ONLY (OUTPATIENT)
Dept: FAMILY MEDICINE CLINIC | Age: 71
End: 2021-02-09

## 2021-02-09 DIAGNOSIS — E55.9 VITAMIN D DEFICIENCY: ICD-10-CM

## 2021-02-09 DIAGNOSIS — E78.00 PURE HYPERCHOLESTEROLEMIA: ICD-10-CM

## 2021-02-09 DIAGNOSIS — E11.21 TYPE 2 DIABETES WITH NEPHROPATHY (HCC): ICD-10-CM

## 2021-02-09 LAB
25(OH)D3 SERPL-MCNC: 45.3 NG/ML (ref 30–100)
CHOLEST SERPL-MCNC: 130 MG/DL
EST. AVERAGE GLUCOSE BLD GHB EST-MCNC: 103 MG/DL
HBA1C MFR BLD: 5.2 % (ref 4–5.6)
HDLC SERPL-MCNC: 73 MG/DL
HDLC SERPL: 1.8 {RATIO} (ref 0–5)
LDLC SERPL CALC-MCNC: 43.2 MG/DL (ref 0–100)
LIPID PROFILE,FLP: NORMAL
TRIGL SERPL-MCNC: 69 MG/DL (ref ?–150)
VLDLC SERPL CALC-MCNC: 13.8 MG/DL

## 2021-02-24 ENCOUNTER — TELEPHONE (OUTPATIENT)
Dept: FAMILY MEDICINE CLINIC | Age: 71
End: 2021-02-24

## 2021-02-24 NOTE — TELEPHONE ENCOUNTER
----- Message from Alisson Saucedo sent at 2/24/2021  1:13 PM EST -----  Regarding: Dr. Bebeto Ruggiero: [phone]  General Message/Vendor Calls    Caller's first and last name:pt      Reason for call: Lab Results      Callback required yes/no and why:yes      Best contact number(s):[phone]      Details to clarify the request:n/a      Alisson Saucedo

## 2021-02-28 DIAGNOSIS — E78.00 PURE HYPERCHOLESTEROLEMIA: ICD-10-CM

## 2021-02-28 DIAGNOSIS — I10 ESSENTIAL HYPERTENSION: ICD-10-CM

## 2021-02-28 RX ORDER — ATORVASTATIN CALCIUM 10 MG/1
TABLET, FILM COATED ORAL
Qty: 90 TAB | Refills: 0 | Status: SHIPPED | OUTPATIENT
Start: 2021-02-28 | End: 2021-05-25

## 2021-02-28 RX ORDER — LISINOPRIL 5 MG/1
TABLET ORAL
Qty: 90 TAB | Refills: 0 | Status: SHIPPED | OUTPATIENT
Start: 2021-02-28 | End: 2021-05-25

## 2021-03-31 DIAGNOSIS — E11.9 CONTROLLED TYPE 2 DIABETES MELLITUS WITHOUT COMPLICATION, WITHOUT LONG-TERM CURRENT USE OF INSULIN (HCC): ICD-10-CM

## 2021-04-01 RX ORDER — GLIPIZIDE 5 MG/1
TABLET, FILM COATED, EXTENDED RELEASE ORAL
Qty: 90 TAB | Refills: 0 | Status: SHIPPED | OUTPATIENT
Start: 2021-04-01 | End: 2021-06-25

## 2021-04-02 ENCOUNTER — TELEPHONE (OUTPATIENT)
Dept: FAMILY MEDICINE CLINIC | Age: 71
End: 2021-04-02

## 2021-04-02 ENCOUNTER — TRANSCRIBE ORDER (OUTPATIENT)
Dept: NEUROLOGY | Age: 71
End: 2021-04-02

## 2021-05-14 ENCOUNTER — TELEPHONE (OUTPATIENT)
Dept: FAMILY MEDICINE CLINIC | Age: 71
End: 2021-05-14

## 2021-05-14 NOTE — TELEPHONE ENCOUNTER
----- Message from Abdirahman Soto sent at 5/14/2021  3:41 PM EDT -----  Regarding: Dr. Cardoso Fly: [phone]  General Message/Vendor Calls    Caller's first and last name: Pt.       Reason for call: Pt went to [facility] ER for sciatic nerve pain, pain in hip and left leg, Wants to know if she needs to follow up with pcp or with Dr. Hui Muñoz. Callback required yes/no and why: Yes, confirm who to follow up with. Best contact number(s): [phone]      Details to clarify the request: N/a.       Abdirahman Soto

## 2021-05-17 ENCOUNTER — APPOINTMENT (OUTPATIENT)
Dept: MRI IMAGING | Age: 71
DRG: 454 | End: 2021-05-17
Attending: INTERNAL MEDICINE
Payer: MEDICARE

## 2021-05-17 ENCOUNTER — HOSPITAL ENCOUNTER (INPATIENT)
Age: 71
LOS: 7 days | Discharge: HOME HEALTH | DRG: 454 | End: 2021-05-24
Attending: EMERGENCY MEDICINE | Admitting: INTERNAL MEDICINE
Payer: MEDICARE

## 2021-05-17 ENCOUNTER — APPOINTMENT (OUTPATIENT)
Dept: CT IMAGING | Age: 71
DRG: 454 | End: 2021-05-17
Attending: EMERGENCY MEDICINE
Payer: MEDICARE

## 2021-05-17 DIAGNOSIS — M51.36 BULGING OF LUMBAR INTERVERTEBRAL DISC: Primary | ICD-10-CM

## 2021-05-17 DIAGNOSIS — G54.8 SACRAL NERVE ROOT COMPRESSION: ICD-10-CM

## 2021-05-17 DIAGNOSIS — M54.16 COMPRESSION OF LUMBAR NERVE ROOT: ICD-10-CM

## 2021-05-17 LAB
ALBUMIN SERPL-MCNC: 4.2 G/DL (ref 3.5–5)
ALBUMIN/GLOB SERPL: 1 {RATIO} (ref 1.1–2.2)
ALP SERPL-CCNC: 49 U/L (ref 45–117)
ALT SERPL-CCNC: 32 U/L (ref 12–78)
ANION GAP SERPL CALC-SCNC: 4 MMOL/L (ref 5–15)
APPEARANCE UR: CLEAR
AST SERPL-CCNC: ABNORMAL U/L (ref 15–37)
BACTERIA URNS QL MICRO: ABNORMAL /HPF
BASOPHILS # BLD: 0.1 K/UL (ref 0–0.1)
BASOPHILS NFR BLD: 1 % (ref 0–1)
BILIRUB SERPL-MCNC: 0.7 MG/DL (ref 0.2–1)
BILIRUB UR QL: NEGATIVE
BUN SERPL-MCNC: 26 MG/DL (ref 6–20)
BUN/CREAT SERPL: 23 (ref 12–20)
CALCIUM SERPL-MCNC: 9.7 MG/DL (ref 8.5–10.1)
CHLORIDE SERPL-SCNC: 105 MMOL/L (ref 97–108)
CO2 SERPL-SCNC: 29 MMOL/L (ref 21–32)
COLOR UR: ABNORMAL
CREAT SERPL-MCNC: 1.15 MG/DL (ref 0.55–1.02)
DIFFERENTIAL METHOD BLD: ABNORMAL
EOSINOPHIL # BLD: 0 K/UL (ref 0–0.4)
EOSINOPHIL NFR BLD: 0 % (ref 0–7)
EPITH CASTS URNS QL MICRO: ABNORMAL /LPF
ERYTHROCYTE [DISTWIDTH] IN BLOOD BY AUTOMATED COUNT: 12.1 % (ref 11.5–14.5)
GLOBULIN SER CALC-MCNC: 4.4 G/DL (ref 2–4)
GLUCOSE SERPL-MCNC: 142 MG/DL (ref 65–100)
GLUCOSE UR STRIP.AUTO-MCNC: >1000 MG/DL
HCT VFR BLD AUTO: 41.6 % (ref 35–47)
HGB BLD-MCNC: 13.9 G/DL (ref 11.5–16)
HGB UR QL STRIP: NEGATIVE
IMM GRANULOCYTES # BLD AUTO: 0 K/UL (ref 0–0.04)
IMM GRANULOCYTES NFR BLD AUTO: 1 % (ref 0–0.5)
KETONES UR QL STRIP.AUTO: NEGATIVE MG/DL
LEUKOCYTE ESTERASE UR QL STRIP.AUTO: NEGATIVE
LYMPHOCYTES # BLD: 1.1 K/UL (ref 0.8–3.5)
LYMPHOCYTES NFR BLD: 18 % (ref 12–49)
MCH RBC QN AUTO: 30.6 PG (ref 26–34)
MCHC RBC AUTO-ENTMCNC: 33.4 G/DL (ref 30–36.5)
MCV RBC AUTO: 91.6 FL (ref 80–99)
MONOCYTES # BLD: 0.6 K/UL (ref 0–1)
MONOCYTES NFR BLD: 9 % (ref 5–13)
NEUTS SEG # BLD: 4.7 K/UL (ref 1.8–8)
NEUTS SEG NFR BLD: 71 % (ref 32–75)
NITRITE UR QL STRIP.AUTO: NEGATIVE
NRBC # BLD: 0 K/UL (ref 0–0.01)
NRBC BLD-RTO: 0 PER 100 WBC
PH UR STRIP: 5.5 [PH] (ref 5–8)
PLATELET # BLD AUTO: 238 K/UL (ref 150–400)
PMV BLD AUTO: 11 FL (ref 8.9–12.9)
POTASSIUM SERPL-SCNC: ABNORMAL MMOL/L (ref 3.5–5.1)
PROT SERPL-MCNC: 8.6 G/DL (ref 6.4–8.2)
PROT UR STRIP-MCNC: NEGATIVE MG/DL
RBC # BLD AUTO: 4.54 M/UL (ref 3.8–5.2)
RBC #/AREA URNS HPF: ABNORMAL /HPF (ref 0–5)
SODIUM SERPL-SCNC: 138 MMOL/L (ref 136–145)
SP GR UR REFRACTOMETRY: 1.02 (ref 1–1.03)
UROBILINOGEN UR QL STRIP.AUTO: 0.2 EU/DL (ref 0.2–1)
WBC # BLD AUTO: 6.5 K/UL (ref 3.6–11)
WBC URNS QL MICRO: ABNORMAL /HPF (ref 0–4)

## 2021-05-17 PROCEDURE — 65270000029 HC RM PRIVATE

## 2021-05-17 PROCEDURE — 72131 CT LUMBAR SPINE W/O DYE: CPT

## 2021-05-17 PROCEDURE — 99284 EMERGENCY DEPT VISIT MOD MDM: CPT

## 2021-05-17 PROCEDURE — 36415 COLL VENOUS BLD VENIPUNCTURE: CPT

## 2021-05-17 PROCEDURE — 94760 N-INVAS EAR/PLS OXIMETRY 1: CPT

## 2021-05-17 PROCEDURE — 74011250636 HC RX REV CODE- 250/636: Performed by: EMERGENCY MEDICINE

## 2021-05-17 PROCEDURE — 81001 URINALYSIS AUTO W/SCOPE: CPT

## 2021-05-17 PROCEDURE — 74011250637 HC RX REV CODE- 250/637: Performed by: EMERGENCY MEDICINE

## 2021-05-17 PROCEDURE — 80053 COMPREHEN METABOLIC PANEL: CPT

## 2021-05-17 PROCEDURE — 72148 MRI LUMBAR SPINE W/O DYE: CPT

## 2021-05-17 PROCEDURE — 85025 COMPLETE CBC W/AUTO DIFF WBC: CPT

## 2021-05-17 PROCEDURE — 96374 THER/PROPH/DIAG INJ IV PUSH: CPT

## 2021-05-17 PROCEDURE — 96375 TX/PRO/DX INJ NEW DRUG ADDON: CPT

## 2021-05-17 RX ORDER — MORPHINE SULFATE 2 MG/ML
2 INJECTION, SOLUTION INTRAMUSCULAR; INTRAVENOUS
Status: DISCONTINUED | OUTPATIENT
Start: 2021-05-17 | End: 2021-05-18

## 2021-05-17 RX ORDER — ASCORBIC ACID 500 MG
500 TABLET ORAL DAILY
Status: DISCONTINUED | OUTPATIENT
Start: 2021-05-18 | End: 2021-05-24 | Stop reason: HOSPADM

## 2021-05-17 RX ORDER — ATORVASTATIN CALCIUM 10 MG/1
10 TABLET, FILM COATED ORAL
Status: DISCONTINUED | OUTPATIENT
Start: 2021-05-17 | End: 2021-05-24 | Stop reason: HOSPADM

## 2021-05-17 RX ORDER — HYDROXYCHLOROQUINE SULFATE 200 MG/1
300 TABLET, FILM COATED ORAL
Status: DISCONTINUED | OUTPATIENT
Start: 2021-05-18 | End: 2021-05-24 | Stop reason: HOSPADM

## 2021-05-17 RX ORDER — ACETAMINOPHEN 325 MG/1
650 TABLET ORAL
Status: DISCONTINUED | OUTPATIENT
Start: 2021-05-17 | End: 2021-05-21

## 2021-05-17 RX ORDER — POLYETHYLENE GLYCOL 3350 17 G/17G
17 POWDER, FOR SOLUTION ORAL DAILY PRN
Status: DISCONTINUED | OUTPATIENT
Start: 2021-05-17 | End: 2021-05-21

## 2021-05-17 RX ORDER — LEFLUNOMIDE 10 MG/1
20 TABLET ORAL DAILY
Status: DISCONTINUED | OUTPATIENT
Start: 2021-05-18 | End: 2021-05-24 | Stop reason: HOSPADM

## 2021-05-17 RX ORDER — PROMETHAZINE HYDROCHLORIDE 25 MG/1
12.5 TABLET ORAL
Status: DISCONTINUED | OUTPATIENT
Start: 2021-05-17 | End: 2021-05-24 | Stop reason: HOSPADM

## 2021-05-17 RX ORDER — KETOROLAC TROMETHAMINE 30 MG/ML
15 INJECTION, SOLUTION INTRAMUSCULAR; INTRAVENOUS
Status: COMPLETED | OUTPATIENT
Start: 2021-05-17 | End: 2021-05-17

## 2021-05-17 RX ORDER — CALCIUM CARBONATE 200(500)MG
200 TABLET,CHEWABLE ORAL DAILY
Status: DISCONTINUED | OUTPATIENT
Start: 2021-05-18 | End: 2021-05-24 | Stop reason: HOSPADM

## 2021-05-17 RX ORDER — MAGNESIUM SULFATE 100 %
4 CRYSTALS MISCELLANEOUS AS NEEDED
Status: DISCONTINUED | OUTPATIENT
Start: 2021-05-17 | End: 2021-05-24 | Stop reason: HOSPADM

## 2021-05-17 RX ORDER — GABAPENTIN 300 MG/1
300 CAPSULE ORAL 3 TIMES DAILY
Status: DISCONTINUED | OUTPATIENT
Start: 2021-05-17 | End: 2021-05-24 | Stop reason: HOSPADM

## 2021-05-17 RX ORDER — ACETAMINOPHEN 650 MG/1
650 SUPPOSITORY RECTAL
Status: DISCONTINUED | OUTPATIENT
Start: 2021-05-17 | End: 2021-05-24 | Stop reason: HOSPADM

## 2021-05-17 RX ORDER — LANOLIN ALCOHOL/MO/W.PET/CERES
1000 CREAM (GRAM) TOPICAL DAILY
Status: DISCONTINUED | OUTPATIENT
Start: 2021-05-18 | End: 2021-05-24 | Stop reason: HOSPADM

## 2021-05-17 RX ORDER — DEXTROSE 50 % IN WATER (D50W) INTRAVENOUS SYRINGE
12.5-25 AS NEEDED
Status: DISCONTINUED | OUTPATIENT
Start: 2021-05-17 | End: 2021-05-24 | Stop reason: HOSPADM

## 2021-05-17 RX ORDER — INSULIN LISPRO 100 [IU]/ML
INJECTION, SOLUTION INTRAVENOUS; SUBCUTANEOUS
Status: DISCONTINUED | OUTPATIENT
Start: 2021-05-18 | End: 2021-05-24 | Stop reason: HOSPADM

## 2021-05-17 RX ORDER — SODIUM CHLORIDE 0.9 % (FLUSH) 0.9 %
5-40 SYRINGE (ML) INJECTION AS NEEDED
Status: DISCONTINUED | OUTPATIENT
Start: 2021-05-17 | End: 2021-05-24 | Stop reason: HOSPADM

## 2021-05-17 RX ORDER — ONDANSETRON 2 MG/ML
4 INJECTION INTRAMUSCULAR; INTRAVENOUS
Status: DISCONTINUED | OUTPATIENT
Start: 2021-05-17 | End: 2021-05-24 | Stop reason: HOSPADM

## 2021-05-17 RX ORDER — FENTANYL CITRATE 50 UG/ML
50 INJECTION, SOLUTION INTRAMUSCULAR; INTRAVENOUS
Status: COMPLETED | OUTPATIENT
Start: 2021-05-17 | End: 2021-05-17

## 2021-05-17 RX ORDER — SODIUM CHLORIDE, SODIUM LACTATE, POTASSIUM CHLORIDE, CALCIUM CHLORIDE 600; 310; 30; 20 MG/100ML; MG/100ML; MG/100ML; MG/100ML
100 INJECTION, SOLUTION INTRAVENOUS CONTINUOUS
Status: DISCONTINUED | OUTPATIENT
Start: 2021-05-17 | End: 2021-05-19

## 2021-05-17 RX ORDER — OXYCODONE AND ACETAMINOPHEN 5; 325 MG/1; MG/1
1 TABLET ORAL
Status: DISCONTINUED | OUTPATIENT
Start: 2021-05-17 | End: 2021-05-21

## 2021-05-17 RX ORDER — ASPIRIN 81 MG/1
81 TABLET ORAL DAILY
Status: DISCONTINUED | OUTPATIENT
Start: 2021-05-18 | End: 2021-05-18

## 2021-05-17 RX ORDER — LISINOPRIL 5 MG/1
5 TABLET ORAL DAILY
Status: DISCONTINUED | OUTPATIENT
Start: 2021-05-18 | End: 2021-05-24 | Stop reason: HOSPADM

## 2021-05-17 RX ORDER — DEXAMETHASONE SODIUM PHOSPHATE 10 MG/ML
10 INJECTION INTRAMUSCULAR; INTRAVENOUS ONCE
Status: COMPLETED | OUTPATIENT
Start: 2021-05-17 | End: 2021-05-17

## 2021-05-17 RX ORDER — HEPARIN SODIUM 5000 [USP'U]/ML
5000 INJECTION, SOLUTION INTRAVENOUS; SUBCUTANEOUS EVERY 8 HOURS
Status: DISCONTINUED | OUTPATIENT
Start: 2021-05-17 | End: 2021-05-24 | Stop reason: HOSPADM

## 2021-05-17 RX ORDER — MORPHINE SULFATE 2 MG/ML
2 INJECTION, SOLUTION INTRAMUSCULAR; INTRAVENOUS
Status: DISCONTINUED | OUTPATIENT
Start: 2021-05-17 | End: 2021-05-17

## 2021-05-17 RX ORDER — SODIUM CHLORIDE 0.9 % (FLUSH) 0.9 %
5-40 SYRINGE (ML) INJECTION EVERY 8 HOURS
Status: DISCONTINUED | OUTPATIENT
Start: 2021-05-17 | End: 2021-05-24 | Stop reason: HOSPADM

## 2021-05-17 RX ADMIN — GABAPENTIN 300 MG: 300 CAPSULE ORAL at 20:27

## 2021-05-17 RX ADMIN — KETOROLAC TROMETHAMINE 15 MG: 30 INJECTION, SOLUTION INTRAMUSCULAR at 19:31

## 2021-05-17 RX ADMIN — SODIUM CHLORIDE 1000 ML: 9 INJECTION, SOLUTION INTRAVENOUS at 19:31

## 2021-05-17 RX ADMIN — DEXAMETHASONE SODIUM PHOSPHATE 10 MG: 10 INJECTION, SOLUTION INTRAMUSCULAR; INTRAVENOUS at 19:32

## 2021-05-17 RX ADMIN — FENTANYL CITRATE 50 MCG: 50 INJECTION, SOLUTION INTRAMUSCULAR; INTRAVENOUS at 19:31

## 2021-05-17 NOTE — ED PROVIDER NOTES
Please note that this dictation was completed with [employer], the computer voice recognition software.  Quite often unanticipated grammatical, syntax, homophones, and other interpretive errors are inadvertently transcribed by the computer software.  Please disregard these errors.  Please excuse any errors that have escaped final proofreading. 52-year-old male past medical history markable acid reflux, colitis, diabetes type 2, diverticulosis, hyperlipidemia, hypertension, osteopenia, and rheumatoid arthritis presents the ER complaining of \" developed left low back pain shooting down to my left leg since last Thursday. There is no trauma . Went to [facility] was evaluated given a prescription for a steroid Dosepak and Lortab. They have not been helping. I feel like my back is getting worse not better. \"  Patient states today she also tried a Tylenol arthritis formula at noon, has not had a Lortab since last night. She has been tolerating p.o. normally has not had any loss of bladder or bowel issues. She said the pain is gradually worsened over the previous 1 to 2 days not controlled with home medicines. She also said she had a prescription of some Flexeril left which also did not help.  Pt has has normal bladder/ bowel habits;     pt denies trauma,  HA, vison changes, diff swallowing, CP, SOB, Abd pain, F/Ch, N/V, D/Cons or other current systemic complaints    Social/ PSH reviewed in EMR    EMR Chart Reviewed           Past Medical History:   Diagnosis Date    Acid reflux     Colitis 04/16/2015    Colitis     Diabetes mellitus, type II ([address]     Diverticulosis of colon 04/20/2015    Hyperlipemia     Hypertension     Osteopenia     of the spine Dexa done 2014, 2017 and due 2019    RA (rheumatoid arthritis) ([address]        Past Surgical History:   Procedure Laterality Date    HX COLONOSCOPY  04/20/2015    Due 2023    HX GYN      HX HYSTERECTOMY      HX TOTAL ABDOMINAL HYSTERECTOMY      [place] ABDOMEN SURGERY PROC UNLISTED      [place] BIOPSY OF BREAST, NEEDLE CORE  2017    benign findings - Fibrosis         Family History:   Problem Relation Age of Onset    Other Mother         diverticulosis    Heart Attack Mother     Heart Failure Father     Pulmonary Embolism Sister     Diabetes Brother     Cancer Brother         brain cancer       Social History     Socioeconomic History    Marital status:      Spouse name: Not on file    Number of children: Not on file    Years of education: Not on file    Highest education level: Not on file   Occupational History    Not on file   Social Needs    Financial resource strain: Not on file    Food insecurity     Worry: Not on file     Inability: Not on file    Transportation needs     Medical: Not on file     Non-medical: Not on file   Tobacco Use    Smoking status: Never Smoker    Smokeless tobacco: Never Used   Substance and Sexual Activity    Alcohol use: No    Drug use: No    Sexual activity: Yes     Partners: Male   Lifestyle    Physical activity     Days per week: Not on file     Minutes per session: Not on file    Stress: Not on file   Relationships    Social connections     Talks on phone: Not on file     Gets together: Not on file     Attends [employer] service: Not on file     Active member of club or organization: Not on file     Attends meetings of clubs or organizations: Not on file     Relationship status: Not on file    Intimate partner violence     Fear of current or ex partner: Not on file     Emotionally abused: Not on file     Physically abused: Not on file     Forced sexual activity: Not on file   Other Topics Concern    Not on file   Social History Narrative    Not on file         ALLERGIES: Patient has no known allergies. Review of Systems   Constitutional: Negative for chills and fever. HENT: Negative for drooling, trouble swallowing and voice change. Eyes: Negative for visual disturbance.    Cardiovascular: Negative for chest pain, palpitations and leg swelling. Gastrointestinal: Negative for abdominal pain, diarrhea, nausea and vomiting. Genitourinary: Negative for decreased urine volume, difficulty urinating, dysuria, flank pain and frequency. Musculoskeletal: Positive for back pain. Skin: Negative for rash. Neurological: Negative for facial asymmetry, speech difficulty and numbness. Psychiatric/Behavioral: Negative for confusion. All other systems reviewed and are negative. Vitals:    05/17/21 1607   BP: (!) 184/70   Pulse: 92   Resp: 15   Temp: 98.7 °F (37.1 °C)   SpO2: 99%   Weight: 67.1 kg (148 lb)   Height: 5' 4\" (1.626 m)            Physical Exam  Vitals signs and nursing note reviewed. Constitutional:       General: She is not in acute distress. Appearance: Normal appearance. She is well-developed. She is obese. She is not ill-appearing, toxic-appearing or diaphoretic. Comments: Uncomfortable appearing, AxOx4, speaking in complete sentences     HENT:      Head: Normocephalic and atraumatic. Right Ear: External ear normal.      Left Ear: External ear normal.   Eyes:      General: No scleral icterus. Right eye: No discharge. Conjunctiva/sclera: Conjunctivae normal.      Pupils: Pupils are equal, round, and reactive to light. Neck:      Musculoskeletal: Normal range of motion and neck supple. No neck rigidity or muscular tenderness. Vascular: No JVD. Trachea: No tracheal deviation. Cardiovascular:      Rate and Rhythm: Normal rate and regular rhythm. Heart sounds: Normal heart sounds. No murmur. No friction rub. No gallop. Pulmonary:      Effort: Pulmonary effort is normal. No respiratory distress. Breath sounds: Normal breath sounds. No wheezing or rales. Chest:      Chest wall: No tenderness. Abdominal:      General: Bowel sounds are normal.      Palpations: Abdomen is soft. Tenderness: There is no abdominal tenderness.  There is no guarding or rebound. Genitourinary:     Vagina: No vaginal discharge. Musculoskeletal:         General: Tenderness present. No swelling, deformity or signs of injury. Lumbar back: She exhibits decreased range of motion, tenderness, bony tenderness and pain. She exhibits no swelling, no edema, no deformity, no laceration, no spasm and normal pulse. Back:       Comments: L spine - no C/T/L central spinous ttp/ no step-offs; skin intact; no cauda equina sx; unable to do leg raise bilat legs ('hurts in thighs'); pt has distal motor/ CV/ Sensation grossly intact L foot;    Skin:     General: Skin is warm and dry. Coloration: Skin is not jaundiced or pale. Findings: No bruising, erythema or rash. Neurological:      Mental Status: She is alert and oriented to person, place, and time. Cranial Nerves: No cranial nerve deficit. Motor: No abnormal muscle tone. Coordination: Coordination normal.   Psychiatric:         Behavior: Behavior normal.         Thought Content: Thought content normal.          MDM       Procedures    7:03 PM  Discussed results w/ Pt/ ; she agrees w/ Spines consult/ evaluation for admission for pain control;     Perfect Serve Consult for Admission  7:07 PM    ED Room Number: ER29/29  Patient Name and age:  Kael Becker 72 y.o.  female  Working Diagnosis:   1. Bulging of lumbar intervertebral disc        COVID-19 Suspicion:  no  Sepsis present:  no  Reassessment needed: yes  Code Status:  Full Code  Readmission: no  Isolation Requirements:  no  Recommended Level of Care:  med/surg  Department:[facility] - 21   Other:  Bulging Discs w/ L5 and S1 nerve root compression/ Spine consulted (NSGY);      CONSULT  NOTE  7:14 PM  Kirill Saleh MD spoke with Dr Danna Ferrari. Specialty: Neurosurgery  Discussed pt's hx, disposition, and available diagnostic and imaging results. Reviewed care plans.  Consulting physician agrees with plans as outlined 'Please obtain MRI'; Russ Martinez

## 2021-05-17 NOTE — ED TRIAGE NOTES
TRIAGE NOTE:  Patient arrives from home with left sided lower back and leg pain since Thursday. She is taking a tapered steroid dose pack, and Lortab without relief. She was seen at [facility] on Saturday for same issue.      She is unable to rest.

## 2021-05-18 ENCOUNTER — APPOINTMENT (OUTPATIENT)
Dept: GENERAL RADIOLOGY | Age: 71
DRG: 454 | End: 2021-05-18
Attending: NEUROLOGICAL SURGERY
Payer: MEDICARE

## 2021-05-18 LAB
ALBUMIN SERPL-MCNC: 3.7 G/DL (ref 3.5–5)
ALBUMIN/GLOB SERPL: 1 {RATIO} (ref 1.1–2.2)
ALP SERPL-CCNC: 47 U/L (ref 45–117)
ALT SERPL-CCNC: 20 U/L (ref 12–78)
ANION GAP SERPL CALC-SCNC: 6 MMOL/L (ref 5–15)
AST SERPL-CCNC: 8 U/L (ref 15–37)
BASOPHILS # BLD: 0 K/UL (ref 0–0.1)
BASOPHILS NFR BLD: 0 % (ref 0–1)
BILIRUB SERPL-MCNC: 0.5 MG/DL (ref 0.2–1)
BUN SERPL-MCNC: 27 MG/DL (ref 6–20)
BUN/CREAT SERPL: 22 (ref 12–20)
CALCIUM SERPL-MCNC: 8.9 MG/DL (ref 8.5–10.1)
CHLORIDE SERPL-SCNC: 107 MMOL/L (ref 97–108)
CO2 SERPL-SCNC: 24 MMOL/L (ref 21–32)
COMMENT, HOLDF: NORMAL
CREAT SERPL-MCNC: 1.23 MG/DL (ref 0.55–1.02)
CRP SERPL-MCNC: <0.29 MG/DL (ref 0–0.6)
DIFFERENTIAL METHOD BLD: ABNORMAL
EOSINOPHIL # BLD: 0 K/UL (ref 0–0.4)
EOSINOPHIL NFR BLD: 0 % (ref 0–7)
ERYTHROCYTE [DISTWIDTH] IN BLOOD BY AUTOMATED COUNT: 11.8 % (ref 11.5–14.5)
EST. AVERAGE GLUCOSE BLD GHB EST-MCNC: 105 MG/DL
GLOBULIN SER CALC-MCNC: 3.7 G/DL (ref 2–4)
GLUCOSE BLD STRIP.AUTO-MCNC: 137 MG/DL (ref 65–117)
GLUCOSE BLD STRIP.AUTO-MCNC: 172 MG/DL (ref 65–117)
GLUCOSE BLD STRIP.AUTO-MCNC: 244 MG/DL (ref 65–117)
GLUCOSE BLD STRIP.AUTO-MCNC: 304 MG/DL (ref 65–117)
GLUCOSE SERPL-MCNC: 333 MG/DL (ref 65–100)
HBA1C MFR BLD: 5.3 % (ref 4–5.6)
HCT VFR BLD AUTO: 37.6 % (ref 35–47)
HGB BLD-MCNC: 12.7 G/DL (ref 11.5–16)
IMM GRANULOCYTES # BLD AUTO: 0 K/UL (ref 0–0.04)
IMM GRANULOCYTES NFR BLD AUTO: 1 % (ref 0–0.5)
LYMPHOCYTES # BLD: 0.2 K/UL (ref 0.8–3.5)
LYMPHOCYTES NFR BLD: 7 % (ref 12–49)
MAGNESIUM SERPL-MCNC: 2.3 MG/DL (ref 1.6–2.4)
MCH RBC QN AUTO: 31 PG (ref 26–34)
MCHC RBC AUTO-ENTMCNC: 33.8 G/DL (ref 30–36.5)
MCV RBC AUTO: 91.7 FL (ref 80–99)
MONOCYTES # BLD: 0.1 K/UL (ref 0–1)
MONOCYTES NFR BLD: 2 % (ref 5–13)
NEUTS SEG # BLD: 3.2 K/UL (ref 1.8–8)
NEUTS SEG NFR BLD: 90 % (ref 32–75)
NRBC # BLD: 0 K/UL (ref 0–0.01)
NRBC BLD-RTO: 0 PER 100 WBC
PHOSPHATE SERPL-MCNC: 3.2 MG/DL (ref 2.6–4.7)
PLATELET # BLD AUTO: 184 K/UL (ref 150–400)
PMV BLD AUTO: 10.7 FL (ref 8.9–12.9)
POTASSIUM SERPL-SCNC: 4.5 MMOL/L (ref 3.5–5.1)
PROT SERPL-MCNC: 7.4 G/DL (ref 6.4–8.2)
RBC # BLD AUTO: 4.1 M/UL (ref 3.8–5.2)
RBC MORPH BLD: ABNORMAL
SAMPLES BEING HELD,HOLD: NORMAL
SERVICE CMNT-IMP: ABNORMAL
SODIUM SERPL-SCNC: 137 MMOL/L (ref 136–145)
TROPONIN I SERPL-MCNC: <0.05 NG/ML
TSH SERPL DL<=0.05 MIU/L-ACNC: 1.04 UIU/ML (ref 0.36–3.74)
WBC # BLD AUTO: 3.5 K/UL (ref 3.6–11)

## 2021-05-18 PROCEDURE — 74011250637 HC RX REV CODE- 250/637: Performed by: INTERNAL MEDICINE

## 2021-05-18 PROCEDURE — 74011250636 HC RX REV CODE- 250/636: Performed by: INTERNAL MEDICINE

## 2021-05-18 PROCEDURE — 74011636637 HC RX REV CODE- 636/637: Performed by: HOSPITALIST

## 2021-05-18 PROCEDURE — 65270000029 HC RM PRIVATE

## 2021-05-18 PROCEDURE — 72110 X-RAY EXAM L-2 SPINE 4/>VWS: CPT

## 2021-05-18 PROCEDURE — 36415 COLL VENOUS BLD VENIPUNCTURE: CPT

## 2021-05-18 PROCEDURE — 82962 GLUCOSE BLOOD TEST: CPT

## 2021-05-18 PROCEDURE — 83036 HEMOGLOBIN GLYCOSYLATED A1C: CPT

## 2021-05-18 PROCEDURE — 84100 ASSAY OF PHOSPHORUS: CPT

## 2021-05-18 PROCEDURE — 84484 ASSAY OF TROPONIN QUANT: CPT

## 2021-05-18 PROCEDURE — 80053 COMPREHEN METABOLIC PANEL: CPT

## 2021-05-18 PROCEDURE — 84443 ASSAY THYROID STIM HORMONE: CPT

## 2021-05-18 PROCEDURE — 74011250636 HC RX REV CODE- 250/636: Performed by: PHYSICIAN ASSISTANT

## 2021-05-18 PROCEDURE — 83735 ASSAY OF MAGNESIUM: CPT

## 2021-05-18 PROCEDURE — 86140 C-REACTIVE PROTEIN: CPT

## 2021-05-18 PROCEDURE — 74011636637 HC RX REV CODE- 636/637: Performed by: INTERNAL MEDICINE

## 2021-05-18 PROCEDURE — 85025 COMPLETE CBC W/AUTO DIFF WBC: CPT

## 2021-05-18 RX ORDER — MORPHINE SULFATE 2 MG/ML
1 INJECTION, SOLUTION INTRAMUSCULAR; INTRAVENOUS
Status: DISCONTINUED | OUTPATIENT
Start: 2021-05-18 | End: 2021-05-21

## 2021-05-18 RX ORDER — DEXAMETHASONE SODIUM PHOSPHATE 4 MG/ML
4 INJECTION, SOLUTION INTRA-ARTICULAR; INTRALESIONAL; INTRAMUSCULAR; INTRAVENOUS; SOFT TISSUE EVERY 8 HOURS
Status: DISCONTINUED | OUTPATIENT
Start: 2021-05-18 | End: 2021-05-21

## 2021-05-18 RX ORDER — DEXAMETHASONE SODIUM PHOSPHATE 4 MG/ML
6 INJECTION, SOLUTION INTRA-ARTICULAR; INTRALESIONAL; INTRAMUSCULAR; INTRAVENOUS; SOFT TISSUE EVERY 8 HOURS
Status: DISCONTINUED | OUTPATIENT
Start: 2021-05-18 | End: 2021-05-18

## 2021-05-18 RX ORDER — HYDRALAZINE HYDROCHLORIDE 20 MG/ML
10 INJECTION INTRAMUSCULAR; INTRAVENOUS
Status: DISCONTINUED | OUTPATIENT
Start: 2021-05-18 | End: 2021-05-20

## 2021-05-18 RX ADMIN — Medication 10 ML: at 01:03

## 2021-05-18 RX ADMIN — Medication 10 ML: at 13:52

## 2021-05-18 RX ADMIN — INSULIN LISPRO 3 UNITS: 100 INJECTION, SOLUTION INTRAVENOUS; SUBCUTANEOUS at 17:02

## 2021-05-18 RX ADMIN — OXYCODONE HYDROCHLORIDE AND ACETAMINOPHEN 1 TABLET: 5; 325 TABLET ORAL at 01:03

## 2021-05-18 RX ADMIN — DEXAMETHASONE SODIUM PHOSPHATE 4 MG: 4 INJECTION, SOLUTION INTRAMUSCULAR; INTRAVENOUS at 22:01

## 2021-05-18 RX ADMIN — INSULIN LISPRO 4 UNITS: 100 INJECTION, SOLUTION INTRAVENOUS; SUBCUTANEOUS at 21:59

## 2021-05-18 RX ADMIN — CYANOCOBALAMIN TAB 500 MCG 1000 MCG: 500 TAB at 08:18

## 2021-05-18 RX ADMIN — HYDROXYCHLOROQUINE SULFATE 300 MG: 200 TABLET ORAL at 07:00

## 2021-05-18 RX ADMIN — GABAPENTIN 300 MG: 300 CAPSULE ORAL at 21:59

## 2021-05-18 RX ADMIN — LISINOPRIL 5 MG: 5 TABLET ORAL at 08:18

## 2021-05-18 RX ADMIN — OXYCODONE HYDROCHLORIDE AND ACETAMINOPHEN 1 TABLET: 5; 325 TABLET ORAL at 21:59

## 2021-05-18 RX ADMIN — HUMAN INSULIN 5 UNITS: 100 INJECTION, SUSPENSION SUBCUTANEOUS at 21:59

## 2021-05-18 RX ADMIN — CALCIUM CARBONATE (ANTACID) CHEW TAB 500 MG 200 MG: 500 CHEW TAB at 08:18

## 2021-05-18 RX ADMIN — DEXAMETHASONE SODIUM PHOSPHATE 6 MG: 4 INJECTION, SOLUTION INTRAMUSCULAR; INTRAVENOUS at 14:14

## 2021-05-18 RX ADMIN — GABAPENTIN 300 MG: 300 CAPSULE ORAL at 17:02

## 2021-05-18 RX ADMIN — SODIUM CHLORIDE, POTASSIUM CHLORIDE, SODIUM LACTATE AND CALCIUM CHLORIDE 100 ML/HR: 600; 310; 30; 20 INJECTION, SOLUTION INTRAVENOUS at 11:09

## 2021-05-18 RX ADMIN — Medication 10 ML: at 06:25

## 2021-05-18 RX ADMIN — SODIUM CHLORIDE, POTASSIUM CHLORIDE, SODIUM LACTATE AND CALCIUM CHLORIDE 100 ML/HR: 600; 310; 30; 20 INJECTION, SOLUTION INTRAVENOUS at 01:03

## 2021-05-18 RX ADMIN — OXYCODONE HYDROCHLORIDE AND ACETAMINOPHEN 500 MG: 500 TABLET ORAL at 09:00

## 2021-05-18 RX ADMIN — ATORVASTATIN CALCIUM 10 MG: 10 TABLET, FILM COATED ORAL at 01:03

## 2021-05-18 RX ADMIN — Medication 10 ML: at 22:01

## 2021-05-18 RX ADMIN — ATORVASTATIN CALCIUM 10 MG: 10 TABLET, FILM COATED ORAL at 21:59

## 2021-05-18 RX ADMIN — GABAPENTIN 300 MG: 300 CAPSULE ORAL at 08:17

## 2021-05-18 RX ADMIN — LEFLUNOMIDE 20 MG: 10 TABLET, FILM COATED ORAL at 10:42

## 2021-05-18 RX ADMIN — INSULIN LISPRO 2 UNITS: 100 INJECTION, SOLUTION INTRAVENOUS; SUBCUTANEOUS at 06:23

## 2021-05-18 NOTE — H&P
[address]  HISTORY AND PHYSICAL    Name:  Tobias Michele  MR#:  [id number]  :  1950  ACCOUNT #:  [de-identified]  ADMIT DATE:  2021      The patient was seen, evaluated, and admitted by me on 2021. PRIMARY CARE PHYSICIAN:  Estella Maher MD    SOURCE OF INFORMATION:  The patient and review of ED and old electronic medical records. CHIEF COMPLAINT:  Back pain. HISTORY OF PRESENT ILLNESS:  This is a 81-year-old woman with a past medical history significant for type 2 diabetes, hypertension, dyslipidemia, rheumatoid arthritis, was in her usual state of health until few days ago when the patient developed back pain. The pain is located at the lower back on the left side, constant sharp pain with radiation to the left leg. No known aggravating factors. The patient was taken to [facility] on Saturday for evaluation of the low back pain. The patient was discharged home on tapering dose of prednisone and pain medication, which did not provide any significant relief. The patient was brought to the emergency room here at [facility] for further evaluation. The patient denies urinary or bowel incontinence as a result of the low back pain. No history of recent trauma to the back. The patient stated that the low back pain is 10/10 in severity. When the patient arrived at the emergency room, CT scan of the lumbar spine was obtained. The CT scan shows findings concerning for bulging of lumbar intervertebral disk. The emergency room physician consulted neurosurgeon on-call who advised MRI and admission to the hospital.  The patient was then referred to the hospitalist service for that purpose. The patient was last admitted to this hospital from 2015 to 2015. The patient was admitted with lower GI bleed, underwent colonoscopy, which was negative. The lower GI bleed was attributed to infectious gastroenteritis.     PAST MEDICAL HISTORY: Type 2 diabetes, hypertension, dyslipidemia, rheumatoid arthritis,    ALLERGIES:  NO KNOWN DRUG ALLERGIES. MEDICATIONS:  Tylenol 650 mg every 8 hours as needed for pain, vitamin C 500 mg daily, aspirin 81 mg daily, Lipitor 10 mg daily, calcium carbonate 1 tablet daily as needed, Jardiance 25 mg daily, Flonase 50 mcg 2 sprays into each nostril daily, glipizide 5 mg daily, Plaquenil dosage as directed, Arava 20 mg daily, lisinopril 5 mg daily. FAMILY HISTORY:  This was reviewed. Her mother had heart disease. Her father had heart failure. PAST SURGICAL HISTORY:  This is significant for total abdominal hysterectomy. SOCIAL HISTORY:  No history of alcohol or tobacco abuse. REVIEW OF SYSTEMS:  HEAD, EYES, EARS, NOSE, AND THROAT:  No headache, no dizziness, no blurring of vision, no photophobia. RESPIRATORY SYSTEM:  No cough, no shortness of breath, no hemoptysis. CARDIOVASCULAR SYSTEM:  No chest pain, no orthopnea, no palpitation. GASTROINTESTINAL SYSTEM:  No nausea or vomiting. No diarrhea. No constipation. GENITOURINARY SYSTEM:  No dysuria, no urgency and no frequency. All other systems are reviewed and they are negative. PHYSICAL EXAMINATION:  GENERAL APPEARANCE:  The patient appeared ill, in moderate distress. VITAL SIGNS:  On arrival at the emergency room, temperature 98.7, pulse 92, respiratory rate 15, blood pressure 184/70, oxygen saturation 99% on room air. HEENT:  Head:  Normocephalic, atraumatic. Eyes:  Normal eye movement. No redness, no drainage, no discharge. Ears:  Normal external ears with no evidence of drainage. Nose:  No deformity, no drainage. Mouth and Throat:  No visible oral lesion. Dry oral mucosa. NECK:  Neck is supple. No JVD, no thyromegaly. CHEST:  Clear breath sounds. No wheezing, no crackles. HEART:  Normal S1 and S2.  Regular. No clinically appreciable murmur. ABDOMEN:  Soft and nontender. Normal bowel sounds. CNS:  Alert and oriented x3.   No gross focal neurological deficit. EXTREMITIES:  No edema, pulses 2+ bilaterally. MUSCULOSKELETAL SYSTEM:  Mild tenderness left lower back with no obvious deformity or swelling. SKIN:  No active skin lesions seen in the exposed part of the body. PSYCHIATRY:  Normal mood and affect. LYMPHATIC SYSTEM:  No cervical lymphadenopathy. DIAGNOSTIC DATA:  CT scan of the lumbar spine shows findings suggestive of bulging of lumbar intervertebral disk. LABORATORY DATA:  Chemistry:  Sodium 138, potassium is not reported, chloride 105, CO2 of 29, glucose 142, BUN 26, creatinine 1.15. Urinalysis: This is significant for negative nitrite, negative leukocyte esterase, 1+ bacteria. Hematology:  WBC 6.5, hemoglobin 13.9, hematocrit 41.6, platelets of 165. ASSESSMENT:  1.  Bulging of lumbar intervertebral disk. 2.  Type 2 diabetes. 3.  Hypertension. 4.  Dyslipidemia. 5.  Rheumatoid arthritis. 6.  Acute kidney injury. PLAN:  1.  Bulging of lumbar intervertebral disk. We will admit the patient for further evaluation and treatment. We will carry out pain control. We will await the result of MRI of the lumbar spine. We will also await further recommendation from the neurosurgeon consulted by the emergency room physician. 2.  Type 2 diabetes. The patient will be placed on sliding scale with insulin coverage. We will check hemoglobin A1c level if one has not been checked recently. We will hold oral agents until the time of discharge from the hospital.  3.  Hypertension. We will continue with preadmission medication and monitor the patient's blood pressure closely. 4.  Dyslipidemia. We will continue with Lipitor. 5  Rheumatoid arthritis. We will resume preadmission medication. We will check C-reactive protein level. 6.  Acute kidney injury. This is most likely due to volume depletion. We will carry out fluid therapy and repeat the patient's renal function. OTHER ISSUES:  Code Status:   The patient is a full code. We will place the patient on heparin for DVT prophylaxis. FUNCTIONAL STATUS PRIOR TO ADMISSION:  The patient came from home. The patient is ambulatory with no assistant or device. COVID PRECAUTION:  The patient was wearing a facemask. I was wearing a facemask and gloves for this patient's encounter.         Zahra Chavez MD      [id number]  D:  05/18/2021 3:11  T:  05/18/2021 4:15  JOB #:  [id number]  CC:  Andrea Purdy MD

## 2021-05-18 NOTE — PROGRESS NOTES
Problem: Falls - Risk of  Goal: *Absence of Falls  Description: Document Jessica Grigsby Fall Risk and appropriate interventions in the flowsheet.   Outcome: Progressing Towards Goal  Note: Fall Risk Interventions:  Mobility Interventions: Patient to call before getting OOB         Medication Interventions: Patient to call before getting OOB    Elimination Interventions: Patient to call for help with toileting needs              Problem: Patient Education: Go to Patient Education Activity  Goal: Patient/Family Education  Outcome: Progressing Towards Goal

## 2021-05-18 NOTE — PROGRESS NOTES
Pt seen and examined. Counseled with family. Pt had upward l5-s1 hnp with foot drop on left. Instability with standing xrays. Will need decompression and fusion.

## 2021-05-18 NOTE — PROGRESS NOTES
Transition of Care Plan  RUR 15%    Plan  Spine surgery pending--decompression and fusion    COVID 19 vaccinations completed. Disposition  TBD- Home - vs rehab pending medical and therapy progress and recommendations after surgery    Transportation   Family / vs BLS pending medical and therapy progress     Home Health   TBD-- Cm will arrange if ordered    Patient will inform CM of which agency when appropriate    Medical follow up  PCP and specialist    Contact    Gonsalo Alfaro  [phone] / cell [phone]     Reason for Admission:   Lower back pain on left side radiating to left leg  Bulging of lumbar intervertebral disk,,   Medical hx  Diabetes, hypertension, rheumatoid arthritis and dyslipidemia                   RUR Score:     15%                Plan for utilizing home health:      TBD-- patient in agreement if needed-- patient will inform Cm of agency of choice when appropriate    PCP: First and Last name:  Lucius Howard MD     Name of Practice:    Are you a current patient: Yes/No: yes   Approximate date of last visit: 1/28/21   Can you participate in a virtual visit with your PCP: yes                    Current Advanced Directive/Advance Care Plan: Full Code      Healthcare Decision Maker:   Click here to complete [address] including selection of the Healthcare Decision Maker Relationship (ie \"Primary\")                           Transition of Care Plan:       TBD  Pending medical and therapy progress and recommendations--Home, HH, rehab    Cm met with patient and daughter, Akila Olguin, in patient's room to introduce self and explain role. Patient was alert and oriented. Confirmed demographics, PCP, and insurance -Medicare and [employer]. Secures medications from [facility]    Patient lives with her  in their 2 level home. Patient was self care and independent and active-- driving to daily activities. Patient has two adult daughters-- they are supportive. Akila Olguin is an OT. 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 Daughters are supportive. 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  owns an [employer] and is a . Patient and CM discussed options after surgery-- patient is in agreement with [facility] if recommended and [facility] if recommended. Cm will follow and talk with patient and family about transition of care planning after surgery. Patient prefers to return home. Patient received first Medicare letter today-- she will be provided with second letter prior to discharge. Care Management Interventions  PCP Verified by CM:  Yes  Last Visit to PCP: 01/28/21  Mode of Transport at Discharge: (car vs BLS   TBD)  Transition of Care Consult (CM Consult): Discharge Planning  Discharge Durable Medical Equipment: No  Physical Therapy Consult: Yes  Occupational Therapy Consult: Yes  Speech Therapy Consult: No  Current Support Network: Lives with Spouse(lives at home with -- self care and independent driving and enjoys her family   No AMD)  Confirm Follow Up Transport: Family(self when able)  Discharge Location  Discharge Placement: (TBD--pending medical and therpay progress after surgery)

## 2021-05-18 NOTE — PROGRESS NOTES
[address] [facility]  Hospitalist Group                                                                                          Hospitalist Progress Note  Roxanne Tam MD  Answering service: [phone] from in house phone        Date of Service:  2021  NAME:  Margret Kramer  :  1950  MRN:  [id number]      Admission Summary:   Per H and P \"72year-old woman with a past medical history significant for type 2 diabetes, hypertension, dyslipidemia, rheumatoid arthritis, was in her usual state of health until few days ago when the patient developed back pain. The pain is located at the lower back on the left side, constant sharp pain with radiation to the left leg. No known aggravating factors'. Interval history / Subjective:   Patient seen and examined. She still continues to have left LE pain even with minimal movement     Assessment & Plan:     # Herniated nucleus pulposus L5-S1  -MRI L spine-Left paracentral extrusion and probable free disc fragment at L5-S1 extending  cranially with significant narrowing of the left lateral recess posterior to L5  and left subarticular zone  -NSGY consulted, plan for surgery on friday  -pain management-on decadron,gabapentin,oxycodone and IV 1 mg morphine prn    # HTN:lisinopril    #Diabetes:  -A1c: 5.3, on oral meds at home  -expect hyperglycemia with steroids- start 5 units NPH with decadron, lispro with meal.    # Rheumatoid arthritis:  -hold plaquenil,leflunomide for surgery    Code status: full  DVT prophylaxis: scd    Care Plan discussed with: patient,nacho  Anticipated Disposition:home  Anticipated Discharge: likely week end     Hospital Problems  Date Reviewed: 2021          Codes Class Noted POA    * (Principal) Bulging of lumbar intervertebral disc ICD-10-CM: M51.26  ICD-9-CM: 722.10  2021 Yes                Review of Systems:   As per HPI      Vital Signs:    Last 24hrs VS reviewed since prior progress note.  Most recent are:  Visit Vitals  BP (!) 167/88   Pulse 83   Temp 98.3 °F (36.8 °C)   Resp 18   Ht 5' 4\" (1.626 m)   Wt 67.1 kg (148 lb)   SpO2 98%   BMI 25.40 kg/m²       No intake or output data in the 24 hours ending 05/18/21 1607     Physical Examination:     I had a face to face encounter with this patient and independently examined them on 5/18/2021 as outlined below:          Constitutional:  No acute distress, cooperative, pleasant    ENT:  Oral mucosa moist, EOMI,anicteric sclera,normal conjunctiva    Resp:  CTA bilaterally. No wheezing/rhonchi/rales. No accessory muscle use   CV:  Regular rhythm, normal rate, S1,S2 wnl    GI:  Soft, non distended, non tender. normoactive bowel sounds    Musculoskeletal:  No LE edema    Neurologic:  alert and oriented X 3, LLE strength 3+/5,RUE,RLE,LUE strength wnl, sensation intact            Data Review:    Review and/or order of clinical lab test  Review and/or order of tests in the radiology section of CPT  Review and/or order of tests in the medicine section of CPT      Labs:     Recent Labs     05/18/21  0115 05/17/21  1847   WBC 3.5* 6.5   HGB 12.7 13.9   HCT 37.6 41.6    238     Recent Labs     05/18/21  0115 05/17/21  1847    138   K 4.5 HEMOLYZED,RECOLLECT REQUESTED    105   CO2 24 29   BUN 27* 26*   CREA 1.23* 1.15*   * 142*   CA 8.9 9.7   MG 2.3  --    PHOS 3.2  --      Recent Labs     05/18/21  0115 05/17/21  1847   ALT 20 32   AP 47 49   TBILI 0.5 0.7   TP 7.4 8.6*   ALB 3.7 4.2   GLOB 3.7 4.4*     No results for input(s): INR, PTP, APTT, INREXT in the last 72 hours. No results for input(s): FE, TIBC, PSAT, FERR in the last 72 hours. No results found for: FOL, RBCF   No results for input(s): PH, PCO2, PO2 in the last 72 hours.   Recent Labs     05/18/21  0115   TROIQ <0.05     Lab Results   Component Value Date/Time    Cholesterol, total 130 02/09/2021 10:22 AM    HDL Cholesterol 73 02/09/2021 10:22 AM    LDL, calculated 43.2 02/09/2021 10:22 AM    Triglyceride 69 02/09/2021 10:22 AM    CHOL/HDL Ratio 1.8 02/09/2021 10:22 AM     Lab Results   Component Value Date/Time    Glucose (POC) 137 (H) 05/18/2021 11:08 AM    Glucose (POC) 172 (H) 05/18/2021 06:17 AM    Glucose (POC) 127 (H) 04/20/2015 04:15 PM    Glucose (POC) 169 (H) 04/20/2015 11:09 AM    Glucose (POC) 147 (H) 04/20/2015 06:39 AM     Lab Results   Component Value Date/Time    Color YELLOW/STRAW 05/17/2021 06:47 PM    Appearance CLEAR 05/17/2021 06:47 PM    Specific gravity 1.023 05/17/2021 06:47 PM    Specific gravity 1.010 04/16/2015 04:13 AM    pH (UA) 5.5 05/17/2021 06:47 PM    Protein Negative 05/17/2021 06:47 PM    Glucose >1,000 (A) 05/17/2021 06:47 PM    Ketone Negative 05/17/2021 06:47 PM    Bilirubin Negative 05/17/2021 06:47 PM    Urobilinogen 0.2 05/17/2021 06:47 PM    Nitrites Negative 05/17/2021 06:47 PM    Leukocyte Esterase Negative 05/17/2021 06:47 PM    Epithelial cells FEW 05/17/2021 06:47 PM    Bacteria 1+ (A) 05/17/2021 06:47 PM    WBC 0-4 05/17/2021 06:47 PM    RBC 0-5 05/17/2021 06:47 PM         Medications Reviewed:     Current Facility-Administered Medications   Medication Dose Route Frequency    hydrALAZINE (APRESOLINE) 20 mg/mL injection 10 mg  10 mg IntraVENous Q6H PRN    dexamethasone (DECADRON) 4 mg/mL injection 4 mg  4 mg IntraVENous Q8H    gabapentin (NEURONTIN) capsule 300 mg  300 mg Oral TID    ascorbic acid (vitamin C) (VITAMIN C) tablet 500 mg  500 mg Oral DAILY    atorvastatin (LIPITOR) tablet 10 mg  10 mg Oral QHS    calcium carbonate (TUMS) chewable tablet 200 mg [elemental]  200 mg Oral DAILY    cyanocobalamin (VITAMIN B12) tablet 1,000 mcg  1,000 mcg Oral DAILY    [Held by provider] hydrOXYchloroQUINE (PLAQUENIL) tablet 300 mg  300 mg Oral DAILY WITH BREAKFAST    [Held by provider] leflunomide (ARAVA) tablet 20 mg  20 mg Oral DAILY    lisinopriL (PRINIVIL, ZESTRIL) tablet 5 mg  5 mg Oral DAILY    sodium chloride (NS) flush 5-40 mL  5-40 mL IntraVENous Q8H    sodium chloride (NS) flush 5-40 mL  5-40 mL IntraVENous PRN    acetaminophen (TYLENOL) tablet 650 mg  650 mg Oral Q6H PRN    Or    acetaminophen (TYLENOL) suppository 650 mg  650 mg Rectal Q6H PRN    polyethylene glycol (MIRALAX) packet 17 g  17 g Oral DAILY PRN    promethazine (PHENERGAN) tablet 12.5 mg  12.5 mg Oral Q6H PRN    Or    ondansetron (ZOFRAN) injection 4 mg  4 mg IntraVENous Q6H PRN    [Held by provider] heparin (porcine) injection 5,000 Units  5,000 Units SubCUTAneous Q8H    oxyCODONE-acetaminophen (PERCOCET) 5-325 mg per tablet 1 Tab  1 Tab Oral Q4H PRN    morphine injection 2 mg  2 mg IntraVENous Q4H PRN    lactated Ringers infusion  100 mL/hr IntraVENous CONTINUOUS    insulin lispro (HUMALOG) injection   SubCUTAneous AC&HS    glucose chewable tablet 16 g  4 Tab Oral PRN    dextrose (D50W) injection syrg 12.5-25 g  12.5-25 g IntraVENous PRN    glucagon (GLUCAGEN) injection 1 mg  1 mg IntraMUSCular PRN     ______________________________________________________________________  EXPECTED LENGTH OF STAY: 2d 21h  ACTUAL LENGTH OF STAY:          1                 Nixon Da Silva MD

## 2021-05-18 NOTE — PROGRESS NOTES
Primary Nurse Rose Jara RN and Jeff Mendez RN performed a dual skin assessment on this patient No impairment noted. Corey score is 22.

## 2021-05-18 NOTE — ROUTINE PROCESS
.. TRANSFER - OUT REPORT: 
 
Verbal report given to Skyler RN(name) on Hira Babcock  being transferred to [facility](unit) for routine progression of care Report consisted of patients Situation, Background, Assessment and  
Recommendations(SBAR). Information from the following report(s) SBAR, ED Summary, [facility] and Recent Results was reviewed with the receiving nurse. Lines:  
Peripheral IV 05/17/21 Left Wrist (Active) Opportunity for questions and clarification was provided. Patient transported with: 
 [facility]

## 2021-05-18 NOTE — PROGRESS NOTES
Spine Surgery Progress Note  Tadeo Chavez PA-C    Admit Date: 5/17/2021   LOS: 1 day      Daily Progress Note: 5/18/2021    HPI: Ms. Ofelia Cabrera is a very pleasant 61-year-old woman with a past medical history significant for type 2 diabetes, hypertension, dyslipidemia, rheumatoid arthritis She was in her usual state of health until few days ago when the patient developed back pain. Prior to this, patient was very active around the house. Her pain is located at the lower back on the left side, constant sharp pain with radiation to the left leg. No known aggravating factors. The patient was taken to [facility] on Saturday for evaluation of the low back pain. The patient was discharged home on tapering dose of prednisone and pain medication, which did not provide any significant relief. The patient was brought to the emergency room here at [facility] for further evaluation. The patient denies urinary or bowel incontinence as a result of the low back pain. No history of recent trauma to the back. The patient stated that the low back pain is 10/10 in severity. When the patient arrived at the emergency room, CT scan of the lumbar spine was obtained. The CT scan shows findings concerning for bulging of lumbar intervertebral disk. The emergency room physician consulted neurosurgeon on-call who advised MRI and admission to the hospital.  The patient was then referred to the hospitalist service for that purpose. MRI revealed \"Left paracentral extrusion and probable free disc fragment at L5-S1 extending cranially with significant narrowing of the left lateral recess posterior to L5 and left subarticular zone\". Xray revealed \"Grade 1 anterolisthesis of L5 that increases with flexion\". Patient is on Arava and Plaquenil for RA. Dr. Woody Frank is her rheumatologist.    Subjective:     Patient states her pain is somewhat improved from yesterday. Pain is tolerable at rest but severe with movement.  Left leg continues to be weak. Patient denies numbness, chest pain, nausea, vomiting, difficulty swallowing, headache, and dyspnea. She is resting comfortably in bed. Daughter is at bedside. Objective:     Vital signs  VSS Afebrile. Temp (24hrs), Av.4 °F (36.9 °C), Min:97.9 °F (36.6 °C), Max:99 °F (37.2 °C)   No intake/output data recorded. No intake/output data recorded. Visit Vitals  BP (!) 167/88   Pulse 83   Temp 98.3 °F (36.8 °C)   Resp 18   Ht 5' 4\" (1.626 m)   Wt 67.1 kg (148 lb)   SpO2 98%   BMI 25.40 kg/m²      O2 Device: None (Room air)     Pain control  Pain Assessment  Pain Scale 1: Numeric (0 - 10)  Pain Intensity 1: 7  Pain Location 1: Leg(left side back down leg)  Pain Orientation 1: Left  Pain Description 1: Aching    Physical Exam:  Gen: No acute distress. Neuro: A&Ox3. Follows commands. Speech clear. Affect normal.  PERRL. EOMI. Face symmetric. MEDINA spontaneously. Strength 5/5 in BUE and RLE. Strength 4/5 left hip, 3/5 left knee and ankle DF/PF, 2/5 left EHL. Sensation intact to sharp and dull touch. Gait deferred. Calves soft and supple;  No pain with passive stretch  Skin: warm, dry  Abd: soft, nontender    24 hour results:    Recent Results (from the past 24 hour(s))   URINALYSIS W/MICROSCOPIC    Collection Time: 21  6:47 PM   Result Value Ref Range    Color YELLOW/STRAW      Appearance CLEAR CLEAR      Specific gravity 1.023 1.003 - 1.030      pH (UA) 5.5 5.0 - 8.0      Protein Negative NEG mg/dL    Glucose >1,000 (A) NEG mg/dL    Ketone Negative NEG mg/dL    Bilirubin Negative NEG      Blood Negative NEG      Urobilinogen 0.2 0.2 - 1.0 EU/dL    Nitrites Negative NEG      Leukocyte Esterase Negative NEG      WBC 0-4 0 - 4 /hpf    RBC 0-5 0 - 5 /hpf    Epithelial cells FEW FEW /lpf    Bacteria 1+ (A) NEG /hpf   METABOLIC PANEL, COMPREHENSIVE    Collection Time: 21  6:47 PM   Result Value Ref Range    Sodium 138 136 - 145 mmol/L    Potassium HEMOLYZED,RECOLLECT REQUESTED 3.5 - 5.1 mmol/L    Chloride 105 97 - 108 mmol/L    CO2 29 21 - 32 mmol/L    Anion gap 4 (L) 5 - 15 mmol/L    Glucose 142 (H) 65 - 100 mg/dL    BUN 26 (H) 6 - 20 MG/DL    Creatinine 1.15 (H) 0.55 - 1.02 MG/DL    BUN/Creatinine ratio 23 (H) 12 - 20      GFR est AA 57 (L) >60 ml/min/1.73m2    GFR est non-AA 47 (L) >60 ml/min/1.73m2    Calcium 9.7 8.5 - 10.1 MG/DL    Bilirubin, total 0.7 0.2 - 1.0 MG/DL    ALT (SGPT) 32 12 - 78 U/L    AST (SGOT) HEMOLYZED,RECOLLECT REQUESTED 15 - 37 U/L    Alk. phosphatase 49 45 - 117 U/L    Protein, total 8.6 (H) 6.4 - 8.2 g/dL    Albumin 4.2 3.5 - 5.0 g/dL    Globulin 4.4 (H) 2.0 - 4.0 g/dL    A-G Ratio 1.0 (L) 1.1 - 2.2     CBC WITH AUTOMATED DIFF    Collection Time: 05/17/21  6:47 PM   Result Value Ref Range    WBC 6.5 3.6 - 11.0 K/uL    RBC 4.54 3.80 - 5.20 M/uL    HGB 13.9 11.5 - 16.0 g/dL    HCT 41.6 35.0 - 47.0 %    MCV 91.6 80.0 - 99.0 FL    MCH 30.6 26.0 - 34.0 PG    MCHC 33.4 30.0 - 36.5 g/dL    RDW 12.1 11.5 - 14.5 %    PLATELET 595 137 - 033 K/uL    MPV 11.0 8.9 - 12.9 FL    NRBC 0.0 0  WBC    ABSOLUTE NRBC 0.00 0.00 - 0.01 K/uL    NEUTROPHILS 71 32 - 75 %    LYMPHOCYTES 18 12 - 49 %    MONOCYTES 9 5 - 13 %    EOSINOPHILS 0 0 - 7 %    BASOPHILS 1 0 - 1 %    IMMATURE GRANULOCYTES 1 (H) 0.0 - 0.5 %    ABS. NEUTROPHILS 4.7 1.8 - 8.0 K/UL    ABS. LYMPHOCYTES 1.1 0.8 - 3.5 K/UL    ABS. MONOCYTES 0.6 0.0 - 1.0 K/UL    ABS. EOSINOPHILS 0.0 0.0 - 0.4 K/UL    ABS. BASOPHILS 0.1 0.0 - 0.1 K/UL    ABS. IMM.  GRANS. 0.0 0.00 - 0.04 K/UL    DF AUTOMATED     CBC WITH AUTOMATED DIFF    Collection Time: 05/18/21  1:15 AM   Result Value Ref Range    WBC 3.5 (L) 3.6 - 11.0 K/uL    RBC 4.10 3.80 - 5.20 M/uL    HGB 12.7 11.5 - 16.0 g/dL    HCT 37.6 35.0 - 47.0 %    MCV 91.7 80.0 - 99.0 FL    MCH 31.0 26.0 - 34.0 PG    MCHC 33.8 30.0 - 36.5 g/dL    RDW 11.8 11.5 - 14.5 %    PLATELET 681 859 - 727 K/uL    MPV 10.7 8.9 - 12.9 FL    NRBC 0.0 0  WBC    ABSOLUTE NRBC 0.00 0.00 - 0.01 K/uL NEUTROPHILS 90 (H) 32 - 75 %    LYMPHOCYTES 7 (L) 12 - 49 %    MONOCYTES 2 (L) 5 - 13 %    EOSINOPHILS 0 0 - 7 %    BASOPHILS 0 0 - 1 %    IMMATURE GRANULOCYTES 1 (H) 0.0 - 0.5 %    ABS. NEUTROPHILS 3.2 1.8 - 8.0 K/UL    ABS. LYMPHOCYTES 0.2 (L) 0.8 - 3.5 K/UL    ABS. MONOCYTES 0.1 0.0 - 1.0 K/UL    ABS. EOSINOPHILS 0.0 0.0 - 0.4 K/UL    ABS. BASOPHILS 0.0 0.0 - 0.1 K/UL    ABS. IMM. GRANS. 0.0 0.00 - 0.04 K/UL    DF SMEAR SCANNED      RBC COMMENTS NORMOCYTIC, NORMOCHROMIC     TSH 3RD GENERATION    Collection Time: 05/18/21  1:15 AM   Result Value Ref Range    TSH 1.04 0.36 - 3.74 uIU/mL   MAGNESIUM    Collection Time: 05/18/21  1:15 AM   Result Value Ref Range    Magnesium 2.3 1.6 - 2.4 mg/dL   PHOSPHORUS    Collection Time: 05/18/21  1:15 AM   Result Value Ref Range    Phosphorus 3.2 2.6 - 4.7 MG/DL   C REACTIVE PROTEIN, QT    Collection Time: 05/18/21  1:15 AM   Result Value Ref Range    C-Reactive protein <0.29 0.00 - 0.60 mg/dL   HEMOGLOBIN A1C WITH EAG    Collection Time: 05/18/21  1:15 AM   Result Value Ref Range    Hemoglobin A1c 5.3 4.0 - 5.6 %    Est. average glucose 105 mg/dL   SAMPLES BEING HELD    Collection Time: 05/18/21  1:15 AM   Result Value Ref Range    SAMPLES BEING HELD 1pst     COMMENT        Add-on orders for these samples will be processed based on acceptable specimen integrity and analyte stability, which may vary by analyte.    METABOLIC PANEL, COMPREHENSIVE    Collection Time: 05/18/21  1:15 AM   Result Value Ref Range    Sodium 137 136 - 145 mmol/L    Potassium 4.5 3.5 - 5.1 mmol/L    Chloride 107 97 - 108 mmol/L    CO2 24 21 - 32 mmol/L    Anion gap 6 5 - 15 mmol/L    Glucose 333 (H) 65 - 100 mg/dL    BUN 27 (H) 6 - 20 MG/DL    Creatinine 1.23 (H) 0.55 - 1.02 MG/DL    BUN/Creatinine ratio 22 (H) 12 - 20      GFR est AA 52 (L) >60 ml/min/1.73m2    GFR est non-AA 43 (L) >60 ml/min/1.73m2    Calcium 8.9 8.5 - 10.1 MG/DL    Bilirubin, total 0.5 0.2 - 1.0 MG/DL    ALT (SGPT) 20 12 - 78 U/L AST (SGOT) 8 (L) 15 - 37 U/L    Alk.  phosphatase 47 45 - 117 U/L    Protein, total 7.4 6.4 - 8.2 g/dL    Albumin 3.7 3.5 - 5.0 g/dL    Globulin 3.7 2.0 - 4.0 g/dL    A-G Ratio 1.0 (L) 1.1 - 2.2     TROPONIN I    Collection Time: 05/18/21  1:15 AM   Result Value Ref Range    Troponin-I, Qt. <0.05 <0.05 ng/mL   GLUCOSE, POC    Collection Time: 05/18/21  6:17 AM   Result Value Ref Range    Glucose (POC) 172 (H) 65 - 117 mg/dL    Performed by Arie Locke    GLUCOSE, POC    Collection Time: 05/18/21 11:08 AM   Result Value Ref Range    Glucose (POC) 137 (H) 65 - 117 mg/dL    Performed by Geoff Pruett       Assessment:     Principal Problem:    Bulging of lumbar intervertebral disc (5/17/2021)      Plan:     1) L5-S1 HNP   -Decompression and fusion surgery likely Friday   -Remain inpt for pain control and safety reasons until then   -Decadron 4mg q8hr  -PT/OT evals for baseline    -Pain control - scheduled gabapentin, prn tylenol, percocet   -Diet - diabetic until midnight Thursday night   -Preop labs/consent/rapid covid tomorrow in preparation of surgery  2) T2DM   -SSI   -Diabetic diet  3) HTN   -Daily lisinopril  4) RA   -Hold meds for surgery  5) Dyslipidemia   -Daily lipitor  6) HANNA   -Continue IVF   -Repeat labs   -Hospitalist following    Plan d/w Dr. [place] PA

## 2021-05-18 NOTE — PROGRESS NOTES
Medicare pt has received, reviewed, and [id number] IM letter informing them of their right to appeal the discharge. Signed copy has been placed on pt bedside chart.     Terence Carrillo RN/CRM  [phone]

## 2021-05-19 LAB
ALBUMIN SERPL-MCNC: 3.7 G/DL (ref 3.5–5)
ANION GAP SERPL CALC-SCNC: 5 MMOL/L (ref 5–15)
BUN SERPL-MCNC: 27 MG/DL (ref 6–20)
BUN/CREAT SERPL: 23 (ref 12–20)
CALCIUM SERPL-MCNC: 9.1 MG/DL (ref 8.5–10.1)
CHLORIDE SERPL-SCNC: 101 MMOL/L (ref 97–108)
CO2 SERPL-SCNC: 30 MMOL/L (ref 21–32)
CREAT SERPL-MCNC: 1.17 MG/DL (ref 0.55–1.02)
GLUCOSE BLD STRIP.AUTO-MCNC: 221 MG/DL (ref 65–117)
GLUCOSE BLD STRIP.AUTO-MCNC: 232 MG/DL (ref 65–117)
GLUCOSE BLD STRIP.AUTO-MCNC: 283 MG/DL (ref 65–117)
GLUCOSE BLD STRIP.AUTO-MCNC: 296 MG/DL (ref 65–117)
GLUCOSE SERPL-MCNC: 261 MG/DL (ref 65–100)
PHOSPHATE SERPL-MCNC: 3.3 MG/DL (ref 2.6–4.7)
POTASSIUM SERPL-SCNC: 4.2 MMOL/L (ref 3.5–5.1)
SERVICE CMNT-IMP: ABNORMAL
SODIUM SERPL-SCNC: 136 MMOL/L (ref 136–145)

## 2021-05-19 PROCEDURE — 74011250637 HC RX REV CODE- 250/637: Performed by: INTERNAL MEDICINE

## 2021-05-19 PROCEDURE — 36415 COLL VENOUS BLD VENIPUNCTURE: CPT

## 2021-05-19 PROCEDURE — 74011636637 HC RX REV CODE- 636/637: Performed by: INTERNAL MEDICINE

## 2021-05-19 PROCEDURE — 82962 GLUCOSE BLOOD TEST: CPT

## 2021-05-19 PROCEDURE — 74011250636 HC RX REV CODE- 250/636: Performed by: PHYSICIAN ASSISTANT

## 2021-05-19 PROCEDURE — 74011250636 HC RX REV CODE- 250/636: Performed by: INTERNAL MEDICINE

## 2021-05-19 PROCEDURE — 97535 SELF CARE MNGMENT TRAINING: CPT

## 2021-05-19 PROCEDURE — 74011636637 HC RX REV CODE- 636/637: Performed by: HOSPITALIST

## 2021-05-19 PROCEDURE — 97161 PT EVAL LOW COMPLEX 20 MIN: CPT

## 2021-05-19 PROCEDURE — 80069 RENAL FUNCTION PANEL: CPT

## 2021-05-19 PROCEDURE — 65270000029 HC RM PRIVATE

## 2021-05-19 PROCEDURE — 97165 OT EVAL LOW COMPLEX 30 MIN: CPT

## 2021-05-19 PROCEDURE — 97116 GAIT TRAINING THERAPY: CPT

## 2021-05-19 PROCEDURE — 94760 N-INVAS EAR/PLS OXIMETRY 1: CPT

## 2021-05-19 RX ADMIN — INSULIN LISPRO 3 UNITS: 100 INJECTION, SOLUTION INTRAVENOUS; SUBCUTANEOUS at 21:47

## 2021-05-19 RX ADMIN — GABAPENTIN 300 MG: 300 CAPSULE ORAL at 08:40

## 2021-05-19 RX ADMIN — Medication 10 ML: at 06:00

## 2021-05-19 RX ADMIN — INSULIN LISPRO 3 UNITS: 100 INJECTION, SOLUTION INTRAVENOUS; SUBCUTANEOUS at 17:06

## 2021-05-19 RX ADMIN — DEXAMETHASONE SODIUM PHOSPHATE 4 MG: 4 INJECTION, SOLUTION INTRAMUSCULAR; INTRAVENOUS at 21:46

## 2021-05-19 RX ADMIN — DEXAMETHASONE SODIUM PHOSPHATE 4 MG: 4 INJECTION, SOLUTION INTRAMUSCULAR; INTRAVENOUS at 14:03

## 2021-05-19 RX ADMIN — OXYCODONE HYDROCHLORIDE AND ACETAMINOPHEN 1 TABLET: 5; 325 TABLET ORAL at 14:21

## 2021-05-19 RX ADMIN — DEXAMETHASONE SODIUM PHOSPHATE 4 MG: 4 INJECTION, SOLUTION INTRAMUSCULAR; INTRAVENOUS at 06:00

## 2021-05-19 RX ADMIN — GABAPENTIN 300 MG: 300 CAPSULE ORAL at 21:46

## 2021-05-19 RX ADMIN — SODIUM CHLORIDE, POTASSIUM CHLORIDE, SODIUM LACTATE AND CALCIUM CHLORIDE 100 ML/HR: 600; 310; 30; 20 INJECTION, SOLUTION INTRAVENOUS at 07:48

## 2021-05-19 RX ADMIN — GABAPENTIN 300 MG: 300 CAPSULE ORAL at 16:34

## 2021-05-19 RX ADMIN — Medication 10 ML: at 14:03

## 2021-05-19 RX ADMIN — HUMAN INSULIN 5 UNITS: 100 INJECTION, SUSPENSION SUBCUTANEOUS at 17:07

## 2021-05-19 RX ADMIN — HUMAN INSULIN 5 UNITS: 100 INJECTION, SUSPENSION SUBCUTANEOUS at 07:13

## 2021-05-19 RX ADMIN — LISINOPRIL 5 MG: 5 TABLET ORAL at 08:40

## 2021-05-19 RX ADMIN — CALCIUM CARBONATE (ANTACID) CHEW TAB 500 MG 200 MG: 500 CHEW TAB at 08:39

## 2021-05-19 RX ADMIN — CYANOCOBALAMIN TAB 500 MCG 1000 MCG: 500 TAB at 08:39

## 2021-05-19 RX ADMIN — INSULIN LISPRO 3 UNITS: 100 INJECTION, SOLUTION INTRAVENOUS; SUBCUTANEOUS at 07:13

## 2021-05-19 RX ADMIN — INSULIN LISPRO 5 UNITS: 100 INJECTION, SOLUTION INTRAVENOUS; SUBCUTANEOUS at 12:36

## 2021-05-19 RX ADMIN — Medication 10 ML: at 21:47

## 2021-05-19 RX ADMIN — ATORVASTATIN CALCIUM 10 MG: 10 TABLET, FILM COATED ORAL at 21:46

## 2021-05-19 RX ADMIN — OXYCODONE HYDROCHLORIDE AND ACETAMINOPHEN 500 MG: 500 TABLET ORAL at 08:40

## 2021-05-19 NOTE — PROGRESS NOTES
Problem: Falls - Risk of  Goal: *Absence of Falls  Description: Document Henrry Logn Fall Risk and appropriate interventions in the flowsheet.   Outcome: Progressing Towards Goal  Note: Fall Risk Interventions:  Mobility Interventions: Patient to call before getting OOB         Medication Interventions: Patient to call before getting OOB    Elimination Interventions: Patient to call for help with toileting needs              Problem: Patient Education: Go to Patient Education Activity  Goal: Patient/Family Education  Outcome: Progressing Towards Goal

## 2021-05-19 NOTE — PROGRESS NOTES
Problem: Mobility Impaired (Adult and Pediatric)  Goal: *Acute Goals and Plan of Care (Insert Text)  Description: FUNCTIONAL STATUS PRIOR TO ADMISSION: Patient was independent and active without use of DME.    HOME SUPPORT PRIOR TO ADMISSION: The patient lived with  but did not require assist. Pt   works during the day    Physical Therapy Goals  Initiated 5/19/2021  1. Patient will move from supine to sit and sit to supine , scoot up and down, and roll side to side in bed with supervision/set-up within 7 day(s). 2.  Patient will transfer from bed to chair and chair to bed with supervision/set-up using the least restrictive device within 7 day(s). 3.  Patient will perform sit to stand with supervision/set-up within 7 day(s). 4.  Patient will ambulate with supervision/set-up for 300 feet with the least restrictive device within 7 day(s). 5.  Patient will ascend/descend 6 stairs with 1 handrail(s) with minimal assistance/contact guard assist within 7 day(s). 5/19/2021 1557 by Jocelyne Dubon, PT  Outcome: Progressing Towards Goal  5/19/2021 1341 by Jocelyne Dubon, PT  Outcome: Progressing Towards Goal     PHYSICAL THERAPY TREATMENT  Patient: Cesar Chinchilla (72 y.o. female)  Date: 5/19/2021  Diagnosis: Bulging of lumbar intervertebral disc [M51.26] Bulging of lumbar intervertebral disc  Procedure(s) (LRB):  L5-S1 LAMINECTOMY AND DISCECTOMY WITH INSTRUMENTATED FUSION, MEDTRONIC PLIF, PEDICLE SCREWS (O-ARM) (URGENT) (N/A)    Precautions:   No bending, no lifting greater than 5 lbs, no twisting, log-roll technique, repositioning every 20-30 min except when sleeping, brace when OOB (if ordered)  Chart, physical therapy assessment, plan of care and goals were reviewed. ASSESSMENT  Patient continues with skilled PT services and is progressing towards goals. Pt demos improved tolerance for gait, requires increased postural cues and improved weight bearing through feet.  Pt becomes more hip flexed/trunk ext during gait pattern and increased pain reported however patient appears controlled. Pt returned to sitting EOB and instructed in log rolling down into supine. Will continue to follow at this time. Current Level of Function Impacting Discharge (mobility/balance): min A    Other factors to consider for discharge:          PLAN :  Patient continues to benefit from skilled intervention to address the above impairments. Continue treatment per established plan of care. to address goals. Recommendation for discharge: (in order for the patient to meet his/her long term goals)  Physical therapy at least 2 days/week in the home     This discharge recommendation:  Has been made in collaboration with the attending provider and/or case management    IF patient discharges home will need the following DME: to be determined (TBD)       SUBJECTIVE:   Patient stated I am hurting so bad.     OBJECTIVE DATA SUMMARY:   Critical Behavior:  Neurologic State: Alert  Orientation Level: Oriented X4  Cognition: Appropriate decision making  Safety/Judgement: Awareness of environment    Spinal diagnosis intervention:  The patient stated 3/3 back precautions when prompted. Reviewed all 3 back precautions, log roll technique, and sitting for 30 minutes at a time. The patient required min cues to maintain back precautions during functional activity. Reviewed back brace application and wear schedule. Brace donned with supervision/set-up      Functional Mobility Training:    Bed Mobility:  Log Rolling: Minimum assistance  Supine to Sit: Minimum assistance  Sit to Supine: Minimum assistance  Scooting: Minimum assistance        Transfers:  Sit to Stand: Supervision;Contact guard assistance  Stand to Sit: Supervision;Contact guard assistance        Bed to Chair: Supervision;Contact guard assistance                    Balance:  Sitting: Intact  Standing: Impaired; Without support  Standing - Static: Fair;Good;Constant support  Standing - Dynamic : Fair;Good;Constant support  Ambulation/Gait Training:  Distance (ft): 50 Feet (ft)  Assistive Device: Gait belt;Walker, rolling  Ambulation - Level of Assistance: Contact guard assistance;Minimal assistance     Gait Description (WDL): Exceptions to WDL  Gait Abnormalities: Decreased step clearance;Shuffling gait        Base of Support: Widened     Speed/Leonie: Shuffled; Slow  Step Length: Left shortened;Right shortened      Pain Rating:  Controlled, c/o high pain levels    Activity Tolerance:   Good, Fair, and requires rest breaks    After treatment patient left in no apparent distress:   Supine in bed and Call bell within reach    COMMUNICATION/COLLABORATION:   The patients plan of care was discussed with: Registered nurse and Case management.      Zina Buckner, PT   Time Calculation: 18 mins

## 2021-05-19 NOTE — PROGRESS NOTES
Problem: Self Care Deficits Care Plan (Adult)  Goal: *Acute Goals and Plan of Care (Insert Text)  Description: FUNCTIONAL STATUS PRIOR TO ADMISSION: Patient was independent and active without use of DME.    HOME SUPPORT: The patient lived with spouse but did not require assist.    Occupational Therapy Goals  Initiated 5/19/2021    1. Patient will perform lower body dressing with modified independence using AE PRN within 4 days. 2.  Patient will perform toileting with modified independence using most appropriate DME within 4 days. 3.  Patient will bathing at modified independence within 4 days. 4.  Patient will verbalize/demonstrate 3/3 back precautions during ADL tasks without cues within 4 days. 5/19/2021 1451 by Dayle Mcburney, OTR/L  Outcome: Progressing Towards Goal  OCCUPATIONAL THERAPY EVALUATION  Patient: Hira Babcock (72 y.o. female)  Date: 5/19/2021  Primary Diagnosis: Bulging of lumbar intervertebral disc [M51.26]  Procedure(s) (LRB):  L5-S1 LAMINECTOMY AND DISCECTOMY WITH INSTRUMENTATED FUSION, MEDTRONIC PLIF, PEDICLE SCREWS (O-ARM) (URGENT) (N/A)     Precautions: Back       ASSESSMENT  Based on the objective data described below, the patient presents with at SBA to set-up level with mobility and self-care. Pt demonstrates and verbalizes understanding with how to protect her back with self-care tasks. Pt to have surgery this Friday. Will con't to follow and re-assess s/p surgery. Pt has a very supportive  who will assist at discharge. Will con't to follow and address listed goals and continue with further education. Current Level of Function Impacting Discharge (ADLs/self-care): Functional Outcome Measure: The patient scored 80/100 on the Barthel outcome measure. Other factors to consider for discharge: surgery 5/21/22     Patient will benefit from skilled therapy intervention to address the above noted impairments.        PLAN :  Recommendations and Planned Interventions: self care training, functional mobility training, therapeutic activities, patient education, and home safety training    Frequency/Duration: Patient will be followed by occupational therapy 3 times a week to address goals. Recommendation for discharge: (in order for the patient to meet his/her long term goals)  Occupational therapy at least 2 days/week in the home     This discharge recommendation:  A follow-up discussion with the attending provider and/or case management is planned    IF patient discharges home will need the following DME: RW       SUBJECTIVE:   Patient stated I use to be active.     OBJECTIVE DATA SUMMARY:   HISTORY:   Past Medical History:   Diagnosis Date    Acid reflux     Colitis 04/16/2015    Colitis     Diabetes mellitus, type II ([facility] [address]     Diverticulosis of colon 04/20/2015    Hyperlipemia     Hypertension     Osteopenia     of the spine Dexa done 2014, 2017 and due 2019    RA (rheumatoid arthritis) ([facility] [address]      Past Surgical History:   Procedure Laterality Date    HX COLONOSCOPY  04/20/2015    Due 2023    HX GYN      HX HYSTERECTOMY      HX TOTAL ABDOMINAL HYSTERECTOMY      CO ABDOMEN SURGERY PROC UNLISTED      CO BIOPSY OF BREAST, NEEDLE CORE  2017    benign findings - Fibrosis       Expanded or extensive additional review of patient history:     Home Situation  Home Environment: Private residence  # Steps to Enter: 3  Rails to Enter: Yes  Hand Rails : Right  One/Two Story Residence: Two story  # of Interior Steps: 12 (6+6)  Interior Rails: Left (left for first 6, R for second 6)  Living Alone: No  Support Systems: Spouse/Significant Other/Partner  Patient Expects to be Discharged to[de-identified] Private residence  Current DME Used/Available at Home: None  Tub or Shower Type: Shower (3 inch lip)    EXAMINATION OF PERFORMANCE DEFICITS:  Cognitive/Behavioral Status:  Neurologic State: Alert  Orientation Level: Oriented X4  Cognition: Appropriate decision making        Safety/Judgement: Awareness of environment    Skin: intact    Edema: none    Hearing:       Vision/Perceptual:                                     Range of Motion:    AROM: Within functional limits  PROM: Generally decreased, functional                      Strength:    Strength: Within functional limits                Coordination:  Coordination: Within functional limits  Fine Motor Skills-Upper: Left Intact; Right Intact    Gross Motor Skills-Upper: Left Intact; Right Intact    Tone & Sensation:    Tone: Normal                         Balance:  Sitting: Intact  Standing: Impaired; Without support  Standing - Static: Fair;Constant support  Standing - Dynamic : Fair;Constant support    Functional Mobility and Transfers for ADLs:  Bed Mobility:  Rolling: Minimum assistance  Supine to Sit: Minimum assistance  Scooting: Minimum assistance    Transfers:  Sit to Stand: Supervision;Contact guard assistance  Stand to Sit: Supervision;Contact guard assistance  Bed to Chair: Supervision;Contact guard assistance  Bathroom Mobility: Supervision/set up;Contact guard assistance  Toilet Transfer : Supervision;Contact guard assistance    ADL Assessment:  Feeding: Independent    Oral Facial Hygiene/Grooming: Independent    Bathing: Stand-by assistance    Upper Body Dressing: Setup    Lower Body Dressing: Setup    Toileting: Contact guard assistance                ADL Intervention and task modifications:            Cognitive Retraining  Safety/Judgement: Awareness of environment    Therapeutic Exercise:     Functional Measure:  Barthel Index:    Bathin  Bladder: 10  Bowels: 10  Groomin  Dressing: 10  Feeding: 10  Mobility: 10  Stairs: 0  Toilet Use: 10  Transfer (Bed to Chair and Back): 10  Total: 80/100        The Barthel ADL Index: Guidelines  1. The index should be used as a record of what a patient does, not as a record of what a patient could do.   2. The main aim is to establish degree of independence from any help, physical or verbal, however minor and for whatever reason. 3. The need for supervision renders the patient not independent. 4. A patient's performance should be established using the best available evidence. Asking the patient, friends/relatives and nurses are the usual sources, but direct observation and common sense are also important. However direct testing is not needed. 5. Usually the patient's performance over the preceding 24-48 hours is important, but occasionally longer periods will be relevant. 6. Middle categories imply that the patient supplies over 50 per cent of the effort. 7. Use of aids to be independent is allowed. Kay Barnett., BarthelELISA. (3589). Functional evaluation: the Barthel Index. [address] [facility] (14)2. Jonny Payan ga GÓMEZ CisnerosF, Ernestina Moreno., Drew Breen., [place], [address] (1999). Measuring the change indisability after inpatient rehabilitation; comparison of the responsiveness of the Barthel Index and Functional Raymond Measure. Journal of Neurology, Neurosurgery, and Psychiatry, 66(4), 284-474. William White, [place]ISRA[place], FARIDEH Perry, & Janneth Dacosta, MDouglasA. (2004.) Assessment of post-stroke quality of life in cost-effectiveness studies: The usefulness of the Barthel Index and the EuroQoL-5D.  Quality of Life Research, 15, 786-39        Occupational Therapy Evaluation Charge Determination   History Examination Decision-Making   LOW Complexity : Brief history review  LOW Complexity : 1-3 performance deficits relating to physical, cognitive , or psychosocial skils that result in activity limitations and / or participation restrictions  LOW Complexity : No comorbidities that affect functional and no verbal or physical assistance needed to complete eval tasks       Based on the above components, the patient evaluation is determined to be of the following complexity level: LOW   Pain Rating:  C/o of hip discomfort    Activity Tolerance:   Good    After treatment patient left in no apparent distress: Sitting in chair and Call bell within reach    COMMUNICATION/EDUCATION:   The patients plan of care was discussed with: Physical therapist.     Home safety education was provided and the patient/caregiver indicated understanding., Patient/family have participated as able in goal setting and plan of care. , and Patient/family agree to work toward stated goals and plan of care. This patients plan of care is appropriate for delegation to [facility].     Thank you for this referral.  Ray Sun, OTR/L  Time Calculation: 43 mins

## 2021-05-19 NOTE — PROGRESS NOTES
Spine Surgery Progress Note  Salbador Pena PA-C    Admit Date: 5/17/2021   LOS: 2 days      Daily Progress Note: 5/19/2021    HPI: Ms. Philippe Allen is a very pleasant 52-year-old woman with a past medical history significant for type 2 diabetes, hypertension, dyslipidemia, rheumatoid arthritis She was in her usual state of health until few days ago when the patient developed back pain. Prior to this, patient was very active around the house. Her pain is located at the lower back on the left side, constant sharp pain with radiation to the left leg. No known aggravating factors. The patient was taken to [facility] ER on Saturday for evaluation of the low back pain. The patient was discharged home on tapering dose of prednisone and pain medication, which did not provide any significant relief. The patient was brought to the emergency room here at [facility] for further evaluation. The patient denies urinary or bowel incontinence as a result of the low back pain. No history of recent trauma to the back. The patient stated that the low back pain is 10/10 in severity. When the patient arrived at the emergency room, CT scan of the lumbar spine was obtained. The CT scan shows findings concerning for bulging of lumbar intervertebral disk. The emergency room physician consulted neurosurgeon on-call who advised MRI and admission to the hospital.  The patient was then referred to the hospitalist service for that purpose. MRI revealed \"Left paracentral extrusion and probable free disc fragment at L5-S1 extending cranially with significant narrowing of the left lateral recess posterior to L5 and left subarticular zone\". Xray revealed \"Grade 1 anterolisthesis of L5 that increases with flexion\". Patient is on Arava and Plaquenil for RA. Dr. Misa Duong is her rheumatologist.    Subjective:     Patient states her pain is somewhat improved from yesterday since started steroids. Left leg continues to be weak. Patient denies numbness, chest pain, nausea, vomiting, difficulty swallowing, headache, and dyspnea. She is resting comfortably in bed. Objective:     Vital signs  VSS Afebrile. Temp (24hrs), Av.2 °F (36.8 °C), Min:97.8 °F (36.6 °C), Max:98.4 °F (36.9 °C)   No intake/output data recorded. No intake/output data recorded. Visit Vitals  BP (!) 175/80   Pulse 62   Temp 97.8 °F (36.6 °C)   Resp 18   Ht 5' 4\" (1.626 m)   Wt 67.1 kg (148 lb)   SpO2 94%   BMI 25.40 kg/m²      O2 Device: None (Room air)     Pain control  Pain Assessment  Pain Scale 1: Numeric (0 - 10)  Pain Intensity 1: 1  Pain Location 1: Back  Pain Orientation 1: Left, Lower  Pain Description 1: Aching  Pain Intervention(s) 1: Medication (see MAR)    Physical Exam:  Gen: No acute distress. Neuro: A&Ox3. Follows commands. Speech clear. Affect normal.  PERRL. EOMI. Face symmetric. MEDINA spontaneously. Strength 5/5 in BUE and RLE. Strength 4/5 left hip, 3/5 left knee and ankle DF/PF, 2/5 left EHL. Sensation intact to sharp and dull touch. Gait deferred. Calves soft and supple;  No pain with passive stretch  Skin: warm, dry  Abd: soft, nontender    24 hour results:    Recent Results (from the past 24 hour(s))   GLUCOSE, POC    Collection Time: 21 11:08 AM   Result Value Ref Range    Glucose (POC) 137 (H) 65 - 117 mg/dL    Performed by Elsi Linton    GLUCOSE, POC    Collection Time: 21  4:19 PM   Result Value Ref Range    Glucose (POC) 244 (H) 65 - 117 mg/dL    Performed by Elsi Linton    GLUCOSE, POC    Collection Time: 21  9:18 PM   Result Value Ref Range    Glucose (POC) 304 (H) 65 - 117 mg/dL    Performed by Edy Rivers    GLUCOSE, POC    Collection Time: 21  5:53 AM   Result Value Ref Range    Glucose (POC) 221 (H) 65 - 117 mg/dL    Performed by Christine BARLOW (CON)       Assessment:     Principal Problem:    Bulging of lumbar intervertebral disc (2021)      Plan:     1) L5-S1 HNP   -Decompression and fusion surgery likely Friday   -Remain inpt for pain control and safety reasons until then   -Decadron 4mg q8hr  -PT/OT evals for baseline    -Pain control - scheduled gabapentin, prn tylenol, percocet   -Diet - diabetic until midnight Thursday night   -Preop labs/consent/rapid covid   2) T2DM   -SSI   -Diabetic diet  3) HTN   -Daily lisinopril  4) RA   -Hold meds for surgery  5) Dyslipidemia   -Daily lipitor  6) HANNA   -Continue IVF   -Repeat labs   -Hospitalist following    Plan d/w Dr. Daisy Stout, PA

## 2021-05-19 NOTE — PROGRESS NOTES
[address] [facility]  Hospitalist Group                                                                                          Hospitalist Progress Note  Nixon Da Silva MD  Answering service: [phone] from in house phone        Date of Service:  2021  NAME:  Karolina Velasquez  :  1950  MRN:  [id number]      Admission Summary:   Per H and P \"72year-old woman with a past medical history significant for type 2 diabetes, hypertension, dyslipidemia, rheumatoid arthritis, was in her usual state of health until few days ago when the patient developed back pain. The pain is located at the lower back on the left side, constant sharp pain with radiation to the left leg. No known aggravating factors'. Interval history / Subjective:   Patient seen and examined. States that she worked with therapist, pain better on current analgesic regimen     Assessment & Plan:     # Herniated nucleus pulposus L5-S1  -MRI L spine-Left paracentral extrusion and probable free disc fragment at L5-S1 extending  cranially with significant narrowing of the left lateral recess posterior to L5  and left subarticular zone  -NSGY consulted, plan for surgery on friday  -pain management-on decadron,gabapentin,oxycodone and IV 1 mg morphine prn    # HTN:lisinopril    #Diabetes:  -A1c: 5.3, on oral meds at home  - 5 units NPH with decadron, lispro with meal. She will be NPO starting midnight    # Rheumatoid arthritis:  -hold plaquenil,leflunomide for surgery    Code status: full  DVT prophylaxis: scd    Care Plan discussed with: patient,nacho  Anticipated Disposition:home  Anticipated Discharge: likely week end     Hospital Problems  Date Reviewed: 2021        Codes Class Noted POA    * (Principal) Bulging of lumbar intervertebral disc ICD-10-CM: M51.26  ICD-9-CM: 722.10  2021 Yes                Review of Systems:   As per HPI      Vital Signs:    Last 24hrs VS reviewed since prior progress note.  Most recent are:  Visit Vitals  /70   Pulse 90   Temp 98.4 °F (36.9 °C)   Resp 18   Ht 5' 4\" (1.626 m)   Wt 67.1 kg (148 lb)   SpO2 98%   BMI 25.40 kg/m²       No intake or output data in the 24 hours ending 05/19/21 1803     Physical Examination:     I had a face to face encounter with this patient and independently examined them on 5/19/2021 as outlined below:          Constitutional:  No acute distress, cooperative, pleasant    ENT:  Oral mucosa moist, EOMI,anicteric sclera,normal conjunctiva    Resp:  CTA bilaterally. No wheezing/rhonchi/rales. No accessory muscle use   CV:  Regular rhythm, normal rate, S1,S2 wnl    GI:  Soft, non distended, non tender. normoactive bowel sounds    Musculoskeletal:  No LE edema    Neurologic:  alert and oriented X 3, LLE strength 3+/5,RUE,RLE,LUE strength wnl, sensation intact            Data Review:    Review and/or order of clinical lab test  Review and/or order of tests in the medicine section of [facility]      Labs:     Recent Labs     05/18/21 0115 05/17/21  1847   WBC 3.5* 6.5   HGB 12.7 13.9   HCT 37.6 41.6    238     Recent Labs     05/19/21  1035 05/18/21  0115 05/17/21  1847    137 138   K 4.2 4.5 HEMOLYZED,RECOLLECT REQUESTED    107 105   CO2 30 24 29   BUN 27* 27* 26*   CREA 1.17* 1.23* 1.15*   * 333* 142*   CA 9.1 8.9 9.7   MG  --  2.3  --    PHOS 3.3 3.2  --      Recent Labs     05/19/21  1035 05/18/21  0115 05/17/21  1847   ALT  --  20 32   AP  --  47 49   TBILI  --  0.5 0.7   TP  --  7.4 8.6*   ALB 3.7 3.7 4.2   GLOB  --  3.7 4.4*     No results for input(s): INR, PTP, APTT, INREXT, INREXT in the last 72 hours. No results for input(s): FE, TIBC, PSAT, FERR in the last 72 hours. No results found for: FOL, RBCF   No results for input(s): PH, PCO2, PO2 in the last 72 hours.   Recent Labs     05/18/21  0115   TROIQ <0.05     Lab Results   Component Value Date/Time    Cholesterol, total 130 02/09/2021 10:22 AM    HDL Cholesterol 73 02/09/2021 10:22 AM    LDL, calculated 43.2 02/09/2021 10:22 AM    Triglyceride 69 02/09/2021 10:22 AM    CHOL/HDL Ratio 1.8 02/09/2021 10:22 AM     Lab Results   Component Value Date/Time    Glucose (POC) 232 (H) 05/19/2021 04:49 PM    Glucose (POC) 283 (H) 05/19/2021 11:59 AM    Glucose (POC) 221 (H) 05/19/2021 05:53 AM    Glucose (POC) 304 (H) 05/18/2021 09:18 PM    Glucose (POC) 244 (H) 05/18/2021 04:19 PM     Lab Results   Component Value Date/Time    Color YELLOW/STRAW 05/17/2021 06:47 PM    Appearance CLEAR 05/17/2021 06:47 PM    Specific gravity 1.023 05/17/2021 06:47 PM    Specific gravity 1.010 04/16/2015 04:13 AM    pH (UA) 5.5 05/17/2021 06:47 PM    Protein Negative 05/17/2021 06:47 PM    Glucose >1,000 (A) 05/17/2021 06:47 PM    Ketone Negative 05/17/2021 06:47 PM    Bilirubin Negative 05/17/2021 06:47 PM    Urobilinogen 0.2 05/17/2021 06:47 PM    Nitrites Negative 05/17/2021 06:47 PM    Leukocyte Esterase Negative 05/17/2021 06:47 PM    Epithelial cells FEW 05/17/2021 06:47 PM    Bacteria 1+ (A) 05/17/2021 06:47 PM    WBC 0-4 05/17/2021 06:47 PM    RBC 0-5 05/17/2021 06:47 PM         Medications Reviewed:     Current Facility-Administered Medications   Medication Dose Route Frequency    hydrALAZINE (APRESOLINE) 20 mg/mL injection 10 mg  10 mg IntraVENous Q6H PRN    dexamethasone (DECADRON) 4 mg/mL injection 4 mg  4 mg IntraVENous Q8H    morphine injection 1 mg  1 mg IntraVENous Q6H PRN    insulin NPH (NOVOLIN N, HUMULIN N) injection 5 Units  5 Units SubCUTAneous ACB&D    gabapentin (NEURONTIN) capsule 300 mg  300 mg Oral TID    ascorbic acid (vitamin C) (VITAMIN C) tablet 500 mg  500 mg Oral DAILY    atorvastatin (LIPITOR) tablet 10 mg  10 mg Oral QHS    calcium carbonate (TUMS) chewable tablet 200 mg [elemental]  200 mg Oral DAILY    cyanocobalamin (VITAMIN B12) tablet 1,000 mcg  1,000 mcg Oral DAILY    [Held by provider] hydrOXYchloroQUINE (PLAQUENIL) tablet 300 mg  300 mg Oral DAILY WITH BREAKFAST  [Held by provider] leflunomide (ARAVA) tablet 20 mg  20 mg Oral DAILY    lisinopriL (PRINIVIL, ZESTRIL) tablet 5 mg  5 mg Oral DAILY    sodium chloride (NS) flush 5-40 mL  5-40 mL IntraVENous Q8H    sodium chloride (NS) flush 5-40 mL  5-40 mL IntraVENous PRN    acetaminophen (TYLENOL) tablet 650 mg  650 mg Oral Q6H PRN    Or    acetaminophen (TYLENOL) suppository 650 mg  650 mg Rectal Q6H PRN    polyethylene glycol (MIRALAX) packet 17 g  17 g Oral DAILY PRN    promethazine (PHENERGAN) tablet 12.5 mg  12.5 mg Oral Q6H PRN    Or    ondansetron (ZOFRAN) injection 4 mg  4 mg IntraVENous Q6H PRN    [Held by provider] heparin (porcine) injection 5,000 Units  5,000 Units SubCUTAneous Q8H    oxyCODONE-acetaminophen (PERCOCET) 5-325 mg per tablet 1 Tab  1 Tablet Oral Q4H PRN    insulin lispro (HUMALOG) injection   SubCUTAneous AC&HS    glucose chewable tablet 16 g  4 Tablet Oral PRN    dextrose (D50W) injection syrg 12.5-25 g  12.5-25 g IntraVENous PRN    glucagon (GLUCAGEN) injection 1 mg  1 mg IntraMUSCular PRN     ______________________________________________________________________  EXPECTED LENGTH OF STAY: 2d 21h  ACTUAL LENGTH OF STAY:          2                 Ken Stevens MD

## 2021-05-19 NOTE — PROGRESS NOTES
Problem: Mobility Impaired (Adult and Pediatric)  Goal: *Acute Goals and Plan of Care (Insert Text)  Description: FUNCTIONAL STATUS PRIOR TO ADMISSION: Patient was independent and active without use of DME.    HOME SUPPORT PRIOR TO ADMISSION: The patient lived with  but did not require assist. Pt   works during the day    Physical Therapy Goals  Initiated 5/19/2021  1. Patient will move from supine to sit and sit to supine , scoot up and down, and roll side to side in bed with supervision/set-up within 7 day(s). 2.  Patient will transfer from bed to chair and chair to bed with supervision/set-up using the least restrictive device within 7 day(s). 3.  Patient will perform sit to stand with supervision/set-up within 7 day(s). 4.  Patient will ambulate with supervision/set-up for 300 feet with the least restrictive device within 7 day(s). 5.  Patient will ascend/descend 6 stairs with 1 handrail(s) with minimal assistance/contact guard assist within 7 day(s). 5/19/2021 1347 by Renetta Olivera PT  Outcome: Progressing Towards Goal    PHYSICAL THERAPY EVALUATION  Patient: Tg Kim (72 y.o. female)  Date: 5/19/2021  Primary Diagnosis: Bulging of lumbar intervertebral disc [M51.26]  Procedure(s) (LRB):  L5-S1 LAMINECTOMY AND DISCECTOMY WITH INSTRUMENTATED FUSION, MEDTRONIC PLIF, PEDICLE SCREWS (O-ARM) (URGENT) (N/A)     Precautions: fall         ASSESSMENT  Based on the objective data described below, the patient presents with impaired trunk ROM, spinal precautions, L>R LE pain, balance, weakness and gait dysfunction/intolerance causing limited independence with mobility s/p recent L5-S1 lami discectomy with fusion POD #1. Pt PLOF: independent with ADLs and IADLs. Patient lives with  in two story home, 3 steps to enter, B rails.    Pt received in bed, min A for log rolling instruction with poor completion of rolling prior to sittting up, sits with shoulder elevation and flexion and increased fear, participates in standing with excessive overpressure at hands and forward flex posture, ambulates with tiny poor and low-functional steps. Pt sat back down and educated on rationale for using hands for balance only and weight through feet. Pt tolerates much improved gait though continues to have increased pain levels. Pt transfers back to bed, then from bed to chair. Positioned well with increased back support. Reviewed back precautions, sitting limit of 30-45 minutes, positioning in chair, and progress. Pt is not cleared for discharge from Physical Therapy standpoint at this time, recommend HHPT. Will benefit from therapy in acute setting, anticipate will improve tolerance quickly with improved pain control. Current Level of Function Impacting Discharge (mobility/balance): min A and strong encouragement at times, some self limitation noted that improves with confidence    Functional Outcome Measure: The patient scored Total: 50/100 on the Barthel Index which is indicative of moderate impaired ability to care for basic self needs/dependency on others. Other factors to consider for discharge:      Patient will benefit from skilled therapy intervention to address the above noted impairments. PLAN :  Recommendations and Planned Interventions: bed mobility training, transfer training, gait training, therapeutic exercises, neuromuscular re-education, patient and family training/education, and therapeutic activities      Frequency/Duration: Patient will be followed by physical therapy:  twice daily to address goals.     Recommendation for discharge: (in order for the patient to meet his/her long term goals)  Physical therapy at least 2 days/week in the home     This discharge recommendation:  Has been made in collaboration with the attending provider and/or case management    IF patient discharges home will need the following DME: rolling walker         SUBJECTIVE:   Patient stated I am hurting so bad, do you think I need rehab??    OBJECTIVE DATA SUMMARY:   HISTORY:    Past Medical History:   Diagnosis Date    Acid reflux     Colitis 04/16/2015    Colitis     Diabetes mellitus, type II ([facility] [address]     Diverticulosis of colon 04/20/2015    Hyperlipemia     Hypertension     Osteopenia     of the spine Dexa done 2014, 2017 and due 2019    RA (rheumatoid arthritis) ([facility] [address]      Past Surgical History:   Procedure Laterality Date    HX COLONOSCOPY  04/20/2015    Due 2023    HX GYN      HX HYSTERECTOMY      HX TOTAL ABDOMINAL HYSTERECTOMY      IA ABDOMEN SURGERY PROC UNLISTED      IA BIOPSY OF BREAST, NEEDLE CORE  2017    benign findings - Fibrosis       Personal factors and/or comorbidities impacting plan of care:     Home Situation  Home Environment: Private residence  # Steps to Enter: 3  Rails to Enter: Yes  Hand Rails : Right  One/Two Story Residence: Two story  # of Interior Steps: 12 (6+6)  Interior Rails: Left (left for first 6, R for second 6)  Living Alone: No  Support Systems: Spouse/Significant Other/Partner  Patient Expects to be Discharged to[de-identified] Private residence  Current DME Used/Available at Home: None  Tub or Shower Type: Shower (3 inch lip)    EXAMINATION/PRESENTATION/DECISION MAKING:   Critical Behavior:              Hearing:     Skin:  intact  Edema: none  Range Of Motion:  AROM: Generally decreased, functional           PROM: Generally decreased, functional           Strength:    Strength: Generally decreased, functional (intact but sore, no foot drop L)                    Tone & Sensation:   Tone: Normal                              Coordination:  Coordination: Within functional limits  Vision:      Functional Mobility:  Bed Mobility:  Rolling: Minimum assistance  Supine to Sit: Minimum assistance     Scooting: Minimum assistance  Transfers:  Sit to Stand: Supervision;Contact guard assistance  Stand to Sit: Supervision;Contact guard assistance        Bed to Chair: Supervision;Contact guard assistance              Balance:   Sitting: Intact  Standing: Impaired; Without support  Standing - Static: Fair;Constant support  Standing - Dynamic : Fair;Constant support  Ambulation/Gait Training:  Distance (ft): 30 Feet (ft) (10+30+15)  Assistive Device: Gait belt;Walker, rolling  Ambulation - Level of Assistance: Contact guard assistance;Minimal assistance     Gait Description (WDL): Exceptions to WDL  Gait Abnormalities: Decreased step clearance;Shuffling gait        Base of Support: Widened     Speed/Leonie: Shuffled; Slow  Step Length: Right shortened;Left shortened           Functional Measure:  Barthel Index:    Bathin  Bladder: 10  Bowels: 10  Groomin  Dressin  Feeding: 10  Mobility: 0  Stairs: 0  Toilet Use: 5  Transfer (Bed to Chair and Back): 10  Total: 50/100       The Barthel ADL Index: Guidelines  1. The index should be used as a record of what a patient does, not as a record of what a patient could do. 2. The main aim is to establish degree of independence from any help, physical or verbal, however minor and for whatever reason. 3. The need for supervision renders the patient not independent. 4. A patient's performance should be established using the best available evidence. Asking the patient, friends/relatives and nurses are the usual sources, but direct observation and common sense are also important. However direct testing is not needed. 5. Usually the patient's performance over the preceding 24-48 hours is important, but occasionally longer periods will be relevant. 6. Middle categories imply that the patient supplies over 50 per cent of the effort. 7. Use of aids to be independent is allowed. Brigette Domínguez., Barthel, D.W. (1711). Functional evaluation: the Barthel Index. [address] [facility] (14)2. VIANEY Mcclure, Shawn Delvalleh., Danette Araujodict.[facility], [address] [place], [place] [address] ().  Measuring the change indisability after inpatient rehabilitation; comparison of the responsiveness of the Barthel Index and Functional Bath Measure. Journal of Neurology, Neurosurgery, and Psychiatry, 66(4), 229-975. EMANI Garcia.ISRAEL, FARIDEH Perry, & Meghna Buchanan M.A. (2004.) Assessment of post-stroke quality of life in cost-effectiveness studies: The usefulness of the Barthel Index and the EuroQoL-5D. Quality of Life Research, 15, 018-24        Physical Therapy Evaluation Charge Determination   History Examination Presentation Decision-Making   HIGH Complexity :3+ comorbidities / personal factors will impact the outcome/ POC  HIGH Complexity : 4+ Standardized tests and measures addressing body structure, function, activity limitation and / or participation in recreation  LOW Complexity : Stable, uncomplicated  Other outcome measures barthel  MEDIUM      Based on the above components, the patient evaluation is determined to be of the following complexity level: LOW     Pain Ratin/10 at rest, 9/10 with movement. Pt pain appears stable with use of FLACC objective measurement pain scale:   Face 1, Legs 0, Activity 0, Cry 1, Consolability 1 for a score of 3/10    Face  0 No particular expression or smile 1 Occasional grimace or frown, withdrawn, uninterested 2 Frequent to constant quivering chin, clenched jaw  Legs  0 Normal position or relaxed  1 Uneasy, restless, tense    2 Kicking, or legs drawn up  Activity  0 Lying quietly/normally, moves easily 1 Squirming, shifting, back and forth, tense  2 Arched, rigid or jerking  Cry  0 No cry (awake or asleep)  1 Moans or whimpers; occasional complaint   2 Crying steadily, screams or sobs, frequent complaints  Consolability  0 Content, relaxed   1 Reassured by touching, being talked to, distractible 2 Difficult to console or comfort      Activity Tolerance:   Good, Fair, and requires rest breaks    After treatment patient left in no apparent distress:   Sitting in chair and Call bell within reach    COMMUNICATION/EDUCATION: The patients plan of care was discussed with: Registered nurse and Case management. Fall prevention education was provided and the patient/caregiver indicated understanding. and Patient/family have participated as able in goal setting and plan of care.     Thank you for this referral.  Mercy Buckner, PT   Time Calculation: 32 mins

## 2021-05-19 NOTE — CONSULTS
[address]    Name:  Davi Gage  MR#:  [id number]  :  1950  ACCOUNT #:  [de-identified]  DATE OF SERVICE:  2021      REASON FOR CONSULTATION:  Severe left-sided radiculopathy with left leg weakness. HISTORY OF PRESENT ILLNESS:  The patient is a 20-year-old patient with rheumatoid arthritis who developed over a week of increasing left leg sciatica, inability to stand up straight or walk. She went to the emergency room at [facility], was discharged home on oral steroids. She came in with similar complaints. Her family brought her in nonambulatory, excruciating pain in her left leg, and weakness in her left foot. She underwent MRI, which shows a grade I spondylolisthesis at L5-S1 with an upward left herniated disk compressing the left L5 nerve root. We obtained upright flexion and extension x-rays, and she increased subluxes to about 6 mm. She is not having any right leg symptoms. Normally, she is quite active. She is usually treated with Plaquenil and I believe methotrexate. Otherwise, medically she is relatively stable. PAST MEDICAL HISTORY:  1. Rheumatoid arthritis. 2.  Hyperlipidemia. 3.  Non-insulin-dependent diabetes. ALLERGIES:  NO KNOWN DRUG ALLERGIES. SOCIAL HISTORY:  Good family support. She is , lives with her family. Does not drink or smoke. REVIEW OF SYMPTOMS:  As above. PHYSICAL EXAMINATION:  NEUROLOGIC:  She is a thin female lying in bed. She has significant pain with straightening her leg. Her motor exam shows 4/5 weakness on the left foot, the gastrocnemius. Her extensor hallucis longus on the left is 1-2/5. She has got a footdrop on the left. Marked pain with movement. Some numbness in the top of the foot. The right leg motor exam is intact. ABDOMEN:  Soft. IMAGING:  As above.     IMPRESSION:  The patient has lumbar degenerative instability at L5-S1 with a herniated disk, significant neurologic deficit, intractable pain. I think she is going to need decompression and stabilization. I talked to her about this. We will work on scheduling her for surgery. At this point, she is not able to stand and ambulate well on her own.       MD FRANKY [email or web address]/[email or web address]  D:  05/19/2021 12:29  T:  05/19/2021 14:23  JOB #:  [id number]

## 2021-05-20 LAB
ABO + RH BLD: NORMAL
ALBUMIN SERPL-MCNC: 3.5 G/DL (ref 3.5–5)
ANION GAP SERPL CALC-SCNC: 7 MMOL/L (ref 5–15)
ANION GAP SERPL CALC-SCNC: 7 MMOL/L (ref 5–15)
BLOOD GROUP ANTIBODIES SERPL: NORMAL
BUN SERPL-MCNC: 33 MG/DL (ref 6–20)
BUN SERPL-MCNC: 33 MG/DL (ref 6–20)
BUN/CREAT SERPL: 29 (ref 12–20)
BUN/CREAT SERPL: 31 (ref 12–20)
CALCIUM SERPL-MCNC: 9.2 MG/DL (ref 8.5–10.1)
CALCIUM SERPL-MCNC: 9.2 MG/DL (ref 8.5–10.1)
CHLORIDE SERPL-SCNC: 103 MMOL/L (ref 97–108)
CHLORIDE SERPL-SCNC: 103 MMOL/L (ref 97–108)
CO2 SERPL-SCNC: 25 MMOL/L (ref 21–32)
CO2 SERPL-SCNC: 27 MMOL/L (ref 21–32)
COVID-19 RAPID TEST, COVR: NOT DETECTED
CREAT SERPL-MCNC: 1.08 MG/DL (ref 0.55–1.02)
CREAT SERPL-MCNC: 1.15 MG/DL (ref 0.55–1.02)
ERYTHROCYTE [DISTWIDTH] IN BLOOD BY AUTOMATED COUNT: 11.3 % (ref 11.5–14.5)
GLUCOSE BLD STRIP.AUTO-MCNC: 204 MG/DL (ref 65–117)
GLUCOSE BLD STRIP.AUTO-MCNC: 264 MG/DL (ref 65–117)
GLUCOSE SERPL-MCNC: 235 MG/DL (ref 65–100)
GLUCOSE SERPL-MCNC: 235 MG/DL (ref 65–100)
HCT VFR BLD AUTO: 36 % (ref 35–47)
HGB BLD-MCNC: 12.2 G/DL (ref 11.5–16)
INR PPP: 1 (ref 0.9–1.1)
MCH RBC QN AUTO: 30.3 PG (ref 26–34)
MCHC RBC AUTO-ENTMCNC: 33.9 G/DL (ref 30–36.5)
MCV RBC AUTO: 89.6 FL (ref 80–99)
NRBC # BLD: 0 K/UL (ref 0–0.01)
NRBC BLD-RTO: 0 PER 100 WBC
PHOSPHATE SERPL-MCNC: 3.6 MG/DL (ref 2.6–4.7)
PLATELET # BLD AUTO: 174 K/UL (ref 150–400)
PMV BLD AUTO: 10.9 FL (ref 8.9–12.9)
POTASSIUM SERPL-SCNC: 4.4 MMOL/L (ref 3.5–5.1)
POTASSIUM SERPL-SCNC: 4.4 MMOL/L (ref 3.5–5.1)
PROTHROMBIN TIME: 10.7 SEC (ref 9–11.1)
RBC # BLD AUTO: 4.02 M/UL (ref 3.8–5.2)
SERVICE CMNT-IMP: ABNORMAL
SERVICE CMNT-IMP: ABNORMAL
SODIUM SERPL-SCNC: 135 MMOL/L (ref 136–145)
SODIUM SERPL-SCNC: 137 MMOL/L (ref 136–145)
SOURCE, COVRS: NORMAL
SPECIMEN EXP DATE BLD: NORMAL
WBC # BLD AUTO: 4.6 K/UL (ref 3.6–11)

## 2021-05-20 PROCEDURE — 36415 COLL VENOUS BLD VENIPUNCTURE: CPT

## 2021-05-20 PROCEDURE — 74011636637 HC RX REV CODE- 636/637: Performed by: INTERNAL MEDICINE

## 2021-05-20 PROCEDURE — 74011636637 HC RX REV CODE- 636/637: Performed by: HOSPITALIST

## 2021-05-20 PROCEDURE — 80069 RENAL FUNCTION PANEL: CPT

## 2021-05-20 PROCEDURE — 80048 BASIC METABOLIC PNL TOTAL CA: CPT

## 2021-05-20 PROCEDURE — 65270000029 HC RM PRIVATE

## 2021-05-20 PROCEDURE — 85610 PROTHROMBIN TIME: CPT

## 2021-05-20 PROCEDURE — 74011250637 HC RX REV CODE- 250/637: Performed by: INTERNAL MEDICINE

## 2021-05-20 PROCEDURE — 85027 COMPLETE CBC AUTOMATED: CPT

## 2021-05-20 PROCEDURE — 74011250636 HC RX REV CODE- 250/636: Performed by: PHYSICIAN ASSISTANT

## 2021-05-20 PROCEDURE — 74011250637 HC RX REV CODE- 250/637: Performed by: PHYSICIAN ASSISTANT

## 2021-05-20 PROCEDURE — 86900 BLOOD TYPING SEROLOGIC ABO: CPT

## 2021-05-20 PROCEDURE — 82962 GLUCOSE BLOOD TEST: CPT

## 2021-05-20 PROCEDURE — 87635 SARS-COV-2 COVID-19 AMP PRB: CPT

## 2021-05-20 PROCEDURE — 97116 GAIT TRAINING THERAPY: CPT

## 2021-05-20 RX ORDER — POLYETHYLENE GLYCOL 3350 17 G/17G
17 POWDER, FOR SOLUTION ORAL DAILY
Status: DISCONTINUED | OUTPATIENT
Start: 2021-05-20 | End: 2021-05-24 | Stop reason: HOSPADM

## 2021-05-20 RX ORDER — FACIAL-BODY WIPES
10 EACH TOPICAL DAILY PRN
Status: DISCONTINUED | OUTPATIENT
Start: 2021-05-20 | End: 2021-05-24 | Stop reason: HOSPADM

## 2021-05-20 RX ORDER — HYDRALAZINE HYDROCHLORIDE 20 MG/ML
10 INJECTION INTRAMUSCULAR; INTRAVENOUS
Status: DISCONTINUED | OUTPATIENT
Start: 2021-05-20 | End: 2021-05-24 | Stop reason: HOSPADM

## 2021-05-20 RX ORDER — AMOXICILLIN 250 MG
1 CAPSULE ORAL DAILY
Status: DISCONTINUED | OUTPATIENT
Start: 2021-05-20 | End: 2021-05-24 | Stop reason: HOSPADM

## 2021-05-20 RX ADMIN — DOCUSATE SODIUM 50 MG AND SENNOSIDES 8.6 MG 1 TABLET: 8.6; 5 TABLET, FILM COATED ORAL at 12:27

## 2021-05-20 RX ADMIN — OXYCODONE HYDROCHLORIDE AND ACETAMINOPHEN 1 TABLET: 5; 325 TABLET ORAL at 08:40

## 2021-05-20 RX ADMIN — HUMAN INSULIN 5 UNITS: 100 INJECTION, SUSPENSION SUBCUTANEOUS at 16:48

## 2021-05-20 RX ADMIN — Medication 10 ML: at 06:48

## 2021-05-20 RX ADMIN — HUMAN INSULIN 5 UNITS: 100 INJECTION, SUSPENSION SUBCUTANEOUS at 07:12

## 2021-05-20 RX ADMIN — DEXAMETHASONE SODIUM PHOSPHATE 4 MG: 4 INJECTION, SOLUTION INTRAMUSCULAR; INTRAVENOUS at 14:00

## 2021-05-20 RX ADMIN — OXYCODONE HYDROCHLORIDE AND ACETAMINOPHEN 500 MG: 500 TABLET ORAL at 08:40

## 2021-05-20 RX ADMIN — ATORVASTATIN CALCIUM 10 MG: 10 TABLET, FILM COATED ORAL at 22:38

## 2021-05-20 RX ADMIN — CALCIUM CARBONATE (ANTACID) CHEW TAB 500 MG 200 MG: 500 CHEW TAB at 08:40

## 2021-05-20 RX ADMIN — CYANOCOBALAMIN TAB 500 MCG 1000 MCG: 500 TAB at 08:40

## 2021-05-20 RX ADMIN — DEXAMETHASONE SODIUM PHOSPHATE 4 MG: 4 INJECTION, SOLUTION INTRAMUSCULAR; INTRAVENOUS at 22:38

## 2021-05-20 RX ADMIN — LISINOPRIL 5 MG: 5 TABLET ORAL at 08:40

## 2021-05-20 RX ADMIN — INSULIN LISPRO 4 UNITS: 100 INJECTION, SOLUTION INTRAVENOUS; SUBCUTANEOUS at 22:38

## 2021-05-20 RX ADMIN — DEXAMETHASONE SODIUM PHOSPHATE 4 MG: 4 INJECTION, SOLUTION INTRAMUSCULAR; INTRAVENOUS at 06:46

## 2021-05-20 RX ADMIN — INSULIN LISPRO 5 UNITS: 100 INJECTION, SOLUTION INTRAVENOUS; SUBCUTANEOUS at 12:27

## 2021-05-20 RX ADMIN — GABAPENTIN 300 MG: 300 CAPSULE ORAL at 08:40

## 2021-05-20 RX ADMIN — OXYCODONE HYDROCHLORIDE AND ACETAMINOPHEN 1 TABLET: 5; 325 TABLET ORAL at 16:52

## 2021-05-20 RX ADMIN — Medication 10 ML: at 14:01

## 2021-05-20 RX ADMIN — GABAPENTIN 300 MG: 300 CAPSULE ORAL at 22:38

## 2021-05-20 RX ADMIN — GABAPENTIN 300 MG: 300 CAPSULE ORAL at 16:49

## 2021-05-20 RX ADMIN — INSULIN LISPRO 9 UNITS: 100 INJECTION, SOLUTION INTRAVENOUS; SUBCUTANEOUS at 16:49

## 2021-05-20 RX ADMIN — INSULIN LISPRO 3 UNITS: 100 INJECTION, SOLUTION INTRAVENOUS; SUBCUTANEOUS at 07:11

## 2021-05-20 NOTE — PROGRESS NOTES
[address] [facility] Adult  Hospitalist Group                                                                                          Hospitalist Progress Note  Karolina Oseguera MD  Answering service: [phone] or [phone] from in house phone        Date of Service:  2021  NAME:  Kael Becker  :  1950  MRN:  [id number]      Admission Summary:   Per H and P \"72year-old woman with a past medical history significant for type 2 diabetes, hypertension, dyslipidemia, rheumatoid arthritis, was in her usual state of health until few days ago when the patient developed back pain. The pain is located at the lower back on the left side, constant sharp pain with radiation to the left leg. No known aggravating factors'. Interval history / Subjective: Follow up lumbosacral herniated disc L5S1  Patient seen and examined at the bedside. Labs, images and notes reviewed  Discussed with nursing staff, orders reviewed. Plan discussed with patient/Family  Patient is sitting up in the chair. Pain controlled. Able to ambulate with PT. Pain more aggravated during that but controlled. Denied any tingling or numbness. Counseled about use of I-S. Educated patient about deep breathing exercises with I-S. No other concerns or overnight events.      Assessment & Plan:     # Herniated nucleus pulposus L5-S1  -MRI L spine-Left paracentral extrusion and probable free disc fragment at L5-S1 extending  cranially with significant narrowing of the left lateral recess posterior to L5  and left subarticular zone  -NSGY consulted, plan for surgery on friday,  morning  -pain management-on decadron,gabapentin,oxycodone and IV 1 mg morphine prn  -Incentive spirometry  -Early ambulation and OOB as per neurosurgery team postoperatively  -PT/OT    # HTN: lisinopril  -Hold lisinopril preoperatively tonight  -As needed hydralazine IV for blood pressure control    #Diabetes:  -A1c: 5.3, on oral meds at home  - 5 units NPH with decadron, lispro with meal. She will be NPO starting midnight    # Rheumatoid arthritis:  -hold plaquenil, leflunomide for surgery    Code status: full  DVT prophylaxis: scd    Care Plan discussed with: patient,nuse  Anticipated Disposition:home. Likely need for IPR postoperatively. Follow-up neurosurgery and PT/OT recommendations postoperatively  Anticipated Discharge: likely week end     Hospital Problems  Date Reviewed: 5/17/2021        Codes Class Noted POA    * (Principal) Bulging of lumbar intervertebral disc ICD-10-CM: M51.26  ICD-9-CM: 722.10  5/17/2021 Yes                Review of Systems:   As per HPI      Vital Signs:    Last 24hrs VS reviewed since prior progress note. Most recent are:  Visit Vitals  BP (!) 142/77 (BP 1 Location: Right upper arm, BP Patient Position: At rest)   Pulse 76   Temp 97.8 °F (36.6 °C)   Resp 16   Ht 5' 4\" (1.626 m)   Wt 67.1 kg (148 lb)   SpO2 97%   BMI 25.40 kg/m²       No intake or output data in the 24 hours ending 05/20/21 1934     Physical Examination:     I had a face to face encounter with this patient and independently examined them on 5/20/2021 as outlined below:          Constitutional:  No acute distress, cooperative, pleasant. Alert, O x3. ENT:  Oral mucosa moist, EOMI,anicteric sclera,normal conjunctiva    Resp:  CTA bilaterally. No wheezing/rhonchi/rales. No accessory muscle use   CV:  Regular rhythm, normal rate, S1,S2 wnl    GI:  Soft, non distended, non tender. normoactive bowel sounds    Musculoskeletal:  No LE edema    Neurologic:  alert and oriented X 3, LLE strength 3+/5,RUE,RLE,LUE strength wnl, sensation intact            Data Review:    Review and/or order of clinical lab test  Review and/or order of tests in the medicine section of CPT    XR SPINE LUMB MIN 4 V    Result Date: 5/18/2021  Grade 1 anterolisthesis of L5 that increases with flexion     MRI LUMB SPINE WO CONT    Result Date: 5/18/2021  1.  Left paracentral extrusion and probable free disc fragment at L5-S1 extending cranially with significant narrowing of the left lateral recess posterior to L5 and left subarticular zone      CT SPINE LUMB WO CONT    Result Date: 5/17/2021  Central canal stenosis at L5-S1 secondary to a left paracentral disc protrusion, with compression of the left S1 nerve root Moderate severe left neural foraminal stenosis L5-S1 with compression of the exiting left L5 nerve root       Labs:     Recent Labs     05/20/21  0450 05/18/21  0115   WBC 4.6 3.5*   HGB 12.2 12.7   HCT 36.0 37.6    184     Recent Labs     05/20/21  0450 05/19/21  1035 05/18/21  0115   *  137 136 137   K 4.4  4.4 4.2 4.5     103 101 107   CO2 25  27 30 24   BUN 33*  33* 27* 27*   CREA 1.08*  1.15* 1.17* 1.23*   *  235* 261* 333*   CA 9.2  9.2 9.1 8.9   MG  --   --  2.3   PHOS 3.6 3.3 3.2     Recent Labs     05/20/21  0450 05/19/21  1035 05/18/21  0115   ALT  --   --  20   AP  --   --  47   TBILI  --   --  0.5   TP  --   --  7.4   ALB 3.5 3.7 3.7   GLOB  --   --  3.7     Recent Labs     05/20/21 0450   INR 1.0   PTP 10.7      No results for input(s): FE, TIBC, PSAT, FERR in the last 72 hours. No results found for: FOL, RBCF   No results for input(s): PH, PCO2, PO2 in the last 72 hours.   Recent Labs     05/18/21  0115   TROIQ <0.05     Lab Results   Component Value Date/Time    Cholesterol, total 130 02/09/2021 10:22 AM    HDL Cholesterol 73 02/09/2021 10:22 AM    LDL, calculated 43.2 02/09/2021 10:22 AM    Triglyceride 69 02/09/2021 10:22 AM    CHOL/HDL Ratio 1.8 02/09/2021 10:22 AM     Lab Results   Component Value Date/Time    Glucose (POC) 264 (H) 05/20/2021 11:36 AM    Glucose (POC) 204 (H) 05/20/2021 06:53 AM    Glucose (POC) 296 (H) 05/19/2021 09:04 PM    Glucose (POC) 232 (H) 05/19/2021 04:49 PM    Glucose (POC) 283 (H) 05/19/2021 11:59 AM     Lab Results   Component Value Date/Time    Color YELLOW/STRAW 05/17/2021 06:47 PM    Appearance CLEAR 05/17/2021 06:47 PM Specific gravity 1.023 05/17/2021 06:47 PM    Specific gravity 1.010 04/16/2015 04:13 AM    pH (UA) 5.5 05/17/2021 06:47 PM    Protein Negative 05/17/2021 06:47 PM    Glucose >1,000 (A) 05/17/2021 06:47 PM    Ketone Negative 05/17/2021 06:47 PM    Bilirubin Negative 05/17/2021 06:47 PM    Urobilinogen 0.2 05/17/2021 06:47 PM    Nitrites Negative 05/17/2021 06:47 PM    Leukocyte Esterase Negative 05/17/2021 06:47 PM    Epithelial cells FEW 05/17/2021 06:47 PM    Bacteria 1+ (A) 05/17/2021 06:47 PM    WBC 0-4 05/17/2021 06:47 PM    RBC 0-5 05/17/2021 06:47 PM         Medications Reviewed:     Current Facility-Administered Medications   Medication Dose Route Frequency    senna-docusate (PERICOLACE) 8.6-50 mg per tablet 1 Tablet  1 Tablet Oral DAILY    polyethylene glycol (MIRALAX) packet 17 g  17 g Oral DAILY    bisacodyL (DULCOLAX) suppository 10 mg  10 mg Rectal DAILY PRN    hydrALAZINE (APRESOLINE) 20 mg/mL injection 10 mg  10 mg IntraVENous Q6H PRN    dexamethasone (DECADRON) 4 mg/mL injection 4 mg  4 mg IntraVENous Q8H    morphine injection 1 mg  1 mg IntraVENous Q6H PRN    insulin NPH (NOVOLIN N, HUMULIN N) injection 5 Units  5 Units SubCUTAneous ACB&D    gabapentin (NEURONTIN) capsule 300 mg  300 mg Oral TID    ascorbic acid (vitamin C) (VITAMIN C) tablet 500 mg  500 mg Oral DAILY    atorvastatin (LIPITOR) tablet 10 mg  10 mg Oral QHS    calcium carbonate (TUMS) chewable tablet 200 mg [elemental]  200 mg Oral DAILY    cyanocobalamin (VITAMIN B12) tablet 1,000 mcg  1,000 mcg Oral DAILY    [Held by provider] hydrOXYchloroQUINE (PLAQUENIL) tablet 300 mg  300 mg Oral DAILY WITH BREAKFAST    [Held by provider] leflunomide (ARAVA) tablet 20 mg  20 mg Oral DAILY    lisinopriL (PRINIVIL, ZESTRIL) tablet 5 mg  5 mg Oral DAILY    sodium chloride (NS) flush 5-40 mL  5-40 mL IntraVENous Q8H    sodium chloride (NS) flush 5-40 mL  5-40 mL IntraVENous PRN    acetaminophen (TYLENOL) tablet 650 mg  650 mg Oral Q6H PRN    Or    acetaminophen (TYLENOL) suppository 650 mg  650 mg Rectal Q6H PRN    polyethylene glycol (MIRALAX) packet 17 g  17 g Oral DAILY PRN    promethazine (PHENERGAN) tablet 12.5 mg  12.5 mg Oral Q6H PRN    Or    ondansetron (ZOFRAN) injection 4 mg  4 mg IntraVENous Q6H PRN    [Held by provider] heparin (porcine) injection 5,000 Units  5,000 Units SubCUTAneous Q8H    oxyCODONE-acetaminophen (PERCOCET) 5-325 mg per tablet 1 Tab  1 Tablet Oral Q4H PRN    insulin lispro (HUMALOG) injection   SubCUTAneous AC&HS    glucose chewable tablet 16 g  4 Tablet Oral PRN    dextrose (D50W) injection syrg 12.5-25 g  12.5-25 g IntraVENous PRN    glucagon (GLUCAGEN) injection 1 mg  1 mg IntraMUSCular PRN     ______________________________________________________________________  EXPECTED LENGTH OF STAY: 2d 21h  ACTUAL LENGTH OF STAY:          3                 Karishma Kendall MD

## 2021-05-20 NOTE — PROGRESS NOTES
Problem: Mobility Impaired (Adult and Pediatric)  Goal: *Acute Goals and Plan of Care (Insert Text)  Description: FUNCTIONAL STATUS PRIOR TO ADMISSION: Patient was independent and active without use of DME.    HOME SUPPORT PRIOR TO ADMISSION: The patient lived with  but did not require assist. Pt   works during the day    Physical Therapy Goals  Initiated 5/19/2021  1. Patient will move from supine to sit and sit to supine , scoot up and down, and roll side to side in bed with supervision/set-up within 7 day(s). 2.  Patient will transfer from bed to chair and chair to bed with supervision/set-up using the least restrictive device within 7 day(s). 3.  Patient will perform sit to stand with supervision/set-up within 7 day(s). 4.  Patient will ambulate with supervision/set-up for 300 feet with the least restrictive device within 7 day(s). 5.  Patient will ascend/descend 6 stairs with 1 handrail(s) with minimal assistance/contact guard assist within 7 day(s). Outcome: Progressing Towards Goal  PHYSICAL THERAPY TREATMENT  Patient: Alejandro Bernal (71 y.o. female)  Date: 5/20/2021  Diagnosis: Bulging of lumbar intervertebral disc [M51.26] Bulging of lumbar intervertebral disc  Procedure(s) (LRB):  L5-S1 LAMINECTOMY AND DISCECTOMY WITH INSTRUMENTATED FUSION, MEDTRONIC PLIF, PEDICLE SCREWS (O-ARM) (URGENT) (N/A)    Precautions:   No bending, no lifting greater than 5 lbs, no twisting, log-roll technique, repositioning every 20-30 min except when sleeping, brace when OOB (if ordered)  Chart, physical therapy assessment, plan of care and goals were reviewed. ASSESSMENT  Patient continues with skilled PT services and is progressing towards goals. She is currently NPO and is to likely have surgery tomorrow. She was received in bed, required CGA + verbal cues for log roll to complete transfer to sit EOB. Gait limited to approx 25 Ft w/ RW d/t LLE pain/discomfort.   She was agreeable to remain seated in chair- all items in reach. Educated pt on 20-30 minute position change w/ RN present. PT to follow up this afternoon for further activity assessment and progression as able and appropriate    Current Level of Function Impacting Discharge (mobility/balance): SBA to CGA for functional transfers and short gait w/ RW. Other factors to consider for discharge: Surgery (likely Friday 5/21)         PLAN :  Patient continues to benefit from skilled intervention to address the above impairments. Continue treatment per established plan of care. to address goals. Recommendation for discharge: (in order for the patient to meet his/her long term goals)  Physical therapy at least 2 days/week in the home     This discharge recommendation:  Has been made in collaboration with the attending provider and/or case management    IF patient discharges home will need the following DME: to be determined (TBD) pending progress after procedure       SUBJECTIVE:   Patient stated The pain is definitely a game changer.     OBJECTIVE DATA SUMMARY:   Critical Behavior:  Neurologic State: Alert  Orientation Level: Oriented X4  Cognition: Appropriate decision making, Appropriate for age attention/concentration, Appropriate safety awareness, Follows commands  Safety/Judgement: Awareness of environment    Spinal diagnosis intervention:  The patient required verbal and minimal tactile cues to maintain back precautions during functional activity. Functional Mobility Training:    Bed Mobility:  Log Rolling: Stand-by assistance;Contact guard assistance;Assist x1  Supine to Sit: Contact guard assistance (side-lying>sit)  Sit to Supine:  (remained OOB in chair)           Transfers:  Sit to Stand: Stand-by assistance;Assist x1 (verbal cues for hand placement)  Stand to Sit: Supervision;Assist x1                             Balance:  Sitting: Intact  Standing: Impaired; Without support  Standing - Static: Constant support;Good;Fair  Standing - Dynamic : Constant support;Good;Fair  Ambulation/Gait Training:  Distance (ft): 25 Feet (ft)  Assistive Device: Gait belt;Walker, rolling  Ambulation - Level of Assistance: Contact guard assistance;Assist x1        Gait Abnormalities: Antalgic;Decreased step clearance;Shuffling gait        Base of Support: Widened     Speed/Leonie: Shuffled; Slow  Step Length: Left shortened;Right shortened       Pain Ratin-7/10    Activity Tolerance:   Fair    After treatment patient left in no apparent distress:   Sitting in chair and Call bell within reach    COMMUNICATION/COLLABORATION:   The patients plan of care was discussed with: Registered nurse.      Dalila Lackey,PTA   Time Calculation: 16 mins

## 2021-05-20 NOTE — PROGRESS NOTES
Spine Surgery Progress Note  Yeison Lopez PA-C    Admit Date: 5/17/2021   LOS: 3 days      Daily Progress Note: 5/20/2021    HPI: Ms. Roopa Angulo is a very pleasant 52-year-old woman with a past medical history significant for type 2 diabetes, hypertension, dyslipidemia, rheumatoid arthritis She was in her usual state of health until few days ago when the patient developed back pain. Prior to this, patient was very active around the house. Her pain is located at the lower back on the left side, constant sharp pain with radiation to the left leg. No known aggravating factors. [facility] of the low back pain. The patient was discharged home on tapering dose of prednisone and pain medication, which did not provide any significant relief. The patient was brought to the emergency room here at [facility] for further evaluation. The patient denies urinary or bowel incontinence as a result of the low back pain. No history of recent trauma to the back. The patient stated that the low back pain is 10/10 in severity. When the patient arrived at the emergency room, CT scan of the lumbar spine was obtained. The CT scan shows findings concerning for bulging of lumbar intervertebral disk. The emergency room physician consulted neurosurgeon on-call who advised MRI and admission to the hospital.  The patient was then referred to the hospitalist service for that purpose. MRI revealed \"Left paracentral extrusion and probable free disc fragment at L5-S1 extending cranially with significant narrowing of the left lateral recess posterior to L5 and left subarticular zone\". Xray revealed \"Grade 1 anterolisthesis of L5 that increases with flexion\". Patient is on Arava and Plaquenil for RA. Dr. López Lopez is her rheumatologist.    Subjective:     She is more uncomfortable today than yesterday. Pain radiating from low back down left leg. Strength is unchanged. Patient denies numbness, chest pain, nausea, vomiting, difficulty swallowing, headache, and dyspnea. She is resting comfortably in bed. Objective:     Vital signs  VSS Afebrile. Temp (24hrs), Av.9 °F (36.6 °C), Min:97.5 °F (36.4 °C), Max:98.4 °F (36.9 °C)   No intake/output data recorded. No intake/output data recorded. Visit Vitals  BP (!) 155/82   Pulse 64   Temp 98 °F (36.7 °C)   Resp 16   Ht 5' 4\" (1.626 m)   Wt 67.1 kg (148 lb)   SpO2 98%   BMI 25.40 kg/m²      O2 Device: None (Room air)     Pain control  Pain Assessment  Pain Scale 1: Numeric (0 - 10)  Pain Intensity 1: 8  Pain Location 1: Back  Pain Orientation 1: Left, Lower  Pain Description 1: Aching  Pain Intervention(s) 1: Medication (see MAR)    Physical Exam:  Gen: No acute distress. Neuro: A&Ox3. Follows commands. Speech clear. Affect normal.  PERRL. EOMI. Face symmetric. MEDINA spontaneously. Strength 5/5 in BUE and RLE. Strength 4/5 left hip, 3/5 left knee and ankle DF/PF, 2/5 left EHL. Sensation intact to sharp and dull touch. Gait deferred. Calves soft and supple;  No pain with passive stretch  Skin: warm, dry  Abd: soft, nontender    24 hour results:    Recent Results (from the past 24 hour(s))   RENAL FUNCTION PANEL    Collection Time: 21 10:35 AM   Result Value Ref Range    Sodium 136 136 - 145 mmol/L    Potassium 4.2 3.5 - 5.1 mmol/L    Chloride 101 97 - 108 mmol/L    CO2 30 21 - 32 mmol/L    Anion gap 5 5 - 15 mmol/L    Glucose 261 (H) 65 - 100 mg/dL    BUN 27 (H) 6 - 20 MG/DL    Creatinine 1.17 (H) 0.55 - 1.02 MG/DL    BUN/Creatinine ratio 23 (H) 12 - 20      GFR est AA 56 (L) >60 ml/min/1.73m2    GFR est non-AA 46 (L) >60 ml/min/1.73m2    Calcium 9.1 8.5 - 10.1 MG/DL    Phosphorus 3.3 2.6 - 4.7 MG/DL    Albumin 3.7 3.5 - 5.0 g/dL   GLUCOSE, POC    Collection Time: 21 11:59 AM   Result Value Ref Range    Glucose (POC) 283 (H) 65 - 117 mg/dL    Performed by Abbie Lewis    GLUCOSE, POC    Collection Time: 21 4:49 PM   Result Value Ref Range    Glucose (POC) 232 (H) 65 - 117 mg/dL    Performed by Starr Townsend    GLUCOSE, POC    Collection Time: 05/19/21  9:04 PM   Result Value Ref Range    Glucose (POC) 296 (H) 65 - 117 mg/dL    Performed by Genoveva Marte    RENAL FUNCTION PANEL    Collection Time: 05/20/21  4:50 AM   Result Value Ref Range    Sodium 135 (L) 136 - 145 mmol/L    Potassium 4.4 3.5 - 5.1 mmol/L    Chloride 103 97 - 108 mmol/L    CO2 25 21 - 32 mmol/L    Anion gap 7 5 - 15 mmol/L    Glucose 235 (H) 65 - 100 mg/dL    BUN 33 (H) 6 - 20 MG/DL    Creatinine 1.08 (H) 0.55 - 1.02 MG/DL    BUN/Creatinine ratio 31 (H) 12 - 20      GFR est AA >60 >60 ml/min/1.73m2    GFR est non-AA 50 (L) >60 ml/min/1.73m2    Calcium 9.2 8.5 - 10.1 MG/DL    Phosphorus 3.6 2.6 - 4.7 MG/DL    Albumin 3.5 3.5 - 5.0 g/dL   CBC W/O DIFF    Collection Time: 05/20/21  4:50 AM   Result Value Ref Range    WBC 4.6 3.6 - 11.0 K/uL    RBC 4.02 3.80 - 5.20 M/uL    HGB 12.2 11.5 - 16.0 g/dL    HCT 36.0 35.0 - 47.0 %    MCV 89.6 80.0 - 99.0 FL    MCH 30.3 26.0 - 34.0 PG    MCHC 33.9 30.0 - 36.5 g/dL    RDW 11.3 (L) 11.5 - 14.5 %    PLATELET 984 768 - 866 K/uL    MPV 10.9 8.9 - 12.9 FL    NRBC 0.0 0  WBC    ABSOLUTE NRBC 0.00 0.00 - 4.94 K/uL   METABOLIC PANEL, BASIC    Collection Time: 05/20/21  4:50 AM   Result Value Ref Range    Sodium 137 136 - 145 mmol/L    Potassium 4.4 3.5 - 5.1 mmol/L    Chloride 103 97 - 108 mmol/L    CO2 27 21 - 32 mmol/L    Anion gap 7 5 - 15 mmol/L    Glucose 235 (H) 65 - 100 mg/dL    BUN 33 (H) 6 - 20 MG/DL    Creatinine 1.15 (H) 0.55 - 1.02 MG/DL    BUN/Creatinine ratio 29 (H) 12 - 20      GFR est AA 57 (L) >60 ml/min/1.73m2    GFR est non-AA 47 (L) >60 ml/min/1.73m2    Calcium 9.2 8.5 - 10.1 MG/DL   PROTHROMBIN TIME + INR    Collection Time: 05/20/21  4:50 AM   Result Value Ref Range    INR 1.0 0.9 - 1.1      Prothrombin time 10.7 9.0 - 11.1 sec   TYPE & SCREEN    Collection Time: 05/20/21  4:50 AM   Result Value Ref Range    Crossmatch Expiration 05/23/2021,2359     ABO/Rh(D) Wale Bracket POSITIVE     Antibody screen NEG    GLUCOSE, POC    Collection Time: 05/20/21  6:53 AM   Result Value Ref Range    Glucose (POC) 204 (H) 65 - 117 mg/dL    Performed by Martell Camargo    COVID-19 RAPID TEST    Collection Time: 05/20/21  8:43 AM   Result Value Ref Range    Specimen source Nasopharyngeal      COVID-19 rapid test Not detected NOTD        Assessment:     Principal Problem:    Bulging of lumbar intervertebral disc (5/17/2021)      Plan:     1) L5-S1 HNP   -Decompression and fusion surgery tomorrow   -Decadron 4mg q8hr; will start to taper post-op  -PT/OT evals for baseline    -Pain control - scheduled gabapentin, prn tylenol, percocet   -NPO after midnight   -Obtain and verify surgery consent  2) T2DM   -SSI   -Diabetic diet  3) HTN   -Daily lisinopril  4) RA   -Hold meds for surgery  5) Dyslipidemia   -Daily lipitor  6) HANNA   -Continue IVF   -Repeat labs   -Hospitalist following    Plan d/w Dr. Bentley Hussein PA

## 2021-05-20 NOTE — PROGRESS NOTES
Problem: Mobility Impaired (Adult and Pediatric)  Goal: *Acute Goals and Plan of Care (Insert Text)  Description: FUNCTIONAL STATUS PRIOR TO ADMISSION: Patient was independent and active without use of DME.    HOME SUPPORT PRIOR TO ADMISSION: The patient lived with  but did not require assist. Pt   works during the day    Physical Therapy Goals  Initiated 5/19/2021  1. Patient will move from supine to sit and sit to supine , scoot up and down, and roll side to side in bed with supervision/set-up within 7 day(s). 2.  Patient will transfer from bed to chair and chair to bed with supervision/set-up using the least restrictive device within 7 day(s). 3.  Patient will perform sit to stand with supervision/set-up within 7 day(s). 4.  Patient will ambulate with supervision/set-up for 300 feet with the least restrictive device within 7 day(s). 5.  Patient will ascend/descend 6 stairs with 1 handrail(s) with minimal assistance/contact guard assist within 7 day(s). 5/20/2021 1312 by Means, Dalila WOOD  Outcome: Progressing Towards Goal  PHYSICAL THERAPY MORNING SESSION NOTE  Patient: Catarino Collier (40 y.o. female)  Date: 5/20/2021  Primary Diagnosis: Bulging of lumbar intervertebral disc [M51.26]  Procedure(s) (LRB):  L5-S1 LAMINECTOMY AND DISCECTOMY WITH INSTRUMENTATED FUSION, MEDTRONIC PLIF, PEDICLE SCREWS (O-ARM) (URGENT) (N/A)     Precautions:Fall, Back      Catarino Collier was seen for afternoon PT session. She was able to progress gait distance compared to AM treatment, walking  50 FT (x2) w/ CGA. She reports that the pain in her LLE has improved compared to this morning. She had been in the chair most of the day, but did change positions/stand often. She returned to bed w/ SBA via log roll. PT to follow up tomorrow as appropriate. Plans for surgery sometime tomorrow.      Morning session functional mobility:  Bed Mobility:  Rolling: Stand-by assistance  Supine to Sit:  (received OOB in chair )  Sit to Supine: Stand-by assistance     Transfers:  Sit to Stand: Stand-by assistance  Stand to Sit: Supervision                       Balance:   Sitting: Intact  Standing: Impaired; Without support  Standing - Static: Constant support;Good  Standing - Dynamic : Constant support;Good  Ambulation/Gait Training:  Distance (ft): 50 Feet (ft)  Assistive Device: Gait belt;Walker, rolling  Ambulation - Level of Assistance: Contact guard assistance;Assist x1        Gait Abnormalities: Antalgic;Decreased step clearance        Base of Support: Widened     Speed/Leonie: Shuffled; Slow  Step Length: Left shortened;Right shortened                     Thank you,  Dalila WOOD Means,PTA   Time Calculation: 11 mins

## 2021-05-21 ENCOUNTER — ANESTHESIA (OUTPATIENT)
Dept: SURGERY | Age: 71
DRG: 454 | End: 2021-05-21
Payer: MEDICARE

## 2021-05-21 ENCOUNTER — ANESTHESIA EVENT (OUTPATIENT)
Dept: SURGERY | Age: 71
DRG: 454 | End: 2021-05-21
Payer: MEDICARE

## 2021-05-21 ENCOUNTER — APPOINTMENT (OUTPATIENT)
Dept: GENERAL RADIOLOGY | Age: 71
DRG: 454 | End: 2021-05-21
Attending: NEUROLOGICAL SURGERY
Payer: MEDICARE

## 2021-05-21 LAB
GLUCOSE BLD STRIP.AUTO-MCNC: 161 MG/DL (ref 65–117)
GLUCOSE BLD STRIP.AUTO-MCNC: 170 MG/DL (ref 65–117)
GLUCOSE BLD STRIP.AUTO-MCNC: 216 MG/DL (ref 65–117)
GLUCOSE BLD STRIP.AUTO-MCNC: 271 MG/DL (ref 65–117)
GLUCOSE BLD STRIP.AUTO-MCNC: 315 MG/DL (ref 65–117)
GLUCOSE BLD STRIP.AUTO-MCNC: 394 MG/DL (ref 65–117)
SERVICE CMNT-IMP: ABNORMAL

## 2021-05-21 PROCEDURE — 76060000037 HC ANESTHESIA 3 TO 3.5 HR: Performed by: NEUROLOGICAL SURGERY

## 2021-05-21 PROCEDURE — 76210000016 HC OR PH I REC 1 TO 1.5 HR: Performed by: NEUROLOGICAL SURGERY

## 2021-05-21 PROCEDURE — 0SG30AJ FUSION OF LUMBOSACRAL JOINT WITH INTERBODY FUSION DEVICE, POSTERIOR APPROACH, ANTERIOR COLUMN, OPEN APPROACH: ICD-10-PCS | Performed by: NEUROLOGICAL SURGERY

## 2021-05-21 PROCEDURE — 74011636637 HC RX REV CODE- 636/637: Performed by: INTERNAL MEDICINE

## 2021-05-21 PROCEDURE — 74011000250 HC RX REV CODE- 250: Performed by: NURSE ANESTHETIST, CERTIFIED REGISTERED

## 2021-05-21 PROCEDURE — 74011250637 HC RX REV CODE- 250/637: Performed by: PHYSICIAN ASSISTANT

## 2021-05-21 PROCEDURE — 74011250636 HC RX REV CODE- 250/636: Performed by: PHYSICIAN ASSISTANT

## 2021-05-21 PROCEDURE — 77030013079 HC BLNKT BAIR HGGR 3M -A: Performed by: ANESTHESIOLOGY

## 2021-05-21 PROCEDURE — 0ST40ZZ RESECTION OF LUMBOSACRAL DISC, OPEN APPROACH: ICD-10-PCS | Performed by: NEUROLOGICAL SURGERY

## 2021-05-21 PROCEDURE — 74011250636 HC RX REV CODE- 250/636: Performed by: ANESTHESIOLOGY

## 2021-05-21 PROCEDURE — 74011250636 HC RX REV CODE- 250/636: Performed by: NURSE ANESTHETIST, CERTIFIED REGISTERED

## 2021-05-21 PROCEDURE — 74011250637 HC RX REV CODE- 250/637: Performed by: NEUROLOGICAL SURGERY

## 2021-05-21 PROCEDURE — 74011250637 HC RX REV CODE- 250/637: Performed by: ANESTHESIOLOGY

## 2021-05-21 PROCEDURE — 77030013137 HC MRK XR CRAN BUSA -A: Performed by: NEUROLOGICAL SURGERY

## 2021-05-21 PROCEDURE — 77030008684 HC TU ET CUF COVD -B: Performed by: ANESTHESIOLOGY

## 2021-05-21 PROCEDURE — C1821 INTERSPINOUS IMPLANT: HCPCS | Performed by: NEUROLOGICAL SURGERY

## 2021-05-21 PROCEDURE — 77030014650 HC SEAL MTRX FLOSEL BAXT -C: Performed by: NEUROLOGICAL SURGERY

## 2021-05-21 PROCEDURE — C1713 ANCHOR/SCREW BN/BN,TIS/BN: HCPCS | Performed by: NEUROLOGICAL SURGERY

## 2021-05-21 PROCEDURE — 0SG3071 FUSION OF LUMBOSACRAL JOINT WITH AUTOLOGOUS TISSUE SUBSTITUTE, POSTERIOR APPROACH, POSTERIOR COLUMN, OPEN APPROACH: ICD-10-PCS | Performed by: NEUROLOGICAL SURGERY

## 2021-05-21 PROCEDURE — 77030002933 HC SUT MCRYL J&J -A: Performed by: NEUROLOGICAL SURGERY

## 2021-05-21 PROCEDURE — 65270000029 HC RM PRIVATE

## 2021-05-21 PROCEDURE — 77030040361 HC SLV COMPR DVT MDII -B: Performed by: NEUROLOGICAL SURGERY

## 2021-05-21 PROCEDURE — 77030010813: Performed by: NEUROLOGICAL SURGERY

## 2021-05-21 PROCEDURE — 2709999900 HC NON-CHARGEABLE SUPPLY: Performed by: NEUROLOGICAL SURGERY

## 2021-05-21 PROCEDURE — 01NB0ZZ RELEASE LUMBAR NERVE, OPEN APPROACH: ICD-10-PCS | Performed by: NEUROLOGICAL SURGERY

## 2021-05-21 PROCEDURE — 74011000250 HC RX REV CODE- 250: Performed by: NEUROLOGICAL SURGERY

## 2021-05-21 PROCEDURE — 77030005513 HC CATH URETH FOL11 MDII -B: Performed by: NEUROLOGICAL SURGERY

## 2021-05-21 PROCEDURE — 77030038600 HC TU BPLR IRR DISP STRY -B: Performed by: NEUROLOGICAL SURGERY

## 2021-05-21 PROCEDURE — 74011250636 HC RX REV CODE- 250/636: Performed by: NEUROLOGICAL SURGERY

## 2021-05-21 PROCEDURE — 77030029099 HC BN WAX SSPC -A: Performed by: NEUROLOGICAL SURGERY

## 2021-05-21 PROCEDURE — 77030004391 HC BUR FLUT MEDT -C: Performed by: NEUROLOGICAL SURGERY

## 2021-05-21 PROCEDURE — 77030014007 HC SPNG HEMSTAT J&J -B: Performed by: NEUROLOGICAL SURGERY

## 2021-05-21 PROCEDURE — 77030032958 HC GRFT BN SUB ELITE TRNTY MUSC -K1: Performed by: NEUROLOGICAL SURGERY

## 2021-05-21 PROCEDURE — 74011636637 HC RX REV CODE- 636/637: Performed by: NEUROLOGICAL SURGERY

## 2021-05-21 PROCEDURE — 82962 GLUCOSE BLOOD TEST: CPT

## 2021-05-21 PROCEDURE — 77030003029 HC SUT VCRL J&J -B: Performed by: NEUROLOGICAL SURGERY

## 2021-05-21 PROCEDURE — 77030040922 HC BLNKT HYPOTHRM STRY -A

## 2021-05-21 PROCEDURE — 77030026438 HC STYL ET INTUB CARD -A: Performed by: ANESTHESIOLOGY

## 2021-05-21 PROCEDURE — 76010000173 HC OR TIME 3 TO 3.5 HR INTENSV-TIER 1: Performed by: NEUROLOGICAL SURGERY

## 2021-05-21 DEVICE — GRAFT BNE SUB L CANC FRZN MORSELIZED W/ VIABLE CELL TRINITY: Type: IMPLANTABLE DEVICE | Site: SPINE LUMBAR | Status: FUNCTIONAL

## 2021-05-21 DEVICE — SCREW 55840006545 5.5/6 MAS 6.5X45 CC .
Type: IMPLANTABLE DEVICE | Site: SPINE LUMBAR | Status: FUNCTIONAL
Brand: CD HORIZON® SPINAL SYSTEM

## 2021-05-21 DEVICE — SPACER 7770823 ELEVATE STD 23X8MM
Type: IMPLANTABLE DEVICE | Site: SPINE LUMBAR | Status: FUNCTIONAL
Brand: ELEVATE™ SPINAL SYSTEM

## 2021-05-21 RX ORDER — OXYCODONE HYDROCHLORIDE 5 MG/1
10 TABLET ORAL
Status: DISCONTINUED | OUTPATIENT
Start: 2021-05-21 | End: 2021-05-24 | Stop reason: HOSPADM

## 2021-05-21 RX ORDER — MIDAZOLAM HYDROCHLORIDE 1 MG/ML
INJECTION, SOLUTION INTRAMUSCULAR; INTRAVENOUS AS NEEDED
Status: DISCONTINUED | OUTPATIENT
Start: 2021-05-21 | End: 2021-05-21 | Stop reason: HOSPADM

## 2021-05-21 RX ORDER — FENTANYL CITRATE 50 UG/ML
50 INJECTION, SOLUTION INTRAMUSCULAR; INTRAVENOUS AS NEEDED
Status: DISCONTINUED | OUTPATIENT
Start: 2021-05-21 | End: 2021-05-21 | Stop reason: HOSPADM

## 2021-05-21 RX ORDER — SODIUM CHLORIDE 0.9 % (FLUSH) 0.9 %
5-40 SYRINGE (ML) INJECTION EVERY 8 HOURS
Status: DISCONTINUED | OUTPATIENT
Start: 2021-05-21 | End: 2021-05-21 | Stop reason: HOSPADM

## 2021-05-21 RX ORDER — HYDROMORPHONE HYDROCHLORIDE 1 MG/ML
0.2 INJECTION, SOLUTION INTRAMUSCULAR; INTRAVENOUS; SUBCUTANEOUS
Status: DISCONTINUED | OUTPATIENT
Start: 2021-05-21 | End: 2021-05-21 | Stop reason: HOSPADM

## 2021-05-21 RX ORDER — HYDROMORPHONE HYDROCHLORIDE 2 MG/ML
INJECTION, SOLUTION INTRAMUSCULAR; INTRAVENOUS; SUBCUTANEOUS AS NEEDED
Status: DISCONTINUED | OUTPATIENT
Start: 2021-05-21 | End: 2021-05-21 | Stop reason: HOSPADM

## 2021-05-21 RX ORDER — DEXAMETHASONE SODIUM PHOSPHATE 4 MG/ML
2 INJECTION, SOLUTION INTRA-ARTICULAR; INTRALESIONAL; INTRAMUSCULAR; INTRAVENOUS; SOFT TISSUE EVERY 12 HOURS
Status: DISCONTINUED | OUTPATIENT
Start: 2021-05-21 | End: 2021-05-23

## 2021-05-21 RX ORDER — SODIUM CHLORIDE, SODIUM LACTATE, POTASSIUM CHLORIDE, CALCIUM CHLORIDE 600; 310; 30; 20 MG/100ML; MG/100ML; MG/100ML; MG/100ML
75 INJECTION, SOLUTION INTRAVENOUS CONTINUOUS
Status: DISCONTINUED | OUTPATIENT
Start: 2021-05-21 | End: 2021-05-21 | Stop reason: HOSPADM

## 2021-05-21 RX ORDER — MORPHINE SULFATE IN 0.9 % NACL 150MG/30ML
PATIENT CONTROLLED ANALGESIA SYRINGE INTRAVENOUS
Status: DISCONTINUED | OUTPATIENT
Start: 2021-05-21 | End: 2021-05-22

## 2021-05-21 RX ORDER — SODIUM CHLORIDE 0.9 % (FLUSH) 0.9 %
5-40 SYRINGE (ML) INJECTION AS NEEDED
Status: DISCONTINUED | OUTPATIENT
Start: 2021-05-21 | End: 2021-05-24 | Stop reason: HOSPADM

## 2021-05-21 RX ORDER — SODIUM CHLORIDE 0.9 % (FLUSH) 0.9 %
5-40 SYRINGE (ML) INJECTION EVERY 8 HOURS
Status: DISCONTINUED | OUTPATIENT
Start: 2021-05-21 | End: 2021-05-24 | Stop reason: HOSPADM

## 2021-05-21 RX ORDER — SODIUM CHLORIDE 9 MG/ML
75 INJECTION, SOLUTION INTRAVENOUS CONTINUOUS
Status: DISPENSED | OUTPATIENT
Start: 2021-05-21 | End: 2021-05-22

## 2021-05-21 RX ORDER — ACETAMINOPHEN 325 MG/1
650 TABLET ORAL ONCE
Status: COMPLETED | OUTPATIENT
Start: 2021-05-21 | End: 2021-05-21

## 2021-05-21 RX ORDER — SODIUM CHLORIDE, SODIUM LACTATE, POTASSIUM CHLORIDE, CALCIUM CHLORIDE 600; 310; 30; 20 MG/100ML; MG/100ML; MG/100ML; MG/100ML
125 INJECTION, SOLUTION INTRAVENOUS CONTINUOUS
Status: DISCONTINUED | OUTPATIENT
Start: 2021-05-21 | End: 2021-05-21 | Stop reason: HOSPADM

## 2021-05-21 RX ORDER — SODIUM CHLORIDE 9 MG/ML
25 INJECTION, SOLUTION INTRAVENOUS CONTINUOUS
Status: DISCONTINUED | OUTPATIENT
Start: 2021-05-21 | End: 2021-05-21 | Stop reason: HOSPADM

## 2021-05-21 RX ORDER — MIDAZOLAM HYDROCHLORIDE 1 MG/ML
1 INJECTION, SOLUTION INTRAMUSCULAR; INTRAVENOUS AS NEEDED
Status: DISCONTINUED | OUTPATIENT
Start: 2021-05-21 | End: 2021-05-21 | Stop reason: HOSPADM

## 2021-05-21 RX ORDER — SODIUM CHLORIDE 0.9 % (FLUSH) 0.9 %
5-40 SYRINGE (ML) INJECTION AS NEEDED
Status: DISCONTINUED | OUTPATIENT
Start: 2021-05-21 | End: 2021-05-21 | Stop reason: HOSPADM

## 2021-05-21 RX ORDER — MORPHINE SULFATE 2 MG/ML
2 INJECTION, SOLUTION INTRAMUSCULAR; INTRAVENOUS
Status: DISCONTINUED | OUTPATIENT
Start: 2021-05-21 | End: 2021-05-21 | Stop reason: HOSPADM

## 2021-05-21 RX ORDER — METHOCARBAMOL 500 MG/1
500 TABLET, FILM COATED ORAL 3 TIMES DAILY
Status: DISCONTINUED | OUTPATIENT
Start: 2021-05-21 | End: 2021-05-24

## 2021-05-21 RX ORDER — MIDAZOLAM HYDROCHLORIDE 1 MG/ML
0.5 INJECTION, SOLUTION INTRAMUSCULAR; INTRAVENOUS
Status: DISCONTINUED | OUTPATIENT
Start: 2021-05-21 | End: 2021-05-21 | Stop reason: HOSPADM

## 2021-05-21 RX ORDER — NALOXONE HYDROCHLORIDE 0.4 MG/ML
0.4 INJECTION, SOLUTION INTRAMUSCULAR; INTRAVENOUS; SUBCUTANEOUS AS NEEDED
Status: DISCONTINUED | OUTPATIENT
Start: 2021-05-21 | End: 2021-05-24 | Stop reason: HOSPADM

## 2021-05-21 RX ORDER — ACETAMINOPHEN 325 MG/1
650 TABLET ORAL EVERY 6 HOURS
Status: DISCONTINUED | OUTPATIENT
Start: 2021-05-21 | End: 2021-05-24 | Stop reason: HOSPADM

## 2021-05-21 RX ORDER — PROPOFOL 10 MG/ML
INJECTION, EMULSION INTRAVENOUS AS NEEDED
Status: DISCONTINUED | OUTPATIENT
Start: 2021-05-21 | End: 2021-05-21 | Stop reason: HOSPADM

## 2021-05-21 RX ORDER — FENTANYL CITRATE 50 UG/ML
25 INJECTION, SOLUTION INTRAMUSCULAR; INTRAVENOUS
Status: DISCONTINUED | OUTPATIENT
Start: 2021-05-21 | End: 2021-05-21 | Stop reason: HOSPADM

## 2021-05-21 RX ORDER — CEFAZOLIN SODIUM 1 G/3ML
INJECTION, POWDER, FOR SOLUTION INTRAMUSCULAR; INTRAVENOUS AS NEEDED
Status: DISCONTINUED | OUTPATIENT
Start: 2021-05-21 | End: 2021-05-21 | Stop reason: HOSPADM

## 2021-05-21 RX ORDER — DIPHENHYDRAMINE HYDROCHLORIDE 50 MG/ML
12.5 INJECTION, SOLUTION INTRAMUSCULAR; INTRAVENOUS AS NEEDED
Status: DISCONTINUED | OUTPATIENT
Start: 2021-05-21 | End: 2021-05-21 | Stop reason: HOSPADM

## 2021-05-21 RX ORDER — OXYCODONE HYDROCHLORIDE 5 MG/1
5 TABLET ORAL
Status: DISCONTINUED | OUTPATIENT
Start: 2021-05-21 | End: 2021-05-24 | Stop reason: HOSPADM

## 2021-05-21 RX ORDER — OXYCODONE AND ACETAMINOPHEN 5; 325 MG/1; MG/1
1 TABLET ORAL
Status: DISCONTINUED | OUTPATIENT
Start: 2021-05-21 | End: 2021-05-21

## 2021-05-21 RX ORDER — LIDOCAINE HYDROCHLORIDE 20 MG/ML
INJECTION, SOLUTION EPIDURAL; INFILTRATION; INTRACAUDAL; PERINEURAL AS NEEDED
Status: DISCONTINUED | OUTPATIENT
Start: 2021-05-21 | End: 2021-05-21 | Stop reason: HOSPADM

## 2021-05-21 RX ORDER — LIDOCAINE HYDROCHLORIDE 10 MG/ML
0.1 INJECTION, SOLUTION EPIDURAL; INFILTRATION; INTRACAUDAL; PERINEURAL AS NEEDED
Status: DISCONTINUED | OUTPATIENT
Start: 2021-05-21 | End: 2021-05-21 | Stop reason: HOSPADM

## 2021-05-21 RX ORDER — DEXAMETHASONE SODIUM PHOSPHATE 4 MG/ML
INJECTION, SOLUTION INTRA-ARTICULAR; INTRALESIONAL; INTRAMUSCULAR; INTRAVENOUS; SOFT TISSUE AS NEEDED
Status: DISCONTINUED | OUTPATIENT
Start: 2021-05-21 | End: 2021-05-21 | Stop reason: HOSPADM

## 2021-05-21 RX ORDER — ROCURONIUM BROMIDE 10 MG/ML
INJECTION, SOLUTION INTRAVENOUS AS NEEDED
Status: DISCONTINUED | OUTPATIENT
Start: 2021-05-21 | End: 2021-05-21 | Stop reason: HOSPADM

## 2021-05-21 RX ORDER — DEXAMETHASONE SODIUM PHOSPHATE 4 MG/ML
4 INJECTION, SOLUTION INTRA-ARTICULAR; INTRALESIONAL; INTRAMUSCULAR; INTRAVENOUS; SOFT TISSUE EVERY 12 HOURS
Status: DISCONTINUED | OUTPATIENT
Start: 2021-05-21 | End: 2021-05-21

## 2021-05-21 RX ORDER — EPHEDRINE SULFATE/0.9% NACL/PF 50 MG/5 ML
SYRINGE (ML) INTRAVENOUS AS NEEDED
Status: DISCONTINUED | OUTPATIENT
Start: 2021-05-21 | End: 2021-05-21 | Stop reason: HOSPADM

## 2021-05-21 RX ORDER — EPHEDRINE SULFATE/0.9% NACL/PF 50 MG/5 ML
SYRINGE (ML) INTRAVENOUS AS NEEDED
Status: DISCONTINUED | OUTPATIENT
Start: 2021-05-21 | End: 2021-05-21

## 2021-05-21 RX ORDER — FENTANYL CITRATE 50 UG/ML
INJECTION, SOLUTION INTRAMUSCULAR; INTRAVENOUS AS NEEDED
Status: DISCONTINUED | OUTPATIENT
Start: 2021-05-21 | End: 2021-05-21 | Stop reason: HOSPADM

## 2021-05-21 RX ORDER — SUCCINYLCHOLINE CHLORIDE 20 MG/ML
INJECTION INTRAMUSCULAR; INTRAVENOUS AS NEEDED
Status: DISCONTINUED | OUTPATIENT
Start: 2021-05-21 | End: 2021-05-21 | Stop reason: HOSPADM

## 2021-05-21 RX ORDER — ROPIVACAINE HYDROCHLORIDE 5 MG/ML
30 INJECTION, SOLUTION EPIDURAL; INFILTRATION; PERINEURAL ONCE
Status: DISCONTINUED | OUTPATIENT
Start: 2021-05-21 | End: 2021-05-21 | Stop reason: HOSPADM

## 2021-05-21 RX ADMIN — FENTANYL CITRATE 25 MCG: 50 INJECTION, SOLUTION INTRAMUSCULAR; INTRAVENOUS at 11:26

## 2021-05-21 RX ADMIN — Medication 10 ML: at 14:34

## 2021-05-21 RX ADMIN — LIDOCAINE HYDROCHLORIDE 100 MG: 20 INJECTION, SOLUTION EPIDURAL; INFILTRATION; INTRACAUDAL; PERINEURAL at 07:35

## 2021-05-21 RX ADMIN — METHOCARBAMOL TABLETS 500 MG: 500 TABLET, COATED ORAL at 15:37

## 2021-05-21 RX ADMIN — SUCCINYLCHOLINE CHLORIDE 140 MG: 20 INJECTION, SOLUTION INTRAMUSCULAR; INTRAVENOUS at 07:36

## 2021-05-21 RX ADMIN — HYDROMORPHONE HYDROCHLORIDE 0.4 MG: 2 INJECTION, SOLUTION INTRAMUSCULAR; INTRAVENOUS; SUBCUTANEOUS at 10:23

## 2021-05-21 RX ADMIN — CEFAZOLIN 2 G: 330 INJECTION, POWDER, FOR SOLUTION INTRAMUSCULAR; INTRAVENOUS at 07:53

## 2021-05-21 RX ADMIN — INSULIN LISPRO 2 UNITS: 100 INJECTION, SOLUTION INTRAVENOUS; SUBCUTANEOUS at 18:24

## 2021-05-21 RX ADMIN — SODIUM CHLORIDE, POTASSIUM CHLORIDE, SODIUM LACTATE AND CALCIUM CHLORIDE: 600; 310; 30; 20 INJECTION, SOLUTION INTRAVENOUS at 09:41

## 2021-05-21 RX ADMIN — Medication 5 MG: at 09:48

## 2021-05-21 RX ADMIN — ROCURONIUM BROMIDE 5 MG: 10 SOLUTION INTRAVENOUS at 07:35

## 2021-05-21 RX ADMIN — ROCURONIUM BROMIDE 10 MG: 10 SOLUTION INTRAVENOUS at 08:15

## 2021-05-21 RX ADMIN — SODIUM CHLORIDE, POTASSIUM CHLORIDE, SODIUM LACTATE AND CALCIUM CHLORIDE 125 ML/HR: 600; 310; 30; 20 INJECTION, SOLUTION INTRAVENOUS at 07:21

## 2021-05-21 RX ADMIN — MIDAZOLAM 1 MG: 1 INJECTION INTRAMUSCULAR; INTRAVENOUS at 07:23

## 2021-05-21 RX ADMIN — ACETAMINOPHEN 650 MG: 325 TABLET ORAL at 07:13

## 2021-05-21 RX ADMIN — ATORVASTATIN CALCIUM 10 MG: 10 TABLET, FILM COATED ORAL at 21:22

## 2021-05-21 RX ADMIN — PROPOFOL 50 MG: 10 INJECTION, EMULSION INTRAVENOUS at 07:46

## 2021-05-21 RX ADMIN — DEXAMETHASONE SODIUM PHOSPHATE 4 MG: 4 INJECTION, SOLUTION INTRAMUSCULAR; INTRAVENOUS at 07:53

## 2021-05-21 RX ADMIN — HYDROMORPHONE HYDROCHLORIDE 0.6 MG: 2 INJECTION, SOLUTION INTRAMUSCULAR; INTRAVENOUS; SUBCUTANEOUS at 09:34

## 2021-05-21 RX ADMIN — ROCURONIUM BROMIDE 10 MG: 10 SOLUTION INTRAVENOUS at 08:46

## 2021-05-21 RX ADMIN — SODIUM CHLORIDE 75 ML/HR: 9 INJECTION, SOLUTION INTRAVENOUS at 14:33

## 2021-05-21 RX ADMIN — FENTANYL CITRATE 50 MCG: 50 INJECTION, SOLUTION INTRAMUSCULAR; INTRAVENOUS at 07:35

## 2021-05-21 RX ADMIN — ROCURONIUM BROMIDE 10 MG: 10 SOLUTION INTRAVENOUS at 09:17

## 2021-05-21 RX ADMIN — Medication: at 11:02

## 2021-05-21 RX ADMIN — Medication 10 ML: at 14:33

## 2021-05-21 RX ADMIN — METHOCARBAMOL TABLETS 500 MG: 500 TABLET, COATED ORAL at 21:22

## 2021-05-21 RX ADMIN — DEXAMETHASONE SODIUM PHOSPHATE 2 MG: 4 INJECTION, SOLUTION INTRAMUSCULAR; INTRAVENOUS at 21:22

## 2021-05-21 RX ADMIN — ACETAMINOPHEN 650 MG: 325 TABLET ORAL at 20:17

## 2021-05-21 RX ADMIN — GABAPENTIN 300 MG: 300 CAPSULE ORAL at 21:22

## 2021-05-21 RX ADMIN — HUMAN INSULIN 5 UNITS: 100 INJECTION, SUSPENSION SUBCUTANEOUS at 18:24

## 2021-05-21 RX ADMIN — Medication 10 ML: at 21:23

## 2021-05-21 RX ADMIN — GABAPENTIN 300 MG: 300 CAPSULE ORAL at 15:37

## 2021-05-21 RX ADMIN — FENTANYL CITRATE 50 MCG: 50 INJECTION, SOLUTION INTRAMUSCULAR; INTRAVENOUS at 08:02

## 2021-05-21 RX ADMIN — INSULIN LISPRO 3 UNITS: 100 INJECTION, SOLUTION INTRAVENOUS; SUBCUTANEOUS at 11:40

## 2021-05-21 RX ADMIN — ACETAMINOPHEN 650 MG: 325 TABLET ORAL at 14:30

## 2021-05-21 RX ADMIN — PROPOFOL 50 MG: 10 INJECTION, EMULSION INTRAVENOUS at 09:51

## 2021-05-21 RX ADMIN — PROPOFOL 50 MG: 10 INJECTION, EMULSION INTRAVENOUS at 08:02

## 2021-05-21 RX ADMIN — CEFAZOLIN SODIUM 2 G: 1 INJECTION, POWDER, FOR SOLUTION INTRAMUSCULAR; INTRAVENOUS at 15:37

## 2021-05-21 RX ADMIN — FENTANYL CITRATE 25 MCG: 50 INJECTION, SOLUTION INTRAMUSCULAR; INTRAVENOUS at 11:35

## 2021-05-21 RX ADMIN — MIDAZOLAM 1 MG: 1 INJECTION INTRAMUSCULAR; INTRAVENOUS at 07:34

## 2021-05-21 RX ADMIN — SUGAMMADEX 150 MG: 100 INJECTION, SOLUTION INTRAVENOUS at 10:10

## 2021-05-21 RX ADMIN — ROCURONIUM BROMIDE 25 MG: 10 SOLUTION INTRAVENOUS at 07:40

## 2021-05-21 RX ADMIN — Medication 10 ML: at 21:24

## 2021-05-21 NOTE — ANESTHESIA POSTPROCEDURE EVALUATION
Post-Anesthesia Evaluation and Assessment    Patient: Vance Simpson MRN: [id number]  SSN: [id number]    YOB: 1950  Age: 72 y.o. Sex: female      I have evaluated the patient and they are stable and ready for discharge from the PACU. Cardiovascular Function/Vital Signs  Visit Vitals  BP (!) 147/80   Pulse 76   Temp 36.8 °C (98.2 °F)   Resp 13   Ht 5' 4\" (1.626 m)   Wt 67.1 kg (148 lb)   SpO2 100%   BMI 25.40 kg/m²       Patient is status post General anesthesia for Procedure(s):  L5-S1 LAMINECTOMY AND DISCECTOMY WITH INSTRUMENTATED FUSION, MEDTRONIC PLIF, PEDICLE SCREWS (O-ARM) (URGENT). Nausea/Vomiting: None    Postoperative hydration reviewed and adequate. Pain:  Pain Scale 1: Numeric (0 - 10) (05/21/21 1035)  Pain Intensity 1: 0 (05/21/21 1035)   Managed    Neurological Status:   Neuro (WDL): Within Defined Limits (05/21/21 1035)  Neuro  Neurologic State: Alert;Eyes open to voice (05/21/21 1035)  Orientation Level: Oriented X4 (05/21/21 1035)  Cognition: Follows commands (05/21/21 1035)  LUE Motor Response: Purposeful (05/21/21 1035)  LLE Motor Response: Purposeful (05/21/21 1035)  RUE Motor Response: Purposeful (05/21/21 1035)  RLE Motor Response: Purposeful (05/21/21 1035)   At baseline    Mental Status, Level of Consciousness: Alert and  oriented to person, place, and time    Pulmonary Status:   O2 Device: Nasal cannula (05/21/21 1035)   Adequate oxygenation and airway patent    Complications related to anesthesia: None    Post-anesthesia assessment completed. No concerns    Signed By: Heidy Torres MD     May 21, 2021              Procedure(s):  L5-S1 LAMINECTOMY AND DISCECTOMY WITH INSTRUMENTATED FUSION, MEDTRONIC PLIF, PEDICLE SCREWS (O-ARM) (URGENT).     general    <BSHSIANPOST>    INITIAL Post-op Vital signs:   Vitals Value Taken Time   /80 05/21/21 1100   Temp 36.8 °C (98.2 °F) 05/21/21 1035   Pulse 76 05/21/21 1110   Resp 9 05/21/21 1110   SpO2 100 % 05/21/21 1110 Vitals shown include unvalidated device data.

## 2021-05-21 NOTE — PERIOP NOTES
TRANSFER - IN REPORT:    Verbal report received from [address] on Zay Busing  being received from 546 for routine progression of care      Report consisted of patients Situation, Background, Assessment and   Recommendations(SBAR). Information from the following report(s) SBAR, Intake/Output, MAR and Recent Results was reviewed with the receiving nurse. Opportunity for questions and clarification was provided. Assessment completed upon patients arrival to unit and care assumed.

## 2021-05-21 NOTE — BRIEF OP NOTE
Brief Postoperative Note    Patient: Jasmin Saldana  YOB: 1950  MRN: [id number]    Date of Procedure: 5/21/2021     Pre-Op Diagnosis: L5-S1 HERNIATED DISC WITH SPINAL INSTABILITY    Post-Op Diagnosis: Same as preoperative diagnosis. Procedure(s):  L5-S1 LAMINECTOMY AND DISCECTOMY WITH INSTRUMENTATED FUSION, MEDTRONIC PLIF, PEDICLE SCREWS (O-ARM) (URGENT)    Surgeon(s):  Jaylen Marshall MD    Surgical Assistant: Surg Asst-1: Holland Iglesias    Anesthesia: General     Estimated Blood Loss (mL): less than 609     Complications: None    Specimens: * No specimens in log *     Implants:   Implant Name Type Inv. Item Serial No.  Lot No. LRB No. Used Action   GRAFT BNE SUB L CANC FRZN MORSELIZED W/ VIABLE CELL [place] - [id number]  GRAFT BNE SUB L CANC FRZN MORSELIZED W/ VIABLE CELL [place] [id number] [employer]  N/A 1 Implanted   SPACER [id number] ELEV STD 23X8MM - SNA Graft SPACER [id number] ELEV STD 23X8MM NA MEDTRONIC SOFAMOR DANEK_WD [id number] N/A 1 Implanted       Drains:   Hemovac Right; Lower Back (Active)   Site Assessment Clean, dry, & intact 05/21/21 0957   Dressing Status Clean, dry, & intact 05/21/21 0957   Drainage Description Serosanguinous 05/21/21 0957   Status Patent; Charged;Draining 05/21/21 0957       Findings: hnp    Electronically Signed by Claudia Wyatt MD on 5/21/2021 at 10:06 AM

## 2021-05-21 NOTE — ROUTINE PROCESS
Patient: Jasmin Saldana MRN: [id number]  SSN: [id number] YOB: 1950  Age: 72 y.o. Sex: female Patient is status post Procedure(s): 
L5-S1 LAMINECTOMY AND DISCECTOMY WITH INSTRUMENTATED FUSION, MEDTRONIC PLIF, PEDICLE SCREWS (O-ARM) (URGENT). Surgeon(s) and Role: 
   * Jaylen Marshall MD - Primary Local/Dose/Irrigation:  No local given Peripheral IV 05/17/21 Left Wrist (Active) Site Assessment Clean, dry, & intact 05/20/21 0840 Phlebitis Assessment 0 05/20/21 0840 Infiltration Assessment 0 05/20/21 0840 Dressing Status Clean, dry, & intact 05/20/21 0840 Dressing Type Transparent 05/20/21 0840 Hub Color/Line Status Capped 05/20/21 0840 Action Taken Open ports on tubing capped 05/20/21 0840 Alcohol Cap Used Yes 05/20/21 0840 Hemovac Right; Lower Back (Active) Site Assessment Clean, dry, & intact 05/21/21 0957 Dressing Status Clean, dry, & intact 05/21/21 0957 Drainage Description Serosanguinous 05/21/21 0957 Status Patent; Charged;Draining 05/21/21 0957 Dressing/Packing:  Incision 05/21/21 Back Posterior;Right-Dressing/Treatment: Adhesive bandage;Band-Aid/Adhesive bandage;Steri-strips (05/21/21 1027) Splint/Cast:  ] Other:  Steri stripes and island dressing

## 2021-05-21 NOTE — ANESTHESIA PREPROCEDURE EVALUATION
Relevant Problems   No relevant active problems       Anesthetic History   No history of anesthetic complications            Review of Systems / Medical History  Patient summary reviewed, nursing notes reviewed and pertinent labs reviewed    Pulmonary  Within defined limits                 Neuro/Psych   Within defined limits           Cardiovascular    Hypertension: well controlled                   GI/Hepatic/Renal  Within defined limits              Endo/Other    Diabetes: type 2    Arthritis     Other Findings              Physical Exam    Airway  Mallampati: II  TM Distance: > 6 cm  Neck ROM: normal range of motion   Mouth opening: Normal     Cardiovascular  Regular rate and rhythm,  S1 and S2 normal,  no murmur, click, rub, or gallop             Dental  No notable dental hx       Pulmonary  Breath sounds clear to auscultation               Abdominal  GI exam deferred       Other Findings            Anesthetic Plan    ASA: 2  Anesthesia type: general          Induction: Intravenous  Anesthetic plan and risks discussed with: Patient

## 2021-05-21 NOTE — PROGRESS NOTES
1150: TRANSFER - OUT REPORT:    Verbal report given to Vanda JACKSON(name) on Nick Drivers  being transferred to [facility](unit) for routine post - op       Report consisted of patients Situation, Background, Assessment and   Recommendations(SBAR). Time Pre op antibiotic given:0753  Anesthesia Stop time: 5270  Mckeon Present on Transfer to floor:yes  Order for Mckeon on Chart:yes  Discharge Prescriptions with Chart:no    Information from the following report(s) SBAR, Kardex, OR Summary, Intake/Output and MAR was reviewed with the receiving nurse. Opportunity for questions and clarification was provided. Is the patient on 02? YES       L/Min 2           Is the patient on a monitor? NO    Is the nurse transporting with the patient? NO    Surgical Waiting Area notified of patient's transfer from PACU?  YES      The following personal items collected during your admission accompanied patient upon transfer:   Dental Appliance: Dental Appliances: None  Vision:    Hearing Aid:    Jewelry:    Clothing:    Other Valuables:    Valuables sent to safe:

## 2021-05-21 NOTE — PROGRESS NOTES
TRANSFER - IN REPORT:    Verbal report received from Kathy(name) on Santos Peterson  being received from [employer]) for routine post - op      Report consisted of patients Situation, Background, Assessment and   Recommendations(SBAR). Information from the following report(s) SBAR was reviewed with the receiving nurse. Opportunity for questions and clarification was provided. Assessment completed upon patients arrival to unit and care assumed.

## 2021-05-21 NOTE — PROGRESS NOTES
KRISTINA:    RUR 19%    Surgery today. Disposition: Anticipate home with spouse and HH. Patient has supportive family.     Transportation: Family to transport    Primary contact: Raj Paz(spouse) [phone]    Evin Jones RN/CRM  [phone]

## 2021-05-21 NOTE — PROGRESS NOTES
Physician Progress Note      Daisy Disla  CSN #:                  [id number]  :                       1950  ADMIT DATE:       2021 5:18 PM  [address] [place] DATE:  RESPONDING  PROVIDER #:        Tara Handy MD          QUERY TEXT:    Patient admitted with Herniated Lumbar Intervertebral Disk. Documentation reflects HANNA in the H&P only, Creat was 1.15 on admission, and remains at 1.15. If possible, please document in the progress notes and discharge summary if HANNA was: The medical record reflects the following:  Risk Factors: hx of DM and HTN  Clinical Indicators: admitted with back pain, noted to have documentation of HANNA in the H&P. Creat on admission was 1.15, and it remains 1.15. Per lab reports in Epic, pt is noted to have a Creat of 1.21 in , therefore does not meet criteria for HANNA. Treatment: 1L NS IV Bolus, monitoring of labs. Thank you, if you have any questions please e-mail me at  [email or web address]  [phone]  Options provided:  -- HANNA ruled out after study  -- HANNA confirmed  -- Other - I will add my own diagnosis  -- Disagree - Not applicable / Not valid  -- Disagree - Clinically unable to determine / Unknown  -- Refer to Clinical Documentation Reviewer    PROVIDER RESPONSE TEXT:    HANNA ruled out after study.     Query created by: Shawn Kilgore on 2021 4:28 PM      Electronically signed by:  Tara Handy MD 2021 7:41 AM

## 2021-05-21 NOTE — OP NOTES
[address] [facility]   OPERATIVE REPORT    Name:  Chris Madsen  MR#:  [id number]  :  1950  ACCOUNT #:  [de-identified]  DATE OF SERVICE:  2021      PREOPERATIVE DIAGNOSIS:  L5-S1 left upper disk extrusion and dynamic instability. POSTOPERATIVE DIAGNOSIS:  L5-S1 left upper disk extrusion and dynamic instability. PROCEDURE PERFORMED:  L5-S1 laminectomy, medial facetectomy, and left-sided diskectomy with instrumented fusion of L5-S1 using Medtronic posterior lumbar interbody fusion with Elevate cage and Ladonna allograft, Medtronic Solera pedicle screws, and local autograft and O-arm navigation. SURGEON:  Allison Win MD    ASSISTANT:  Tara Vasquez. ANESTHESIA:  General endotracheal.    COMPLICATIONS:  None. SPECIMENS REMOVED:  None. IMPLANTS:  As noted above. ESTIMATED BLOOD LOSS:  150 mL. OPERATIVE INDICATIONS:  This is a 67-year-old female who came in with left-sided footdrop debilitating with pain, nonambulatory. Workup revealed dynamic instability at L5-S1 with an upward disk extrusion. After discussing treatment options and risks of surgery, informed consent was obtained. OPERATION IN DETAIL:  The patient was taken to the operating room and placed under general endotracheal anesthesia. All necessary lines and monitors were placed. Given appropriate dose of IV antibiotics. SCDs and Mckeon were placed. She was placed prone on the [facility] table. All pressure points were padded. The lumbar sacral region was prepped and draped in standard sterile fashion. An incision was made from L4-S1 with a skin knife and carried down with Bovie electrocautery in the avascular midline plane. Subperiosteal dissection was performed on the lamina of L4-5 and the sacrum. Localizing x-ray was obtained. Dissection was carried out over the L5-S1 facet fully exposing and cleaning off the transverse processes of L5 and S1 sacral ala.   The L5-S1 joints were decorticated and rongeured. Complete L5 laminectomy and rostral S1 laminectomy was performed. Wide decompression was performed, especially on the left where a generous medial facetectomy was performed. As I retracted the S1 nerve root, I encountered clear significant disk protrusion. Annulotomy was performed and disk was removed. I then worked my way up from the disk space where I encountered a large sequestered disk herniation that had herniated upward and was sitting under the axilla of L5 nerve root. This herniation was freed up and came out with one large chunk. There was no further disk after I removed this. There was no further compression of the L5 nerve root. I could palpate going up the foramen. I then cleaned up the disk space and decorticated the edges. I then placed a mixed titanium PEEK Elevate expandable cage packed with Ladonna allograft in the interspace for a posterior interbody fusion, countersunk it, and then expanded it to 12 mm. The O-arm navigation was then used. A reference film was then done. I then cannulated the pedicles of L5 and S1. The posterior lateral gutters of L5-S1 including the sacral ala and transverse processes decorticated. The local autologous bone harvested from the laminectomy and the remaining Ladonna were placed in the posterior lateral gutters. I then placed 6.5 x 45 mm pedicle screws bilaterally at L5 and 7.5 x 40 mm screws bilaterally at S1. There was good purchase of the screws. X-ray showed good position of the graft and the hardware. Polyaxial heads were lined up, 35 mm . The rods were locked into place. The wound was copiously irrigated. Hemostasis was achieved. Retractors were removed. The Hemovac drain was placed and brought out through a separate stab incision. The wound was then closed using 0 Vicryl to close the deep fascial layer, 2-0 Vicryl in the deep dermal layer, and running 4-0 Monocryl in the subcuticular fascia.   The wounds were cleaned, dried, and dressed with sterile dressing. The patient was flipped over, extubated, and taken to recovery room in stable condition.       MD FRANKY [id number]  D:  05/21/2021 10:35  T:  05/21/2021 15:24  JOB #:  [id number]

## 2021-05-21 NOTE — PROGRESS NOTES
Occupational Therapy Note:     Pt currently in the OR for procedure. Will follow up for evaluation post surgery.   Thanks,       Nati Gonzales, OT

## 2021-05-21 NOTE — PROGRESS NOTES
Problem: Falls - Risk of  Goal: *Absence of Falls  Description: Document Burrell Canavan Fall Risk and appropriate interventions in the flowsheet.   Outcome: Progressing Towards Goal  Note: Fall Risk Interventions:  Mobility Interventions: Patient to call before getting OOB         Medication Interventions: Patient to call before getting OOB    Elimination Interventions: Patient to call for help with toileting needs

## 2021-05-21 NOTE — PERIOP NOTES
Given to sterile field:    Surgifoam, ref [place], [address], exp 11/16/2024    Floseal, ref [id number], Lot [id number], exp 3/15/2023

## 2021-05-21 NOTE — PROGRESS NOTES
Spine Surgery Progress Note  Olvin Garrison PA-C    Admit Date: 5/17/2021   LOS: 4 days      Daily Progress Note: 5/21/2021    HPI: Ms. Deloris Hendrix is a very pleasant 62-year-old woman with a past medical history significant for type 2 diabetes, hypertension, dyslipidemia, rheumatoid arthritis She was in her usual state of health until few days ago when the patient developed back pain. Prior to this, patient was very active around the house. Her pain is located at the lower back on the left side, constant sharp pain with radiation to the left leg. No known aggravating factors. The patient was taken to [facility] on Saturday for evaluation of the low back pain. The patient was discharged home on tapering dose of prednisone and pain medication, which did not provide any significant relief. The patient was brought to the emergency room here at [facility] for further evaluation. The patient denies urinary or bowel incontinence as a result of the low back pain. No history of recent trauma to the back. The patient stated that the low back pain is 10/10 in severity. When the patient arrived at the emergency room, CT scan of the lumbar spine was obtained. The CT scan shows findings concerning for bulging of lumbar intervertebral disk. The emergency room physician consulted neurosurgeon on-call who advised MRI and admission to the hospital.  The patient was then referred to the hospitalist service for that purpose. MRI revealed \"Left paracentral extrusion and probable free disc fragment at L5-S1 extending cranially with significant narrowing of the left lateral recess posterior to L5 and left subarticular zone\". Xray revealed \"Grade 1 anterolisthesis of L5 that increases with flexion\". Patient is on Arava and Plaquenil for RA. Dr. Kartik Quiles is her rheumatologist.    Subjective:     Ms. Deloris Hendrix underwent L5-S1 fusion surgery on 5/21/21. She tolerated the procedure well. Left foot feels stronger. Patient denies numbness, chest pain, nausea, vomiting, difficulty swallowing, headache, and dyspnea. She is resting comfortably in bed. Objective:     Vital signs  VSS Afebrile. Temp (24hrs), Av.1 °F (36.7 °C), Min:97.6 °F (36.4 °C), Max:98.5 °F (36.9 °C)    07 -  1900  In: 1200 [I.V.:1200]  Out: 700 [Urine:600]  No intake/output data recorded. Visit Vitals  BP (!) 157/81   Pulse 76   Temp 98.2 °F (36.8 °C)   Resp 8   Ht 5' 4\" (1.626 m)   Wt 67.1 kg (148 lb)   SpO2 100%   BMI 25.40 kg/m²      O2 Device: Nasal cannula     Pain control  Pain Assessment  Pain Scale 1: Numeric (0 - 10)  Pain Intensity 1: 0  Pain Location 1: Back  Pain Orientation 1: Left  Pain Description 1: Aching, Dull  Pain Intervention(s) 1: Medication (see MAR)    Physical Exam:  Gen: No acute distress. Neuro: A&Ox3. Follows commands. Speech clear. Affect normal. Drowsy from surgery. PERRL. EOMI. Face symmetric. MEDINA spontaneously. Strength 5/5 in BUE and RLE. Strength 4/5 left hip, 3/5 left knee and ankle DF/PF, 2/5 left EHL. Sensation intact to sharp and dull touch. Gait deferred. Calves soft and supple;  No pain with passive stretch  Skin: Incision C/D/I  Abd: soft, nontender    24 hour results:    Recent Results (from the past 24 hour(s))   GLUCOSE, POC    Collection Time: 21 11:36 AM   Result Value Ref Range    Glucose (POC) 264 (H) 65 - 117 mg/dL    Performed by Okey Severs    GLUCOSE, POC    Collection Time: 21  4:06 PM   Result Value Ref Range    Glucose (POC) 394 (H) 65 - 117 mg/dL    Performed by Okey Severs    GLUCOSE, POC    Collection Time: 21  9:17 PM   Result Value Ref Range    Glucose (POC) 315 (H) 65 - 117 mg/dL    Performed by Phoenix Bland    GLUCOSE, POC    Collection Time: 21  5:55 AM   Result Value Ref Range    Glucose (POC) 271 (H) 65 - 117 mg/dL    Performed by Christina Marks       Assessment:     Principal Problem:    Bulging of lumbar intervertebral disc (5/17/2021)      Plan:     1) L5-S1 HNP   -L5-S1 laminectomy and fusion 5/21/21   -Decadron - taper  -PT/OT - in lumbar quickdraw brace    -Pain control - PCA post operatively then scheduled gabapentin, prn tylenol, percocet   -D/C PCA tomorrow AM   -D/C [place] tomorrow AM if mobilizing   -D/C HVAC drain when output <30mL over 8 hour shift   -ADAT  2) T2DM   -SSI   -Diabetic diet  3) HTN   -Daily lisinopril  4) RA   -Hold meds for surgery  5) Dyslipidemia   -Daily lipitor  6) HANNA   -Repeat labs   -Hospitalist following    Plan d/w Dr. Daisy Stout, PA

## 2021-05-21 NOTE — PROGRESS NOTES
Physical Therapy  Patient in surgery today. Will follow up for reassessment tomorrow.   Jessi Morrow, PT

## 2021-05-21 NOTE — PROGRESS NOTES
Problem: Falls - Risk of  Goal: *Absence of Falls  Description: Document Lear Shaker Fall Risk and appropriate interventions in the flowsheet.   5/21/2021 1812 by Tommie Georges RN  Outcome: Progressing Towards Goal  Note: Fall Risk Interventions:  Mobility Interventions: Patient to call before getting OOB         Medication Interventions: Patient to call before getting OOB    Elimination Interventions: Patient to call for help with toileting needs           5/21/2021 1811 by Tommie Georges RN  Outcome: Progressing Towards Goal  Note: Fall Risk Interventions:  Mobility Interventions: Patient to call before getting OOB         Medication Interventions: Patient to call before getting OOB    Elimination Interventions: Patient to call for help with toileting needs

## 2021-05-22 LAB
ALBUMIN SERPL-MCNC: 3.2 G/DL (ref 3.5–5)
ALBUMIN/GLOB SERPL: 1 {RATIO} (ref 1.1–2.2)
ALP SERPL-CCNC: 42 U/L (ref 45–117)
ALT SERPL-CCNC: 19 U/L (ref 12–78)
ANION GAP SERPL CALC-SCNC: 6 MMOL/L (ref 5–15)
AST SERPL-CCNC: 15 U/L (ref 15–37)
BILIRUB SERPL-MCNC: 0.4 MG/DL (ref 0.2–1)
BUN SERPL-MCNC: 45 MG/DL (ref 6–20)
BUN/CREAT SERPL: 24 (ref 12–20)
CALCIUM SERPL-MCNC: 8.2 MG/DL (ref 8.5–10.1)
CHLORIDE SERPL-SCNC: 102 MMOL/L (ref 97–108)
CO2 SERPL-SCNC: 28 MMOL/L (ref 21–32)
COMMENT, HOLDF: NORMAL
CREAT SERPL-MCNC: 1.87 MG/DL (ref 0.55–1.02)
ERYTHROCYTE [DISTWIDTH] IN BLOOD BY AUTOMATED COUNT: 11.9 % (ref 11.5–14.5)
GLOBULIN SER CALC-MCNC: 3.2 G/DL (ref 2–4)
GLUCOSE BLD STRIP.AUTO-MCNC: 174 MG/DL (ref 65–117)
GLUCOSE BLD STRIP.AUTO-MCNC: 178 MG/DL (ref 65–117)
GLUCOSE BLD STRIP.AUTO-MCNC: 224 MG/DL (ref 65–117)
GLUCOSE BLD STRIP.AUTO-MCNC: 247 MG/DL (ref 65–117)
GLUCOSE SERPL-MCNC: 237 MG/DL (ref 65–100)
HCT VFR BLD AUTO: 33.5 % (ref 35–47)
HGB BLD-MCNC: 11 G/DL (ref 11.5–16)
MCH RBC QN AUTO: 30.3 PG (ref 26–34)
MCHC RBC AUTO-ENTMCNC: 32.8 G/DL (ref 30–36.5)
MCV RBC AUTO: 92.3 FL (ref 80–99)
NRBC # BLD: 0 K/UL (ref 0–0.01)
NRBC BLD-RTO: 0 PER 100 WBC
PHOSPHATE SERPL-MCNC: 5.7 MG/DL (ref 2.6–4.7)
PLATELET # BLD AUTO: 175 K/UL (ref 150–400)
PMV BLD AUTO: 11.1 FL (ref 8.9–12.9)
POTASSIUM SERPL-SCNC: 4.8 MMOL/L (ref 3.5–5.1)
PROT SERPL-MCNC: 6.4 G/DL (ref 6.4–8.2)
RBC # BLD AUTO: 3.63 M/UL (ref 3.8–5.2)
SAMPLES BEING HELD,HOLD: NORMAL
SERVICE CMNT-IMP: ABNORMAL
SODIUM SERPL-SCNC: 136 MMOL/L (ref 136–145)
WBC # BLD AUTO: 8.5 K/UL (ref 3.6–11)

## 2021-05-22 PROCEDURE — 74011250636 HC RX REV CODE- 250/636: Performed by: PHYSICIAN ASSISTANT

## 2021-05-22 PROCEDURE — 65270000029 HC RM PRIVATE

## 2021-05-22 PROCEDURE — 97168 OT RE-EVAL EST PLAN CARE: CPT

## 2021-05-22 PROCEDURE — 97164 PT RE-EVAL EST PLAN CARE: CPT

## 2021-05-22 PROCEDURE — 97535 SELF CARE MNGMENT TRAINING: CPT

## 2021-05-22 PROCEDURE — 85027 COMPLETE CBC AUTOMATED: CPT

## 2021-05-22 PROCEDURE — 74011636637 HC RX REV CODE- 636/637: Performed by: NEUROLOGICAL SURGERY

## 2021-05-22 PROCEDURE — 84100 ASSAY OF PHOSPHORUS: CPT

## 2021-05-22 PROCEDURE — 74011250636 HC RX REV CODE- 250/636: Performed by: NEUROLOGICAL SURGERY

## 2021-05-22 PROCEDURE — 97530 THERAPEUTIC ACTIVITIES: CPT

## 2021-05-22 PROCEDURE — 74011250637 HC RX REV CODE- 250/637: Performed by: NEUROLOGICAL SURGERY

## 2021-05-22 PROCEDURE — 74011000250 HC RX REV CODE- 250: Performed by: NEUROLOGICAL SURGERY

## 2021-05-22 PROCEDURE — 74011250637 HC RX REV CODE- 250/637: Performed by: PHYSICIAN ASSISTANT

## 2021-05-22 PROCEDURE — 80053 COMPREHEN METABOLIC PANEL: CPT

## 2021-05-22 PROCEDURE — 36415 COLL VENOUS BLD VENIPUNCTURE: CPT

## 2021-05-22 PROCEDURE — 74011000258 HC RX REV CODE- 258: Performed by: NEUROLOGICAL SURGERY

## 2021-05-22 RX ADMIN — GABAPENTIN 300 MG: 300 CAPSULE ORAL at 21:38

## 2021-05-22 RX ADMIN — OXYCODONE HYDROCHLORIDE AND ACETAMINOPHEN 500 MG: 500 TABLET ORAL at 09:06

## 2021-05-22 RX ADMIN — ACETAMINOPHEN 650 MG: 325 TABLET ORAL at 07:09

## 2021-05-22 RX ADMIN — Medication 10 ML: at 21:39

## 2021-05-22 RX ADMIN — CEFAZOLIN SODIUM 2 G: 1 INJECTION, POWDER, FOR SOLUTION INTRAMUSCULAR; INTRAVENOUS at 00:13

## 2021-05-22 RX ADMIN — GABAPENTIN 300 MG: 300 CAPSULE ORAL at 17:38

## 2021-05-22 RX ADMIN — INSULIN LISPRO 3 UNITS: 100 INJECTION, SOLUTION INTRAVENOUS; SUBCUTANEOUS at 06:30

## 2021-05-22 RX ADMIN — METHOCARBAMOL TABLETS 500 MG: 500 TABLET, COATED ORAL at 21:38

## 2021-05-22 RX ADMIN — HUMAN INSULIN 5 UNITS: 100 INJECTION, SUSPENSION SUBCUTANEOUS at 06:30

## 2021-05-22 RX ADMIN — Medication 10 ML: at 05:13

## 2021-05-22 RX ADMIN — HUMAN INSULIN 5 UNITS: 100 INJECTION, SUSPENSION SUBCUTANEOUS at 17:38

## 2021-05-22 RX ADMIN — CYANOCOBALAMIN TAB 500 MCG 1000 MCG: 500 TAB at 09:06

## 2021-05-22 RX ADMIN — ONDANSETRON 4 MG: 2 INJECTION INTRAMUSCULAR; INTRAVENOUS at 10:30

## 2021-05-22 RX ADMIN — Medication 10 ML: at 05:14

## 2021-05-22 RX ADMIN — METHOCARBAMOL TABLETS 500 MG: 500 TABLET, COATED ORAL at 17:38

## 2021-05-22 RX ADMIN — GABAPENTIN 300 MG: 300 CAPSULE ORAL at 09:06

## 2021-05-22 RX ADMIN — Medication 10 ML: at 13:50

## 2021-05-22 RX ADMIN — DOCUSATE SODIUM 50 MG AND SENNOSIDES 8.6 MG 1 TABLET: 8.6; 5 TABLET, FILM COATED ORAL at 09:06

## 2021-05-22 RX ADMIN — INSULIN LISPRO 2 UNITS: 100 INJECTION, SOLUTION INTRAVENOUS; SUBCUTANEOUS at 12:47

## 2021-05-22 RX ADMIN — INSULIN LISPRO 3 UNITS: 100 INJECTION, SOLUTION INTRAVENOUS; SUBCUTANEOUS at 17:38

## 2021-05-22 RX ADMIN — DEXAMETHASONE SODIUM PHOSPHATE 2 MG: 4 INJECTION, SOLUTION INTRAMUSCULAR; INTRAVENOUS at 09:06

## 2021-05-22 RX ADMIN — ATORVASTATIN CALCIUM 10 MG: 10 TABLET, FILM COATED ORAL at 21:38

## 2021-05-22 RX ADMIN — POLYETHYLENE GLYCOL 3350 17 G: 17 POWDER, FOR SOLUTION ORAL at 09:05

## 2021-05-22 RX ADMIN — CEFAZOLIN SODIUM 1 G: 1 INJECTION, POWDER, FOR SOLUTION INTRAMUSCULAR; INTRAVENOUS at 19:56

## 2021-05-22 RX ADMIN — METHOCARBAMOL TABLETS 500 MG: 500 TABLET, COATED ORAL at 09:06

## 2021-05-22 RX ADMIN — OXYCODONE 5 MG: 5 TABLET ORAL at 21:42

## 2021-05-22 RX ADMIN — ACETAMINOPHEN 650 MG: 325 TABLET ORAL at 19:56

## 2021-05-22 RX ADMIN — ACETAMINOPHEN 650 MG: 325 TABLET ORAL at 13:50

## 2021-05-22 RX ADMIN — DEXAMETHASONE SODIUM PHOSPHATE 2 MG: 4 INJECTION, SOLUTION INTRAMUSCULAR; INTRAVENOUS at 21:39

## 2021-05-22 RX ADMIN — CEFAZOLIN SODIUM 2 G: 1 INJECTION, POWDER, FOR SOLUTION INTRAMUSCULAR; INTRAVENOUS at 07:09

## 2021-05-22 RX ADMIN — INSULIN LISPRO 2 UNITS: 100 INJECTION, SOLUTION INTRAVENOUS; SUBCUTANEOUS at 21:39

## 2021-05-22 RX ADMIN — ACETAMINOPHEN 650 MG: 325 TABLET ORAL at 03:13

## 2021-05-22 RX ADMIN — CALCIUM CARBONATE (ANTACID) CHEW TAB 500 MG 200 MG: 500 CHEW TAB at 09:06

## 2021-05-22 NOTE — PROGRESS NOTES
[id number]  Hospitalist Group                                                                                          Hospitalist Progress Note  Cirilo Orozco MD  Answering service: [phone] or [phone] from in house phone        Date of Service:  2021  NAME:  Mary Kate Dsouza  :  1950  MRN:  [id number]      Admission Summary:   Per H and P \"72year-old woman with a past medical history significant for type 2 diabetes, hypertension, dyslipidemia, rheumatoid arthritis, was in her usual state of health until few days ago when the patient developed back pain. The pain is located at the lower back on the left side, constant sharp pain with radiation to the left leg. No known aggravating factors'. Interval history / Subjective: Follow up lumbosacral herniated disc L5S1  Patient seen and examined at the bedside. Labs, images and notes reviewed  Discussed with nursing staff, orders reviewed. Plan discussed with patient/Family  Lying in the bed. Leg pain better post procedure. Some nausea and vomiting x2 after PT. Better at present. No fever, chills. No other concerns. Mckeon catheter and PCA discontinued by NUS  Creatinine up. Continued with IVF. Assessment & Plan:     # Herniated nucleus pulposus L5-S1, L5-S1 Laminectomy and Discectomy with instrumentated fusion, MEDTRONIC PLIF on  by Dr. Debora Flores  -MRI L spine-Left paracentral extrusion and probable free disc fragment at L5-S1 extending  cranially with significant narrowing of the left lateral recess posterior to L5  and left subarticular zone  -NSGY on board  -Perioperative monitoring and management per NUS team  -pain management per NUS. Decadrone taper per NUS  -PCA discontinued .  Further per NUS  -Incentive spirometry  -Early ambulation and OOB as per neurosurgery team postoperatively  -PT/OT per NUS, initiated  post-operatively  -N/V control with meds    # Mild HANNA, likely perioperative hydration deficit  -Continue IVF   -BMP monitoring   -If no improvement, consider further eval    # HTN: lisinopril  -Hold lisinopril preoperatively tonight  -As needed hydralazine IV for blood pressure control    #Diabetes:  -A1c: 5.3, on oral meds at home  - 5 units NPH with decadron, lispro with meal. She will be NPO starting midnight    # Rheumatoid arthritis:  -hold plaquenil, leflunomide for surgery    Code status: full  DVT prophylaxis: [address] discussed with: patientnacho  Anticipated Disposition:home. Likely need for IPR postoperatively. Follow-up neurosurgery and PT/OT recommendations postoperatively  Anticipated Discharge: based on PT/OT rec and NUS rec     Hospital Problems  Date Reviewed: 5/17/2021        Codes Class Noted POA    * (Principal) Bulging of lumbar intervertebral disc ICD-10-CM: M51.26  ICD-9-CM: 722.10  5/17/2021 Yes                Review of Systems:   As per HPI      Vital Signs:    Last 24hrs VS reviewed since prior progress note. Most recent are:  Visit Vitals  BP (!) 153/81   Pulse 80   Temp 98 °F (36.7 °C)   Resp 16   Ht 5' 4\" (1.626 m)   Wt 67.1 kg (148 lb)   SpO2 97%   BMI 25.40 kg/m²         Intake/Output Summary (Last 24 hours) at 5/22/2021 1142  Last data filed at 5/22/2021 0608  Gross per 24 hour   Intake 592.5 ml   Output 1080 ml   Net -487.5 ml        Physical Examination:     I had a face to face encounter with this patient and independently examined them on 5/22/2021 as outlined below:          Constitutional:  No acute distress, cooperative, pleasant. Alert, O x3. Comfortable with pain control. Drain in place. Dressing clean and dry. Drain in place. ENT:  Oral mucosa moist, EOMI,anicteric sclera,normal conjunctiva    Resp:  CTA bilaterally. No wheezing/rhonchi/rales. No accessory muscle use   CV:  Regular rhythm, normal rate, S1,S2 wnl    GI:  Soft, non distended, non tender.  normoactive bowel sounds    Musculoskeletal:  No LE edema    Neurologic:  alert and oriented X 3, improved left LE streght. Limited exam given N/V just earlier. Data Review:    Review and/or order of clinical lab test  Review and/or order of tests in the medicine section of CPT    XR SPINE LUMB MIN 4 V    Result Date: 5/18/2021  Grade 1 anterolisthesis of L5 that increases with flexion     MRI LUMB SPINE WO CONT    Result Date: 5/18/2021  1. Left paracentral extrusion and probable free disc fragment at L5-S1 extending cranially with significant narrowing of the left lateral recess posterior to L5 and left subarticular zone      CT SPINE LUMB WO CONT    Result Date: 5/17/2021  Central canal stenosis at L5-S1 secondary to a left paracentral disc protrusion, with compression of the left S1 nerve root Moderate severe left neural foraminal stenosis L5-S1 with compression of the exiting left L5 nerve root     NC XR TECHNOLOGIST SERVICE    Result Date: 5/21/2021  Fluoroscopic guidance was provided for the referring clinician. Labs:     Recent Labs     05/22/21 0045 05/20/21 0450   WBC 8.5 4.6   HGB 11.0* 12.2   HCT 33.5* 36.0    174     Recent Labs     05/22/21 0045 05/20/21 0450    135*  137   K 4.8 4.4  4.4    103  103   CO2 28 25  27   BUN 45* 33*  33*   CREA 1.87* 1.08*  1.15*   * 235*  235*   CA 8.2* 9.2  9.2   PHOS 5.7* 3.6     Recent Labs     05/22/21 0045 05/20/21 0450   ALT 19  --    AP 42*  --    TBILI 0.4  --    TP 6.4  --    ALB 3.2* 3.5   GLOB 3.2  --      Recent Labs     05/20/21 0450   INR 1.0   PTP 10.7      No results for input(s): FE, TIBC, PSAT, FERR in the last 72 hours. No results found for: FOL, RBCF   No results for input(s): PH, PCO2, PO2 in the last 72 hours. No results for input(s): CPK, CKNDX, TROIQ in the last 72 hours.     No lab exists for component: CPKMB  Lab Results   Component Value Date/Time    Cholesterol, total 130 02/09/2021 10:22 AM    HDL Cholesterol 73 02/09/2021 10:22 AM    LDL, calculated 43.2 02/09/2021 10:22 AM Triglyceride 69 02/09/2021 10:22 AM    CHOL/HDL Ratio 1.8 02/09/2021 10:22 AM     Lab Results   Component Value Date/Time    Glucose (POC) 170 (H) 05/21/2021 08:57 PM    Glucose (POC) 161 (H) 05/21/2021 04:14 PM    Glucose (POC) 216 (H) 05/21/2021 11:00 AM    Glucose (POC) 271 (H) 05/21/2021 05:55 AM    Glucose (POC) 315 (H) 05/20/2021 09:17 PM     Lab Results   Component Value Date/Time    Color YELLOW/STRAW 05/17/2021 06:47 PM    Appearance CLEAR 05/17/2021 06:47 PM    Specific gravity 1.023 05/17/2021 06:47 PM    Specific gravity 1.010 04/16/2015 04:13 AM    pH (UA) 5.5 05/17/2021 06:47 PM    Protein Negative 05/17/2021 06:47 PM    Glucose >1,000 (A) 05/17/2021 06:47 PM    Ketone Negative 05/17/2021 06:47 PM    Bilirubin Negative 05/17/2021 06:47 PM    Urobilinogen 0.2 05/17/2021 06:47 PM    Nitrites Negative 05/17/2021 06:47 PM    Leukocyte Esterase Negative 05/17/2021 06:47 PM    Epithelial cells FEW 05/17/2021 06:47 PM    Bacteria 1+ (A) 05/17/2021 06:47 PM    WBC 0-4 05/17/2021 06:47 PM    RBC 0-5 05/17/2021 06:47 PM         Medications Reviewed:     Current Facility-Administered Medications   Medication Dose Route Frequency    ceFAZolin (ANCEF) 2 g in sterile water (preservative free) 20 mL IV syringe  2 g IntraVENous Q8H    methocarbamoL (ROBAXIN) tablet 500 mg  500 mg Oral TID    dexamethasone (DECADRON) 4 mg/mL injection 2 mg  2 mg IntraVENous Q12H    oxyCODONE IR (ROXICODONE) tablet 5 mg  5 mg Oral Q4H PRN    oxyCODONE IR (ROXICODONE) tablet 10 mg  10 mg Oral Q4H PRN    acetaminophen (TYLENOL) tablet 650 mg  650 mg Oral Q6H    0.9% sodium chloride infusion  75 mL/hr IntraVENous CONTINUOUS    sodium chloride (NS) flush 5-40 mL  5-40 mL IntraVENous Q8H    sodium chloride (NS) flush 5-40 mL  5-40 mL IntraVENous PRN    naloxone (NARCAN) injection 0.4 mg  0.4 mg IntraVENous PRN    senna-docusate (PERICOLACE) 8.6-50 mg per tablet 1 Tablet  1 Tablet Oral DAILY    polyethylene glycol (MIRALAX) packet 17 g  17 g Oral DAILY    bisacodyL (DULCOLAX) suppository 10 mg  10 mg Rectal DAILY PRN    hydrALAZINE (APRESOLINE) 20 mg/mL injection 10 mg  10 mg IntraVENous Q6H PRN    insulin NPH (NOVOLIN N, HUMULIN N) injection 5 Units  5 Units SubCUTAneous ACB&D    gabapentin (NEURONTIN) capsule 300 mg  300 mg Oral TID    ascorbic acid (vitamin C) (VITAMIN C) tablet 500 mg  500 mg Oral DAILY    atorvastatin (LIPITOR) tablet 10 mg  10 mg Oral QHS    calcium carbonate (TUMS) chewable tablet 200 mg [elemental]  200 mg Oral DAILY    cyanocobalamin (VITAMIN B12) tablet 1,000 mcg  1,000 mcg Oral DAILY    [Held by provider] hydrOXYchloroQUINE (PLAQUENIL) tablet 300 mg  300 mg Oral DAILY WITH BREAKFAST    [Held by provider] leflunomide (ARAVA) tablet 20 mg  20 mg Oral DAILY    [Held by provider] lisinopriL (PRINIVIL, ZESTRIL) tablet 5 mg  5 mg Oral DAILY    sodium chloride (NS) flush 5-40 mL  5-40 mL IntraVENous Q8H    sodium chloride (NS) flush 5-40 mL  5-40 mL IntraVENous PRN    acetaminophen (TYLENOL) suppository 650 mg  650 mg Rectal Q6H PRN    promethazine (PHENERGAN) tablet 12.5 mg  12.5 mg Oral Q6H PRN    Or    ondansetron (ZOFRAN) injection 4 mg  4 mg IntraVENous Q6H PRN    [Held by provider] heparin (porcine) injection 5,000 Units  5,000 Units SubCUTAneous Q8H    insulin lispro (HUMALOG) injection   SubCUTAneous AC&HS    glucose chewable tablet 16 g  4 Tablet Oral PRN    dextrose (D50W) injection syrg 12.5-25 g  12.5-25 g IntraVENous PRN    glucagon (GLUCAGEN) injection 1 mg  1 mg IntraMUSCular PRN     ______________________________________________________________________  EXPECTED LENGTH OF STAY: 2d 21h  ACTUAL LENGTH OF STAY:          Willy Crews MD

## 2021-05-22 NOTE — PROGRESS NOTES
Problem: Mobility Impaired (Adult and Pediatric)  Goal: *Acute Goals and Plan of Care (Insert Text)  Description: FUNCTIONAL STATUS PRIOR TO ADMISSION: Patient was independent and active without use of DME.    HOME SUPPORT PRIOR TO ADMISSION: The patient lived with  but did not require assist. Pt   works during the day    Physical Therapy Goals  Initiated 5/19/2021  1. Patient will move from supine to sit and sit to supine , scoot up and down, and roll side to side in bed with supervision/set-up within 7 day(s). 2.  Patient will transfer from bed to chair and chair to bed with supervision/set-up using the least restrictive device within 7 day(s). 3.  Patient will perform sit to stand with supervision/set-up within 7 day(s). 4.  Patient will ambulate with supervision/set-up for 300 feet with the least restrictive device within 7 day(s). 5.  Patient will ascend/descend 6 stairs with 1 handrail(s) with minimal assistance/contact guard assist within 7 day(s). Outcome: Progressing Towards Goal   PHYSICAL THERAPY REEVALUATION  Patient: Zay Slade (72 y.o. female)  Date: 5/22/2021  Primary Diagnosis: Bulging of lumbar intervertebral disc [M51.26]  Procedure(s) (LRB):  L5-S1 LAMINECTOMY AND DISCECTOMY WITH INSTRUMENTATED FUSION, MEDTRONIC PLIF, PEDICLE SCREWS (O-ARM) (URGENT) (N/A) 1 Day Post-Op   Precautions:   Back (LOS on when up)      ASSESSMENT  Based on the objective data described below, the patient presents with decline in all mobility as compared to PLOF. Patient seated in chair when entering the room. PT obtained LSO brace to dottie with patient and fit properly. PT presented brace to patient and explained that we will need to place this brace on to wear when OOB. Patient immediately stated, \"I am going to throw up. \" Patient provided with emesis bag, vomited x 2 into bag. Emesis was food from last evening's dinner. Patient stated, I am so dizzy and it is making me nauseated.  PT consulted with RN who requested patient be put back to bed. Patient declined being able to tolerate LSO brace donning/fitting at this time. PT provided education on spinal precautions and patient was assisted to standing at walker and stand pivot transferring to bed. Patient reported she was very sorry she could not do more but was not well. Patient required mod A for sit to stand, min A for stand pivot, Mod A for bed mobility. Able to demonstrate good log rolling technique with assistance. Nurse entered room to provide medication for nausea and assisted PT in making patient comfortable in supine. Per nursing and OT, patient had vomited during OT session as well. Patient is not appropriate for a discharge home at this time. Current Level of Function Impacting Discharge (mobility/balance): unable to ambulate during PT session, has not attempted stair climbing, unable to stand supported for any length of time, requires fitting and education for donning/doffing LSO brace    Functional Outcome Measure: The patient scored 40 on the Barthel outcome measure which is indicative of disability. Other factors to consider for discharge: steps to enter the home and to get to second floor in home, PLOF no use of AD     Patient will benefit from skilled therapy intervention to address the above noted impairments. PLAN :  Recommendations and Planned Interventions: bed mobility training, transfer training, gait training, neuromuscular re-education, patient and family training/education, and therapeutic activities      Frequency/Duration: Patient will be followed by physical therapy:  twice daily to address goals.     Recommendation for discharge: (in order for the patient to meet his/her long term goals)  Physical therapy at least 2 days/week in the home AND ensure assist and/or supervision for safety with all mobility    This discharge recommendation:  A follow-up discussion with the attending provider and/or case management is planned    Equipment recommendations for successful discharge (if) home: walker: rolling         SUBJECTIVE:   Patient stated I don't know why I am so dizzy and nauseated.     OBJECTIVE DATA SUMMARY:   HISTORY:    Past Medical History:   Diagnosis Date    Acid reflux     Colitis 04/16/2015    Colitis     Diabetes mellitus, type II ([facility]     Diverticulosis of colon 04/20/2015    Hyperlipemia     Hypertension     Osteopenia     of the spine Dexa done 2014, 2017 and due 2019    RA (rheumatoid arthritis) ([facility]      Past Surgical History:   Procedure Laterality Date    HX COLONOSCOPY  04/20/2015    Due 2023    HX GYN      HX HYSTERECTOMY      HX TOTAL ABDOMINAL HYSTERECTOMY      KS ABDOMEN SURGERY PROC UNLISTED      KS BIOPSY OF BREAST, NEEDLE CORE  2017    benign findings - [facility] course since last seen and reason for reevaluation: S/p laminectomy and fusion    Personal factors and/or comorbidities impacting plan of care: n/a    Home Situation  Home Environment: Private residence  # Steps to Enter: 3  Rails to Enter: Yes  Hand Rails : Right  One/Two Story Residence: Two story  # of Interior Steps: 12 (6+6)  Interior Rails: Left (left for first 6, R for second 6)  Living Alone: No  Support Systems: Spouse/Significant Other/Partner  Patient Expects to be Discharged to[de-identified] Private residence  Current DME Used/Available at Home: None  Tub or Shower Type: Shower (3 inch lip)    EXAMINATION/PRESENTATION/DECISION MAKING:   Critical Behavior:  Neurologic State: Alert  Orientation Level: Oriented X4  Cognition: Appropriate decision making  Safety/Judgement: Awareness of environment      Range Of Motion:  AROM: Generally decreased, functional           PROM: Generally decreased, functional        Strength:    Strength: Generally decreased, functional    Tone & Sensation:   Tone: Normal        Coordination:  Coordination: Within functional limits    Functional Mobility:  Bed Mobility:  Rolling: Minimum assistance;Assist x1  Supine to Sit: Moderate assistance  Sit to Supine: Moderate assistance;Assist x1  Scooting: Minimum assistance;Assist x2  Transfers:  Sit to Stand: Moderate assistance;Assist x1;Additional time  Stand to Sit: Minimum assistance;Assist x1        Bed to Chair: Minimum assistance;Assist x1;Additional time    Balance:   Sitting: Impaired; With support  Standing: Impaired; With support  Standing - Static: Occasional;Constant support  Standing - Dynamic : Occasional;Constant support  Ambulation/Gait Training:         Unable to assess today    Stairs:  Unable to assess today        Functional Measure:  Barthel Index:    Bathin  Bladder: 0  Bowels: 10  Groomin  Dressin  Feeding: 10  Mobility: 0  Stairs: 0  Toilet Use: 0  Transfer (Bed to Chair and Back): 10  Total: 40/100       The Barthel ADL Index: Guidelines  1. The index should be used as a record of what a patient does, not as a record of what a patient could do. 2. The main aim is to establish degree of independence from any help, physical or verbal, however minor and for whatever reason. 3. The need for supervision renders the patient not independent. 4. A patient's performance should be established using the best available evidence. Asking the patient, friends/relatives and nurses are the usual sources, but direct observation and common sense are also important. However direct testing is not needed. 5. Usually the patient's performance over the preceding 24-48 hours is important, but occasionally longer periods will be relevant. 6. Middle categories imply that the patient supplies over 50 per cent of the effort. 7. Use of aids to be independent is allowed. Angela Rm., Barthel, D.W. (1003). Functional evaluation: the Barthel Index. [address] [facility] (14)[address]. [address], Sophie Reyes, Zoran Sung.Zeb, [address] [place], [place] [address] ().  Measuring the change indisability after inpatient rehabilitation; comparison of the responsiveness of the Barthel Index and Functional Patrick Measure. Journal of Neurology, Neurosurgery, and Psychiatry, 66(4), 524-434. EMANI King.ISRAEL, FARIDEH Perry, & Deann Boast, M.A. (2004.) Assessment of post-stroke quality of life in cost-effectiveness studies: The usefulness of the Barthel Index and the EuroQoL-5D. Quality of Life Research, 13, 427-43             Pain Ratin/10    Activity Tolerance:   Poor, requires frequent rest breaks, and nausea/vomitting during movement and at rest    After treatment patient left in no apparent distress:   Supine in bed, Heels elevated for pressure relief, Call bell within reach, and Side rails x 3    COMMUNICATION/EDUCATION:   The patients plan of care was discussed with: Occupational therapist and Registered nurse. Fall prevention education was provided and the patient/caregiver indicated understanding. and Patient/family agree to work toward stated goals and plan of care.     Thank you for this referral.  Yusra Painter, PT   Time Calculation: 20 mins

## 2021-05-22 NOTE — PROGRESS NOTES
Problem: Falls - Risk of  Goal: *Absence of Falls  Description: Document [facility] Fall Risk and appropriate interventions in the flowsheet.   Outcome: Progressing Towards Goal  Note: Fall Risk Interventions:  Mobility Interventions: Patient to call before getting OOB         Medication Interventions: Patient to call before getting OOB    Elimination Interventions: Patient to call for help with toileting needs

## 2021-05-22 NOTE — PROGRESS NOTES
PT returned to unit at [phone], spoke with nursing regarding patient status. PT was going to attempt second session to fit for LSO. Nursing reports patient continues with nausea and fatigue however was able to eat some lunch. Requested PT hold until tomorrow to allow patient to digest food and prevent further vomiting.      Thank you for your consideration,    Eneida Wilson, PT, DPT

## 2021-05-22 NOTE — PROGRESS NOTES
Day #2 of cefazolin  Indication:  surgical prophylaxis  Current regimen:  2 gm IV q8h  Abx regimen: cefazolin  Recent Labs     21  0045 21  0450   WBC 8.5 4.6   CREA 1.87* 1.08*  1.15*   BUN 45* 33*  33*     Est CrCl: ~25-30 ml/min; UO: 1 ml/kg/hr  Temp (24hrs), Av °F (36.7 °C), Min:97.5 °F (36.4 °C), Max:98.5 °F (36.9 °C)    Cultures: none    Plan: Change to 1 gm IV q12h to complete therapy x 2 doses due to increase in SCr    Thank you,  Lidya Su, PHARMD, BCPS

## 2021-05-22 NOTE — PROGRESS NOTES
[address] [facility] Adult  Hospitalist Group                                                                                          Hospitalist Progress Note  Sarah Swain MD  Answering service: [phone] OR [phone] from in house phone        Date of Service:  2021  NAME:  Margret Kramer  :  1950  MRN:  [id number]      Admission Summary:   Per H and P \"72year-old woman with a past medical history significant for type 2 diabetes, hypertension, dyslipidemia, rheumatoid arthritis, was in her usual state of health until few days ago when the patient developed back pain. The pain is located at the lower back on the left side, constant sharp pain with radiation to the left leg. No known aggravating factors'. Interval history / Subjective: Follow up lumbosacral herniated disc L5S1  Patient seen and examined at the bedside. Labs, images and notes reviewed  Discussed with nursing staff, orders reviewed. Plan discussed with patient/Family  Patient is sitting up in the chair. Pain controlled. Able to ambulate with PT. Pain more aggravated during that but controlled. Denied any tingling or numbness. Counseled about use of I-S. Educated patient about deep breathing exercises with I-S. No other concerns or overnight events.      Assessment & Plan:     # Herniated nucleus pulposus L5-S1, s/p Fusion of L5-S1 on  by Dr. Ashley Pepper  -MRI L spine-Left paracentral extrusion and probable free disc fragment at L5-S1 extending  cranially with significant narrowing of the left lateral recess posterior to L5  and left subarticular zone  -NSGY on board  -Perioperative monitoring and management per NUS team  -pain management-on decadron,gabapentin,oxycodone and IV 1 mg morphine prn  -Incentive spirometry  -Early ambulation and OOB as per neurosurgery team postoperatively  -PT/OT per NUS    # HTN: lisinopril  -Hold lisinopril preoperatively tonight  -As needed hydralazine IV for blood pressure control    #Diabetes:  -A1c: 5.3, on oral meds at home  - 5 units NPH with decadron, lispro with meal. She will be NPO starting midnight    # Rheumatoid arthritis:  -hold plaquenil, leflunomide for surgery    Code status: full  DVT prophylaxis: [place] 38 discussed with: patientnacho  Anticipated Disposition:home. Likely need for IPR postoperatively. Follow-up neurosurgery and PT/OT recommendations postoperatively  Anticipated Discharge: likely week end     Hospital Problems  Date Reviewed: 5/17/2021        Codes Class Noted POA    * (Principal) Bulging of lumbar intervertebral disc ICD-10-CM: M51.26  ICD-9-CM: 722.10  5/17/2021 Yes                Review of Systems:   As per HPI      Vital Signs:    Last 24hrs VS reviewed since prior progress note. Most recent are:  Visit Vitals  /70   Pulse 77   Temp 97.9 °F (36.6 °C)   Resp 16   Ht 5' 4\" (1.626 m)   Wt 67.1 kg (148 lb)   SpO2 96%   BMI 25.40 kg/m²         Intake/Output Summary (Last 24 hours) at 5/21/2021 2317  Last data filed at 5/21/2021 2128  Gross per 24 hour   Intake 1792.5 ml   Output 1465 ml   Net 327.5 ml        Physical Examination:     I had a face to face encounter with this patient and independently examined them on 5/21/2021 as outlined below:          Constitutional:  No acute distress, cooperative, pleasant. Alert, O x3. Comfortable with pain control. Drain in place. Dressing clean and dry   ENT:  Oral mucosa moist, EOMI,anicteric sclera,normal conjunctiva    Resp:  CTA bilaterally. No wheezing/rhonchi/rales. No accessory muscle use   CV:  Regular rhythm, normal rate, S1,S2 wnl    GI:  Soft, non distended, non tender.  normoactive bowel sounds    Musculoskeletal:  No LE edema    Neurologic:  alert and oriented X 3, LLE strength 3+/5,RUE,RLE,LUE strength wnl, sensation intact            Data Review:    Review and/or order of clinical lab test  Review and/or order of tests in the medicine section of CPT    XR SPINE LUMB MIN 4 V    Result Date: 5/18/2021  Grade 1 anterolisthesis of L5 that increases with flexion     MRI LUMB SPINE WO CONT    Result Date: 5/18/2021  1. Left paracentral extrusion and probable free disc fragment at L5-S1 extending cranially with significant narrowing of the left lateral recess posterior to L5 and left subarticular zone      CT SPINE LUMB WO CONT    Result Date: 5/17/2021  Central canal stenosis at L5-S1 secondary to a left paracentral disc protrusion, with compression of the left S1 nerve root Moderate severe left neural foraminal stenosis L5-S1 with compression of the exiting left L5 nerve root     NC XR TECHNOLOGIST SERVICE    Result Date: 5/21/2021  Fluoroscopic guidance was provided for the referring clinician. Labs:     Recent Labs     05/20/21 0450   WBC 4.6   HGB 12.2   HCT 36.0        Recent Labs     05/20/21 0450 05/19/21  1035   *  137 136   K 4.4  4.4 4.2     103 101   CO2 25  27 30   BUN 33*  33* 27*   CREA 1.08*  1.15* 1.17*   *  235* 261*   CA 9.2  9.2 9.1   PHOS 3.6 3.3     Recent Labs     05/20/21 0450 05/19/21  1035   ALB 3.5 3.7     Recent Labs     05/20/21 0450   INR 1.0   PTP 10.7      No results for input(s): FE, TIBC, PSAT, FERR in the last 72 hours. No results found for: FOL, RBCF   No results for input(s): PH, PCO2, PO2 in the last 72 hours. No results for input(s): CPK, CKNDX, TROIQ in the last 72 hours.     No lab exists for component: CPKMB  Lab Results   Component Value Date/Time    Cholesterol, total 130 02/09/2021 10:22 AM    HDL Cholesterol 73 02/09/2021 10:22 AM    LDL, calculated 43.2 02/09/2021 10:22 AM    Triglyceride 69 02/09/2021 10:22 AM    CHOL/HDL Ratio 1.8 02/09/2021 10:22 AM     Lab Results   Component Value Date/Time    Glucose (POC) 170 (H) 05/21/2021 08:57 PM    Glucose (POC) 161 (H) 05/21/2021 04:14 PM    Glucose (POC) 216 (H) 05/21/2021 11:00 AM    Glucose (POC) 271 (H) 05/21/2021 05:55 AM    Glucose (POC) 315 (H) 05/20/2021 09:17 PM     Lab Results   Component Value Date/Time    Color YELLOW/STRAW 05/17/2021 06:47 PM    Appearance CLEAR 05/17/2021 06:47 PM    Specific gravity 1.023 05/17/2021 06:47 PM    Specific gravity 1.010 04/16/2015 04:13 AM    pH (UA) 5.5 05/17/2021 06:47 PM    Protein Negative 05/17/2021 06:47 PM    Glucose >1,000 (A) 05/17/2021 06:47 PM    Ketone Negative 05/17/2021 06:47 PM    Bilirubin Negative 05/17/2021 06:47 PM    Urobilinogen 0.2 05/17/2021 06:47 PM    Nitrites Negative 05/17/2021 06:47 PM    Leukocyte Esterase Negative 05/17/2021 06:47 PM    Epithelial cells FEW 05/17/2021 06:47 PM    Bacteria 1+ (A) 05/17/2021 06:47 PM    WBC 0-4 05/17/2021 06:47 PM    RBC 0-5 05/17/2021 06:47 PM         Medications Reviewed:     Current Facility-Administered Medications   Medication Dose Route Frequency    ceFAZolin (ANCEF) 2 g in sterile water (preservative free) 20 mL IV syringe  2 g IntraVENous Q8H    morphine  mg / 30 mL   IntraVENous TITRATE    methocarbamoL (ROBAXIN) tablet 500 mg  500 mg Oral TID    dexamethasone (DECADRON) 4 mg/mL injection 2 mg  2 mg IntraVENous Q12H    oxyCODONE IR (ROXICODONE) tablet 5 mg  5 mg Oral Q4H PRN    oxyCODONE IR (ROXICODONE) tablet 10 mg  10 mg Oral Q4H PRN    acetaminophen (TYLENOL) tablet 650 mg  650 mg Oral Q6H    0.9% sodium chloride infusion  75 mL/hr IntraVENous CONTINUOUS    sodium chloride (NS) flush 5-40 mL  5-40 mL IntraVENous Q8H    sodium chloride (NS) flush 5-40 mL  5-40 mL IntraVENous PRN    naloxone (NARCAN) injection 0.4 mg  0.4 mg IntraVENous PRN    senna-docusate (PERICOLACE) 8.6-50 mg per tablet 1 Tablet  1 Tablet Oral DAILY    polyethylene glycol (MIRALAX) packet 17 g  17 g Oral DAILY    bisacodyL (DULCOLAX) suppository 10 mg  10 mg Rectal DAILY PRN    hydrALAZINE (APRESOLINE) 20 mg/mL injection 10 mg  10 mg IntraVENous Q6H PRN    insulin NPH (NOVOLIN N, HUMULIN N) injection 5 Units  5 Units SubCUTAneous ACB&D    gabapentin (NEURONTIN) capsule 300 mg  300 mg Oral TID    ascorbic acid (vitamin C) (VITAMIN C) tablet 500 mg  500 mg Oral DAILY    atorvastatin (LIPITOR) tablet 10 mg  10 mg Oral QHS    calcium carbonate (TUMS) chewable tablet 200 mg [elemental]  200 mg Oral DAILY    cyanocobalamin (VITAMIN B12) tablet 1,000 mcg  1,000 mcg Oral DAILY    [Held by provider] hydrOXYchloroQUINE (PLAQUENIL) tablet 300 mg  300 mg Oral DAILY WITH BREAKFAST    [Held by provider] leflunomide (ARAVA) tablet 20 mg  20 mg Oral DAILY    [Held by provider] lisinopriL (PRINIVIL, ZESTRIL) tablet 5 mg  5 mg Oral DAILY    sodium chloride (NS) flush 5-40 mL  5-40 mL IntraVENous Q8H    sodium chloride (NS) flush 5-40 mL  5-40 mL IntraVENous PRN    acetaminophen (TYLENOL) suppository 650 mg  650 mg Rectal Q6H PRN    promethazine (PHENERGAN) tablet 12.5 mg  12.5 mg Oral Q6H PRN    Or    ondansetron (ZOFRAN) injection 4 mg  4 mg IntraVENous Q6H PRN    [Held by provider] heparin (porcine) injection 5,000 Units  5,000 Units SubCUTAneous Q8H    insulin lispro (HUMALOG) injection   SubCUTAneous AC&HS    glucose chewable tablet 16 g  4 Tablet Oral PRN    dextrose (D50W) injection syrg 12.5-25 g  12.5-25 g IntraVENous PRN    glucagon (GLUCAGEN) injection 1 mg  1 mg IntraMUSCular PRN     ______________________________________________________________________  EXPECTED LENGTH OF STAY: 2d 21h  ACTUAL LENGTH OF STAY:          4                 Ramin Daniels MD

## 2021-05-22 NOTE — PROGRESS NOTES
Problem: Self Care Deficits Care Plan (Adult)  Goal: *Acute Goals and Plan of Care (Insert Text)  Description: FUNCTIONAL STATUS PRIOR TO ADMISSION: Patient was independent and active without use of DME.    HOME SUPPORT: The patient lived with spouse but did not require assist.    Occupational Therapy Goals    REEVAL: GOALS CONTINUE 5/22/21 and partially modified to address toilet transfer in goal 2 as well. 1.  Patient will perform lower body dressing with modified independence using AE PRN within 4 days. 2.  Patient will perform toileting and transfer with modified independence using most appropriate DME within 4 days. 3.  Patient will bathing at modified independence within 4 days. 4.  Patient will verbalize/demonstrate 3/3 back precautions during ADL tasks without cues within 4 days. 5. Patient will dottie/doff LSO with mod I within 4 days. Initiated 5/19/2021    1. Patient will perform lower body dressing with modified independence using AE PRN within 4 days. 2.  Patient will perform toileting  with modified independence using most appropriate DME within 4 days. 3.  Patient will bathing at modified independence within 4 days. 4.  Patient will verbalize/demonstrate 3/3 back precautions during ADL tasks without cues within 4 days. Outcome: Not Met    OCCUPATIONAL THERAPY RE-EVALUATION  Patient: Byron Edge (46 y.o. female)  Date: 5/22/2021  Diagnosis: Bulging of lumbar intervertebral disc [M51.26] Bulging of lumbar intervertebral disc  Procedure(s) (LRB):  L5-S1 LAMINECTOMY AND DISCECTOMY WITH INSTRUMENTATED FUSION, MEDTRONIC PLIF, PEDICLE SCREWS (O-ARM) (URGENT) (N/A) 1 Day Post-Op  Precautions:  back, logroll  Chart, occupational therapy assessment, plan of care, and goals were reviewed.     ASSESSMENT  Based on the objective data described below, Patient with surgery above on 5/21/21--now with decreased ADL, I-ADL, safety, functional transfers/mobility, reach to distal LE, bed mobility, standing balance/tolerance. She requires increased time for mobility at RW level into bathroom with min-CGA, standing for one task at sink with CGA, returned to chair with education for back precautions and impact on ADL tasks with partial LE AE training then she vomited. She can tailor sit for R sock donning, unable to reach L LE for ADL tasks-- used reacher to doff L sock and was interrupted by nausea/vomiting. She will benefit from OT for stated goals and HH OT at d/c. Patient Vitals for the past 4 hrs:   Temp Pulse Resp BP SpO2   05/22/21 0944    (!) 153/81    05/22/21 0942  80  (!) 161/77    05/22/21 0917  85  124/66 97 %                 Current Level of Function Impacting Discharge (ADLs): vomiting, needs AE training    Other factors to consider for discharge:          PLAN :  Recommendations and Planned Interventions: self care training, functional mobility training, therapeutic exercise, balance training, therapeutic activities, endurance activities, neuromuscular re-education, patient education, home safety training, and family training/education    Frequency/Duration: Patient will be followed by occupational therapy 5 times a week to address goals. Recommend with staff: to bathroom for toileting, chair for meals. Recommend next OT session: AE training    Recommendation for discharge: (in order for the patient to meet his/her long term goals)  Occupational therapy at least 2 days/week in the home     This discharge recommendation:  Has not yet been discussed the attending provider and/or case management    Equipment recommendations for successful discharge (if) home: AE: long handled bathing, AE: long handled dressing, and toilet safety frame for over toilet potentially vs BSC       SUBJECTIVE:   Patient stated I feel better yet nauseated.     OBJECTIVE DATA SUMMARY:   Hospital course since last seen and reason for reevaluation: see above, had surgery above on 5/21/21    Cognitive/Behavioral Status:                          Hearing:       Vision/Perceptual:                 No c/o                    Range of Motion:  BUE  AROM: Generally decreased, functional  PROM: Generally decreased, functional   Unable to tailor sit to reach L foot, can tailor sit to reach R foot                   Strength:  BUE WFL  Strength: Generally decreased, functional                Coordination:  Coordination: Within functional limits  Fine Motor Skills-Upper: Left Intact; Right Intact    Gross Motor Skills-Upper: Left Intact; Right Intact    Tone & Sensation:    Tone: Normal                           Functional Mobility and Transfers for ADLs:  Bed Mobility:  Rolling: Minimum assistance  Supine to Sit: Moderate assistance  Scooting: Stand-by assistance    Transfers:  Sit to Stand: Moderate assistance;Assist x1;Additional time; Adaptive equipment  Functional Transfers  Bathroom Mobility: Contact guard assistance;Minimum assistance  Toilet Transfer : Minimum assistance;Contact guard assistance;Assist x1  Bed to Chair: Minimum assistance; Additional time;Assist x1    Balance:  Sitting: Intact  Standing: Impaired;Pull to stand; With support  Standing - Static: Fair;Constant support  Standing - Dynamic : Fair;Constant support    ADL Assessment:  Feeding: Independent    Oral Facial Hygiene/Grooming: Setup    Bathing: Moderate assistance    Upper Body Dressing: Minimum assistance    Lower Body Dressing: Maximum assistance    Toileting: Total assistance (ashton)                ADL Intervention and task modifications:                           Lower Body Dressing Assistance  Socks: Moderate assistance; Compensatory technique training (tailor sat for R sock, unable L sock, AE instruction then vomited so task terminated and nurse notified)  Position Performed: Seated in chair  Cues: Don;Verbal cues provided; Doff  Adaptive Equipment Used: Reacher              Therapeutic Exercises:       Functional Measure:  Barthel Index:    Bathin  Bladder: 0  Bowels: 10  Groomin  Dressin  Feeding: 10  Mobility: 0  Stairs: 0  Toilet Use: 0  Transfer (Bed to Chair and Back): 10  Total: 40/100        The Barthel ADL Index: Guidelines  1. The index should be used as a record of what a patient does, not as a record of what a patient could do. 2. The main aim is to establish degree of independence from any help, physical or verbal, however minor and for whatever reason. 3. The need for supervision renders the patient not independent. 4. A patient's performance should be established using the best available evidence. Asking the patient, friends/relatives and nurses are the usual sources, but direct observation and common sense are also important. However direct testing is not needed. 5. Usually the patient's performance over the preceding 24-48 hours is important, but occasionally longer periods will be relevant. 6. Middle categories imply that the patient supplies over 50 per cent of the effort. 7. Use of aids to be independent is allowed. Joe Donnelly., Barthel, D.W. (473). Functional evaluation: the Barthel Index. [facility] (14)2. Panchito Hendricks ga VIANEY Cisneros, Maxine Boudreaux., Karin Moreno., [place], [address] [place] [address] (). Measuring the change indisability after inpatient rehabilitation; comparison of the responsiveness of the Barthel Index and Functional [place] Measure. Journal of Neurology, Neurosurgery, and Psychiatry, 66(4), 664-190. Cha Pizarro, N.[place].A, FARIDEH Perry, & Henri Harrell MAURELIANO. (2004.) Assessment of post-stroke quality of life in cost-effectiveness studies: The usefulness of the Barthel Index and the EuroQoL-5D.  Quality of Life Research, 13, 427-43        Pain:  4/10    Activity Tolerance:   Fair, requires rest breaks, requires frequent rest breaks, and vomiting    After treatment patient left in no apparent distress:   Sitting in chair, Call bell within reach, and Caregiver / family present    COMMUNICATION/COLLABORATION:   The patients plan of care was discussed with: Registered nurse.      Jaspal Luna OTR/L  Time Calculation: 45 mins

## 2021-05-23 LAB
ALBUMIN SERPL-MCNC: 2.7 G/DL (ref 3.5–5)
ANION GAP SERPL CALC-SCNC: 3 MMOL/L (ref 5–15)
BUN SERPL-MCNC: 33 MG/DL (ref 6–20)
BUN/CREAT SERPL: 36 (ref 12–20)
CALCIUM SERPL-MCNC: 7.9 MG/DL (ref 8.5–10.1)
CHLORIDE SERPL-SCNC: 109 MMOL/L (ref 97–108)
CO2 SERPL-SCNC: 28 MMOL/L (ref 21–32)
CREAT SERPL-MCNC: 0.91 MG/DL (ref 0.55–1.02)
ERYTHROCYTE [DISTWIDTH] IN BLOOD BY AUTOMATED COUNT: 11.9 % (ref 11.5–14.5)
GLUCOSE BLD STRIP.AUTO-MCNC: 208 MG/DL (ref 65–117)
GLUCOSE BLD STRIP.AUTO-MCNC: 272 MG/DL (ref 65–117)
GLUCOSE BLD STRIP.AUTO-MCNC: 288 MG/DL (ref 65–117)
GLUCOSE SERPL-MCNC: 173 MG/DL (ref 65–100)
HCT VFR BLD AUTO: 30.4 % (ref 35–47)
HGB BLD-MCNC: 9.9 G/DL (ref 11.5–16)
MCH RBC QN AUTO: 30.5 PG (ref 26–34)
MCHC RBC AUTO-ENTMCNC: 32.6 G/DL (ref 30–36.5)
MCV RBC AUTO: 93.5 FL (ref 80–99)
NRBC # BLD: 0 K/UL (ref 0–0.01)
NRBC BLD-RTO: 0 PER 100 WBC
PHOSPHATE SERPL-MCNC: 2.5 MG/DL (ref 2.6–4.7)
PLATELET # BLD AUTO: 131 K/UL (ref 150–400)
PMV BLD AUTO: 11 FL (ref 8.9–12.9)
POTASSIUM SERPL-SCNC: 4.5 MMOL/L (ref 3.5–5.1)
RBC # BLD AUTO: 3.25 M/UL (ref 3.8–5.2)
SERVICE CMNT-IMP: ABNORMAL
SODIUM SERPL-SCNC: 140 MMOL/L (ref 136–145)
WBC # BLD AUTO: 6.4 K/UL (ref 3.6–11)

## 2021-05-23 PROCEDURE — 74011250636 HC RX REV CODE- 250/636: Performed by: NEUROLOGICAL SURGERY

## 2021-05-23 PROCEDURE — 74011250637 HC RX REV CODE- 250/637: Performed by: NEUROLOGICAL SURGERY

## 2021-05-23 PROCEDURE — 80069 RENAL FUNCTION PANEL: CPT

## 2021-05-23 PROCEDURE — 74011636637 HC RX REV CODE- 636/637: Performed by: NEUROLOGICAL SURGERY

## 2021-05-23 PROCEDURE — 36415 COLL VENOUS BLD VENIPUNCTURE: CPT

## 2021-05-23 PROCEDURE — 97116 GAIT TRAINING THERAPY: CPT

## 2021-05-23 PROCEDURE — 97535 SELF CARE MNGMENT TRAINING: CPT

## 2021-05-23 PROCEDURE — 82962 GLUCOSE BLOOD TEST: CPT

## 2021-05-23 PROCEDURE — 97530 THERAPEUTIC ACTIVITIES: CPT

## 2021-05-23 PROCEDURE — 74011000258 HC RX REV CODE- 258: Performed by: NEUROLOGICAL SURGERY

## 2021-05-23 PROCEDURE — 65270000029 HC RM PRIVATE

## 2021-05-23 PROCEDURE — 85027 COMPLETE CBC AUTOMATED: CPT

## 2021-05-23 PROCEDURE — 74011250637 HC RX REV CODE- 250/637: Performed by: PHYSICIAN ASSISTANT

## 2021-05-23 RX ORDER — DEXAMETHASONE 1 MG/1
2 TABLET ORAL EVERY 12 HOURS
Status: DISCONTINUED | OUTPATIENT
Start: 2021-05-23 | End: 2021-05-24

## 2021-05-23 RX ADMIN — POLYETHYLENE GLYCOL 3350 17 G: 17 POWDER, FOR SOLUTION ORAL at 09:09

## 2021-05-23 RX ADMIN — Medication 10 ML: at 05:30

## 2021-05-23 RX ADMIN — ACETAMINOPHEN 650 MG: 325 TABLET ORAL at 22:31

## 2021-05-23 RX ADMIN — HUMAN INSULIN 5 UNITS: 100 INJECTION, SUSPENSION SUBCUTANEOUS at 18:16

## 2021-05-23 RX ADMIN — OXYCODONE 5 MG: 5 TABLET ORAL at 10:51

## 2021-05-23 RX ADMIN — DEXAMETHASONE 2 MG: 1 TABLET ORAL at 22:25

## 2021-05-23 RX ADMIN — CALCIUM CARBONATE (ANTACID) CHEW TAB 500 MG 200 MG: 500 CHEW TAB at 09:09

## 2021-05-23 RX ADMIN — Medication 10 ML: at 22:26

## 2021-05-23 RX ADMIN — OXYCODONE 5 MG: 5 TABLET ORAL at 22:31

## 2021-05-23 RX ADMIN — ACETAMINOPHEN 650 MG: 325 TABLET ORAL at 02:33

## 2021-05-23 RX ADMIN — ACETAMINOPHEN 650 MG: 325 TABLET ORAL at 18:16

## 2021-05-23 RX ADMIN — INSULIN LISPRO 3 UNITS: 100 INJECTION, SOLUTION INTRAVENOUS; SUBCUTANEOUS at 13:36

## 2021-05-23 RX ADMIN — METHOCARBAMOL TABLETS 500 MG: 500 TABLET, COATED ORAL at 09:09

## 2021-05-23 RX ADMIN — GABAPENTIN 300 MG: 300 CAPSULE ORAL at 22:25

## 2021-05-23 RX ADMIN — ATORVASTATIN CALCIUM 10 MG: 10 TABLET, FILM COATED ORAL at 22:25

## 2021-05-23 RX ADMIN — INSULIN LISPRO 2 UNITS: 100 INJECTION, SOLUTION INTRAVENOUS; SUBCUTANEOUS at 06:44

## 2021-05-23 RX ADMIN — OXYCODONE 5 MG: 5 TABLET ORAL at 06:47

## 2021-05-23 RX ADMIN — OXYCODONE 5 MG: 5 TABLET ORAL at 16:18

## 2021-05-23 RX ADMIN — GABAPENTIN 300 MG: 300 CAPSULE ORAL at 16:18

## 2021-05-23 RX ADMIN — HUMAN INSULIN 5 UNITS: 100 INJECTION, SUSPENSION SUBCUTANEOUS at 06:44

## 2021-05-23 RX ADMIN — INSULIN LISPRO 5 UNITS: 100 INJECTION, SOLUTION INTRAVENOUS; SUBCUTANEOUS at 18:16

## 2021-05-23 RX ADMIN — METHOCARBAMOL TABLETS 500 MG: 500 TABLET, COATED ORAL at 16:18

## 2021-05-23 RX ADMIN — METHOCARBAMOL TABLETS 500 MG: 500 TABLET, COATED ORAL at 22:24

## 2021-05-23 RX ADMIN — Medication 10 ML: at 22:25

## 2021-05-23 RX ADMIN — ACETAMINOPHEN 650 MG: 325 TABLET ORAL at 13:36

## 2021-05-23 RX ADMIN — DEXAMETHASONE 2 MG: 1 TABLET ORAL at 09:09

## 2021-05-23 RX ADMIN — DOCUSATE SODIUM 50 MG AND SENNOSIDES 8.6 MG 1 TABLET: 8.6; 5 TABLET, FILM COATED ORAL at 09:09

## 2021-05-23 RX ADMIN — ACETAMINOPHEN 650 MG: 325 TABLET ORAL at 07:08

## 2021-05-23 RX ADMIN — GABAPENTIN 300 MG: 300 CAPSULE ORAL at 09:10

## 2021-05-23 RX ADMIN — CEFAZOLIN SODIUM 1 G: 1 INJECTION, POWDER, FOR SOLUTION INTRAMUSCULAR; INTRAVENOUS at 06:45

## 2021-05-23 RX ADMIN — OXYCODONE HYDROCHLORIDE AND ACETAMINOPHEN 500 MG: 500 TABLET ORAL at 09:10

## 2021-05-23 RX ADMIN — CYANOCOBALAMIN TAB 500 MCG 1000 MCG: 500 TAB at 09:09

## 2021-05-23 NOTE — PROGRESS NOTES
Problem: Self Care Deficits Care Plan (Adult)  Goal: *Acute Goals and Plan of Care (Insert Text)  Description: FUNCTIONAL STATUS PRIOR TO ADMISSION: Patient was independent and active without use of DME.    HOME SUPPORT: The patient lived with spouse but did not require assist.    Occupational Therapy Goals    REEVAL: GOALS CONTINUE 5/22/21 and partially modified to address toilet transfer in goal 2 as well. 1.  Patient will perform lower body dressing with modified independence using AE PRN within 4 days. 2.  Patient will perform toileting and transfer with modified independence using most appropriate DME within 4 days. 3.  Patient will bathing at modified independence within 4 days. 4.  Patient will verbalize/demonstrate 3/3 back precautions during ADL tasks without cues within 4 days. 5. Patient will dottie/doff LSO with mod I within 4 days. Initiated 5/19/2021    1. Patient will perform lower body dressing with modified independence using AE PRN within 4 days. 2.  Patient will perform toileting  with modified independence using most appropriate DME within 4 days. 3.  Patient will bathing at modified independence within 4 days. 4.  Patient will verbalize/demonstrate 3/3 back precautions during ADL tasks without cues within 4 days. Outcome: Progressing Towards Goal    OCCUPATIONAL THERAPY TREATMENT  Patient: Mary Kate Dsouza (19 y.o. female)  Date: 5/23/2021  Diagnosis: Bulging of lumbar intervertebral disc [M51.26] Bulging of lumbar intervertebral disc  Procedure(s) (LRB):  L5-S1 LAMINECTOMY AND DISCECTOMY WITH INSTRUMENTATED FUSION, MEDTRONIC PLIF, PEDICLE SCREWS (O-ARM) (URGENT) (N/A) 2 Days Post-Op  Precautions: Back (LOS on when up)  Chart, occupational therapy assessment, plan of care, and goals were reviewed. ASSESSMENT  Patient continues with skilled OT services and is progressing towards goals.   This patient is CGA to S for simple ADL tasks with AE used for distal LE dressing tasks. She is able to tailor sit one LE and unable for the other so AE instruction was provided--handout issued to obtain hip kit with 26\" reacher as well as handout for back precautions. She amb in room with CGA to SBA with one slight LOB posterior, self-corrected, min-mod cue for hand placement with sit to stand to sit, min cues to NOT twist with ADL tasks at sink. She declined to don underpants due to ashton still in place. She recalls 3/3 back precautions. She will benefit from [facility] OT at d/c. Current Level of Function Impacting Discharge (ADLs): see below    Other factors to consider for discharge:          PLAN :  Patient continues to benefit from skilled intervention to address the above impairments. Continue treatment per established plan of care to address goals. Recommend with staff: amb to bathroom with nursing, chair for meals. Recommend next OT session: per POC    Recommendation for discharge: (in order for the patient to meet his/her long term goals)  Occupational therapy at least 2 days/week in the home     This discharge recommendation:  Has not yet been discussed the attending provider and/or case management    IF patient discharges home will need the following DME: rec hip kit (has handout) and shower chair (issued handout) and toilet safety frame (daughter can get per patient)       SUBJECTIVE:   Patient stated My daughter can get all that stuff. ..she's an OT assistant.     OBJECTIVE DATA SUMMARY:   Cognitive/Behavioral Status:                      Functional Mobility and Transfers for ADLs:  Bed Mobility:  Supine to Sit: Stand-by assistance  Scooting: Supervision    Transfers:  Sit to Stand: Stand-by assistance (cues for hand placement)  Functional Transfers  Bathroom Mobility: Contact guard assistance (posterior LOB, self corrected when step backwards)  Toilet Transfer : Contact guard assistance;Assist x1 (2 trails)  Bed to Chair: Stand-by assistance    Balance:  Sitting: Intact  Standing: Impaired; Without support  Standing - Static: Good;Constant support  Standing - Dynamic : Fair;Constant support    ADL Intervention:       Grooming  Grooming Assistance: Stand-by assistance  Position Performed: Standing  Washing Face: Stand-by assistance  Washing Hands: Stand-by assistance  Brushing Teeth: Stand-by assistance              Upper Body Dressing Assistance  Orthotics(Brace): Minimum assistance (initial education provided)    Lower Body Dressing Assistance  Socks: Stand-by assistance; Compensatory technique training  Leg Crossed Method Used: Yes (only for one LE)  Position Performed: Seated in chair  Cues: Don;Doff;Verbal cues provided  Adaptive Equipment Used: Reacher;Sock aid    Toileting  Bladder Hygiene: Total assistance (dependent) (ashton)  Clothing Management: Stand-by assistance  Cues: Verbal cues provided         The patient recalled and demonstrated 3/3 back precautions. Reviewed all 3 with patient. Bathing: Patient instructed and indicated understanding when bathing to not submerge wound in water, stand to shower or sponge bathe, cover wound with plastic and tape to ensure no water reaches bandage/wound without cues. Dressing brace: Patient instructed and demonstrated to don/doff velcro on brace using dominant side, keeping non-dominant side intact. Patient instructed and demonstrated in meantime of being able to stand with back against wall to don/doff brace, to don/doff seated using lap and bed/chair surface to support brace while manipulating. Dressing lower body: Patient instructed to don brace first and on the benefits to remain seated to don all clothing to increase independence with precautions and pain management.     Home safety: Patient instructed and indicated understanding on home modifications and safety (raise height of ADL objects, appropriate height of chair surfaces, recliner safety, change of floor surfaces, clear pathways) to increase independence and fall prevention with min cues. Standing: Patient instructed and indicated understanding to walk up to sink/counter top/surfaces, step into walker, square off while using objects, slide objects along surfaces, to increase adherence to back precautions and fall prevention. Patient instructed to increase amount of time standing in order to increase independence and tolerance with ADLs. During prolonged standing, can open cabinet door or place foot on stool to decrease spinal pressure/increase pain. Tub transfer: Patient instructed and indicated understanding regarding when it is safe to begin transfer into tub (complete stairs with PT, advance exercises with PT high enough to clear tub height, and while clothes donned practice with another person present). Patient instructed and indicated understanding to use the same technique as used with stairs when entering and exiting tub (\"up with good leg, down with bad leg\"). Patient instructed and indicated understanding the benefits of maintaining activity tolerance, functional mobility, and independence with self care tasks during acute stay  to ensure safe return home and to baseline. Encouraged patient to increase frequency and duration OOB, not sitting longer than 30 mins without marching/walking with staff, be out of bed for all meals, perform daily ADLs (as approved by RN/MD regarding bathing etc), and performing functional mobility to/from bathroom. Patient instruction and indicated understanding on body mechanics, ergonomics and gravitational force on the spine during different body positions to plan activities in prep for return home to complete instrumental ADLs and back to work safely.      Patient instructed and demonstrated while supine hip ER stretch and hold 10 seconds to increase ROM in prep for lower body ADLs daily with Supervision only for unilateral LE, not rec for other LE due to c/o radicular pain with attempt to elevate LE.        Pain:  5/10    Activity Tolerance:   Good, Fair, and requires rest breaks    After treatment patient left in no apparent distress:   Sitting in chair and Call bell within reach    COMMUNICATION/COLLABORATION:   The patients plan of care was discussed with: Registered nurse.      Brandon Cowan OTR/L  Time Calculation: 38 mins

## 2021-05-23 NOTE — PROGRESS NOTES
Problem: Falls - Risk of  Goal: *Absence of Falls  Description: Document [place] Fall Risk and appropriate interventions in the flowsheet.   Outcome: Progressing Towards Goal  Note: Fall Risk Interventions:  Mobility Interventions: Patient to call before getting OOB         Medication Interventions: Patient to call before getting OOB    Elimination Interventions: Patient to call for help with toileting needs

## 2021-05-23 NOTE — PROGRESS NOTES
[facility]                                                                                          Hospitalist Progress Note  Patrick Capone MD  Answering service: [phone] OR [phone] from in house phone        Date of Service:  2021  NAME:  Tg Kim  :  1950  MRN:  [id number]      Admission Summary:   Per H and P \"72year-old woman with a past medical history significant for type 2 diabetes, hypertension, dyslipidemia, rheumatoid arthritis, was in her usual state of health until few days ago when the patient developed back pain. The pain is located at the lower back on the left side, constant sharp pain with radiation to the left leg. No known aggravating factors'. Interval history / Subjective: Follow up lumbosacral herniated disc L5S1  Patient seen and examined at the bedside. Labs, images and notes reviewed  Discussed with nursing staff, orders reviewed. Plan discussed with patient/Family  Feeling well. Off Mckeon catheter and drain. Okay from neurosurgery team with healing progression. No fever or chills. Denied any nausea or vomiting or constipation or diarrhea or loose stools. No other concerns or questions. Assessment & Plan:     # Herniated nucleus pulposus L5-S1, L5-S1 Laminectomy and Discectomy with instrumentated fusion, MEDTRONIC PLIF on  by Dr. Shah Pa  -MRI L spine-Left paracentral extrusion and probable free disc fragment at L5-S1 extending  cranially with significant narrowing of the left lateral recess posterior to L5  and left subarticular zone  -NSGY on board  -Perioperative monitoring and management per NUS team  -pain management per NUS. Decadrone taper per NUS ongoing. Transition from IV to p.o. done per neurosurgery  -PCA discontinued . Off IV pain medications.   Well-controlled with p.o. pain medications.  -Incentive spirometry  -Early ambulation and OOB as per neurosurgery team postoperatively  -PT/OT per NUS, initiated 5/22 post-operatively  -N/V control with meds    # Mild HANNA, likely perioperative hydration deficit. Improved, creatinine 1.87 on 5/22-0.91 on 5/23  -Off IV fluids  -BMP monitoring     # HTN: lisinopril  -Hold lisinopril perioperative  -Consider resuming lisinopril 5/24 if renal function stable and improved  -As needed hydralazine IV for blood pressure control    #Diabetes:  -A1c: 5.3, on oral meds at home  - 5 units NPH with decadron, lispro with meal. She will be NPO starting midnight    # Rheumatoid arthritis:  -hold plaquenil, leflunomide for surgery  -Resume when okay per neurosurgery    Code status: full  DVT prophylaxis: [place] 38 discussed with: patientnacho  Anticipated Disposition:home. Likely need for IPR postoperatively. Follow-up neurosurgery and PT/OT recommendations postoperatively  Anticipated Discharge: based on PT/OT rec and NUS rec     Hospital Problems  Date Reviewed: 5/17/2021        Codes Class Noted POA    * (Principal) Bulging of lumbar intervertebral disc ICD-10-CM: M51.26  ICD-9-CM: 722.10  5/17/2021 Yes                Review of Systems:   As per HPI      Vital Signs:    Last 24hrs VS reviewed since prior progress note. Most recent are:  Visit Vitals  /69   Pulse 92   Temp 98.8 °F (37.1 °C)   Resp 16   Ht 5' 4\" (1.626 m)   Wt 67.1 kg (148 lb)   SpO2 97%   BMI 25.40 kg/m²         Intake/Output Summary (Last 24 hours) at 5/23/2021 1032  Last data filed at 5/23/2021 3419  Gross per 24 hour   Intake 737.5 ml   Output 3555 ml   Net -2817.5 ml        Physical Examination:     I had a face to face encounter with this patient and independently examined them on 5/23/2021 as outlined below:          Constitutional:  No acute distress, cooperative, pleasant. Alert, O x3. Comfortable with pain control. Drain in place. Dressing clean and dry. Drain removed.   Mckeon catheter removed   ENT:  Oral mucosa moist, EOMI,anicteric sclera,normal conjunctiva    Resp:  CTA bilaterally. No wheezing/rhonchi/rales. No accessory muscle use   CV:  Regular rhythm, normal rate, S1,S2 wnl    GI:  Soft, non distended, non tender. normoactive bowel sounds    Musculoskeletal:  No LE edema    Neurologic:  alert and oriented X 3, improved left LE streght. Limited exam given N/V just earlier. Data Review:    Review and/or order of clinical lab test  Review and/or order of tests in the medicine section of CPT    XR SPINE LUMB MIN 4 V    Result Date: 5/18/2021  Grade 1 anterolisthesis of L5 that increases with flexion     MRI LUMB SPINE WO CONT    Result Date: 5/18/2021  1. Left paracentral extrusion and probable free disc fragment at L5-S1 extending cranially with significant narrowing of the left lateral recess posterior to L5 and left subarticular zone      CT SPINE LUMB WO CONT    Result Date: 5/17/2021  Central canal stenosis at L5-S1 secondary to a left paracentral disc protrusion, with compression of the left S1 nerve root Moderate severe left neural foraminal stenosis L5-S1 with compression of the exiting left L5 nerve root     NC XR TECHNOLOGIST SERVICE    Result Date: 5/21/2021  Fluoroscopic guidance was provided for the referring clinician. Labs:     Recent Labs     05/23/21 0235 05/22/21  0045   WBC 6.4 8.5   HGB 9.9* 11.0*   HCT 30.4* 33.5*   * 175     Recent Labs     05/23/21 0235 05/22/21  0045    136   K 4.5 4.8   * 102   CO2 28 28   BUN 33* 45*   CREA 0.91 1.87*   * 237*   CA 7.9* 8.2*   PHOS 2.5* 5.7*     Recent Labs     05/23/21 0235 05/22/21  0045   ALT  --  19   AP  --  42*   TBILI  --  0.4   TP  --  6.4   ALB 2.7* 3.2*   GLOB  --  3.2     No results for input(s): INR, PTP, APTT, INREXT, INREXT in the last 72 hours. No results for input(s): FE, TIBC, PSAT, FERR in the last 72 hours. No results found for: FOL, RBCF   No results for input(s): PH, PCO2, PO2 in the last 72 hours.   No results for input(s): CPK, CKNDX, TROIQ in the last 72 hours.     No lab exists for component: CPKMB  Lab Results   Component Value Date/Time    Cholesterol, total 130 02/09/2021 10:22 AM    HDL Cholesterol 73 02/09/2021 10:22 AM    LDL, calculated 43.2 02/09/2021 10:22 AM    Triglyceride 69 02/09/2021 10:22 AM    CHOL/HDL Ratio 1.8 02/09/2021 10:22 AM     Lab Results   Component Value Date/Time    Glucose (POC) 224 (H) 05/22/2021 08:55 PM    Glucose (POC) 178 (H) 05/22/2021 11:30 AM    Glucose (POC) 247 (H) 05/22/2021 05:36 AM    Glucose (POC) 170 (H) 05/21/2021 08:57 PM    Glucose (POC) 174 (H) 05/21/2021 05:27 PM     Lab Results   Component Value Date/Time    Color YELLOW/STRAW 05/17/2021 06:47 PM    Appearance CLEAR 05/17/2021 06:47 PM    Specific gravity 1.023 05/17/2021 06:47 PM    Specific gravity 1.010 04/16/2015 04:13 AM    pH (UA) 5.5 05/17/2021 06:47 PM    Protein Negative 05/17/2021 06:47 PM    Glucose >1,000 (A) 05/17/2021 06:47 PM    Ketone Negative 05/17/2021 06:47 PM    Bilirubin Negative 05/17/2021 06:47 PM    Urobilinogen 0.2 05/17/2021 06:47 PM    Nitrites Negative 05/17/2021 06:47 PM    Leukocyte Esterase Negative 05/17/2021 06:47 PM    Epithelial cells FEW 05/17/2021 06:47 PM    Bacteria 1+ (A) 05/17/2021 06:47 PM    WBC 0-4 05/17/2021 06:47 PM    RBC 0-5 05/17/2021 06:47 PM         Medications Reviewed:     Current Facility-Administered Medications   Medication Dose Route Frequency    dexAMETHasone (DECADRON) tablet 2 mg  2 mg Oral Q12H    methocarbamoL (ROBAXIN) tablet 500 mg  500 mg Oral TID    oxyCODONE IR (ROXICODONE) tablet 5 mg  5 mg Oral Q4H PRN    oxyCODONE IR (ROXICODONE) tablet 10 mg  10 mg Oral Q4H PRN    acetaminophen (TYLENOL) tablet 650 mg  650 mg Oral Q6H    sodium chloride (NS) flush 5-40 mL  5-40 mL IntraVENous Q8H    sodium chloride (NS) flush 5-40 mL  5-40 mL IntraVENous PRN    naloxone (NARCAN) injection 0.4 mg  0.4 mg IntraVENous PRN    senna-docusate (PERICOLACE) 8.6-50 mg per tablet 1 Tablet  1 Tablet Oral DAILY    polyethylene glycol (MIRALAX) packet 17 g  17 g Oral DAILY    bisacodyL (DULCOLAX) suppository 10 mg  10 mg Rectal DAILY PRN    hydrALAZINE (APRESOLINE) 20 mg/mL injection 10 mg  10 mg IntraVENous Q6H PRN    insulin NPH (NOVOLIN N, HUMULIN N) injection 5 Units  5 Units SubCUTAneous ACB&D    gabapentin (NEURONTIN) capsule 300 mg  300 mg Oral TID    ascorbic acid (vitamin C) (VITAMIN C) tablet 500 mg  500 mg Oral DAILY    atorvastatin (LIPITOR) tablet 10 mg  10 mg Oral QHS    calcium carbonate (TUMS) chewable tablet 200 mg [elemental]  200 mg Oral DAILY    cyanocobalamin (VITAMIN B12) tablet 1,000 mcg  1,000 mcg Oral DAILY    [Held by provider] hydrOXYchloroQUINE (PLAQUENIL) tablet 300 mg  300 mg Oral DAILY WITH BREAKFAST    [Held by provider] leflunomide (ARAVA) tablet 20 mg  20 mg Oral DAILY    [Held by provider] lisinopriL (PRINIVIL, ZESTRIL) tablet 5 mg  5 mg Oral DAILY    sodium chloride (NS) flush 5-40 mL  5-40 mL IntraVENous Q8H    sodium chloride (NS) flush 5-40 mL  5-40 mL IntraVENous PRN    acetaminophen (TYLENOL) suppository 650 mg  650 mg Rectal Q6H PRN    promethazine (PHENERGAN) tablet 12.5 mg  12.5 mg Oral Q6H PRN    Or    ondansetron (ZOFRAN) injection 4 mg  4 mg IntraVENous Q6H PRN    [Held by provider] heparin (porcine) injection 5,000 Units  5,000 Units SubCUTAneous Q8H    insulin lispro (HUMALOG) injection   SubCUTAneous AC&HS    glucose chewable tablet 16 g  4 Tablet Oral PRN    dextrose (D50W) injection syrg 12.5-25 g  12.5-25 g IntraVENous PRN    glucagon (GLUCAGEN) injection 1 mg  1 mg IntraMUSCular PRN     ______________________________________________________________________  EXPECTED LENGTH OF STAY: 2d 21h  ACTUAL LENGTH OF STAY:          6                 Rosella Baumgarten, MD

## 2021-05-23 NOTE — PROGRESS NOTES
Problem: Mobility Impaired (Adult and Pediatric)  Goal: *Acute Goals and Plan of Care (Insert Text)  Description: FUNCTIONAL STATUS PRIOR TO ADMISSION: Patient was independent and active without use of DME.    HOME SUPPORT PRIOR TO ADMISSION: The patient lived with  but did not require assist. Pt   works during the day    Physical Therapy Goals  Initiated 5/19/2021  1. Patient will move from supine to sit and sit to supine , scoot up and down, and roll side to side in bed with supervision/set-up within 7 day(s). 2.  Patient will transfer from bed to chair and chair to bed with supervision/set-up using the least restrictive device within 7 day(s). 3.  Patient will perform sit to stand with supervision/set-up within 7 day(s). 4.  Patient will ambulate with supervision/set-up for 300 feet with the least restrictive device within 7 day(s). 5.  Patient will ascend/descend 6 stairs with 1 handrail(s) with minimal assistance/contact guard assist within 7 day(s). Outcome: Progressing Towards Goal     PHYSICAL THERAPY TREATMENT  Patient: Bonnie Daniels (89 y.o. female)  Date: 5/23/2021  Diagnosis: Bulging of lumbar intervertebral disc [M51.26] Bulging of lumbar intervertebral disc  Procedure(s) (LRB):  L5-S1 LAMINECTOMY AND DISCECTOMY WITH INSTRUMENTATED FUSION, MEDTRONIC PLIF, PEDICLE SCREWS (O-ARM) (URGENT) (N/A) 2 Days Post-Op  Precautions: Back (LOS on when up)  Chart, physical therapy assessment, plan of care and goals were reviewed. ASSESSMENT  Patient continues with skilled PT services and is progressing towards goals. Pt generally mobilized at a SPV->CGA during todays session. Pt was able to transfer, ambulate, and perform stair negotiation while maintaining BLT precautions. Pt able to verbalize precautions prior to activity and performed all mobility with LSO brace on. Pt navigated 2 stairs with hand rail on L and step to pattern.  Pt verbalized reluctance to attempt more stairs to proper simulate home set up. Anticipate pt will clear in 1-2 more sessions. Current Level of Function Impacting Discharge (mobility/balance): endurance, strength, stair navigation, level of assist    Other factors to consider for discharge:  works majority of day, pt will be home alone         PLAN :  Patient continues to benefit from skilled intervention to address the above impairments. Continue treatment per established plan of care. to address goals. Recommendation for discharge: (in order for the patient to meet his/her long term goals)  Physical therapy at least 2 days/week in the home AND ensure assist and/or supervision for safety with mobility    This discharge recommendation:  Has been made in collaboration with the attending provider and/or case management    IF patient discharges home will need the following DME: rolling walker       SUBJECTIVE:   Patient stated I can try the stairs.     OBJECTIVE DATA SUMMARY:   Critical Behavior:  Neurologic State: Alert  Orientation Level: Oriented X4  Cognition: Appropriate decision making  Safety/Judgement: Awareness of environment  Functional Mobility Training:  Bed Mobility:     Supine to Sit: Stand-by assistance     Scooting: Supervision     Transfers:  Sit to Stand: Supervision  Stand to Sit: Supervision        Bed to Chair: Stand-by assistance    Balance:  Sitting: Intact  Standing: Impaired; Without support  Standing - Static: Good;Constant support  Standing - Dynamic : Fair;Constant support  Ambulation/Gait Training:  Distance (ft): 175 Feet (ft)  Assistive Device: Gait belt;Walker, rolling  Ambulation - Level of Assistance: Supervision        Gait Abnormalities: Shuffling gait; Decreased step clearance        Base of Support: Narrowed  Stance: Time  Speed/Leonie: Shuffled;Pace decreased (<100 feet/min); Slow  Step Length: Left shortened;Right shortened       Stairs:  Number of Stairs Trained: 2  Stairs - Level of Assistance: Contact guard assistance   Rail Use: Left     Pain Rating:  Pt did not express pain during session    Activity Tolerance:   Good and SpO2 stable on RA    After treatment patient left in no apparent distress:   Sitting in chair and Call bell within reach    COMMUNICATION/COLLABORATION:   The patients plan of care was discussed with: Registered nurse.      Anuj Norris, PT   Time Calculation: 24 mins

## 2021-05-23 NOTE — PROGRESS NOTES
Problem: Falls - Risk of  Goal: *Absence of Falls  Description: Document [place] Fall Risk and appropriate interventions in the flowsheet.   Outcome: Progressing Towards Goal  Note: Fall Risk Interventions:  Mobility Interventions: Patient to call before getting OOB         Medication Interventions: Patient to call before getting OOB    Elimination Interventions: Patient to call for help with toileting needs

## 2021-05-24 VITALS
DIASTOLIC BLOOD PRESSURE: 77 MMHG | HEART RATE: 91 BPM | SYSTOLIC BLOOD PRESSURE: 129 MMHG | OXYGEN SATURATION: 100 % | HEIGHT: 64 IN | TEMPERATURE: 97.7 F | WEIGHT: 148 LBS | RESPIRATION RATE: 16 BRPM | BODY MASS INDEX: 25.27 KG/M2

## 2021-05-24 LAB
ALBUMIN SERPL-MCNC: 2.5 G/DL (ref 3.5–5)
ALBUMIN/GLOB SERPL: 0.7 {RATIO} (ref 1.1–2.2)
ALP SERPL-CCNC: 38 U/L (ref 45–117)
ALT SERPL-CCNC: 7 U/L (ref 12–78)
ANION GAP SERPL CALC-SCNC: 5 MMOL/L (ref 5–15)
AST SERPL-CCNC: 19 U/L (ref 15–37)
BILIRUB SERPL-MCNC: 0.4 MG/DL (ref 0.2–1)
BUN SERPL-MCNC: 28 MG/DL (ref 6–20)
BUN/CREAT SERPL: 32 (ref 12–20)
CALCIUM SERPL-MCNC: 8.2 MG/DL (ref 8.5–10.1)
CHLORIDE SERPL-SCNC: 106 MMOL/L (ref 97–108)
CO2 SERPL-SCNC: 27 MMOL/L (ref 21–32)
COMMENT, HOLDF: NORMAL
CREAT SERPL-MCNC: 0.88 MG/DL (ref 0.55–1.02)
ERYTHROCYTE [DISTWIDTH] IN BLOOD BY AUTOMATED COUNT: 11.7 % (ref 11.5–14.5)
GLOBULIN SER CALC-MCNC: 3.4 G/DL (ref 2–4)
GLUCOSE BLD STRIP.AUTO-MCNC: 171 MG/DL (ref 65–117)
GLUCOSE BLD STRIP.AUTO-MCNC: 227 MG/DL (ref 65–117)
GLUCOSE BLD STRIP.AUTO-MCNC: 230 MG/DL (ref 65–117)
GLUCOSE BLD STRIP.AUTO-MCNC: 305 MG/DL (ref 65–117)
GLUCOSE BLD STRIP.AUTO-MCNC: 366 MG/DL (ref 65–117)
GLUCOSE SERPL-MCNC: 207 MG/DL (ref 65–100)
HCT VFR BLD AUTO: 29.7 % (ref 35–47)
HGB BLD-MCNC: 9.5 G/DL (ref 11.5–16)
MCH RBC QN AUTO: 29.9 PG (ref 26–34)
MCHC RBC AUTO-ENTMCNC: 32 G/DL (ref 30–36.5)
MCV RBC AUTO: 93.4 FL (ref 80–99)
NRBC # BLD: 0 K/UL (ref 0–0.01)
NRBC BLD-RTO: 0 PER 100 WBC
PHOSPHATE SERPL-MCNC: 1.7 MG/DL (ref 2.6–4.7)
PLATELET # BLD AUTO: 126 K/UL (ref 150–400)
PMV BLD AUTO: 11.4 FL (ref 8.9–12.9)
POTASSIUM SERPL-SCNC: 4.3 MMOL/L (ref 3.5–5.1)
PROT SERPL-MCNC: 5.9 G/DL (ref 6.4–8.2)
RBC # BLD AUTO: 3.18 M/UL (ref 3.8–5.2)
SAMPLES BEING HELD,HOLD: NORMAL
SERVICE CMNT-IMP: ABNORMAL
SODIUM SERPL-SCNC: 138 MMOL/L (ref 136–145)
WBC # BLD AUTO: 6.7 K/UL (ref 3.6–11)

## 2021-05-24 PROCEDURE — 97530 THERAPEUTIC ACTIVITIES: CPT

## 2021-05-24 PROCEDURE — 74011250637 HC RX REV CODE- 250/637: Performed by: NEUROLOGICAL SURGERY

## 2021-05-24 PROCEDURE — 97116 GAIT TRAINING THERAPY: CPT

## 2021-05-24 PROCEDURE — 80053 COMPREHEN METABOLIC PANEL: CPT

## 2021-05-24 PROCEDURE — 74011636637 HC RX REV CODE- 636/637: Performed by: NEUROLOGICAL SURGERY

## 2021-05-24 PROCEDURE — 84100 ASSAY OF PHOSPHORUS: CPT

## 2021-05-24 PROCEDURE — 97535 SELF CARE MNGMENT TRAINING: CPT

## 2021-05-24 PROCEDURE — 36415 COLL VENOUS BLD VENIPUNCTURE: CPT

## 2021-05-24 PROCEDURE — 74011250636 HC RX REV CODE- 250/636: Performed by: NEUROLOGICAL SURGERY

## 2021-05-24 PROCEDURE — 85027 COMPLETE CBC AUTOMATED: CPT

## 2021-05-24 PROCEDURE — 82962 GLUCOSE BLOOD TEST: CPT

## 2021-05-24 PROCEDURE — 74011250637 HC RX REV CODE- 250/637: Performed by: PHYSICIAN ASSISTANT

## 2021-05-24 RX ORDER — ACETAMINOPHEN 325 MG/1
650 TABLET ORAL
Qty: 60 TABLET | Refills: 0 | Status: SHIPPED
Start: 2021-05-24 | End: 2022-04-27

## 2021-05-24 RX ORDER — LEFLUNOMIDE 20 MG/1
20 TABLET ORAL DAILY
Qty: 30 TABLET | Refills: 0 | Status: SHIPPED
Start: 2021-06-08 | End: 2021-07-15 | Stop reason: SDUPTHER

## 2021-05-24 RX ORDER — DEXAMETHASONE 1 MG/1
2 TABLET ORAL DAILY
Status: DISCONTINUED | OUTPATIENT
Start: 2021-05-25 | End: 2021-05-24 | Stop reason: HOSPADM

## 2021-05-24 RX ORDER — OXYCODONE HYDROCHLORIDE 5 MG/1
5 TABLET ORAL
Qty: 12 TABLET | Refills: 0 | Status: SHIPPED | OUTPATIENT
Start: 2021-05-24 | End: 2021-05-27

## 2021-05-24 RX ORDER — GABAPENTIN 300 MG/1
300 CAPSULE ORAL 3 TIMES DAILY
Qty: 45 CAPSULE | Refills: 0 | Status: SHIPPED | OUTPATIENT
Start: 2021-05-24 | End: 2021-06-08

## 2021-05-24 RX ORDER — POLYETHYLENE GLYCOL 3350 17 G/17G
17 POWDER, FOR SOLUTION ORAL DAILY
Qty: 15 PACKET | Refills: 0 | Status: SHIPPED | OUTPATIENT
Start: 2021-05-25 | End: 2021-06-09

## 2021-05-24 RX ORDER — DEXAMETHASONE 1 MG/1
2 TABLET ORAL
Qty: 4 TABLET | Refills: 0 | Status: SHIPPED | OUTPATIENT
Start: 2021-05-25 | End: 2021-05-27

## 2021-05-24 RX ORDER — AMOXICILLIN 250 MG
2 CAPSULE ORAL DAILY
Qty: 60 TABLET | Refills: 0 | Status: SHIPPED | OUTPATIENT
Start: 2021-05-25 | End: 2021-06-24

## 2021-05-24 RX ORDER — FACIAL-BODY WIPES
10 EACH TOPICAL
Qty: 10 SUPPOSITORY | Refills: 0 | Status: SHIPPED | OUTPATIENT
Start: 2021-05-24 | End: 2022-04-27

## 2021-05-24 RX ORDER — METHOCARBAMOL 500 MG/1
500 TABLET, FILM COATED ORAL 2 TIMES DAILY
Qty: 30 TABLET | Refills: 0 | Status: SHIPPED | OUTPATIENT
Start: 2021-05-24 | End: 2021-06-08

## 2021-05-24 RX ORDER — METHOCARBAMOL 500 MG/1
500 TABLET, FILM COATED ORAL
Status: DISCONTINUED | OUTPATIENT
Start: 2021-05-24 | End: 2021-05-24 | Stop reason: HOSPADM

## 2021-05-24 RX ADMIN — CYANOCOBALAMIN TAB 500 MCG 1000 MCG: 500 TAB at 09:00

## 2021-05-24 RX ADMIN — OXYCODONE 5 MG: 5 TABLET ORAL at 13:55

## 2021-05-24 RX ADMIN — ACETAMINOPHEN 650 MG: 325 TABLET ORAL at 07:07

## 2021-05-24 RX ADMIN — INSULIN LISPRO 7 UNITS: 100 INJECTION, SOLUTION INTRAVENOUS; SUBCUTANEOUS at 11:22

## 2021-05-24 RX ADMIN — HUMAN INSULIN 5 UNITS: 100 INJECTION, SUSPENSION SUBCUTANEOUS at 06:39

## 2021-05-24 RX ADMIN — METHOCARBAMOL TABLETS 500 MG: 500 TABLET, COATED ORAL at 09:00

## 2021-05-24 RX ADMIN — OXYCODONE HYDROCHLORIDE AND ACETAMINOPHEN 500 MG: 500 TABLET ORAL at 09:00

## 2021-05-24 RX ADMIN — INSULIN LISPRO 3 UNITS: 100 INJECTION, SOLUTION INTRAVENOUS; SUBCUTANEOUS at 06:39

## 2021-05-24 RX ADMIN — DEXAMETHASONE 2 MG: 1 TABLET ORAL at 09:00

## 2021-05-24 RX ADMIN — INSULIN LISPRO 3 UNITS: 100 INJECTION, SOLUTION INTRAVENOUS; SUBCUTANEOUS at 00:14

## 2021-05-24 RX ADMIN — POLYETHYLENE GLYCOL 3350 17 G: 17 POWDER, FOR SOLUTION ORAL at 09:00

## 2021-05-24 RX ADMIN — Medication 10 ML: at 06:18

## 2021-05-24 RX ADMIN — OXYCODONE 5 MG: 5 TABLET ORAL at 06:38

## 2021-05-24 RX ADMIN — CALCIUM CARBONATE (ANTACID) CHEW TAB 500 MG 200 MG: 500 CHEW TAB at 09:00

## 2021-05-24 RX ADMIN — DOCUSATE SODIUM 50 MG AND SENNOSIDES 8.6 MG 1 TABLET: 8.6; 5 TABLET, FILM COATED ORAL at 09:00

## 2021-05-24 RX ADMIN — Medication 10 ML: at 06:19

## 2021-05-24 RX ADMIN — GABAPENTIN 300 MG: 300 CAPSULE ORAL at 09:00

## 2021-05-24 RX ADMIN — ACETAMINOPHEN 650 MG: 325 TABLET ORAL at 13:55

## 2021-05-24 RX ADMIN — LISINOPRIL 5 MG: 5 TABLET ORAL at 09:00

## 2021-05-24 RX ADMIN — BISACODYL 10 MG: 10 SUPPOSITORY RECTAL at 11:15

## 2021-05-24 NOTE — ACP (ADVANCE CARE PLANNING)
Advance Care Planning     Advance Care Planning (ACP) Physician/NP/PA Conversation      Date of Conversation: 5/17/2021  Conducted with: Patient with [address] Decision Maker:     Click here to complete Medic Trace including selection of the [employer] Relationship (ie \"Primary\")  Today we documented Decision Maker(s) consistent with Legal Next of Kin hierarchy. Her  and 2 daughters-Ms. BETTY SIMS and Ms. Fatemeh Davila Preferences:    Hospitalization: \"If your health worsens and it becomes clear that your chance of recovery is unlikely, what would be your preference regarding hospitalization? \"  The patient would prefer hospitalization. Ventilation: \"If you were unable to breathe on your own and your chance of recovery was unlikely, what would be your preference about the use of a ventilator (breathing machine) if it was available to you? \"   The patient would desire the use of a ventilator. Resuscitation: \"In the event your heart stopped as a result of an underlying serious health condition, would you want attempts to be made to restart your heart, or would you prefer a natural death? \"   Yes, attempt to resuscitate.     Additional topics discussed: treatment goals, benefit/burden of treatment options, artificial nutrition, ventilation preferences, hospitalization preferences and resuscitation preferences   Patient wants to have all life preserving and restorative measures at this point of her life, including CPR, intubation, mechanical ventilation, artificial nutrition with PEG tube/tube feeding    Conversation Outcomes / Follow-Up Plan:   ACP complete - no further action today  Reviewed DNR/DNI and patient elects Full Code (Attempt Resuscitation)     Length of Voluntary ACP Conversation in minutes:  20 minutes    Hannah Barron MD

## 2021-05-24 NOTE — PROGRESS NOTES
Spiritual Care Assessment/Progress Note  [facility]      NAME: Shaista Reece      MRN: [id number]  AGE: 72 y.o. SEX: female  Evangelical Affiliation: No Denominational   Language: English     5/24/2021     Total Time (in minutes): 19     Spiritual Assessment begun in [facility] 5W1 ORTHO SPINE through conversation with:         [x]Patient        [x] Family    [] Friend(s)        Reason for Consult: Initial/Spiritual assessment, patient floor     Spiritual beliefs: (Please include comment if needed)     [x] Identifies with a amaris tradition: Oriental orthodox       [] Supported by a amaris community:            [] Claims no spiritual orientation:           [] Seeking spiritual identity:                [] Adheres to an individual form of spirituality:           [] Not able to assess:                           Identified resources for coping:      [x] Prayer                               [] Music                  [] Guided Imagery     [x] Family/friends                 [] Pet visits     [] Devotional reading                         [] Unknown     [] Other:                                              Interventions offered during this visit: (See comments for more details)    Patient Interventions: Affirmation of emotions/emotional suffering, Affirmation of amaris, Catharsis/review of pertinent events in supportive environment, Coping skills reviewed/reinforced, Iconic (affirming the presence of God/Higher Power), Prayer (actual)     Family/Friend(s):  Affirmation of amaris, Iconic (affirming the presence of God/Higher Power), Prayer (actual)     Plan of Care:     [] Support spiritual and/or cultural needs    [] Support AMD and/or advance care planning process      [] Support grieving process   [] Coordinate Rites and/or Rituals    [] Coordination with community clergy   [] No spiritual needs identified at this time   [] Detailed Plan of Care below (See Comments)  [] Make referral to Music Therapy  [] Make referral to Pet Therapy     [] Make referral to Addiction services  [] Make referral to [facility]  [] Make referral to Spiritual Care Partner  [] No future visits requested        [x] Follow up upon further referrals     Comments:  visit for initial spiritual assessment. Patient sitting in chair at bedside. Good eye contact, smiling, friendly. Patient's daughter also  Present. Says she is feeling a little better day by day. Provided spiritual presence and listening as she spoke of her present thoughts, feelings, and concerns. Spoke of her health and the events leading to this hospitalization. Spoke of going from here to rehab and is looking forward to her recovery. Says she enjoys being very active with her grandchildren and. Although she feels she may need to slow down, hopes to enjoy being as active as she is able with her family for as long as able. Spoke of her amaris describing a rich, active amaris and trust in God. Consented to prayer and a prayer was offered. Both appeared comforted and encouraged as a result of this visit and expressed gratitude for this visit and prayer. Rev.  Shannon Brown MDiv, [facility], [facility]   paging service: [phone])

## 2021-05-24 NOTE — PROGRESS NOTES
Spine Surgery Progress Note  La Nena Godinez, ACNP-BC    Admit Date: 5/17/2021   LOS: 7 days      Daily Progress Note: 5/24/2021    HPI: Ms. Silvestre Burns is a very pleasant 71-year-old woman with a past medical history significant for type 2 diabetes, hypertension, dyslipidemia, rheumatoid arthritis She was in her usual state of health until few days ago when the patient developed back pain. Prior to this, patient was very active around the house. Her pain is located at the lower back on the left side, constant sharp pain with radiation to the left leg. No known aggravating factors. The patient was taken to [facility] on Saturday for evaluation of the low back pain. The patient was discharged home on tapering dose of prednisone and pain medication, which did not provide any significant relief. The patient was brought to the emergency room here at [facility] for further evaluation. The patient denies urinary or bowel incontinence as a result of the low back pain. No history of recent trauma to the back. The patient stated that the low back pain is 10/10 in severity. When the patient arrived at the emergency room, CT scan of the lumbar spine was obtained. The CT scan shows findings concerning for bulging of lumbar intervertebral disk. The emergency room physician consulted neurosurgeon on-call who advised MRI and admission to the hospital.  The patient was then referred to the hospitalist service for that purpose. MRI revealed \"Left paracentral extrusion and probable free disc fragment at L5-S1 extending cranially with significant narrowing of the left lateral recess posterior to L5 and left subarticular zone\". Xray revealed \"Grade 1 anterolisthesis of L5 that increases with flexion\". Patient is on Arava and Plaquenil for RA. Dr. Eduard Wilkes is her rheumatologist.    Subjective:     Ms. Silvestre Burns underwent L5-S1 fusion surgery on 5/21/21. She tolerated the procedure well. No acute events overnight. Pt had a bowel movement this morning after a suppository administration. Patient denies numbness, chest pain, nausea, vomiting, difficulty swallowing, headache, and dyspnea. Objective:     Vital signs  VSS Afebrile. Temp (24hrs), Av.6 °F (37 °C), Min:98.5 °F (36.9 °C), Max:99 °F (37.2 °C)   No intake/output data recorded.  1901 -  0700  In: 857.5 [P.O.:240; I.V.:617.5]  Out: 3185 [Urine:3100; Drains:85]    Visit Vitals  BP (!) 148/74   Pulse 84   Temp 99 °F (37.2 °C)   Resp 16   Ht 5' 4\" (1.626 m)   Wt 67.1 kg (148 lb)   SpO2 99%   BMI 25.40 kg/m²      O2 Device: None (Room air)     Pain control  Pain Assessment  Pain Scale 1: Numeric (0 - 10)  Pain Intensity 1: 0  Pain Onset 1: post op  Pain Location 1: Back  Pain Orientation 1: Lower  Pain Description 1: Aching  Pain Intervention(s) 1: Medication (see MAR)    Physical Exam:  Gen: No acute distress. Neuro: A&Ox3. Follows commands. Speech clear. Affect normal. Drowsy from surgery. PERRL. EOMI. Face symmetric. MEDINA spontaneously. Strength 5/5 in BUE and RLE. Strength 4/5 left hip, 4-/5 left knee and ankle DF/PF, 3/5 left EHL. Sensation intact to sharp and dull touch. Gait deferred. Calves soft and supple;  No pain with passive stretch  Skin: Incision C/D/I  Abd: soft, nontender    24 hour results:    Recent Results (from the past 24 hour(s))   GLUCOSE, POC    Collection Time: 21  4:10 PM   Result Value Ref Range    Glucose (POC) 288 (H) 65 - 117 mg/dL    Performed by Cain Killian  [facility]    GLUCOSE, POC    Collection Time: 21  8:55 PM   Result Value Ref Range    Glucose (POC) 272 (H) 65 - 117 mg/dL    Performed by [address] [place] [address]    Collection Time: 21  4:12 AM   Result Value Ref Range    Sodium 138 136 - 145 mmol/L    Potassium 4.3 3.5 - 5.1 mmol/L    Chloride 106 97 - 108 mmol/L    CO2 27 21 - 32 mmol/L    Anion gap 5 5 - 15 mmol/L    Glucose 207 (H) 65 - 100 mg/dL    BUN 28 (H) 6 - 20 MG/DL Creatinine 0.88 0.55 - 1.02 MG/DL    BUN/Creatinine ratio 32 (H) 12 - 20      GFR est AA >60 >60 ml/min/1.73m2    GFR est non-AA >60 >60 ml/min/1.73m2    Calcium 8.2 (L) 8.5 - 10.1 MG/DL    Bilirubin, total 0.4 0.2 - 1.0 MG/DL    ALT (SGPT) 7 (L) 12 - 78 U/L    AST (SGOT) 19 15 - 37 U/L    Alk. phosphatase 38 (L) 45 - 117 U/L    Protein, total 5.9 (L) 6.4 - 8.2 g/dL    Albumin 2.5 (L) 3.5 - 5.0 g/dL    Globulin 3.4 2.0 - 4.0 g/dL    A-G Ratio 0.7 (L) 1.1 - 2.2     CBC W/O DIFF    Collection Time: 05/24/21  4:12 AM   Result Value Ref Range    WBC 6.7 3.6 - 11.0 K/uL    RBC 3.18 (L) 3.80 - 5.20 M/uL    HGB 9.5 (L) 11.5 - 16.0 g/dL    HCT 29.7 (L) 35.0 - 47.0 %    MCV 93.4 80.0 - 99.0 FL    MCH 29.9 26.0 - 34.0 PG    MCHC 32.0 30.0 - 36.5 g/dL    RDW 11.7 11.5 - 14.5 %    PLATELET 292 (L) 133 - 400 K/uL    MPV 11.4 8.9 - 12.9 FL    NRBC 0.0 0  WBC    ABSOLUTE NRBC 0.00 0.00 - 0.01 K/uL   SAMPLES BEING HELD    Collection Time: 05/24/21  4:12 AM   Result Value Ref Range    SAMPLES BEING HELD 1pst     COMMENT        Add-on orders for these samples will be processed based on acceptable specimen integrity and analyte stability, which may vary by analyte.    PHOSPHORUS    Collection Time: 05/24/21  4:12 AM   Result Value Ref Range    Phosphorus 1.7 (L) 2.6 - 4.7 MG/DL   GLUCOSE, POC    Collection Time: 05/24/21  6:26 AM   Result Value Ref Range    Glucose (POC) 227 (H) 65 - 117 mg/dL    Performed by Dalila Lee    GLUCOSE, POC    Collection Time: 05/24/21 11:19 AM   Result Value Ref Range    Glucose (POC) 305 (H) 65 - 117 mg/dL    Performed by Valorie Wilcox       Assessment:     Principal Problem:    Bulging of lumbar intervertebral disc (5/17/2021)      Plan:     1) L5-S1 HNP   -L5-S1 laminectomy and fusion 5/21/21   -Decadron - taper  -PT/OT - in lumbar quickdraw brace    -Pain control - scheduled gabapentin, prn tylenol, percocet   -D/C to home on gabapentin, tylenol, oxycodone prn, robaxin prn  2) T2DM   -SSI   -Diabetic diet  3) HTN   -Daily lisinopril  4) RA   -ok to restart plaquenil   - hold Arava until follow-up appt  5) Dyslipidemia   -Daily lipitor  6) HANNA   -Repeat labs   -Hospitalist following    Plan d/w Dr. Rebekah Osorio.  Discussed with hospitalist. D/C to home today    Jessica Soolmon NP

## 2021-05-24 NOTE — PROGRESS NOTES
[facility]  Hospitalist Group                                                                                          Hospitalist Progress Note  Gissell Valles MD  Answering service: [phone] or [phone] from in house phone        Date of Service:  2021  NAME:  Svetlana Reed  :  1950  MRN:  [id number]      Admission Summary:   Per H and P \"72year-old woman with a past medical history significant for type 2 diabetes, hypertension, dyslipidemia, rheumatoid arthritis, was in her usual state of health until few days ago when the patient developed back pain. The pain is located at the lower back on the left side, constant sharp pain with radiation to the left leg. No known aggravating factors'. Interval history / Subjective: Follow up lumbosacral herniated disc L5S1  Patient seen and examined at the bedside. Labs, images and notes reviewed  Discussed with nursing staff, orders reviewed. Plan discussed with patient/Family  Patient is feeling well. No BM. Passing gas. Otherwise no nausea/vomiting or abdominal pain. Pain at surgical site well controlled. D/W neurosurgery team.  Francesco Frankel to resume Plaquenil. Continue to hold [address] until follow-up. Okay to discharge. D/W CM. Walker arrangement ongoing. D/W patient. Agreeable for suppository and/or enema to facilitate BM. No other concerns or overnight events. Assessment & Plan:     # Herniated nucleus pulposus L5-S1, L5-S1 Laminectomy and Discectomy with instrumentated fusion, [employer] PLIF on  by Dr. Watson Campos  -MRI L spine-Left paracentral extrusion and probable free disc fragment at L5-S1 extending  cranially with significant narrowing of the left lateral recess posterior to L5  and left subarticular zone  -NSGY on board  -Perioperative monitoring and management per NUS team  -pain management per NUS. Decadrone taper per NUS ongoing. Transition from IV to p.o. done per neurosurgery  -PCA discontinued .   Off IV pain medications. Well-controlled with p.o. pain medications.  -Incentive spirometry  -Early ambulation and OOB as per neurosurgery team postoperatively  -PT/OT per NUS, initiated 5/22 post-operatively  -N/V control with meds  -Suppository helped with BM.  -Neurosurgery follow-up with Dr. Bo Cavazos in 2 weeks  -D/W with neurosurgery team about discharge medications. As following:  Decadron 2 mg daily x2 more days  Oxycodone 5 mg every 6 hours as needed x3 days  Gabapentin 300 mg 3 times daily x2 weeks. Further per Dr. Bo Cavazos on follow-up  Methocarbamol 500 mg twice daily x2 weeks. Further per Dr. Bo Cavazos on follow-up  Tylenol 650 mg every 6 hours as needed    # Mild HANNA, likely perioperative hydration deficit. Improved, creatinine 1.87 on 5/22-0.91 on 5/23. Back to baseline.  -Off IV fluids  -BMP monitoring     # HTN: lisinopril  -Hold lisinopril perioperative  -Resume lisinopril on discharge  -As needed hydralazine IV for blood pressure control    #Diabetes: Hyperglycemia due to steroids  -A1c: 5.3, on oral meds at home  -Sliding scale insulin along with NPH 5 units twice daily  -Resume home Jardiance and glipizide. Defer to PCP for further monitoring and adjustment    # Rheumatoid arthritis:  -held plaquenil, leflunomide for surgery  -Resume Plaquenil on discharge.  -Hold leflunomideArava until follow-up outpatient with Dr. Bo Cavazos in 2 weeks    Code status: full  DVT prophylaxis: [place] 38 discussed with: patientnacho  Anticipated Disposition:home. Home health PT/OT recommended  Anticipated Discharge: DC home with home health today     Hospital Problems  Date Reviewed: 5/17/2021        Codes Class Noted POA    * (Principal) Bulging of lumbar intervertebral disc ICD-10-CM: M51.26  ICD-9-CM: 722.10  5/17/2021 Yes                Review of Systems:   As per HPI      Vital Signs:    Last 24hrs VS reviewed since prior progress note.  Most recent are:  Visit Vitals  /77   Pulse 91   Temp 97.7 °F (36.5 °C)   Resp 16   Ht 5' 4\" (1.626 m)   Wt 67.1 kg (148 lb)   SpO2 100%   BMI 25.40 kg/m²         Intake/Output Summary (Last 24 hours) at 5/24/2021 1528  Last data filed at 5/23/2021 2016  Gross per 24 hour   Intake 120 ml   Output    Net 120 ml        Physical Examination:     I had a face to face encounter with this patient and independently examined them on 5/24/2021 as outlined below:          Constitutional:  No acute distress, cooperative, pleasant. Alert, O x3. Comfortable with pain control. Drain in place. Dressing clean and dry. Drain removed. Mckeon catheter removed   ENT:  Oral mucosa moist, EOMI,anicteric sclera,normal conjunctiva    Resp:  CTA B. No wheezing/rhonchi/rales. No use of accessory muscles. CV:  Regular rhythm, normal rate, S1,S2 wnl    GI:  Soft, mild distended, non tender. normoactive bowel sounds. Softer subsequently after BM with suppository. Musculoskeletal:  No LE edema    Neurologic:  alert and oriented X 3, improved left LE streght. Limited exam given N/V just earlier. Data Review:    Review and/or order of clinical lab test  Review and/or order of tests in the medicine section of CPT    XR SPINE LUMB MIN 4 V    Result Date: 5/18/2021  Grade 1 anterolisthesis of L5 that increases with flexion     MRI LUMB SPINE WO CONT    Result Date: 5/18/2021  1. Left paracentral extrusion and probable free disc fragment at L5-S1 extending cranially with significant narrowing of the left lateral recess posterior to L5 and left subarticular zone      CT SPINE LUMB WO CONT    Result Date: 5/17/2021  Central canal stenosis at L5-S1 secondary to a left paracentral disc protrusion, with compression of the left S1 nerve root Moderate severe left neural foraminal stenosis L5-S1 with compression of the exiting left L5 nerve root     NC XR TECHNOLOGIST SERVICE    Result Date: 5/21/2021  Fluoroscopic guidance was provided for the referring clinician.         Labs:     Recent Labs     05/24/21 0412 05/23/21 0235   WBC 6.7 6.4   HGB 9.5* 9.9*   HCT 29.7* 30.4*   * 131*     Recent Labs     05/24/21 0412 05/23/21 0235 05/22/21  0045    140 136   K 4.3 4.5 4.8    109* 102   CO2 27 28 28   BUN 28* 33* 45*   CREA 0.88 0.91 1.87*   * 173* 237*   CA 8.2* 7.9* 8.2*   PHOS 1.7* 2.5* 5.7*     Recent Labs     05/24/21 0412 05/23/21 0235 05/22/21 0045   ALT 7*  --  19   AP 38*  --  42*   TBILI 0.4  --  0.4   TP 5.9*  --  6.4   ALB 2.5* 2.7* 3.2*   GLOB 3.4  --  3.2     No results for input(s): INR, PTP, APTT, INREXT, INREXT in the last 72 hours. No results for input(s): FE, TIBC, PSAT, FERR in the last 72 hours. No results found for: FOL, RBCF   No results for input(s): PH, PCO2, PO2 in the last 72 hours. No results for input(s): CPK, CKNDX, TROIQ in the last 72 hours.     No lab exists for component: CPKMB  Lab Results   Component Value Date/Time    Cholesterol, total 130 02/09/2021 10:22 AM    HDL Cholesterol 73 02/09/2021 10:22 AM    LDL, calculated 43.2 02/09/2021 10:22 AM    Triglyceride 69 02/09/2021 10:22 AM    CHOL/HDL Ratio 1.8 02/09/2021 10:22 AM     Lab Results   Component Value Date/Time    Glucose (POC) 305 (H) 05/24/2021 11:19 AM    Glucose (POC) 227 (H) 05/24/2021 06:26 AM    Glucose (POC) 272 (H) 05/23/2021 08:55 PM    Glucose (POC) 288 (H) 05/23/2021 04:10 PM    Glucose (POC) 208 (H) 05/23/2021 11:53 AM     Lab Results   Component Value Date/Time    Color YELLOW/STRAW 05/17/2021 06:47 PM    Appearance CLEAR 05/17/2021 06:47 PM    Specific gravity 1.023 05/17/2021 06:47 PM    Specific gravity 1.010 04/16/2015 04:13 AM    pH (UA) 5.5 05/17/2021 06:47 PM    Protein Negative 05/17/2021 06:47 PM    Glucose >1,000 (A) 05/17/2021 06:47 PM    Ketone Negative 05/17/2021 06:47 PM    Bilirubin Negative 05/17/2021 06:47 PM    Urobilinogen 0.2 05/17/2021 06:47 PM    Nitrites Negative 05/17/2021 06:47 PM    Leukocyte Esterase Negative 05/17/2021 06:47 PM Epithelial cells FEW 05/17/2021 06:47 PM    Bacteria 1+ (A) 05/17/2021 06:47 PM    WBC 0-4 05/17/2021 06:47 PM    RBC 0-5 05/17/2021 06:47 PM         Medications Reviewed:     Current Facility-Administered Medications   Medication Dose Route Frequency    methocarbamoL (ROBAXIN) tablet 500 mg  500 mg Oral TID PRN    [START ON 5/25/2021] dexAMETHasone (DECADRON) tablet 2 mg  2 mg Oral DAILY    oxyCODONE IR (ROXICODONE) tablet 5 mg  5 mg Oral Q4H PRN    oxyCODONE IR (ROXICODONE) tablet 10 mg  10 mg Oral Q4H PRN    acetaminophen (TYLENOL) tablet 650 mg  650 mg Oral Q6H    sodium chloride (NS) flush 5-40 mL  5-40 mL IntraVENous Q8H    sodium chloride (NS) flush 5-40 mL  5-40 mL IntraVENous PRN    naloxone (NARCAN) injection 0.4 mg  0.4 mg IntraVENous PRN    senna-docusate (PERICOLACE) 8.6-50 mg per tablet 1 Tablet  1 Tablet Oral DAILY    polyethylene glycol (MIRALAX) packet 17 g  17 g Oral DAILY    bisacodyL (DULCOLAX) suppository 10 mg  10 mg Rectal DAILY PRN    hydrALAZINE (APRESOLINE) 20 mg/mL injection 10 mg  10 mg IntraVENous Q6H PRN    insulin NPH (NOVOLIN N, HUMULIN N) injection 5 Units  5 Units SubCUTAneous ACB&D    gabapentin (NEURONTIN) capsule 300 mg  300 mg Oral TID    ascorbic acid (vitamin C) (VITAMIN C) tablet 500 mg  500 mg Oral DAILY    atorvastatin (LIPITOR) tablet 10 mg  10 mg Oral QHS    calcium carbonate (TUMS) chewable tablet 200 mg [elemental]  200 mg Oral DAILY    cyanocobalamin (VITAMIN B12) tablet 1,000 mcg  1,000 mcg Oral DAILY    hydrOXYchloroQUINE (PLAQUENIL) tablet 300 mg  300 mg Oral DAILY WITH BREAKFAST    [Held by provider] leflunomide (ARAVA) tablet 20 mg  20 mg Oral DAILY    lisinopriL (PRINIVIL, ZESTRIL) tablet 5 mg  5 mg Oral DAILY    sodium chloride (NS) flush 5-40 mL  5-40 mL IntraVENous Q8H    sodium chloride (NS) flush 5-40 mL  5-40 mL IntraVENous PRN    acetaminophen (TYLENOL) suppository 650 mg  650 mg Rectal Q6H PRN    promethazine (PHENERGAN) tablet 12.5 mg  12.5 mg Oral Q6H PRN    Or    ondansetron (ZOFRAN) injection 4 mg  4 mg IntraVENous Q6H PRN    [Held by provider] heparin (porcine) injection 5,000 Units  5,000 Units SubCUTAneous Q8H    insulin lispro (HUMALOG) injection   SubCUTAneous AC&HS    glucose chewable tablet 16 g  4 Tablet Oral PRN    dextrose (D50W) injection syrg 12.5-25 g  12.5-25 g IntraVENous PRN    glucagon (GLUCAGEN) injection 1 mg  1 mg IntraMUSCular PRN     ______________________________________________________________________  EXPECTED LENGTH OF STAY: 2d 16h  ACTUAL LENGTH OF STAY:          7                 Luis E Burton MD

## 2021-05-24 NOTE — PROGRESS NOTES
Problem: Mobility Impaired (Adult and Pediatric)  Goal: *Acute Goals and Plan of Care (Insert Text)  Description: FUNCTIONAL STATUS PRIOR TO ADMISSION: Patient was independent and active without use of DME.    HOME SUPPORT PRIOR TO ADMISSION: The patient lived with  but did not require assist. Pt   works during the day    Physical Therapy Goals  Initiated 5/19/2021  1. Patient will move from supine to sit and sit to supine , scoot up and down, and roll side to side in bed with supervision/set-up within 7 day(s). 2.  Patient will transfer from bed to chair and chair to bed with supervision/set-up using the least restrictive device within 7 day(s). 3.  Patient will perform sit to stand with supervision/set-up within 7 day(s). 4.  Patient will ambulate with supervision/set-up for 300 feet with the least restrictive device within 7 day(s). 5.  Patient will ascend/descend 6 stairs with 1 handrail(s) with minimal assistance/contact guard assist within 7 day(s). Outcome: Progressing Towards Goal   PHYSICAL THERAPY TREATMENT  Patient: Shaista Reece (2 y.o. female)  Date: 5/24/2021  Diagnosis: Bulging of lumbar intervertebral disc [M51.26] Bulging of lumbar intervertebral disc  Procedure(s) (LRB):  L5-S1 LAMINECTOMY AND DISCECTOMY WITH INSTRUMENTATED FUSION, MEDTRONIC PLIF, PEDICLE SCREWS (O-ARM) (URGENT) (N/A) 3 Days Post-Op  Precautions: Back (LOS on when up) No bending, no lifting greater than 5 lbs, no twisting, log-roll technique, repositioning every 20-30 min except when sleeping, brace when OOB (if ordered)  Chart, physical therapy assessment, plan of care and goals were reviewed. ASSESSMENT  Patient continues with skilled PT services and is progressing towards goals. Pt continues to improve with her mobility, gait, and cleared on stairs this session.   Pt completed bed mobility with supervision with use of bed rail, stands with supervision, and ambulated a good distance with a rolling walker. Pt cleared on full flight of stairs using one handrail and step to pattern. Pt recalls 3 of 3 back precautions, sitting restrictions, and wearing schedule of brace. Pt is cleared for discharge from a PT standpoint. Current Level of Function Impacting Discharge (mobility/balance): CGA to supervision level, ambulated with rolling walker x 200 feet    Other factors to consider for discharge: lives with spouse however reports he is at work during the day         PLAN :  Patient continues to benefit from skilled intervention to address the above impairments. Continue treatment per established plan of care. to address goals. Recommendation for discharge: (in order for the patient to meet his/her long term goals)  Physical therapy at least 2 days/week in the home     This discharge recommendation:  Has been made in collaboration with the attending provider and/or case management    IF patient discharges home will need the following DME: rolling walker       SUBJECTIVE:   Patient stated I can't rely on him(spouse) to help me all the time.     OBJECTIVE DATA SUMMARY:   Critical Behavior:  Neurologic State: Alert  Orientation Level: Oriented X4  Cognition: Appropriate decision making  Safety/Judgement: Awareness of environment    Spinal diagnosis intervention:  The patient stated 3/3 back precautions when prompted. Reviewed all 3 back precautions, log roll technique, and sitting for 30 minutes at a time. Reviewed back brace application and wear schedule. Brace donned with supervision/set-up      Functional Mobility Training:    Bed Mobility:  Log Rolling: Supervision;Assist x1;Additional time; Adaptive equipment  Supine to Sit: Supervision; Additional time;Assist x1;Adaptive equipment  Sit to Supine: Supervision; Adaptive equipment; Additional time;Assist x1  Scooting: Supervision        Transfers:  Sit to Stand: Supervision;Assist x1  Stand to Sit: Supervision;Assist x1 Balance:  Sitting: Intact  Standing: Impaired  Standing - Static: Good  Standing - Dynamic : Good (with walker support)  Ambulation/Gait Training:  Distance (ft): 200 Feet (ft)  Assistive Device: Brace/Splint; Walker, rolling  Ambulation - Level of Assistance: Contact guard assistance;Assist x1        Gait Abnormalities: Antalgic;Decreased step clearance              Speed/Leonie: Slow  Step Length: Right shortened;Left shortened        Stairs:  Number of Stairs Trained: 13  Stairs - Level of Assistance: Contact guard assistance;Assist X1   Rail Use: Left     Pain Rating:  Verbal: 7  Location: back     Activity Tolerance:   Good    After treatment patient left in no apparent distress:   Supine in bed and Call bell within reach    COMMUNICATION/COLLABORATION:   The patients plan of care was discussed with: Registered nurse.      Sherine Abdul   Time Calculation: 38 mins

## 2021-05-24 NOTE — PROGRESS NOTES
Problem: Self Care Deficits Care Plan (Adult)  Goal: *Acute Goals and Plan of Care (Insert Text)  Description: FUNCTIONAL STATUS PRIOR TO ADMISSION: Patient was independent and active without use of DME.    HOME SUPPORT: The patient lived with spouse but did not require assist.    Occupational Therapy Goals    REEVAL: GOALS CONTINUE 5/22/21 and partially modified to address toilet transfer in goal 2 as well. 1.  Patient will perform lower body dressing with modified independence using AE PRN within 4 days. 2.  Patient will perform toileting and transfer with modified independence using most appropriate DME within 4 days. 3.  Patient will bathing at modified independence within 4 days. 4.  Patient will verbalize/demonstrate 3/3 back precautions during ADL tasks without cues within 4 days. 5. Patient will dottie/doff LSO with mod I within 4 days. Initiated 5/19/2021    1. Patient will perform lower body dressing with modified independence using AE PRN within 4 days. 2.  Patient will perform toileting  with modified independence using most appropriate DME within 4 days. 3.  Patient will bathing at modified independence within 4 days. 4.  Patient will verbalize/demonstrate 3/3 back precautions during ADL tasks without cues within 4 days. Outcome: Progressing Towards Goal   OCCUPATIONAL THERAPY TREATMENT  Patient: Sebas Snider (48 y.o. female)  Date: 5/24/2021  Diagnosis: Bulging of lumbar intervertebral disc [M51.26] Bulging of lumbar intervertebral disc  Procedure(s) (LRB):  L5-S1 LAMINECTOMY AND DISCECTOMY WITH INSTRUMENTATED FUSION, MEDTRONIC PLIF, PEDICLE SCREWS (O-ARM) (URGENT) (N/A) 3 Days Post-Op  Precautions: Back (LOS on when up)  Chart, occupational therapy assessment, plan of care, and goals were reviewed. ASSESSMENT  Patient continues with skilled OT services and is progressing towards goals. Received in chair.  Patient with good verbal carry over of back precautions and dressing compensatory strategies from previous sessions. She demonstrated chair > EOB> supine with log roll at supervision with vc for hand placement on chair vs RW with sit <> stand. She plans to purchase reacher and bag for RW and dc home with her spouse and daughters (one is  [de-identified]) will check in. Issued hand out for back precautions with ADL tasks. She would benefit from [facility] OT for home safety assessment and continued ed on toileting hygiene. Current Level of Function Impacting Discharge (ADLs): self care transfers supervision at [facility] level with vc for hand placement, doffing brace with supervision    Other factors to consider for discharge: Patient plans to dc home today with support from family and [facility] services. PLAN :  Patient continues to benefit from skilled intervention to address the above impairments. Continue treatment per established plan of care to address goals. Recommend with staff: close supervision to chair for meals and to bathroom at RW level    Recommend next OT session: if patient remains in hospital recommend toileting hygiene practice with in back precautions    Recommendation for discharge: (in order for the patient to meet his/her long term goals)  Occupational therapy at least 2 days/week in the home AND ensure assist and/or supervision for safety with ADL tasks    This discharge recommendation:  Has been made in collaboration with the attending provider and/or case management    IF patient discharges home will need the following DME: AE: long handled dressing, bedside commode, shower chair, and walker: rolling       SUBJECTIVE:   Patient stated I am worried about being alone.     OBJECTIVE DATA SUMMARY:   Cognitive/Behavioral Status:  Neurologic State: Alert  Orientation Level: Oriented X4                Functional Mobility and Transfers for ADLs:  Bed Mobility:  Rolling: Supervision  Supine to Sit: Supervision; Additional time;Assist x1;Adaptive equipment  Sit to Supine: Supervision  Scooting: Supervision    Transfers:  Sit to Stand: Supervision (cues for hand placement)     Bed to Chair: Supervision (RW)    Balance:  Sitting: Intact  Standing: Impaired  Standing - Static: Good  Standing - Dynamic : Good (with walker support)    ADL Intervention:           Upper Body Dressing Assistance  Orthotics(Brace): Supervision (cues for velcro tab mgmt)         Toileting  Bowel Hygiene: Compensatory technique training (verbal ed on toilet tongs, hand out)         The patient recalled and demonstrated 3/3 back precautions. Reviewed all 3 with patient. Bathing: Patient instructed and indicated understanding when bathing to not submerge wound in water, stand to shower or sponge bathe, cover wound with plastic and tape to ensure no water reaches bandage/wound without cues. Dressing brace: Patient instructed and demonstrated to don/doff velcro on brace prior to completing activity. Dressing lower body: Patient instructed to don brace first and on the benefits to remain seated to don all clothing to increase independence with precautions and pain management. Toileting: Patient instructed on the benefits of using flushable wet wipes and toilet tongs if decreased reach or pain for camelia care. Also, the benefits of a reacher to aid in clothing management. Home safety: Patient instructed and indicated understanding on home modifications and safety (raise height of ADL objects, appropriate height of chair surfaces, recliner safety, change of floor surfaces, clear pathways) to increase independence and fall prevention. Standing: Patient instructed and indicated understanding to walk up to sink/counter top/surfaces, step into walker, square off while using objects, slide objects along surfaces, to increase adherence to back precautions and fall prevention. Patient instructed to increase amount of time standing in order to increase independence and tolerance with ADLs.  During prolonged standing, can open cabinet door or place foot on stool to decrease spinal pressure/increase pain. Patient instructed and indicated understanding the benefits of maintaining activity tolerance, functional mobility, and independence with self care tasks during acute stay  to ensure safe return home and to baseline. Encouraged patient to increase frequency and duration OOB, not sitting longer than 30 mins without marching/walking with staff, be out of bed for all meals, perform daily ADLs (as approved by RN/MD regarding bathing etc), and performing functional mobility to/from bathroom. Patient instruction and indicated understanding on body mechanics, ergonomics and gravitational force on the spine during different body positions to plan activities in prep for return home to complete instrumental ADLs and back to work safely. Pain:  No c/o pain during session    Activity Tolerance:   Fair    After treatment patient left in no apparent distress:   Supine in bed, Call bell within reach, and Caregiver / family present    COMMUNICATION/COLLABORATION:   The patients plan of care was discussed with: Registered nurse and Case management.      Ruthie Mack, OT  Time Calculation: 24 mins

## 2021-05-24 NOTE — PROGRESS NOTES
Problem: Falls - Risk of  Goal: *Absence of Falls  Description: Document Prudence Celeste Fall Risk and appropriate interventions in the flowsheet.   Outcome: Progressing Towards Goal  Note: Fall Risk Interventions:  Mobility Interventions: Patient to call before getting OOB         Medication Interventions: Patient to call before getting OOB    Elimination Interventions: Patient to call for help with toileting needs

## 2021-05-24 NOTE — PROGRESS NOTES
Hospital follow-up PCP transitional care appointment has been scheduled with Dr. Albertus Gottron for Tuesday, 6/1/21 at 3:00 p.m. Pending patient discharge.   Yao Harrington, Care Management Specialist.

## 2021-05-24 NOTE — DISCHARGE INSTRUCTIONS
After Hospital Care Plan:  Discharge Instructions Lumbar Fusion Surgery   Dr. Estela Meneses     Patient Name: Florencio Yu    Date of procedure: 5/21/2021  Date of discharge: 5/21/2021    Procedure: Procedure(s):  L5-S1 LAMINECTOMY AND DISCECTOMY WITH INSTRUMENTATED FUSION, MEDTRONIC PLIF, PEDICLE SCREWS (O-ARM) (URGENT)  PCP: Aylin Clark MD    Follow up appointments  -follow up with Dr. Estela Meneses in 2 weeks. Call [phone] to make an appointment as soon as you get home from the hospital.    When to call your Spine Surgeon:  -Signs of infection-if your incision is red; continues to have drainage; drainage has a foul odor or if you have a persistent fever over 101 degrees for 24 hours  -Nausea or vomiting, severe headache  -Loss of bowel or bladder function, inability to urinate  -Changes in sensation in your arms or legs (numbness, tingling, loss of color)  -Increased weakness-greater than before your surgery  -Severe pain or pain not relieved by medications  -Signs of a blood clot in your leg-calf pain, tenderness, redness, swelling of lower leg    When to call your Primary Care Physician:  -Concerns about medical conditions such as diabetes, high blood pressure, asthma, congestive heart failure  -Call if blood sugars are elevated, persistent headache or dizziness, coughing or congestion, constipation or diarrhea, burning with urination, abnormal heart rate    When to call 060 and go to the nearest emergency room:  -Acute onset of chest pain, shortness of breath, difficulty breathing    Activity  -You are going home a well person, be as active as possible. Your only exercise should be walking. Start with short frequent walks and increase your walking distance each day.  -Limit the amount of time you sit to 20-30 minute intervals.   Sitting for prolonged periods of time will be uncomfortable for you following surgery.  -Do NOT lift anything over 5 pounds  -Do NOT do any straining, twisting or bending  -When you are in bed, you may lay on your back or on either side. Do NOT lie on your stomach    Brace  -If you have a back brace, you should wear your brace at all times when you are out of bed. Do not wear the brace while in bed or showering.  -Remember to always wear a cotton t-shirt underneath your brace.  -Do not bend or twist when your brace is off. Diet  -Resume usual diet; drink plenty of fluids; eat foods high in fiber  -It is important to have regular bowel movements. Pain medications may cause constipation. You may want to take a stool softener (such as Senokot-S or Colace) to prevent constipation.   -If constipation occurs, take a laxative (such as Dulcolax tablets, Milk of Magnesia, or a suppository). Laxatives should only be used if the above preventable measures have failed and you still have not had a bowel movement after three days    Driving  -You may not drive or return to work until instructed by your physician. However, you may ride in the car for short periods of time. Incision Care  -You may take brief showers but do not run the water run directly onto the wound. After showering or bathing, remove the wet dressing and gently blot the wound dry with a soft towel.  -Do not rub or apply any lotions or ointments to your incision site.   -Do not soak or scrub your wound  -Keep a dry dressing (guaze and paper tape) on your incision and have it changed daily for 14 days after surgery; more often if your incision is draining. Have your caregiver wash their hands thoroughly before changing your dressing.  -You will have absorbable sutures and skin glue on your incision. Skin glue will fall off on its own; do not pick at your incision. Showering  -You may shower in approximately 2 days after your surgery.    -Leave the dressing on during your shower. Do NOT allow the water to run directly onto your dressing.    Once you get out of the shower, pat the area dry with a towel and put on a dry dressing.  -Reminder- your brace can be removed while showering. Remember to not bend or twist while your brace is off.    -Do not take a tub bath. Preventing blood clots  -You have been given T.E.D. stockings to wear. Continue to wear these for 7 days after your discharge. Put them on in the morning and take them off at night.    -They are used to increase your circulation and prevent blood clots from forming in your legs  -T. E.D. stockings can be machine washed, temperature not to exceed 160° F (71°C) and machine dried for 15 to 20 minutes, temperature not to exceed 250° F (121°C). Pain management  -Take pain medication as prescribed; decrease the amount you use as your pain lessens  -DO not wait until you are in extreme pain to take your medication.  -Avoid alcoholic beverages while taking pain medication    Pain Medication Safety  DO:  -Read the Medication Guide   -Take your medicine exactly as prescribed   -Store your medicine away from children and in a safe place   -Call your healthcare provider for medical advice about side effects.  -Please be aware that many medications contain Tylenol. We do not want you to over medicate so please read the information below as a guide. Do not take more than 4 Grams of Tylenol in a 24 hour period.   (There are 1000 milligrams in one Gram)                                                                                                                                                                                                                                                                                                                                                                  Percocet contains 325 mg of Tylenol per tablet (do not take more than 12 tablets in 24 hours)  Lortab contains 500 mg of Tylenol per tablet (do not take more than 8 tablets in 24 hours)  Norco contains 325 mg of Tylenol per tablet (do not take more than 12 tablets in 24 hours). DO NOT:  -Do not give your medicine to others   -Do not take medicine unless it was prescribed for you   -Do not stop taking your medicine without talking to your healthcare provider   -Do not break, chew, crush, dissolve, or inject your medicine. If you cannot swallow your medicine whole, talk to your healthcare provider.  -Do not drink alcohol while taking this medicine  -Do not take anti-inflammatory medications or aspirin unless instructed by your   physician. Discharge Instructions       PATIENT ID: Hira Babcock  MRN: [id number]   YOB: 1950    DATE OF ADMISSION: 5/17/2021  5:18 PM    DATE OF DISCHARGE: 5/24/2021    PRIMARY CARE PROVIDER: Arthur Dean MD     ATTENDING PHYSICIAN: Rodolfo Meyers MD  DISCHARGING PROVIDER: Bernardo Webb MD    To contact this individual call [phone] and ask the  to page. If unavailable ask to be transferred the Adult Hospitalist Department. DISCHARGE DIAGNOSES   # Herniated nucleus pulposus L5-S1, L5-S1 Laminectomy and Discectomy with instrumentated fusion, MEDTRONIC PLIF on 5/21 by Dr. Bo Cavazos  -MRI L spine-Left paracentral extrusion and probable free disc fragment at L5-S1 extending  cranially with significant narrowing of the left lateral recess posterior to L5  and left subarticular zone  -NSGY on board  -Perioperative monitoring and management per NUS team  -pain management per NUS. Decadrone taper per NUS ongoing. Transition from IV to p.o. done per neurosurgery  -PCA discontinued 5/22. Off IV pain medications. Well-controlled with p.o. pain medications.  -Incentive spirometry  -Early ambulation and OOB as per neurosurgery team postoperatively  -PT/OT per NUS, initiated 5/22 post-operatively  -N/V control with meds  -Suppository helped with BM.  -Neurosurgery follow-up with Dr. Bo Cavazos in 2 weeks  -D/W with neurosurgery team about discharge medications.   As following:  Decadron 2 mg daily x2 more days  Oxycodone 5 mg every 6 hours as needed x3 days  Gabapentin 300 mg 3 times daily x2 weeks. Further per Dr. Maye Brandt on follow-up  Methocarbamol 500 mg twice daily x2 weeks. Further per Dr. Maye Brandt on follow-up  Tylenol 650 mg every 6 hours as needed     # Mild HANNA, likely perioperative hydration deficit. Improved, creatinine 1.87 on 5/22-0.91 on 5/23. Back to baseline.  -Off IV fluids  -BMP monitoring      # HTN: lisinopril  -Hold lisinopril perioperative  -Resume lisinopril on discharge  -As needed hydralazine IV for blood pressure control    #Diabetes: Hyperglycemia due to steroids  -A1c: 5.3, on oral meds at home  -Sliding scale insulin along with NPH 5 units twice daily  -Resume home Jardiance and glipizide. Defer to PCP for further monitoring and adjustment     # Rheumatoid arthritis:  -held plaquenil, leflunomide for surgery  -Resume Plaquenil on discharge.  -Hold leflunomide-Arava until follow-up outpatient with Dr. Maye Brandt in 2 weeks       CONSULTATIONS: IP CONSULT TO NEUROSURGERY    PROCEDURES/SURGERIES: Procedure(s):  L5-S1 LAMINECTOMY AND DISCECTOMY WITH INSTRUMENTATED FUSION, MEDTRONIC PLIF, PEDICLE SCREWS (O-ARM) (URGENT)    PENDING TEST RESULTS:   At the time of discharge the following test results are still pending: None    FOLLOW UP APPOINTMENTS:   Follow-up Information     Follow up With Specialties Details Why Contact Info    Jaylen Marshall MD Neurosurgery In 2 weeks  [facility] [address] [facility]      AT [facility] and OT [address] [place] [address]    Zeenat Neri MD Pediatric Medicine, Family Medicine Schedule an appointment as soon as possible for a visit in 1 week PCP: Please call for post hospital discharge follow-up within 1 week.   CBC, CMP, blood glucose monitoring, blood pressure monitoring [address]  [address] [facility]  [phone] Jeff Cisse MD Neurosurgery Schedule an appointment as soon as possible for a visit in 2 weeks Neurosurgery: Follow-up with Dr. Ruchi Nelson in 2 weeks post surgery. Please call office to schedule a follow-up appointment. [place] [id number]  [phone]             ADDITIONAL CARE RECOMMENDATIONS:   Follow-up with neurosurgery, PCP as noted in appointments  · It is important that you take the medication exactly as they are prescribed. · Keep your medication in the bottles provided by the pharmacist and keep a list of the medication names, dosages, and times to be taken in your wallet. · Do not take other medications without consulting your doctor. · No drinking alcohol or driving car or operating machinery if you are on narcotic pain medications. Donot take sedating mediations if you are sleepy or confused. · Fall Precautions  · Keep Well Hydrated  · Report to your medical provider if you feel you have  developed allergies to medications  · Follow up with your PCP or Consultant for medication adjustments and refills  · Monitor for signs of fevers,chills,bleeding,chest pain and seek medical attention if you do so. DIET: Cardiac Diet and Diabetic Diet    ACTIVITY: Activity as tolerated and PT/OT Eval and Treat    WOUND CARE: As recommended by neurosurgery team as above. EQUIPMENT needed: Walker      Radiology:  XR SPINE LUMB MIN 4 V    Result Date: 5/18/2021  Grade 1 anterolisthesis of L5 that increases with flexion     MRI LUMB SPINE WO CONT    Result Date: 5/18/2021  1.  Left paracentral extrusion and probable free disc fragment at L5-S1 extending cranially with significant narrowing of the left lateral recess posterior to L5 and left subarticular zone      CT SPINE LUMB WO CONT    Result Date: 5/17/2021  Central canal stenosis at L5-S1 secondary to a left paracentral disc protrusion, with compression of the left S1 nerve root Moderate severe left neural foraminal stenosis L5-S1 with compression of the exiting left L5 nerve root     NC XR TECHNOLOGIST SERVICE    Result Date: 5/21/2021  Fluoroscopic guidance was provided for the referring clinician. Laboratory data:  Recent Results (from the past 24 hour(s))   GLUCOSE, POC    Collection Time: 05/23/21  4:10 PM   Result Value Ref Range    Glucose (POC) 288 (H) 65 - 117 mg/dL    Performed by Siddhartha Toledo  PCT    GLUCOSE, POC    Collection Time: 05/23/21  8:55 PM   Result Value Ref Range    Glucose (POC) 272 (H) 65 - 117 mg/dL    Performed by Theresa Stewart    METABOLIC PANEL, COMPREHENSIVE    Collection Time: 05/24/21  4:12 AM   Result Value Ref Range    Sodium 138 136 - 145 mmol/L    Potassium 4.3 3.5 - 5.1 mmol/L    Chloride 106 97 - 108 mmol/L    CO2 27 21 - 32 mmol/L    Anion gap 5 5 - 15 mmol/L    Glucose 207 (H) 65 - 100 mg/dL    BUN 28 (H) 6 - 20 MG/DL    Creatinine 0.88 0.55 - 1.02 MG/DL    BUN/Creatinine ratio 32 (H) 12 - 20      GFR est AA >60 >60 ml/min/1.73m2    GFR est non-AA >60 >60 ml/min/1.73m2    Calcium 8.2 (L) 8.5 - 10.1 MG/DL    Bilirubin, total 0.4 0.2 - 1.0 MG/DL    ALT (SGPT) 7 (L) 12 - 78 U/L    AST (SGOT) 19 15 - 37 U/L    Alk.  phosphatase 38 (L) 45 - 117 U/L    Protein, total 5.9 (L) 6.4 - 8.2 g/dL    Albumin 2.5 (L) 3.5 - 5.0 g/dL    Globulin 3.4 2.0 - 4.0 g/dL    A-G Ratio 0.7 (L) 1.1 - 2.2     CBC W/O DIFF    Collection Time: 05/24/21  4:12 AM   Result Value Ref Range    WBC 6.7 3.6 - 11.0 K/uL    RBC 3.18 (L) 3.80 - 5.20 M/uL    HGB 9.5 (L) 11.5 - 16.0 g/dL    HCT 29.7 (L) 35.0 - 47.0 %    MCV 93.4 80.0 - 99.0 FL    MCH 29.9 26.0 - 34.0 PG    MCHC 32.0 30.0 - 36.5 g/dL    RDW 11.7 11.5 - 14.5 %    PLATELET 298 (L) 186 - 400 K/uL    MPV 11.4 8.9 - 12.9 FL    NRBC 0.0 0  WBC    ABSOLUTE NRBC 0.00 0.00 - 0.01 K/uL   SAMPLES BEING HELD    Collection Time: 05/24/21  4:12 AM   Result Value Ref Range    SAMPLES BEING HELD 1pst     COMMENT        Add-on orders for these samples will be processed based on acceptable specimen integrity and analyte stability, which may vary by analyte. PHOSPHORUS    Collection Time: 05/24/21  4:12 AM   Result Value Ref Range    Phosphorus 1.7 (L) 2.6 - 4.7 MG/DL   GLUCOSE, POC    Collection Time: 05/24/21  6:26 AM   Result Value Ref Range    Glucose (POC) 227 (H) 65 - 117 mg/dL    Performed by [address] [place] [address], POC    Collection Time: 05/24/21 11:19 AM   Result Value Ref Range    Glucose (POC) 305 (H) 65 - 117 mg/dL    Performed by Lisa Dominguez            DISCHARGE MEDICATIONS:   See Medication Reconciliation Form    · It is important that you take the medication exactly as they are prescribed. · Keep your medication in the bottles provided by the pharmacist and keep a list of the medication names, dosages, and times to be taken in your wallet. · Do not take other medications without consulting your doctor. NOTIFY YOUR PHYSICIAN FOR ANY OF THE FOLLOWING:   Fever over 101 degrees for 24 hours. Chest pain, shortness of breath, fever, chills, nausea, vomiting, diarrhea, change in mentation, falling, weakness, bleeding. Severe pain or pain not relieved by medications. Or, any other signs or symptoms that you may have questions about. DISPOSITION:  x  Home With:  x OT x PT x HH  RN       SNF/Inpatient Rehab/LTAC    Independent/assisted living    Hospice    Other:     CDMP Checked:   Yes x     PROBLEM LIST Updated:  Yes x        My Medications      START taking these medications      Instructions Each Dose to Equal Morning Noon Evening Bedtime   acetaminophen 325 mg tablet  Commonly known as: TYLENOL  Replaces: Tylenol Arthritis Pain 650 mg Tber    Your last dose was: Your next dose is: Take 2 Tablets by mouth every six (6) hours as needed for Pain or Fever. 650 mg                 bisacodyL 10 mg Supp  Commonly known as: DULCOLAX    Your last dose was: Your next dose is:          Insert 10 mg into rectum daily as needed for Constipation. Please use if no bowel movement for 2 days despite of oral MiraLAX, Starr-Colace like stool softener/stimulant bowel regime. 10 mg                 dexAMETHasone 1 mg tablet  Commonly known as: DECADRON  Start taking on: May 25, 2021    Your last dose was: Your next dose is: Take 2 Tablets by mouth Daily (before breakfast) for 2 days. 2 mg                 gabapentin 300 mg capsule  Commonly known as: NEURONTIN    Your last dose was: Your next dose is: Take 1 Capsule by mouth three (3) times daily for 15 days. Max Daily Amount: 900 mg.   300 mg                 methocarbamoL 500 mg tablet  Commonly known as: ROBAXIN    Your last dose was: Your next dose is: Take 1 Tablet by mouth two (2) times a day for 15 days. 500 mg                 oxyCODONE IR 5 mg immediate release tablet  Commonly known as: ROXICODONE    Your last dose was: Your next dose is: Take 1 Tablet by mouth every six (6) hours as needed for Pain for up to 3 days. Max Daily Amount: 20 mg.   5 mg                 polyethylene glycol 17 gram packet  Commonly known as: MIRALAX  Start taking on: May 25, 2021    Your last dose was: Your next dose is: Take 1 Packet by mouth daily for 15 days. Hold for diarrhea or loose stools. Please be sure to take this medication well using oxycodone/strong pain medications. Mix in 8 oz of water, juice, soda, coffee, or tea prior to administration   17 g                 senna-docusate 8.6-50 mg per tablet  Commonly known as: PERICOLACE  Start taking on: May 25, 2021    Your last dose was: Your next dose is: Take 2 Tablets by mouth daily for 30 days. Hold for diarrhea or loose stools. Please be sure to take this medication well using oxycodone/strong pain medication.    2 Tablet                    CHANGE how you take these medications      Instructions Each Dose to Equal Morning Noon Evening Bedtime   fluticasone propionate 50 mcg/actuation nasal spray  Commonly known as: FLONASE  What changed:   · when to take this  · reasons to take this    Your last dose was: Your next dose is:         2 sprays per nostril once a day  Indications: Allergic Rhinitis                  leflunomide 20 mg tablet  Commonly known as: ARAVA  Start taking on: June 8, 2021  What changed:   · See the new instructions. · These instructions start on June 8, 2021. If you are unsure what to do until then, ask your doctor or other care provider. Your last dose was: Your next dose is: Take 1 Tablet by mouth daily. Please hold leflunomide until follow-up with Dr. Theresa Sandra in 2 weeks. Please start after okay from Dr. Theresa Sandra. 20 mg                    CONTINUE taking these medications      Instructions Each Dose to Equal Morning Noon Evening Bedtime   aspirin delayed-release 81 mg tablet    Your last dose was: Your next dose is: Take 81 mg by mouth daily. 81 mg                 atorvastatin 10 mg tablet  Commonly known as: LIPITOR    Your last dose was: Your next dose is:         TAKE ONE TABLET BY MOUTH DAILY                  calcium carbonate 200 mg calcium (500 mg) Chew  Commonly known as: TUMS    Your last dose was: Your next dose is: Take 1 Tab by mouth daily. PRN   1 Tablet                 cyanocobalamin 1,000 mcg tablet    Your last dose was: Your next dose is: Take 1,000 mcg by mouth daily. 1,000 mcg                 DAILY PROBIOTIC PO    Your last dose was: Your next dose is: Take 1 Tab by mouth once over twenty-four (24) hours. 1 Tablet                 diclofenac 1 % Gel  Commonly known as: VOLTAREN    Your last dose was: Your next dose is:         Apply 4 g to affected area four (4) times daily. PLEASE GIVE GENERIC IF AVAILABLE.   4 g                 empagliflozin 25 mg tablet  Commonly known as: Jardiance    Your last dose was:      Your next dose is:         TAKE ONE TABLET BY MOUTH DAILY                  glipiZIDE SR 5 mg CR tablet  Commonly known as: GLUCOTROL XL    Your last dose was: Your next dose is:         TAKE ONE TABLET BY MOUTH DAILY                  glucose blood VI test strips strip  Commonly known as: OneTouch Ultra [employer]    Your last dose was: Your next dose is:         Check daily FBS. E11.9                  hydrOXYchloroQUINE 200 mg tablet  Commonly known as: PLAQUENIL    Your last dose was: Your next dose is:         TAKE ONE AND ONE-HALF (1 & 1/2) TABLET BY MOUTH DAILY                  lisinopriL 5 mg tablet  Commonly known as: Shelley Oris    Your last dose was: Your next dose is:         TAKE ONE TABLET BY MOUTH DAILY                  nystatin powder  Commonly known as: MYCOSTATIN    Your last dose was: Your next dose is:         Apply  to affected area four (4) times daily. PRENATAL PO    Your last dose was: Your next dose is: Take  by mouth. terbinafine  mg tablet  Commonly known as: LAMISIL    Your last dose was: Your next dose is:                         Vitamin C 500 mg tablet  Generic drug: ascorbic acid (vitamin C)    Your last dose was: Your next dose is: Take 500 mg by mouth daily. 500 mg                 Vitamin D3 25 mcg (1,000 unit) Cap  Generic drug: cholecalciferol    Your last dose was: Your next dose is: Take 1,000 Units by mouth daily.    1,000 Units                    STOP taking these medications    Tylenol Arthritis Pain 650 mg Tber  Generic drug: acetaminophen  Replaced by: acetaminophen 325 mg tablet              Where to Get Your Medications      These medications were sent to [address] [facility], [address] [facility] [address], [facility] [address]    Phone: [phone]   · dexAMETHasone 1 mg tablet  · gabapentin 300 mg capsule  · methocarbamoL 500 mg tablet  · oxyCODONE IR 5 mg immediate release tablet     Information on where to get these meds will be given to you by the nurse or doctor.     Ask your nurse or doctor about these medications  · acetaminophen 325 mg tablet  · bisacodyL 10 mg Supp  · leflunomide 20 mg tablet  · polyethylene glycol 17 gram packet  · senna-docusate 8.6-50 mg per tablet         Signed:   Fredi Calderon MD  5/24/2021  3:50 PM

## 2021-05-24 NOTE — PROGRESS NOTES
KRISTINA:    RUR 21%    Disposition: Home with HH. At [facility] accepted. [facility] to deliver RW to room before discharge today. Medicare pt has received, reviewed, and signed 2nd IM letter informing them of their right to appeal the discharge. Signed copy has been placed on pt bedside chart.     Transportation: Family    Follow Up: PCP/Specialist    Brandon Etienne RN/CRM  [phone]

## 2021-05-24 NOTE — DISCHARGE SUMMARY
Discharge Summary       PATIENT ID: Curry Lorenzo  MRN: [id number]   YOB: 1950    DATE OF ADMISSION: 5/17/2021  5:18 PM    DATE OF DISCHARGE: 05/24/21   PRIMARY CARE PROVIDER: Osiel Rose MD     ATTENDING PHYSICIAN: Rosa Goldsmith MD  DISCHARGING PROVIDER: Rosa Goldsmith MD    To contact this individual call [phone] and ask the  to page. If unavailable ask to be transferred the Adult Hospitalist Department. CONSULTATIONS: IP CONSULT TO NEUROSURGERY    PROCEDURES/SURGERIES: Procedure(s):  L5-S1 LAMINECTOMY AND DISCECTOMY WITH INSTRUMENTATED FUSION, MEDTRONIC PLIF, PEDICLE SCREWS (O-ARM) (URGENT)    ADMITTING DIAGNOSES & HOSPITAL COURSE:   # Herniated nucleus pulposus L5-S1, L5-S1 Laminectomy and Discectomy with instrumentated fusion, MEDTRONIC PLIF on 5/21 by Dr. Jason Mendez  # Mild HANNA, likely perioperative hydration deficit.  Improved.   # HTN  # Diabetes: Hyperglycemia due to steroids. A1c: 5.3  # Rheumatoid arthritis    Admission Summary:   Per H and P \"72year-old woman with a past medical history significant for type 2 diabetes, hypertension, dyslipidemia, rheumatoid arthritis, was in her usual state of health until few days ago when the patient developed back pain.  The pain is located at the lower back on the left side, constant sharp pain with radiation to the left leg.  No known aggravating factors'.     Interval history / Subjective: Follow up lumbosacral herniated disc L5S1  Patient seen and examined at the bedside. Labs, images and notes reviewed  Discussed with nursing staff, orders reviewed. Plan discussed with patient/Family  Patient is feeling well. No BM. Passing gas. Otherwise no nausea/vomiting or abdominal pain. Pain at surgical site well controlled. D/W neurosurgery team.  Zoraida Og to resume Plaquenil. Continue to hold [address] until follow-up. Okay to discharge. D/W CM. Walker arrangement ongoing. D/W patient.   Agreeable for suppository and/or enema to facilitate BM. No other concerns or overnight events. DISCHARGE DIAGNOSES / PLAN:      # Herniated nucleus pulposus L5-S1, L5-S1 Laminectomy and Discectomy with instrumentated fusion, MEDTRONIC PLIF on 5/21 by Dr. Daryl Ruiz  -MRI L spine-Left paracentral extrusion and probable free disc fragment at L5-S1 extending  cranially with significant narrowing of the left lateral recess posterior to L5  and left subarticular zone  -NSGY on board  -Perioperative monitoring and management per NUS team  -pain management per NUS. Decadrone taper per NUS ongoing. Transition from IV to p.o. done per neurosurgery  -PCA discontinued 5/22. Off IV pain medications. Well-controlled with p.o. pain medications.  -Incentive spirometry  -Early ambulation and OOB as per neurosurgery team postoperatively  -PT/OT per NUS, initiated 5/22 post-operatively  -N/V control with meds  -Suppository helped with BM.  -Neurosurgery follow-up with Dr. Daryl Ruiz in 2 weeks  -D/W with neurosurgery team about discharge medications. As following:  Decadron 2 mg daily x2 more days  Oxycodone 5 mg every 6 hours as needed x3 days  Gabapentin 300 mg 3 times daily x2 weeks. Further per Dr. Daryl Ruiz on follow-up  Methocarbamol 500 mg twice daily x2 weeks. Further per Dr. Daryl Ruiz on follow-up  Tylenol 650 mg every 6 hours as needed     # Mild HANNA, likely perioperative hydration deficit. Improved, creatinine 1.87 on 5/22-0.91 on 5/23. Back to baseline.  -Off IV fluids  -BMP monitoring      # HTN: lisinopril  -Hold lisinopril perioperative  -Resume lisinopril on discharge  -As needed hydralazine IV for blood pressure control    #Diabetes: Hyperglycemia due to steroids  -A1c: 5.3, on oral meds at home  -Sliding scale insulin along with NPH 5 units twice daily  -Resume home Jardiance and glipizide.   Defer to PCP for further monitoring and adjustment     # Rheumatoid arthritis:  -held plaquenil, leflunomide for surgery  -Resume Plaquenil on discharge.  -Hold leflunomideArava until follow-up outpatient with Dr. Hammad Hanley in 2 weeks     Code status: full  DVT prophylaxis: scd     Care Plan discussed with: patient,nacho  Anticipated Disposition:home. Home health PT/OT recommended  Anticipated Discharge: DC home with home health today     ADDITIONAL CARE RECOMMENDATIONS:   Follow-up with neurosurgery, PCP as noted in appointments  · It is important that you take the medication exactly as they are prescribed. · Keep your medication in the bottles provided by the pharmacist and keep a list of the medication names, dosages, and times to be taken in your wallet. · Do not take other medications without consulting your doctor. · No drinking alcohol or driving car or operating machinery if you are on narcotic pain medications. Donot take sedating mediations if you are sleepy or confused.    · Fall Precautions  · Keep Well Hydrated  · Report to your medical provider if you feel you have  developed allergies to medications  · Follow up with your PCP or Consultant for medication adjustments and refills  · Monitor for signs of fevers,chills,bleeding,chest pain and seek medical attention if you do so.          DIET: Cardiac Diet and Diabetic Diet     ACTIVITY: Activity as tolerated and PT/OT Eval and Treat     WOUND CARE: As recommended by neurosurgery team as above.     EQUIPMENT needed: Walker        PENDING TEST RESULTS:   At the time of discharge the following test results are still pending: None    FOLLOW UP APPOINTMENTS:    Follow-up Information     Follow up With Specialties Details Why Contact Info    Brie Lora MD Neurosurgery In 2 weeks  [facility] [address]      AT [facility] PT and OT [address] [place] [address]    Arturo Gamboa MD Pediatric Medicine, Family Medicine Schedule an appointment as soon as possible for a visit in  week PCP: Please call for post hospital discharge follow-up within 1 week. CBC, CMP, blood glucose monitoring, blood pressure monitoring [address]  [address] [facility]  [phone]      Angela Lott MD Neurosurgery Schedule an appointment as soon as possible for a visit in 2 weeks Neurosurgery: Follow-up with Dr. Roman Jarvis in 2 weeks post surgery. Please call office to schedule a follow-up appointment. Monico Brown  [phone]                 DISCHARGE MEDICATIONS:  Current Discharge Medication List      START taking these medications    Details   dexAMETHasone (DECADRON) 1 mg tablet Take 2 Tablets by mouth Daily (before breakfast) for 2 days. Qty: 4 Tablet, Refills: 0  Start date: 5/25/2021, End date: 5/27/2021      oxyCODONE IR (ROXICODONE) 5 mg immediate release tablet Take 1 Tablet by mouth every six (6) hours as needed for Pain for up to 3 days. Max Daily Amount: 20 mg.  Qty: 12 Tablet, Refills: 0  Start date: 5/24/2021, End date: 5/27/2021    Associated Diagnoses: Bulging of lumbar intervertebral disc      acetaminophen (TYLENOL) 325 mg tablet Take 2 Tablets by mouth every six (6) hours as needed for Pain or Fever. Qty: 60 Tablet, Refills: 0  Start date: 5/24/2021      senna-docusate (PERICOLACE) 8.6-50 mg per tablet Take 2 Tablets by mouth daily for 30 days. Hold for diarrhea or loose stools. Please be sure to take this medication well using oxycodone/strong pain medication. Qty: 60 Tablet, Refills: 0  Start date: 5/25/2021, End date: 6/24/2021      polyethylene glycol (MIRALAX) 17 gram packet Take 1 Packet by mouth daily for 15 days. Hold for diarrhea or loose stools. Please be sure to take this medication well using oxycodone/strong pain medications. Mix in 8 oz of water, juice, soda, coffee, or tea prior to administration  Qty: 15 Packet, Refills: 0  Start date: 5/25/2021, End date: 6/9/2021      methocarbamoL (ROBAXIN) 500 mg tablet Take 1 Tablet by mouth two (2) times a day for 15 days. Qty: 30 Tablet, Refills: 0  Start date: 5/24/2021, End date: 6/8/2021      gabapentin (NEURONTIN) 300 mg capsule Take 1 Capsule by mouth three (3) times daily for 15 days. Max Daily Amount: 900 mg. Qty: 45 Capsule, Refills: 0  Start date: 5/24/2021, End date: 6/8/2021    Associated Diagnoses: Bulging of lumbar intervertebral disc      bisacodyL (DULCOLAX) 10 mg supp Insert 10 mg into rectum daily as needed for Constipation. Please use if no bowel movement for 2 days despite of oral MiraLAX, Starr-Colace like stool softener/stimulant bowel regime. Qty: 10 Suppository, Refills: 0  Start date: 5/24/2021         CONTINUE these medications which have CHANGED    Details   leflunomide (ARAVA) 20 mg tablet Take 1 Tablet by mouth daily. Please hold leflunomide until follow-up with Dr. Mychal Dash in 2 weeks. Please start after okay from Dr. Mychal Dash. Qty: 30 Tablet, Refills: 0  Start date: 6/8/2021         CONTINUE these medications which have NOT CHANGED    Details   glipiZIDE SR (GLUCOTROL XL) 5 mg CR tablet TAKE ONE TABLET BY MOUTH DAILY  Qty: 90 Tab, Refills: 0    Associated Diagnoses: Controlled type 2 diabetes mellitus without complication, without long-term current use of insulin (HCC)      lisinopriL (PRINIVIL, ZESTRIL) 5 mg tablet TAKE ONE TABLET BY MOUTH DAILY  Qty: 90 Tab, Refills: 0    Associated Diagnoses: Essential hypertension      atorvastatin (LIPITOR) 10 mg tablet TAKE ONE TABLET BY MOUTH DAILY  Qty: 90 Tab, Refills: 0    Associated Diagnoses: Pure hypercholesterolemia      empagliflozin (Jardiance) 25 mg tablet TAKE ONE TABLET BY MOUTH DAILY  Qty: 90 Tab, Refills: 1    Associated Diagnoses: Controlled type 2 diabetes mellitus without complication, without long-term current use of insulin (HCC)      nystatin (MYCOSTATIN) powder Apply  to affected area four (4) times daily.   Qty: 1 Bottle, Refills: 5    Associated Diagnoses: Intertrigo hydrOXYchloroQUINE (PLAQUENIL) 200 mg tablet TAKE ONE AND ONE-HALF (1 & 1/2) TABLET BY MOUTH DAILY  Qty: 45 Tab, Refills: 3    Associated Diagnoses: Rheumatoid arthritis involving both hands with positive rheumatoid factor ([facility])      terbinafine HCL (LAMISIL) 250 mg tablet       ascorbic acid, vitamin C, (Vitamin C) 500 mg tablet Take 500 mg by mouth daily. glucose blood VI test strips (OneTouch Ultra Blue Test Strip) strip Check daily FBS. E11.9  Qty: 50 Strip, Refills: 5    Associated Diagnoses: Controlled type 2 diabetes mellitus without complication, without long-term current use of insulin ([facility])      PNV LE.81/QHQFPWZ fum/folic ac (PRENATAL PO) Take  by mouth. diclofenac (VOLTAREN) 1 % gel Apply 4 g to affected area four (4) times daily. PLEASE GIVE GENERIC IF AVAILABLE. Qty: 100 g, Refills: 3    Associated Diagnoses: Multiple joint pain; Rheumatoid arthritis involving multiple sites with positive rheumatoid factor ([facility])      fluticasone (FLONASE) 50 mcg/actuation nasal spray 2 sprays per nostril once a day  Indications: Allergic Rhinitis  Qty: 1 Bottle, Refills: 3    Associated Diagnoses: Acute non-recurrent sinusitis, unspecified location      cholecalciferol (VITAMIN D3) 1,000 unit cap Take 1,000 Units by mouth daily. calcium carbonate (TUMS) 200 mg calcium (500 mg) chew Take 1 Tab by mouth daily. PRN      cyanocobalamin 1,000 mcg tablet Take 1,000 mcg by mouth daily. L. ACIDOPHILUS/BIFID. ANIMALIS (DAILY PROBIOTIC PO) Take 1 Tab by mouth once over twenty-four (24) hours. aspirin delayed-release 81 mg tablet Take 81 mg by mouth daily. STOP taking these medications       acetaminophen (TYLENOL ARTHRITIS PAIN) 650 mg TbER Comments:   Reason for Stopping:                 NOTIFY YOUR PHYSICIAN FOR ANY OF THE FOLLOWING:   Fever over 101 degrees for 24 hours.    Chest pain, shortness of breath, fever, chills, nausea, vomiting, diarrhea, change in mentation, falling, weakness, bleeding. Severe pain or pain not relieved by medications. Or, any other signs or symptoms that you may have questions about. DISPOSITION:  x  Home With: At home care  x OT x PT x HH  RN       Long term SNF/Inpatient Rehab    Independent/assisted living    Hospice    Other:       PATIENT CONDITION AT DISCHARGE:     Functional status    Poor    x Deconditioned     Independent      Cognition   x  Lucid     Forgetful     Dementia      Catheters/lines (plus indication)    Mckeon     PICC     PEG    x None      Code status    x Full code     DNR      PHYSICAL EXAMINATION AT DISCHARGE:  Visit Vitals  /77   Pulse 91   Temp 97.7 °F (36.5 °C)   Resp 16   Ht 5' 4\" (1.626 m)   Wt 67.1 kg (148 lb)   SpO2 100%   BMI 25.40 kg/m²       I had a face to face encounter with this patient and independently examined them on 5/24/2021 as outlined below:                                                   Constitutional:  No acute distress, cooperative, pleasant. Alert, O x3. Comfortable with pain control. Drain in place. Dressing clean and dry. Drain removed. Mckeon catheter removed   ENT:  Oral mucosa moist, EOMI,anicteric sclera,normal conjunctiva    Resp:  CTA B. No wheezing/rhonchi/rales. No use of accessory muscles. CV:  Regular rhythm, normal rate, S1,S2 wnl    GI:  Soft, mild distended, non tender. normoactive bowel sounds. Softer subsequently after BM with suppository. Musculoskeletal:  No LE edema    Neurologic:  alert and oriented X 3, improved left LE streght. Limited exam given N/V just earlier.          CHRONIC MEDICAL DIAGNOSES:  Problem List as of 5/24/2021 Date Reviewed: 5/17/2021        Codes Class Noted - Resolved    * (Principal) Bulging of lumbar intervertebral disc ICD-10-CM: M51.26  ICD-9-CM: 722.10  5/17/2021 - Present        Rheumatoid arthritis involving multiple sites with positive rheumatoid factor ([facility] ICD-10-CM: M05.79  ICD-9-CM: 714.0  1/29/2019 - Present        Type 2 diabetes with nephropathy ([facility] ICD-10-CM: E11.21  ICD-9-CM: 250.40, 583.81  4/3/2018 - Present        History of rheumatoid arthritis ICD-10-CM: Z87.39  ICD-9-CM: V13.4  11/21/2017 - Present        Osteopenia of both lower legs ICD-10-CM: M85.861, M85.862  ICD-9-CM: 733.90  11/21/2017 - Present        Diverticulosis ICD-10-CM: K57.90  ICD-9-CM: 562.10  4/20/2015 - Present        Infectious gastroenteritis and colitis ICD-10-CM: A09  ICD-9-CM: 009.0  4/20/2015 - Present        Benign essential hypertension ICD-10-CM: I10  ICD-9-CM: 401.1  4/20/2015 - Present        Diabetes mellitus type II, controlled ([facility] ICD-10-CM: E11.9  ICD-9-CM: 250.00  4/20/2015 - Present        Anemia ICD-10-CM: D64.9  ICD-9-CM: 285.9  4/20/2015 - Present        Anemia associated with acute blood loss ICD-10-CM: D62  ICD-9-CM: 285.1  4/20/2015 - Present        RESOLVED: GI bleed ICD-10-CM: K92.2  ICD-9-CM: 578.9  4/16/2015 - 4/20/2015            Radiology:  XR SPINE LUMB MIN 4 V    Result Date: 5/18/2021  Grade 1 anterolisthesis of L5 that increases with flexion     MRI LUMB SPINE WO CONT    Result Date: 5/18/2021  1. Left paracentral extrusion and probable free disc fragment at L5-S1 extending cranially with significant narrowing of the left lateral recess posterior to L5 and left subarticular zone      CT SPINE LUMB WO CONT    Result Date: 5/17/2021  Central canal stenosis at L5-S1 secondary to a left paracentral disc protrusion, with compression of the left S1 nerve root Moderate severe left neural foraminal stenosis L5-S1 with compression of the exiting left L5 nerve root     NC XR TECHNOLOGIST SERVICE    Result Date: 5/21/2021  Fluoroscopic guidance was provided for the referring clinician.       Laboratory data:  Recent Results (from the past 24 hour(s))   GLUCOSE, POC    Collection Time: 05/23/21  4:10 PM   Result Value Ref Range    Glucose (POC) 288 (H) 65 - 117 mg/dL    Performed by Kyree FAIR    GLUCOSE, POC    Collection Time: 05/23/21 8:55 PM   Result Value Ref Range    Glucose (POC) 272 (H) 65 - 117 mg/dL    Performed by Sandra Vazquez    METABOLIC PANEL, COMPREHENSIVE    Collection Time: 05/24/21  4:12 AM   Result Value Ref Range    Sodium 138 136 - 145 mmol/L    Potassium 4.3 3.5 - 5.1 mmol/L    Chloride 106 97 - 108 mmol/L    CO2 27 21 - 32 mmol/L    Anion gap 5 5 - 15 mmol/L    Glucose 207 (H) 65 - 100 mg/dL    BUN 28 (H) 6 - 20 MG/DL    Creatinine 0.88 0.55 - 1.02 MG/DL    BUN/Creatinine ratio 32 (H) 12 - 20      GFR est AA >60 >60 ml/min/1.73m2    GFR est non-AA >60 >60 ml/min/1.73m2    Calcium 8.2 (L) 8.5 - 10.1 MG/DL    Bilirubin, total 0.4 0.2 - 1.0 MG/DL    ALT (SGPT) 7 (L) 12 - 78 U/L    AST (SGOT) 19 15 - 37 U/L    Alk. phosphatase 38 (L) 45 - 117 U/L    Protein, total 5.9 (L) 6.4 - 8.2 g/dL    Albumin 2.5 (L) 3.5 - 5.0 g/dL    Globulin 3.4 2.0 - 4.0 g/dL    A-G Ratio 0.7 (L) 1.1 - 2.2     CBC W/O DIFF    Collection Time: 05/24/21  4:12 AM   Result Value Ref Range    WBC 6.7 3.6 - 11.0 K/uL    RBC 3.18 (L) 3.80 - 5.20 M/uL    HGB 9.5 (L) 11.5 - 16.0 g/dL    HCT 29.7 (L) 35.0 - 47.0 %    MCV 93.4 80.0 - 99.0 FL    MCH 29.9 26.0 - 34.0 PG    MCHC 32.0 30.0 - 36.5 g/dL    RDW 11.7 11.5 - 14.5 %    PLATELET 201 (L) 707 - 400 K/uL    MPV 11.4 8.9 - 12.9 FL    NRBC 0.0 0  WBC    ABSOLUTE NRBC 0.00 0.00 - 0.01 K/uL   SAMPLES BEING HELD    Collection Time: 05/24/21  4:12 AM   Result Value Ref Range    SAMPLES BEING HELD 1pst     COMMENT        Add-on orders for these samples will be processed based on acceptable specimen integrity and analyte stability, which may vary by analyte.    PHOSPHORUS    Collection Time: 05/24/21  4:12 AM   Result Value Ref Range    Phosphorus 1.7 (L) 2.6 - 4.7 MG/DL   GLUCOSE, POC    Collection Time: 05/24/21  6:26 AM   Result Value Ref Range    Glucose (POC) 227 (H) 65 - 117 mg/dL    Performed by Amber Teran, POC    Collection Time: 05/24/21 11:19 AM   Result Value Ref Range    Glucose (POC) 305 (H) 65 - 117 mg/dL    Performed by Ponce Jovel        Greater than 45 minutes were spent with the patient on counseling and coordination of care    Signed:   May Colvin MD  5/24/2021  3:53 PM

## 2021-05-24 NOTE — PROGRESS NOTES
Medicare pt has received, reviewed, and signed 2nd IM letter informing them of their right to appeal the discharge. Signed copy has been placed on pt bedside chart.     Brandon Etienne RN/CRM  [phone]

## 2021-05-25 DIAGNOSIS — E78.00 PURE HYPERCHOLESTEROLEMIA: ICD-10-CM

## 2021-05-25 DIAGNOSIS — I10 ESSENTIAL HYPERTENSION: ICD-10-CM

## 2021-05-25 RX ORDER — ATORVASTATIN CALCIUM 10 MG/1
TABLET, FILM COATED ORAL
Qty: 90 TABLET | Refills: 0 | Status: SHIPPED | OUTPATIENT
Start: 2021-05-25 | End: 2021-09-13

## 2021-05-25 RX ORDER — LISINOPRIL 5 MG/1
TABLET ORAL
Qty: 90 TABLET | Refills: 0 | Status: SHIPPED | OUTPATIENT
Start: 2021-05-25 | End: 2021-09-27

## 2021-05-28 DIAGNOSIS — E11.9 CONTROLLED TYPE 2 DIABETES MELLITUS WITHOUT COMPLICATION, WITHOUT LONG-TERM CURRENT USE OF INSULIN (HCC): ICD-10-CM

## 2021-05-31 ENCOUNTER — TELEPHONE (OUTPATIENT)
Dept: FAMILY MEDICINE CLINIC | Age: 71
End: 2021-05-31

## 2021-05-31 NOTE — TELEPHONE ENCOUNTER
Phone call with patient at 5:15 am.  Evidently, she had back surgery 10 days ago and is now having severe right leg pain. Her on call surgeon told her to call me. She has some leftover oxycodone. Advised to take one and call the surgeon back as I do not know of what significant this is post op.

## 2021-06-01 ENCOUNTER — OFFICE VISIT (OUTPATIENT)
Dept: FAMILY MEDICINE CLINIC | Age: 71
End: 2021-06-01
Payer: MEDICARE

## 2021-06-01 VITALS
BODY MASS INDEX: 25.78 KG/M2 | WEIGHT: 151 LBS | HEIGHT: 64 IN | OXYGEN SATURATION: 96 % | RESPIRATION RATE: 18 BRPM | DIASTOLIC BLOOD PRESSURE: 70 MMHG | HEART RATE: 78 BPM | TEMPERATURE: 98.7 F | SYSTOLIC BLOOD PRESSURE: 128 MMHG

## 2021-06-01 DIAGNOSIS — Z09 HOSPITAL DISCHARGE FOLLOW-UP: ICD-10-CM

## 2021-06-01 DIAGNOSIS — N17.9 AKI (ACUTE KIDNEY INJURY) (HCC): Primary | ICD-10-CM

## 2021-06-01 PROBLEM — M54.32 SCIATICA OF LEFT SIDE: Status: ACTIVE | Noted: 2021-06-01

## 2021-06-01 LAB
ALBUMIN SERPL-MCNC: 3.3 G/DL (ref 3.5–5)
ALBUMIN/GLOB SERPL: 1 {RATIO} (ref 1.1–2.2)
ALP SERPL-CCNC: 69 U/L (ref 45–117)
ALT SERPL-CCNC: 22 U/L (ref 12–78)
ANION GAP SERPL CALC-SCNC: 7 MMOL/L (ref 5–15)
AST SERPL-CCNC: 16 U/L (ref 15–37)
BILIRUB SERPL-MCNC: 0.3 MG/DL (ref 0.2–1)
BUN SERPL-MCNC: 25 MG/DL (ref 6–20)
BUN/CREAT SERPL: 27 (ref 12–20)
CALCIUM SERPL-MCNC: 9 MG/DL (ref 8.5–10.1)
CHLORIDE SERPL-SCNC: 105 MMOL/L (ref 97–108)
CO2 SERPL-SCNC: 28 MMOL/L (ref 21–32)
CREAT SERPL-MCNC: 0.91 MG/DL (ref 0.55–1.02)
GLOBULIN SER CALC-MCNC: 3.3 G/DL (ref 2–4)
GLUCOSE SERPL-MCNC: 151 MG/DL (ref 65–100)
POTASSIUM SERPL-SCNC: 4.4 MMOL/L (ref 3.5–5.1)
PROT SERPL-MCNC: 6.6 G/DL (ref 6.4–8.2)
SODIUM SERPL-SCNC: 140 MMOL/L (ref 136–145)

## 2021-06-01 PROCEDURE — G8427 DOCREV CUR MEDS BY ELIG CLIN: HCPCS | Performed by: INTERNAL MEDICINE

## 2021-06-01 PROCEDURE — G8752 SYS BP LESS 140: HCPCS | Performed by: INTERNAL MEDICINE

## 2021-06-01 PROCEDURE — G8754 DIAS BP LESS 90: HCPCS | Performed by: INTERNAL MEDICINE

## 2021-06-01 PROCEDURE — 1101F PT FALLS ASSESS-DOCD LE1/YR: CPT | Performed by: INTERNAL MEDICINE

## 2021-06-01 PROCEDURE — G9899 SCRN MAM PERF RSLTS DOC: HCPCS | Performed by: INTERNAL MEDICINE

## 2021-06-01 PROCEDURE — 1111F DSCHRG MED/CURRENT MED MERGE: CPT | Performed by: INTERNAL MEDICINE

## 2021-06-01 PROCEDURE — G8536 NO DOC ELDER MAL SCRN: HCPCS | Performed by: INTERNAL MEDICINE

## 2021-06-01 PROCEDURE — 99214 OFFICE O/P EST MOD 30 MIN: CPT | Performed by: INTERNAL MEDICINE

## 2021-06-01 PROCEDURE — G0463 HOSPITAL OUTPT CLINIC VISIT: HCPCS | Performed by: INTERNAL MEDICINE

## 2021-06-01 PROCEDURE — 1090F PRES/ABSN URINE INCON ASSESS: CPT | Performed by: INTERNAL MEDICINE

## 2021-06-01 PROCEDURE — G8510 SCR DEP NEG, NO PLAN REQD: HCPCS | Performed by: INTERNAL MEDICINE

## 2021-06-01 PROCEDURE — 3017F COLORECTAL CA SCREEN DOC REV: CPT | Performed by: INTERNAL MEDICINE

## 2021-06-01 PROCEDURE — G8419 CALC BMI OUT NRM PARAM NOF/U: HCPCS | Performed by: INTERNAL MEDICINE

## 2021-06-01 PROCEDURE — G8399 PT W/DXA RESULTS DOCUMENT: HCPCS | Performed by: INTERNAL MEDICINE

## 2021-06-01 RX ORDER — HYDROCODONE BITARTRATE AND ACETAMINOPHEN 5; 325 MG/1; MG/1
1 TABLET ORAL
COMMUNITY
Start: 2021-05-14 | End: 2022-04-27

## 2021-06-01 NOTE — PROGRESS NOTES
Identified pt with two pt identifiers(name and ). Chief Complaint   Patient presents with   [address]     [facility] ERS/17    Hip Pain     left hip pain        Health Maintenance Due   Topic    COVID-19 Vaccine (1)    DTaP/Tdap/Td series (1 - Tdap)    Shingrix Vaccine Age 50> (1 of 2)    Foot Exam Q1     MICROALBUMIN Q1     Medicare Yearly Exam        Wt Readings from Last 3 Encounters:   21 151 lb (68.5 kg)   21 148 lb (67.1 kg)   21 147 lb (66.7 kg)     Temp Readings from Last 3 Encounters:   21 98.7 °F (37.1 °C) (Oral)   21 97.7 °F (36.5 °C)   10/16/20 97.2 °F (36.2 °C) (Temporal)     BP Readings from Last 3 Encounters:   21 128/70   21 129/77   10/16/20 (!) 154/79     Pulse Readings from Last 3 Encounters:   21 78   21 91   10/16/20 81         Learning Assessment:  :     Learning Assessment 10/16/2020 2020 12/3/2019 2019 4/3/2019 2018 2018   PRIMARY LEARNER Patient Patient Patient Patient Patient Patient Patient   HIGHEST LEVEL OF EDUCATION - PRIMARY LEARNER  - - - - - - 2 YEARS [facility] LEARNER - - - - - - [address] CAREGIVER No No No No No No No   PRIMARY LANGUAGE ENGLISH ENGLISH ENGLISH ENGLISH ENGLISH ENGLISH ENGLISH   LEARNER PREFERENCE PRIMARY DEMONSTRATION DEMONSTRATION DEMONSTRATION DEMONSTRATION DEMONSTRATION DEMONSTRATION OTHER (COMMENT)   ANSWERED BY patient  patient patient patient  patient patient self   RELATIONSHIP SELF SELF SELF SELF SELF SELF SELF       Depression Screening:  :     3 most recent PHQ Screens 2021   Little interest or pleasure in doing things Not at all   Feeling down, depressed, irritable, or hopeless Not at all   Total Score PHQ 2 0       Fall Risk Assessment:  :     Fall Risk Assessment, last 12 mths 10/16/2020   Able to walk? Yes   Fall in past 12 months? No   Number of falls in past 12 months -   Fall with injury?  -       Abuse Screening:  :     Abuse Screening Questionnaire 6/1/2021 10/1/2020 5/7/2019 2/21/2018   Do you ever feel afraid of your partner? N N N N   Are you in a relationship with someone who physically or mentally threatens you? N N N N   Is it safe for you to go home? Maynor Marsh       Coordination of Care Questionnaire:  :     1) Have you been to an emergency room, urgent care clinic since your last visit? Yes 05/16/2021 Amanda Dougie for hip pain  Hospitalized since your last visit? yes         05/17/2021 at [facility]    2) Have you seen or consulted any other health care providers outside of [address] [place] [address] since your last visit? no  (Include any pap smears or colon screenings in this section.)    3) Do you have an Advance Directive on file? no  Are you interested in receiving information about Advance Directives? no    Patient is accompanied by daughter I have received verbal consent from Sebas Snider to discuss any/all medical information while they are present in the room.     Reviewed record in preparation for visit and have obtained necessary documentatio

## 2021-06-01 NOTE — PROGRESS NOTES
Chief Complaint   Patient presents with   [address] ERS/17    Hip Pain     left hip pain       Assessment/ Plan:   Diagnoses and all orders for this visit:    1. HANNA (acute kidney injury) ([address]  -     METABOLIC PANEL, COMPREHENSIVE; Future    2. Hospital discharge follow-up  -     CT DISCHARGE MEDS RECONCILED W/ CURRENT OUTPATIENT MED LIST  - Patient will have follow up with Dr. Sathish Prado later this week. -  Reviewed medications in detail. I have discussed the diagnosis with the patient and the intended treatment plan as seen in the above orders. The patient has received an after-visit summary and questions were answered concerning future plans. Asked to return should symptoms worsen or not improve with treatment. Any pending labs and studies will be relayed to patient when they become available. Pt verbalizes understanding of plan of care and denies further questions or concerns at this time. Follow-up and Dispositions    · Return if symptoms worsen or fail to improve. Subjective:   70F who presents for post hospital visit after she was initially seen in the ER for onset of lower back pain with pain radiating to the L-leg. She initially went to [address] [place] [address] where she was treated with steroids, but her pain worsened and she returned to [facility] where she was found to have L-paracentral extrusion and probable free disc fragment at L5-S1 extending cranially. She was taken to the OR for L5-S1 Laminectomy and discectomy. She is here today with her daughter for MARISELA MAHMOOD follow up and wearing back brace. The plan is to see Dr. Kerrie Bojorquez later this week for post op evaluation. Her only concern is that her feet have felt cool - especially the left leg. There is mild swelling. Inspection and evaluation of the foot shows that NVI and pedal pulses felt. No pitting edema. I have also reviewed her hospital course as outlined below and pending labs/studies reviewed.      Hospital Course: Herniated nucleus pulposus L5-S1, L5-S1 Laminectomy and Discectomy with instrumentated fusion, [employer] PLIF on 5/21 by Dr. Daryl Ruiz  -MRI L spine-Left paracentral extrusion and probable free disc fragment at L5-S1 extending  cranially with significant narrowing of the left lateral recess posterior to L5  and left subarticular zone  -NSGY on board  -Perioperative monitoring and management per NUS team  -pain management per NUS.  Decadrone taper per NUS ongoing.  Transition from IV to p.o. done per neurosurgery  -PCA discontinued 5/22.  Off IV pain medications.  Well-controlled with p.o. pain medications.  -Incentive spirometry  -Early ambulation and OOB as per neurosurgery team postoperatively  -PT/OT per NUS, initiated 5/22 post-operatively  -N/V control with meds  -Suppository helped with BM.  -Neurosurgery follow-up with Dr. Daryl Ruiz in 2 weeks  -D/W with neurosurgery team about discharge medications.  As following:  Decadron 2 mg daily x2 more days  Oxycodone 5 mg every 6 hours as needed x3 days  Gabapentin 300 mg 3 times daily x2 weeks.  Further per Dr. Daryl Ruiz on follow-up  Methocarbamol 500 mg twice daily x2 weeks.  Further per Dr. Daryl Ruiz on follow-up  Tylenol 650 mg every 6 hours as needed     # Mild HANNA, likely perioperative hydration deficit.  Improved, creatinine 1.87 on 5/22-0.91 on 5/23.  Back to baseline.  -Off IV fluids  -BMP monitoring      # HTN: lisinopril  -Hold lisinopril perioperative  -Resume lisinopril on discharge  -As needed hydralazine IV for blood pressure control    #Diabetes: Hyperglycemia due to steroids  -A1c: 5.3, on oral meds at home  -Sliding scale insulin along with NPH 5 units twice daily  -Resume home Jardiance and glipizide.  Defer to PCP for further monitoring and adjustment     # Rheumatoid arthritis:  -held plaquenil, leflunomide for surgery  -Resume Plaquenil on discharge.  -Hold leflunomideArava until follow-up outpatient with Dr. Daryl Ruiz in 2 weeks     Code status: full  DVT prophylaxis: scd       Patient Active Problem List    Diagnosis Date Noted    Bulging of lumbar intervertebral disc 05/17/2021    Rheumatoid arthritis involving multiple sites with positive rheumatoid factor ([facility] 01/29/2019    Type 2 diabetes with nephropathy ([facility] 04/03/2018    History of rheumatoid arthritis 11/21/2017    Osteopenia of both lower legs 11/21/2017    Diverticulosis 04/20/2015    Infectious gastroenteritis and colitis 04/20/2015    Benign essential hypertension 04/20/2015    Diabetes mellitus type II, controlled ([facility] 04/20/2015    Anemia 04/20/2015    Anemia associated with acute blood loss 04/20/2015         Current Outpatient Medications   Medication Sig Dispense Refill    glucose blood VI test strips (OneTouch Ultra Blue Test Strip) strip Check daily FBS. E11.9 50 Strip 0    lisinopriL (PRINIVIL, ZESTRIL) 5 mg tablet TAKE ONE TABLET BY MOUTH DAILY 90 Tablet 0    atorvastatin (LIPITOR) 10 mg tablet TAKE ONE TABLET BY MOUTH DAILY 90 Tablet 0    [START ON 6/8/2021] leflunomide (ARAVA) 20 mg tablet Take 1 Tablet by mouth daily. Please hold leflunomide until follow-up with Dr. Mychal Dash in 2 weeks. Please start after okay from Dr. Mychal Dash. 30 Tablet 0    acetaminophen (TYLENOL) 325 mg tablet Take 2 Tablets by mouth every six (6) hours as needed for Pain or Fever. 60 Tablet 0    senna-docusate (PERICOLACE) 8.6-50 mg per tablet Take 2 Tablets by mouth daily for 30 days. Hold for diarrhea or loose stools. Please be sure to take this medication well using oxycodone/strong pain medication. 60 Tablet 0    polyethylene glycol (MIRALAX) 17 gram packet Take 1 Packet by mouth daily for 15 days. Hold for diarrhea or loose stools. Please be sure to take this medication well using oxycodone/strong pain medications. Mix in 8 oz of water, juice, soda, coffee, or tea prior to administration 15 Packet 0    methocarbamoL (ROBAXIN) 500 mg tablet Take 1 Tablet by mouth two (2) times a day for 15 days. 30 Tablet 0    gabapentin (NEURONTIN) 300 mg capsule Take 1 Capsule by mouth three (3) times daily for 15 days. Max Daily Amount: 900 mg. 45 Capsule 0    bisacodyL (DULCOLAX) 10 mg supp Insert 10 mg into rectum daily as needed for Constipation. Please use if no bowel movement for 2 days despite of oral MiraLAX, Starr-Colace like stool softener/stimulant bowel regime. 10 Suppository 0    glipiZIDE SR (GLUCOTROL XL) 5 mg CR tablet TAKE ONE TABLET BY MOUTH DAILY 90 Tab 0    empagliflozin (Jardiance) 25 mg tablet TAKE ONE TABLET BY MOUTH DAILY 90 Tab 1    nystatin (MYCOSTATIN) powder Apply  to affected area four (4) times daily. 1 Bottle 5    hydrOXYchloroQUINE (PLAQUENIL) 200 mg tablet TAKE ONE AND ONE-HALF (1 & 1/2) TABLET BY MOUTH DAILY 45 Tab 3    terbinafine HCL (LAMISIL) 250 mg tablet       ascorbic acid, vitamin C, (Vitamin C) 500 mg tablet Take 500 mg by mouth daily.  PNV GD.29/VBTAOGO fum/folic ac (PRENATAL PO) Take  by mouth.  diclofenac (VOLTAREN) 1 % gel Apply 4 g to affected area four (4) times daily. PLEASE GIVE GENERIC IF AVAILABLE. 100 g 3    fluticasone (FLONASE) 50 mcg/actuation nasal spray 2 sprays per nostril once a day  Indications: Allergic Rhinitis (Patient taking differently: as needed. 2 sprays per nostril once a day  Indications: inflammation of the nose due to an allergy) 1 Bottle 3    cholecalciferol (VITAMIN D3) 1,000 unit cap Take 1,000 Units by mouth daily.  calcium carbonate (TUMS) 200 mg calcium (500 mg) chew Take 1 Tab by mouth daily. PRN      cyanocobalamin 1,000 mcg tablet Take 1,000 mcg by mouth daily.  L. ACIDOPHILUS/BIFID. ANIMALIS (DAILY PROBIOTIC PO) Take 1 Tab by mouth once over twenty-four (24) hours.  aspirin delayed-release 81 mg tablet Take 81 mg by mouth daily.            No Known Allergies      Past Medical History:   Diagnosis Date    Acid reflux     Colitis 04/16/2015    Colitis     Diabetes mellitus, type II ([facility]     Diverticulosis of colon 04/20/2015    Hyperlipemia     Hypertension     Osteopenia     of the spine Dexa done 2014, 2017 and due 2019    RA (rheumatoid arthritis) ([facility]          Past Surgical History:   Procedure Laterality Date    HX COLONOSCOPY  04/20/2015    Due 2023    HX GYN      HX HYSTERECTOMY      HX TOTAL ABDOMINAL HYSTERECTOMY      [place] ABDOMEN SURGERY PROC UNLISTED      [place] BIOPSY OF BREAST, NEEDLE CORE  2017    benign findings - Fibrosis         Family History   Problem Relation Age of Onset    Other Mother         diverticulosis    Heart Attack Mother     Heart Failure Father     Pulmonary Embolism Sister     Diabetes Brother     Cancer Brother         brain cancer         Social History     Tobacco Use    Smoking status: Never Smoker    Smokeless tobacco: Never Used   Substance Use Topics    Alcohol use: No        Review of Systems  Pertinent items are noted in HPI. Objective:   /70 (BP 1 Location: Right arm, BP Patient Position: Sitting, BP Cuff Size: Adult)   Pulse 78   Temp 98.7 °F (37.1 °C) (Oral)   Resp 18   Ht 5' 4\" (1.626 m)   Wt 151 lb (68.5 kg)   SpO2 96%   BMI 25.92 kg/m²     General appearance: alert, well appearing, and in no distress and normal appearing weight. Has back brace on. Chest: clear to auscultation, no wheezes, rales or rhonchi, symmetric air entry. CVS exam: normal rate, regular rhythm, normal S1, S2, no murmurs, rubs, clicks or gallops. Oropharyngeal exam - mucous membranes moist, pharynx normal without lesions. Ears - bilateral TM's and external ear canals normal.  Exam of extremities: peripheral pulses normal, no pedal edema, no clubbing or cyanosis  Skin exam - normal coloration and turgor, no rashes, no suspicious skin lesions noted.     Norm Anton MD

## 2021-06-02 ENCOUNTER — TELEPHONE (OUTPATIENT)
Dept: FAMILY MEDICINE CLINIC | Age: 71
End: 2021-06-02

## 2021-06-02 NOTE — PROGRESS NOTES
Please let the patient know that her follow up blood work was improved. Continue to take medications as prescribed. Drink plenty of fluids. No worrisome findings at this time.    Thanks,   Dr. Grecia Osborne

## 2021-06-02 NOTE — TELEPHONE ENCOUNTER
Called pt and left vm with results. ----- Message from Iban Kerr MD sent at 6/2/2021  5:35 AM EDT -----  Please let the patient know that her follow up blood work was improved. Continue to take medications as prescribed. Drink plenty of fluids. No worrisome findings at this time.    Thanks,   Dr. Christi Miramontes

## 2021-06-23 ENCOUNTER — TELEPHONE (OUTPATIENT)
Dept: RHEUMATOLOGY | Age: 71
End: 2021-06-23

## 2021-06-23 NOTE — TELEPHONE ENCOUNTER
----- Message from Amanda Capps MD sent at 6/23/2021  8:38 AM EDT -----  Please let patient know that she needs to make a follow up appointment.  Checking to see if she is still taking medication - plaquenil

## 2021-06-25 DIAGNOSIS — E11.9 CONTROLLED TYPE 2 DIABETES MELLITUS WITHOUT COMPLICATION, WITHOUT LONG-TERM CURRENT USE OF INSULIN (HCC): ICD-10-CM

## 2021-06-25 RX ORDER — GLIPIZIDE 5 MG/1
TABLET, FILM COATED, EXTENDED RELEASE ORAL
Qty: 90 TABLET | Refills: 0 | Status: SHIPPED | OUTPATIENT
Start: 2021-06-25 | End: 2021-07-15 | Stop reason: SDUPTHER

## 2021-07-15 DIAGNOSIS — M25.50 MULTIPLE JOINT PAIN: ICD-10-CM

## 2021-07-15 DIAGNOSIS — M05.79 RHEUMATOID ARTHRITIS INVOLVING MULTIPLE SITES WITH POSITIVE RHEUMATOID FACTOR (HCC): ICD-10-CM

## 2021-07-15 DIAGNOSIS — E11.9 CONTROLLED TYPE 2 DIABETES MELLITUS WITHOUT COMPLICATION, WITHOUT LONG-TERM CURRENT USE OF INSULIN (HCC): ICD-10-CM

## 2021-07-15 RX ORDER — LEFLUNOMIDE 20 MG/1
20 TABLET ORAL DAILY
Qty: 30 TABLET | Refills: 0 | Status: SHIPPED | OUTPATIENT
Start: 2021-07-15 | End: 2021-07-27 | Stop reason: SDUPTHER

## 2021-07-15 RX ORDER — GLIPIZIDE 5 MG/1
TABLET, FILM COATED, EXTENDED RELEASE ORAL
Qty: 90 TABLET | Refills: 0 | Status: SHIPPED | OUTPATIENT
Start: 2021-07-15 | End: 2022-04-15 | Stop reason: SDUPTHER

## 2021-07-15 RX ORDER — DICLOFENAC SODIUM 10 MG/G
4 GEL TOPICAL 4 TIMES DAILY
Qty: 100 G | Refills: 3 | Status: SHIPPED | OUTPATIENT
Start: 2021-07-15 | End: 2022-04-27

## 2021-07-15 RX ORDER — LEFLUNOMIDE 20 MG/1
20 TABLET ORAL DAILY
Qty: 30 TABLET | Refills: 0 | OUTPATIENT
Start: 2021-07-15

## 2021-07-15 NOTE — TELEPHONE ENCOUNTER
Meds show that Glipizide was sent in on June 26th,Patient said the pharm called her and cancelled the RX because patient did not pick it up soon enough. Said she was never contacted and informed that it was ready to . Can this be resent please

## 2021-07-27 ENCOUNTER — OFFICE VISIT (OUTPATIENT)
Dept: RHEUMATOLOGY | Age: 71
End: 2021-07-27
Payer: MEDICARE

## 2021-07-27 VITALS
WEIGHT: 148 LBS | RESPIRATION RATE: 16 BRPM | OXYGEN SATURATION: 97 % | BODY MASS INDEX: 25.4 KG/M2 | HEART RATE: 91 BPM | SYSTOLIC BLOOD PRESSURE: 156 MMHG | TEMPERATURE: 98.5 F | DIASTOLIC BLOOD PRESSURE: 79 MMHG

## 2021-07-27 DIAGNOSIS — M05.742 RHEUMATOID ARTHRITIS INVOLVING BOTH HANDS WITH POSITIVE RHEUMATOID FACTOR (HCC): Primary | ICD-10-CM

## 2021-07-27 DIAGNOSIS — M05.741 RHEUMATOID ARTHRITIS INVOLVING BOTH HANDS WITH POSITIVE RHEUMATOID FACTOR (HCC): Primary | ICD-10-CM

## 2021-07-27 PROCEDURE — G0463 HOSPITAL OUTPT CLINIC VISIT: HCPCS | Performed by: PEDIATRICS

## 2021-07-27 PROCEDURE — 1101F PT FALLS ASSESS-DOCD LE1/YR: CPT | Performed by: PEDIATRICS

## 2021-07-27 PROCEDURE — G8536 NO DOC ELDER MAL SCRN: HCPCS | Performed by: PEDIATRICS

## 2021-07-27 PROCEDURE — G8754 DIAS BP LESS 90: HCPCS | Performed by: PEDIATRICS

## 2021-07-27 PROCEDURE — G8399 PT W/DXA RESULTS DOCUMENT: HCPCS | Performed by: PEDIATRICS

## 2021-07-27 PROCEDURE — G8419 CALC BMI OUT NRM PARAM NOF/U: HCPCS | Performed by: PEDIATRICS

## 2021-07-27 PROCEDURE — G8510 SCR DEP NEG, NO PLAN REQD: HCPCS | Performed by: PEDIATRICS

## 2021-07-27 PROCEDURE — 1090F PRES/ABSN URINE INCON ASSESS: CPT | Performed by: PEDIATRICS

## 2021-07-27 PROCEDURE — 99214 OFFICE O/P EST MOD 30 MIN: CPT | Performed by: PEDIATRICS

## 2021-07-27 PROCEDURE — G8427 DOCREV CUR MEDS BY ELIG CLIN: HCPCS | Performed by: PEDIATRICS

## 2021-07-27 PROCEDURE — 3017F COLORECTAL CA SCREEN DOC REV: CPT | Performed by: PEDIATRICS

## 2021-07-27 PROCEDURE — G8753 SYS BP > OR = 140: HCPCS | Performed by: PEDIATRICS

## 2021-07-27 PROCEDURE — G9899 SCRN MAM PERF RSLTS DOC: HCPCS | Performed by: PEDIATRICS

## 2021-07-27 RX ORDER — LEFLUNOMIDE 20 MG/1
20 TABLET ORAL DAILY
Qty: 30 TABLET | Refills: 4 | Status: SHIPPED | OUTPATIENT
Start: 2021-07-27 | End: 2021-07-28 | Stop reason: SDUPTHER

## 2021-07-27 RX ORDER — METHOCARBAMOL 500 MG/1
500 TABLET, FILM COATED ORAL 4 TIMES DAILY
COMMUNITY
End: 2022-04-27

## 2021-07-27 RX ORDER — GABAPENTIN 300 MG/1
300 CAPSULE ORAL 2 TIMES DAILY
COMMUNITY
Start: 2021-07-20 | End: 2022-04-27

## 2021-07-27 NOTE — PROGRESS NOTES
Chief Complaint   Patient presents with    Joint Pain     1. Have you been to the ER, urgent care clinic since your last visit? Hospitalized since your last visit? No    2. Have you seen or consulted any other health care providers outside of the [address] [place] [address] since your last visit? Include any pap smears or colon screening.  No

## 2021-07-27 NOTE — PROGRESS NOTES
RHEUMATOLOGY PROBLEM LIST AND CHIEF COMPLAINT  1. Rheumatoid Arthritis - diagnosed 30+ years ago, treated to remission with Methotrexate (few years), positive RF, CCP, flare in 2019    Therapy History:   Prior DMARDs: Methotrexate (past response, put into remission)  Current NSAIDs: Tylenol Arthritis  Current DMARDs: Leflunomide (started 4/2019-current), Plaquenil (1/2020-current)    HISTORY OF PRESENT ILLNESS  This is a 72 y.o.  female. Today, the patient complains of no joint pain. Location: ankle  Timing: none at this time   Severity: 5 on a scale of 0-10  Duration: 3 month  Context/Associated signs and symptoms: The patient reports numbness and tingling in her left leg with swelling in her ankle. Her chief complaint is mild difficulty writing. She denies taking Leflunomide 20 mg daily due to being out of medication. Endorses taking Plaquenil 300 mg daily.      RHEUMATOLOGY REVIEW OF SYSTEMS   Positives as per history  Negatives as follows:  Ana Harringtone:  Denies unexplained persistent fevers, weight change, chronic fatigue  HEAD/EYES:   Denies eye redness, blurry vision or sudden loss of vision, dry eyes, HA, temporal artery pain  ENT:    Denies oral/nasal ulcers, recurrent sinus infections, dry mouth  RESPIRATORY:  No pleuritic pain, history of pleural effusions, hemoptysis, exertional dyspnea  CARDIOVASCULAR:  Denies chest pain, history of pericardial effusions  GASTRO:   Denies heartburn, esophageal dysmotility, abdominal pain, nausea, vomiting, diarrhea, blood in the stool  HEMATOLOGIC:  No easy bruising, purpura, swollen lymph nodes  SKIN:    Denies alopecia, ulcers, nodules, sun sensitivity, unexplained persistent rash   VASCULAR:   Denies edema, cyanosis, raynaud phenomenon  NEUROLOGIC:  Denies specific muscle weakness, paresthesias   PSYCHIATRIC:  No sleep disturbance / snoring, depression, anxiety  MSK:    No morning stiffness >1 hour, SI joint pain, persistent joint swelling, persistent joint pain    PAST MEDICAL HISTORY  Reviewed with patient, significant changes in medical history - no    PHYSICAL EXAM  Blood pressure (!) 156/79, pulse 91, temperature 98.5 °F (36.9 °C), temperature source Oral, resp. rate 16, weight 148 lb (67.1 kg), SpO2 97 %. GENERAL APPEARANCE: Well-nourished, no acute distress  EYES: No scleral erythema, conjunctival injection  ENT: No oral ulcer, parotid enlargement  NECK: No adenopathy, thyroid enlargement  CARDIOVASCULAR: Heart rhythm is regular. No murmur, rub, gallop  CHEST: Normal vesicular breath sounds. No wheezes, rales, pleural friction rubs  ABDOMINAL: The abdomen is soft and nontender. Bowel sounds are normal  EXTREMITIES: There is no evidence of clubbing, cyanosis, edema  SKIN: No rash, palpable purpura, digital ulcer, abnormal thickening, normal nailfold capillaries   NEUROLOGICAL: Normal gait and station, full strength in upper and lower extremities,  normal sensation to light touch  MUSCULOSKELETAL:   Upper extremities - Right wrist dROM with mild fullness   Lower extremities - B/L bony prominence in knee. B/L Crepitus in knee. OA changes in her feet. LABS, RADIOLOGY AND PROCEDURES  Previous labs reviewed -Yes  Previous radiology reviewed -Yes  Previous procedures reviewed -Yes  Previous medical records reviewed/summarized -Yes    ASSESSMENT  1. Rheumatoid Arthritis - (significantly improved) The patient continues to do well except for her right wrist discomfort and dROM. Patient was advised to remain on Leflunomide 20 mg daily. Patient will discontinue Plaquenil 300 mg at this time. If the patient has another flare up we will add back Plaquenil. Labs are not needed today. Follow up in 5-6 months. 2. OA - The patient did not complain of this today. Tylenol and ROM exercises were recommended. NSAIDs are contraindicated due to kidney disease - unchanged.   3. Drug therapy monitoring for toxicity (leflunomide, plaquenil ) - CBC, BUN, Cr, AST, ALT and albumin every 3 months; eye exams every 6-12 months for retinal toxicity. 4. Trochanteric bursitis -  The patient did not complain of this today. She should continue rehabilitation exercises including piriformas stretch, iliotibial band stretch, straight leg stretch, wall squat with ball and gluteal strengthening as needed. PLAN  1. Leflunomide 20 mg daily  2. Discontinue Plaquenil 300 mg daily  3. Tylenol PRN  4. Return in 5-6 months  5. I Advised the patient to visit her primary care if blood pressure continues to be high    Vy Alfaro MD   Adult and Pediatric Rheumatology     [facility], [address] [facility], Phone [phone], Fax [phone]    Visiting  of Pediatrics    Department of Pediatrics, [facility] of [address] [place] [address], [address] [place] [address], Phone [phone], Fax [phone]    There are no Patient Instructions on file for this visit.     cc:  Abhijit Leyva MD    Written by anne Burnett, as dictated by Dr. Jw Pham M.D.

## 2021-07-28 RX ORDER — LEFLUNOMIDE 20 MG/1
20 TABLET ORAL DAILY
Qty: 30 TABLET | Refills: 4 | Status: SHIPPED | OUTPATIENT
Start: 2021-07-28 | End: 2021-12-01 | Stop reason: SDUPTHER

## 2021-08-03 DIAGNOSIS — E11.9 CONTROLLED TYPE 2 DIABETES MELLITUS WITHOUT COMPLICATION, WITHOUT LONG-TERM CURRENT USE OF INSULIN (HCC): ICD-10-CM

## 2021-08-03 RX ORDER — BLOOD SUGAR DIAGNOSTIC
STRIP MISCELLANEOUS
Qty: 50 STRIP | Refills: 0 | Status: SHIPPED | OUTPATIENT
Start: 2021-08-03 | End: 2021-12-09

## 2021-08-12 DIAGNOSIS — E11.9 CONTROLLED TYPE 2 DIABETES MELLITUS WITHOUT COMPLICATION, WITHOUT LONG-TERM CURRENT USE OF INSULIN (HCC): ICD-10-CM

## 2021-09-01 ENCOUNTER — OFFICE VISIT (OUTPATIENT)
Dept: FAMILY MEDICINE CLINIC | Age: 71
End: 2021-09-01
Payer: MEDICARE

## 2021-09-01 ENCOUNTER — HOSPITAL ENCOUNTER (OUTPATIENT)
Dept: GENERAL RADIOLOGY | Age: 71
Discharge: HOME | End: 2021-09-01
Payer: MEDICARE

## 2021-09-01 VITALS
RESPIRATION RATE: 18 BRPM | TEMPERATURE: 98 F | WEIGHT: 149 LBS | OXYGEN SATURATION: 96 % | BODY MASS INDEX: 25.44 KG/M2 | DIASTOLIC BLOOD PRESSURE: 82 MMHG | SYSTOLIC BLOOD PRESSURE: 126 MMHG | HEART RATE: 80 BPM | HEIGHT: 64 IN

## 2021-09-01 DIAGNOSIS — M25.562 ACUTE PAIN OF LEFT KNEE: ICD-10-CM

## 2021-09-01 DIAGNOSIS — M25.562 ACUTE PAIN OF LEFT KNEE: Primary | ICD-10-CM

## 2021-09-01 PROCEDURE — 73562 X-RAY EXAM OF KNEE 3: CPT

## 2021-09-01 PROCEDURE — G8536 NO DOC ELDER MAL SCRN: HCPCS | Performed by: INTERNAL MEDICINE

## 2021-09-01 PROCEDURE — G8754 DIAS BP LESS 90: HCPCS | Performed by: INTERNAL MEDICINE

## 2021-09-01 PROCEDURE — G8752 SYS BP LESS 140: HCPCS | Performed by: INTERNAL MEDICINE

## 2021-09-01 PROCEDURE — G0463 HOSPITAL OUTPT CLINIC VISIT: HCPCS | Performed by: INTERNAL MEDICINE

## 2021-09-01 PROCEDURE — G8419 CALC BMI OUT NRM PARAM NOF/U: HCPCS | Performed by: INTERNAL MEDICINE

## 2021-09-01 PROCEDURE — 1101F PT FALLS ASSESS-DOCD LE1/YR: CPT | Performed by: INTERNAL MEDICINE

## 2021-09-01 PROCEDURE — 3017F COLORECTAL CA SCREEN DOC REV: CPT | Performed by: INTERNAL MEDICINE

## 2021-09-01 PROCEDURE — G8510 SCR DEP NEG, NO PLAN REQD: HCPCS | Performed by: INTERNAL MEDICINE

## 2021-09-01 PROCEDURE — G8427 DOCREV CUR MEDS BY ELIG CLIN: HCPCS | Performed by: INTERNAL MEDICINE

## 2021-09-01 PROCEDURE — G9899 SCRN MAM PERF RSLTS DOC: HCPCS | Performed by: INTERNAL MEDICINE

## 2021-09-01 PROCEDURE — G8399 PT W/DXA RESULTS DOCUMENT: HCPCS | Performed by: INTERNAL MEDICINE

## 2021-09-01 PROCEDURE — 1090F PRES/ABSN URINE INCON ASSESS: CPT | Performed by: INTERNAL MEDICINE

## 2021-09-01 PROCEDURE — 99213 OFFICE O/P EST LOW 20 MIN: CPT | Performed by: INTERNAL MEDICINE

## 2021-09-01 RX ORDER — METHYLPREDNISOLONE 4 MG/1
TABLET ORAL
Qty: 1 DOSE PACK | Refills: 0 | Status: SHIPPED | OUTPATIENT
Start: 2021-09-01 | End: 2022-04-27

## 2021-09-01 NOTE — PROGRESS NOTES
Identified pt with two pt identifiers(name and ). Chief Complaint   Patient presents with    Knee Pain     LT when walking x 1 wk        Health Maintenance Due   Topic    COVID-19 Vaccine (1)    Shingrix Vaccine Age 50> (1 of 2)    DTaP/Tdap/Td series (1 - Tdap)    Foot Exam Q1     MICROALBUMIN Q1     Medicare Yearly Exam     Bone Densitometry     Flu Vaccine (1)       Wt Readings from Last 3 Encounters:   21 149 lb (67.6 kg)   21 148 lb (67.1 kg)   21 151 lb (68.5 kg)     Temp Readings from Last 3 Encounters:   21 98 °F (36.7 °C) (Oral)   21 98.5 °F (36.9 °C) (Oral)   21 98.7 °F (37.1 °C) (Oral)     BP Readings from Last 3 Encounters:   21 126/82   21 (!) 156/79   21 128/70     Pulse Readings from Last 3 Encounters:   21 80   21 91   21 78         Learning Assessment:  :     Learning Assessment 2021 10/16/2020 2020 12/3/2019 2019 4/3/2019 2018   PRIMARY LEARNER Patient Patient Patient Patient Patient Patient Patient   HIGHEST LEVEL OF EDUCATION - PRIMARY LEARNER  - - - - - - -   BARRIERS PRIMARY LEARNER - - - - - - -   CO-LEARNER CAREGIVER No No No No No No No   PRIMARY LANGUAGE ENGLISH ENGLISH ENGLISH ENGLISH ENGLISH ENGLISH ENGLISH   LEARNER PREFERENCE PRIMARY DEMONSTRATION DEMONSTRATION DEMONSTRATION DEMONSTRATION DEMONSTRATION DEMONSTRATION DEMONSTRATION   ANSWERED BY patient  patient  patient patient patient  patient patient   RELATIONSHIP SELF SELF SELF SELF SELF SELF SELF       Depression Screening:  :     3 most recent PHQ Screens 2021   Little interest or pleasure in doing things Not at all   Feeling down, depressed, irritable, or hopeless Not at all   Total Score PHQ 2 0       Fall Risk Assessment:  :     Fall Risk Assessment, last 12 mths 2021   Able to walk? Yes   Fall in past 12 months? 0   Do you feel unsteady?  1   Are you worried about falling 1   Is the gait abnormal? 1   Number of falls in past 12 months -   Fall with injury? -       Abuse Screening:  :     Abuse Screening Questionnaire 9/1/2021 6/1/2021 10/1/2020 5/7/2019 2/21/2018   Do you ever feel afraid of your partner? N N N N N   Are you in a relationship with someone who physically or mentally threatens you? N N N N N   Is it safe for you to go home? Y Y Y Y Y       Coordination of Care Questionnaire:  :     1) Have you been to an emergency room, urgent care clinic since your last visit? no   Hospitalized since your last visit? no             2) Have you seen or consulted any other health care providers outside of [address] [place] [address] since your last visit? no  (Include any pap smears or colon screenings in this section.)    3) Do you have an Advance Directive on file? no  Are you interested in receiving information about Advance Directives? no    Reviewed record in preparation for visit and have obtained necessary documentation.

## 2021-09-01 NOTE — PROGRESS NOTES
Chief Complaint   Patient presents with    Knee Pain     LT when walking x 1 wk       Assessment/ Plan:   Diagnoses and all orders for this visit:    1. Acute pain of left knee  Comments:  acute onset of anterior L-knee pain. No trauma. Walking and felt the pain. Orders:  -     methylPREDNISolone (MEDROL DOSEPACK) 4 mg tablet; Take as directed. - May require referral to orthopedics depending the results. I have discussed the diagnosis with the patient and the intended treatment plan as seen in the above orders. The patient has received an after-visit summary and questions were answered concerning future plans. Asked to return should symptoms worsen or not improve with treatment. Any pending labs and studies will be relayed to patient when they become available. Pt verbalizes understanding of plan of care and denies further questions or concerns at this time. Follow-up and Dispositions    · Return if symptoms worsen or fail to improve. Subjective:   Derick Dobbins is a 72 y.o. female who presents today with worsening L-knee pain. She denies any trauma, but just stood up and felt the pain when walking about 1-week ago. Of note, the patient recently underwent emergency back surgery at [facility]. Herniated nucleus pulposus L5-S1, L5-S1 Laminectomy and Discectomy with instrumentated fusion, [employer] PLIF on 5/21 by Dr. Liudmila Flores    She is still wearing a back brace, but improving daily until the new knee problem. It is tender in the anterior position of the knee. There may be minimal swelling. She is able to bear weight, but uncomfortable.      Patient Active Problem List    Diagnosis Date Noted    Sciatica of left side 06/01/2021    Bulging of lumbar intervertebral disc 05/17/2021    Rheumatoid arthritis involving multiple sites with positive rheumatoid factor ([facility] 01/29/2019    Type 2 diabetes with nephropathy ([facility] 04/03/2018    History of rheumatoid arthritis 11/21/2017    Osteopenia of both lower legs 11/21/2017    Diverticulosis 04/20/2015    Infectious gastroenteritis and colitis 04/20/2015    Benign essential hypertension 04/20/2015    Diabetes mellitus type II, controlled [facility] 04/20/2015    Anemia 04/20/2015    Anemia associated with acute blood loss 04/20/2015         Current Outpatient Medications   Medication Sig Dispense Refill    empagliflozin (Jardiance) 25 mg tablet TAKE ONE TABLET BY MOUTH DAILY 90 Tablet 1    glucose blood VI test strips (OneTouch Ultra Test) strip USE ONE STRIP TO TEST DAILY E11.9 50 Strip 0    leflunomide (ARAVA) 20 mg tablet Take 1 Tablet by mouth daily. 30 Tablet 4    gabapentin (NEURONTIN) 300 mg capsule Take  by mouth. 1 pill in AM and 2 pills in PM      methocarbamoL (ROBAXIN) 500 mg tablet Take 500 mg by mouth four (4) times daily. As needed      diclofenac (VOLTAREN) 1 % gel Apply 4 g to affected area four (4) times daily. PLEASE GIVE GENERIC IF AVAILABLE. 100 g 3    glipiZIDE SR (GLUCOTROL XL) 5 mg CR tablet TAKE ONE TABLET BY MOUTH DAILY 90 Tablet 0    lisinopriL (PRINIVIL, ZESTRIL) 5 mg tablet TAKE ONE TABLET BY MOUTH DAILY 90 Tablet 0    atorvastatin (LIPITOR) 10 mg tablet TAKE ONE TABLET BY MOUTH DAILY 90 Tablet 0    acetaminophen (TYLENOL) 325 mg tablet Take 2 Tablets by mouth every six (6) hours as needed for Pain or Fever. 60 Tablet 0    nystatin (MYCOSTATIN) powder Apply  to affected area four (4) times daily. 1 Bottle 5    ascorbic acid, vitamin C, (Vitamin C) 500 mg tablet Take 500 mg by mouth daily.  PNV YB.98/YYMQTCN fum/folic ac (PRENATAL PO) Take  by mouth.  fluticasone (FLONASE) 50 mcg/actuation nasal spray 2 sprays per nostril once a day  Indications: Allergic Rhinitis (Patient taking differently: as needed. 2 sprays per nostril once a day  Indications: inflammation of the nose due to an allergy) 1 Bottle 3    cholecalciferol (VITAMIN D3) 1,000 unit cap Take 1,000 Units by mouth daily.       calcium carbonate (TUMS) 200 mg calcium (500 mg) chew Take 1 Tab by mouth daily. PRN      cyanocobalamin 1,000 mcg tablet Take 1,000 mcg by mouth daily.  L. ACIDOPHILUS/BIFID. ANIMALIS (DAILY PROBIOTIC PO) Take 1 Tab by mouth once over twenty-four (24) hours.  aspirin delayed-release 81 mg tablet Take 81 mg by mouth daily.  HYDROcodone-acetaminophen (NORCO) 5-325 mg per tablet 1 Tablet. (Patient not taking: Reported on 7/27/2021)      bisacodyL (DULCOLAX) 10 mg supp Insert 10 mg into rectum daily as needed for Constipation. Please use if no bowel movement for 2 days despite of oral MiraLAX, Starr-Colace like stool softener/stimulant bowel regime.  (Patient not taking: Reported on 6/1/2021) 10 Suppository 0    hydrOXYchloroQUINE (PLAQUENIL) 200 mg tablet TAKE ONE AND ONE-HALF (1 & 1/2) TABLET BY MOUTH DAILY (Patient not taking: Reported on 6/1/2021) 45 Tab 3    terbinafine HCL (LAMISIL) 250 mg tablet  (Patient not taking: Reported on 6/1/2021)           No Known Allergies      Past Medical History:   Diagnosis Date    Acid reflux     Colitis 04/16/2015    Colitis     Diabetes mellitus, type II ([address]     Diverticulosis of colon 04/20/2015    Hyperlipemia     Hypertension     Osteopenia     of the spine Dexa done 2014, 2017 and due 2019    RA (rheumatoid arthritis) ([address]          Past Surgical History:   Procedure Laterality Date    HX COLONOSCOPY  04/20/2015    Due 2023    HX GYN      HX HYSTERECTOMY      HX TOTAL ABDOMINAL HYSTERECTOMY      MI ABDOMEN SURGERY PROC UNLISTED      MI BIOPSY OF BREAST, NEEDLE CORE  2017    benign findings - Fibrosis         Family History   Problem Relation Age of Onset    Other Mother         diverticulosis    Heart Attack Mother     Heart Failure Father     Pulmonary Embolism Sister     Diabetes Brother     Cancer Brother         brain cancer         Social History     Tobacco Use    Smoking status: Never Smoker    Smokeless tobacco: Never Used   Substance Use Topics    Alcohol use: No      Review of Systems  Pertinent items are noted in HPI. Objective:     Vitals:    09/01/21 1614   BP: 126/82   Pulse: 80   Resp: 18   Temp: 98 °F (36.7 °C)   TempSrc: Oral   SpO2: 96%   Weight: 149 lb (67.6 kg)   Height: 5' 4\" (1.626 m)       General: alert, cooperative, no distress   Mental  status: normal mood, behavior, speech, dress, motor activity, and thought processes, able to follow commands   HENT: NCAT   Neck: no visualized mass   Resp: no respiratory distress   Neuro: no gross deficits   Skin: no discoloration or lesions of concern on visible areas   Psychiatric: normal affect, consistent with stated mood, no evidence of hallucinations     Debbie Turner MD    Patient Instructions          Knee Pain or Injury: Care Instructions  Your Care Instructions     Injuries are a common cause of knee problems. Sudden (acute) injuries may be caused by a direct blow to the knee. They can also be caused by abnormal twisting, bending, or falling on the knee. Pain, bruising, or swelling may be severe, and may start within minutes of the injury. Overuse is another cause of knee pain. Other causes are climbing stairs, kneeling, and other activities that use the knee. Everyday wear and tear, especially as you get older, also can cause knee pain. Rest, along with home treatment, often relieves pain and allows your knee to heal. If you have a serious knee injury, you may need tests and treatment. Follow-up care is a key part of your treatment and safety. Be sure to make and go to all appointments, and call your doctor if you are having problems. It's also a good idea to know your test results and keep a list of the medicines you take. How can you care for yourself at home? · Be safe with medicines. Read and follow all instructions on the label. ? If the doctor gave you a prescription medicine for pain, take it as prescribed.   ? If you are not taking a prescription pain medicine, ask your doctor if you can take an over-the-counter medicine. · Rest and protect your knee. Take a break from any activity that may cause pain. · Put ice or a cold pack on your knee for 10 to 20 minutes at a time. Put a thin cloth between the ice and your skin. · Prop up a sore knee on a pillow when you ice it or anytime you sit or lie down for the next 3 days. Try to keep it above the level of your heart. This will help reduce swelling. · If your knee is not swollen, you can put moist heat, a heating pad, or a warm cloth on your knee. · If your doctor recommends an elastic bandage, sleeve, or other type of support for your knee, wear it as directed. · Follow your doctor's instructions about how much weight you can put on your leg. Use a cane, crutches, or a walker as instructed. · Follow your doctor's instructions about activity during your healing process. If you can do mild exercise, slowly increase your activity. · Reach and stay at a healthy weight. Extra weight can strain the joints, especially the knees and hips, and make the pain worse. Losing even a few pounds may help. When should you call for help? Call 911 anytime you think you may need emergency care. For example, call if:    · You have symptoms of a blood clot in your lung (called a pulmonary embolism). These may include:  ? Sudden chest pain. ? Trouble breathing. ? Coughing up blood. Call your doctor now or seek immediate medical care if:    · You have severe or increasing pain.     · Your leg or foot turns cold or changes color.     · You cannot stand or put weight on your knee.     · Your knee looks twisted or bent out of shape.     · You cannot move your knee.     · You have signs of infection, such as:  ? Increased pain, swelling, warmth, or redness. ? Red streaks leading from the knee. ? Pus draining from a place on your knee.   ? A fever.     · You have signs of a blood clot in your leg (called a deep vein thrombosis), such as:  ? Pain in your calf, back of the knee, thigh, or groin. ? Redness and swelling in your leg or groin. Watch closely for changes in your health, and be sure to contact your doctor if:    · You have tingling, weakness, or numbness in your knee.     · You have any new symptoms, such as swelling.     · You have bruises from a knee injury that last longer than 2 weeks.     · You do not get better as expected. Where can you learn more? Go to http://www.gray.com/  Enter K195 in the search box to learn more about \"Knee Pain or Injury: Care Instructions. \"  Current as of: February 26, 2020               Content Version: 12.8  © 2006-2021 [employer]. Care instructions adapted under license by [employer] (which disclaims liability or warranty for this information). If you have questions about a medical condition or this instruction, always ask your healthcare professional. [place] [address] any warranty or liability for your use of this information.

## 2021-09-01 NOTE — PATIENT INSTRUCTIONS
Knee Pain or Injury: Care Instructions  Your Care Instructions     Injuries are a common cause of knee problems. Sudden (acute) injuries may be caused by a direct blow to the knee. They can also be caused by abnormal twisting, bending, or falling on the knee. Pain, bruising, or swelling may be severe, and may start within minutes of the injury. Overuse is another cause of knee pain. Other causes are climbing stairs, kneeling, and other activities that use the knee. Everyday wear and tear, especially as you get older, also can cause knee pain. Rest, along with home treatment, often relieves pain and allows your knee to heal. If you have a serious knee injury, you may need tests and treatment. Follow-up care is a key part of your treatment and safety. Be sure to make and go to all appointments, and call your doctor if you are having problems. It's also a good idea to know your test results and keep a list of the medicines you take. How can you care for yourself at home? · Be safe with medicines. Read and follow all instructions on the label. ? If the doctor gave you a prescription medicine for pain, take it as prescribed. ? If you are not taking a prescription pain medicine, ask your doctor if you can take an over-the-counter medicine. · Rest and protect your knee. Take a break from any activity that may cause pain. · Put ice or a cold pack on your knee for 10 to 20 minutes at a time. Put a thin cloth between the ice and your skin. · Prop up a sore knee on a pillow when you ice it or anytime you sit or lie down for the next 3 days. Try to keep it above the level of your heart. This will help reduce swelling. · If your knee is not swollen, you can put moist heat, a heating pad, or a warm cloth on your knee. · If your doctor recommends an elastic bandage, sleeve, or other type of support for your knee, wear it as directed.   · Follow your doctor's instructions about how much weight you can put on your leg. Use a cane, crutches, or a walker as instructed. · Follow your doctor's instructions about activity during your healing process. If you can do mild exercise, slowly increase your activity. · Reach and stay at a healthy weight. Extra weight can strain the joints, especially the knees and hips, and make the pain worse. Losing even a few pounds may help. When should you call for help? Call 911 anytime you think you may need emergency care. For example, call if:    · You have symptoms of a blood clot in your lung (called a pulmonary embolism). These may include:  ? Sudden chest pain. ? Trouble breathing. ? Coughing up blood. Call your doctor now or seek immediate medical care if:    · You have severe or increasing pain.     · Your leg or foot turns cold or changes color.     · You cannot stand or put weight on your knee.     · Your knee looks twisted or bent out of shape.     · You cannot move your knee.     · You have signs of infection, such as:  ? Increased pain, swelling, warmth, or redness. ? Red streaks leading from the knee. ? Pus draining from a place on your knee. ? A fever.     · You have signs of a blood clot in your leg (called a deep vein thrombosis), such as:  ? Pain in your calf, back of the knee, thigh, or groin. ? Redness and swelling in your leg or groin. Watch closely for changes in your health, and be sure to contact your doctor if:    · You have tingling, weakness, or numbness in your knee.     · You have any new symptoms, such as swelling.     · You have bruises from a knee injury that last longer than 2 weeks.     · You do not get better as expected. Where can you learn more? Go to http://www.gray.com/  Enter K195 in the search box to learn more about \"Knee Pain or Injury: Care Instructions. \"  Current as of: February 26, 2020               Content Version: 12.8  © 7118-9880 [employer].    Care instructions adapted under license by [employer] (which disclaims liability or warranty for this information). If you have questions about a medical condition or this instruction, always ask your healthcare professional. [address] any warranty or liability for your use of this information.

## 2021-09-02 ENCOUNTER — TELEPHONE (OUTPATIENT)
Dept: FAMILY MEDICINE CLINIC | Age: 71
End: 2021-09-02

## 2021-09-02 NOTE — PROGRESS NOTES
Knee has significant problems that I think would be best evaluated by a orthopedist. I would like her to walk with a cane until she is evaluated. High risk for a fall. No fluid is noted in the knee. No active fracture or dislocation. I can send her to see Bree Zarate.

## 2021-09-02 NOTE — TELEPHONE ENCOUNTER
----- Message from Med Ruggiero MD sent at 9/2/2021 12:20 PM EDT -----  Knee has significant problems that I think would be best evaluated by a orthopedist. I would like her to walk with a cane until she is evaluated. High risk for a fall. No fluid is noted in the knee. No active fracture or dislocation. I can send her to see Felicia Zarate.

## 2021-09-13 DIAGNOSIS — E78.00 PURE HYPERCHOLESTEROLEMIA: ICD-10-CM

## 2021-09-13 RX ORDER — ATORVASTATIN CALCIUM 10 MG/1
TABLET, FILM COATED ORAL
Qty: 90 TABLET | Refills: 0 | Status: SHIPPED | OUTPATIENT
Start: 2021-09-13 | End: 2021-12-09

## 2021-09-27 DIAGNOSIS — I10 ESSENTIAL HYPERTENSION: ICD-10-CM

## 2021-09-27 RX ORDER — LISINOPRIL 5 MG/1
TABLET ORAL
Qty: 90 TABLET | Refills: 0 | Status: SHIPPED | OUTPATIENT
Start: 2021-09-27 | End: 2021-12-28

## 2021-10-21 ENCOUNTER — OFFICE VISIT (OUTPATIENT)
Dept: FAMILY MEDICINE CLINIC | Age: 71
End: 2021-10-21
Payer: MEDICARE

## 2021-10-21 VITALS
WEIGHT: 148 LBS | BODY MASS INDEX: 25.27 KG/M2 | RESPIRATION RATE: 16 BRPM | OXYGEN SATURATION: 97 % | HEART RATE: 79 BPM | DIASTOLIC BLOOD PRESSURE: 80 MMHG | HEIGHT: 64 IN | SYSTOLIC BLOOD PRESSURE: 122 MMHG | TEMPERATURE: 97.8 F

## 2021-10-21 DIAGNOSIS — Z23 NEEDS FLU SHOT: Primary | ICD-10-CM

## 2021-10-21 PROCEDURE — 3017F COLORECTAL CA SCREEN DOC REV: CPT | Performed by: NURSE PRACTITIONER

## 2021-10-21 PROCEDURE — G8536 NO DOC ELDER MAL SCRN: HCPCS | Performed by: NURSE PRACTITIONER

## 2021-10-21 PROCEDURE — G8427 DOCREV CUR MEDS BY ELIG CLIN: HCPCS | Performed by: NURSE PRACTITIONER

## 2021-10-21 PROCEDURE — G8432 DEP SCR NOT DOC, RNG: HCPCS | Performed by: NURSE PRACTITIONER

## 2021-10-21 PROCEDURE — G8754 DIAS BP LESS 90: HCPCS | Performed by: NURSE PRACTITIONER

## 2021-10-21 PROCEDURE — 99213 OFFICE O/P EST LOW 20 MIN: CPT | Performed by: NURSE PRACTITIONER

## 2021-10-21 PROCEDURE — G8399 PT W/DXA RESULTS DOCUMENT: HCPCS | Performed by: NURSE PRACTITIONER

## 2021-10-21 PROCEDURE — 1090F PRES/ABSN URINE INCON ASSESS: CPT | Performed by: NURSE PRACTITIONER

## 2021-10-21 PROCEDURE — 90694 VACC AIIV4 NO PRSRV 0.5ML IM: CPT | Performed by: NURSE PRACTITIONER

## 2021-10-21 PROCEDURE — G8419 CALC BMI OUT NRM PARAM NOF/U: HCPCS | Performed by: NURSE PRACTITIONER

## 2021-10-21 PROCEDURE — G8752 SYS BP LESS 140: HCPCS | Performed by: NURSE PRACTITIONER

## 2021-10-21 PROCEDURE — G9899 SCRN MAM PERF RSLTS DOC: HCPCS | Performed by: NURSE PRACTITIONER

## 2021-10-21 PROCEDURE — 1101F PT FALLS ASSESS-DOCD LE1/YR: CPT | Performed by: NURSE PRACTITIONER

## 2021-10-21 NOTE — PATIENT INSTRUCTIONS
Vaccine Information Statement    Influenza (Flu) Vaccine (Inactivated or Recombinant): What You Need to Know    Many vaccine information statements are available in Italian and other languages. See www.immunize.org/vis. Hojas de información sobre vacunas están disponibles en español y en muchos otros idiomas. Visite www.immunize.org/vis. 1. Why get vaccinated? Influenza vaccine can prevent influenza (flu). Flu is a contagious disease that spreads around the [facility] every year, usually between October and May. Anyone can get the flu, but it is more dangerous for some people. Infants and young children, people 72 years and older, pregnant people, and people with certain health conditions or a weakened immune system are at greatest risk of flu complications. Pneumonia, bronchitis, sinus infections, and ear infections are examples of flu-related complications. If you have a medical condition, such as heart disease, cancer, or diabetes, flu can make it worse. Flu can cause fever and chills, sore throat, muscle aches, fatigue, cough, headache, and runny or stuffy nose. Some people may have vomiting and diarrhea, though this is more common in children than adults. In an average year, thousands of people in the [facility] die from flu, and many more are hospitalized. Flu vaccine prevents millions of illnesses and flu-related visits to the doctor each year. 2. Influenza vaccines     CDC recommends everyone 6 months and older get vaccinated every flu season. Children 6 months through 6years of age may need 2 doses during a single flu season. Everyone else needs only 1 dose each flu season. It takes about 2 weeks for protection to develop after vaccination. There are many flu viruses, and they are always changing. Each year a new flu vaccine is made to protect against the influenza viruses believed to be likely to cause disease in the upcoming flu season.  Even when the vaccine doesnt exactly match these viruses, it may still provide some protection. Influenza vaccine does not cause flu. Influenza vaccine may be given at the same time as other vaccines. 3. Talk with your health care provider    Tell your vaccination provider if the person getting the vaccine:   Has had an allergic reaction after a previous dose of influenza vaccine, or has any severe, life-threatening allergies    Has ever had Guillain-Barré Syndrome (also called GBS)    In some cases, your health care provider may decide to postpone influenza vaccination until a future visit. Influenza vaccine can be administered at any time during pregnancy. People who are or will be pregnant during influenza season should receive inactivated influenza vaccine. People with minor illnesses, such as a cold, may be vaccinated. People who are moderately or severely ill should usually wait until they recover before getting influenza vaccine. Your health care provider can give you more information. 4. Risks of a vaccine reaction     Soreness, redness, and swelling where the shot is given, fever, muscle aches, and headache can happen after influenza vaccination.  There may be a very small increased risk of Guillain-Barré Syndrome (GBS) after inactivated influenza vaccine (the flu shot). [place] children who get the flu shot along with pneumococcal vaccine (PCV13) and/or DTaP vaccine at the same time might be slightly more likely to have a seizure caused by fever. Tell your health care provider if a child who is getting flu vaccine has ever had a seizure. People sometimes faint after medical procedures, including vaccination. Tell your provider if you feel dizzy or have vision changes or ringing in the ears. As with any medicine, there is a very remote chance of a vaccine causing a severe allergic reaction, other serious injury, or death. 5. What if there is a serious problem?     An allergic reaction could occur after the vaccinated person leaves the clinic. If you see signs of a severe allergic reaction (hives, swelling of the face and throat, difficulty breathing, a fast heartbeat, dizziness, or weakness), call 9-1-1 and get the person to the nearest hospital.    For other signs that concern you, call your health care provider. Adverse reactions should be reported to the Vaccine Adverse Event Reporting System (VAERS). Your health care provider will usually file this report, or you can do it yourself. Visit the VAERS website at www.vaers. [facility].gov or call 8-909.758.8396. VAERS is only for reporting reactions, and VAERS staff members do not give medical advice. 6. The National Vaccine Injury Compensation Program    The [facility] Vaccine Injury Compensation Program (VICP) is a federal program that was created to compensate people who may have been injured by certain vaccines. Claims regarding alleged injury or death due to vaccination have a time limit for filing, which may be as short as two years. Visit the VICP website at www.[facility].gov/vaccinecompensation or call 8-708.840.9941 to learn about the program and about filing a claim. 7. How can I learn more?  Ask your health care provider.  Call your local or state health department.  Visit the website of the Food and Drug Administration (FDA) for vaccine package inserts and additional information at www.fda.gov/vaccines-blood-biologics/vaccines.  Contact the Centers for Disease Control and Prevention (CDC):  - Call 4-691.980.5215 (1-800-CDC-INFO) or  - Visit CDCs influenza website at www.cdc.gov/flu. Vaccine Information Statement   Inactivated Influenza Vaccine   8/6/2021  42 BASSEM. Roselyn Keller [id number]   Department of Health and Human Services  Centers for Disease Control and Prevention    Office Use Only

## 2021-10-21 NOTE — PROGRESS NOTES
HISTORY OF PRESENT ILLNESS  Mela Cole is a 72 y.o. female. HPI  Pt presents for \"flu vaccine\"    Pt denies concerns or complaints at this time  She has tolerated vaccines well in the past  Review of Systems   Constitutional: Negative for fever. Physical Exam  Constitutional:       Appearance: Normal appearance. Cardiovascular:      Rate and Rhythm: Normal rate and regular rhythm. Heart sounds: Normal heart sounds. Pulmonary:      Effort: Pulmonary effort is normal.      Breath sounds: Normal breath sounds. Neurological:      Mental Status: She is alert. Psychiatric:         Mood and Affect: Mood normal.         Behavior: Behavior normal.         ASSESSMENT and PLAN    ICD-10-CM ICD-9-CM    1. Needs flu shot  Z23 V04.81 FLU (FLUAD QUAD INFLUENZA VACCINE,QUAD,ADJUVANTED)     Vaccine and VIS given    Pt informed to return to office with worsening of symptoms, or PRN with any questions or concerns. Pt verbalizes understanding of plan of care and denies further questions or concerns at this time.

## 2021-10-21 NOTE — PROGRESS NOTES
Identified pt with two pt identifiers(name and ). Chief Complaint   Patient presents with    Immunization/Injection     flu        Health Maintenance Due   Topic    Shingrix Vaccine Age 50> (1 of 2)    DTaP/Tdap/Td series (1 - Tdap)    Foot Exam Q1     MICROALBUMIN Q1     Medicare Yearly Exam     Bone Densitometry        Wt Readings from Last 3 Encounters:   10/21/21 148 lb (67.1 kg)   21 149 lb (67.6 kg)   21 148 lb (67.1 kg)     Temp Readings from Last 3 Encounters:   10/21/21 97.8 °F (36.6 °C) (Temporal)   21 98 °F (36.7 °C) (Oral)   21 98.5 °F (36.9 °C) (Oral)     BP Readings from Last 3 Encounters:   10/21/21 122/80   21 126/82   21 (!) 156/79     Pulse Readings from Last 3 Encounters:   10/21/21 79   21 80   21 91         Learning Assessment:  :     Learning Assessment 2021 10/16/2020 2020 12/3/2019 2019 4/3/2019 2018   PRIMARY LEARNER Patient Patient Patient Patient Patient Patient Patient   HIGHEST LEVEL OF EDUCATION - PRIMARY LEARNER  - - - - - - -   BARRIERS PRIMARY LEARNER - - - - - - -   CO-LEARNER CAREGIVER No No No No No No No   PRIMARY LANGUAGE ENGLISH ENGLISH ENGLISH ENGLISH ENGLISH ENGLISH ENGLISH   LEARNER PREFERENCE PRIMARY DEMONSTRATION DEMONSTRATION DEMONSTRATION DEMONSTRATION DEMONSTRATION DEMONSTRATION DEMONSTRATION   ANSWERED BY patient  patient  patient patient patient  patient patient   RELATIONSHIP SELF SELF SELF SELF SELF SELF SELF       Depression Screening:  :     3 most recent PHQ Screens 10/21/2021   Little interest or pleasure in doing things Not at all   Feeling down, depressed, irritable, or hopeless Not at all   Total Score PHQ 2 0       Fall Risk Assessment:  :     Fall Risk Assessment, last 12 mths 2021   Able to walk? Yes   Fall in past 12 months? 0   Do you feel unsteady? 1   Are you worried about falling 1   Is the gait abnormal? 1   Number of falls in past 12 months -   Fall with injury?  - Abuse Screening:  :     Abuse Screening Questionnaire 10/21/2021 9/1/2021 6/1/2021 10/1/2020 5/7/2019 2/21/2018   Do you ever feel afraid of your partner? N N N N N N   Are you in a relationship with someone who physically or mentally threatens you? N N N N N N   Is it safe for you to go home? Y Y Y Y Y Y       Coordination of Care Questionnaire:  :     1) Have you been to an emergency room, urgent care clinic since your last visit? no   Hospitalized since your last visit? no             2) Have you seen or consulted any other health care providers outside of [address] [place] [address] since your last visit? no  (Include any pap smears or colon screenings in this section.)    3) Do you have an Advance Directive on file? no  Are you interested in receiving information about Advance Directives? no      Reviewed record in preparation for visit and have obtained necessary documentation.

## 2021-12-01 ENCOUNTER — OFFICE VISIT (OUTPATIENT)
Dept: RHEUMATOLOGY | Age: 71
End: 2021-12-01
Payer: MEDICARE

## 2021-12-01 VITALS
DIASTOLIC BLOOD PRESSURE: 84 MMHG | TEMPERATURE: 97.8 F | RESPIRATION RATE: 16 BRPM | SYSTOLIC BLOOD PRESSURE: 159 MMHG | HEART RATE: 81 BPM | OXYGEN SATURATION: 98 % | BODY MASS INDEX: 25.3 KG/M2 | WEIGHT: 147.4 LBS

## 2021-12-01 DIAGNOSIS — M05.741 RHEUMATOID ARTHRITIS INVOLVING BOTH HANDS WITH POSITIVE RHEUMATOID FACTOR (HCC): Primary | ICD-10-CM

## 2021-12-01 DIAGNOSIS — M05.742 RHEUMATOID ARTHRITIS INVOLVING BOTH HANDS WITH POSITIVE RHEUMATOID FACTOR (HCC): Primary | ICD-10-CM

## 2021-12-01 PROCEDURE — 99214 OFFICE O/P EST MOD 30 MIN: CPT | Performed by: PEDIATRICS

## 2021-12-01 RX ORDER — LEFLUNOMIDE 20 MG/1
20 TABLET ORAL DAILY
Qty: 30 TABLET | Refills: 6 | Status: SHIPPED | OUTPATIENT
Start: 2021-12-01 | End: 2022-07-25

## 2021-12-01 NOTE — PROGRESS NOTES
RHEUMATOLOGY PROBLEM LIST AND CHIEF COMPLAINT  1. Rheumatoid Arthritis - diagnosed 30+ years ago, treated to remission with Methotrexate (few years), positive RF, CCP, flare in 2019    Therapy History:   Prior DMARDs: Methotrexate (past response, put into remission), Plaquenil (1/2020-7/2021)  Current NSAIDs: Tylenol Arthritis  Current DMARDs: Leflunomide (4/2019-current, stopped briefly)     HISTORY OF PRESENT ILLNESS  This is a 72 y.o.  female. Today, the patient complains of no joint pain. Location: generalized  Severity: 5 on a scale of 0-10  Timing: none at this time   Duration: 4 months  Context/Associated signs and symptoms: The patient reports doing well today. Her chief complaint is soreness between her right second and third MCP joints and difficulty writing with her right hand. Denies significant morning stiffness. She stopped Plaquenil 300 mg daily and restarted Leflunomide 20 mg daily following last visit. She mentions recently starting Gabapentin following a back surgery.      RHEUMATOLOGY REVIEW OF SYSTEMS   Positives as per history  Negatives as follows:  Derinda Tyrone:  Denies unexplained persistent fevers, weight change, chronic fatigue  HEAD/EYES:   Denies eye redness, blurry vision or sudden loss of vision, dry eyes, HA, temporal artery pain  ENT:    Denies oral/nasal ulcers, recurrent sinus infections, dry mouth  RESPIRATORY:  No pleuritic pain, history of pleural effusions, hemoptysis, exertional dyspnea  CARDIOVASCULAR:  Denies chest pain, history of pericardial effusions  GASTRO:   Denies heartburn, esophageal dysmotility, abdominal pain, nausea, vomiting, diarrhea, blood in the stool  HEMATOLOGIC:  No easy bruising, purpura, swollen lymph nodes  SKIN:    Denies alopecia, ulcers, nodules, sun sensitivity, unexplained persistent rash   VASCULAR:   Denies edema, cyanosis, raynaud phenomenon  NEUROLOGIC:  Denies specific muscle weakness, paresthesias   PSYCHIATRIC:  No sleep disturbance / snoring, depression, anxiety  MSK:    No morning stiffness >1 hour, SI joint pain, persistent joint swelling, persistent joint pain    PAST MEDICAL HISTORY  Reviewed with patient, significant changes in medical history - no    PHYSICAL EXAM  Blood pressure (!) 159/84, pulse 81, temperature 97.8 °F (36.6 °C), temperature source Oral, resp. rate 16, weight 147 lb 6.4 oz (66.9 kg), SpO2 98 %. GENERAL APPEARANCE: Well-nourished, no acute distress  EYES: No scleral erythema, conjunctival injection  ENT: No oral ulcer, parotid enlargement  NECK: No adenopathy, thyroid enlargement  CARDIOVASCULAR: Heart rhythm is regular. No murmur, rub, gallop  CHEST: Normal vesicular breath sounds. No wheezes, rales, pleural friction rubs  ABDOMINAL: The abdomen is soft and nontender. Bowel sounds are normal  EXTREMITIES: There is no evidence of clubbing, cyanosis, edema  SKIN: No rash, palpable purpura, digital ulcer, abnormal thickening, normal nailfold capillaries   NEUROLOGICAL: Normal gait and station, full strength in upper and lower extremities,  normal sensation to light touch  MUSCULOSKELETAL:   Upper extremities - Right wrist dROM with mild fullness - resolved. Mild synovial thickening of b/l 2nd and 3rd MCP joints. Subtle thickened right 3rd tendon sheath   Lower extremities - B/L bony prominence in knee (L>R). B/L Crepitus in knee (L>R). OA changes in her feet. LABS, RADIOLOGY AND PROCEDURES  Previous labs reviewed -Yes  Previous radiology reviewed -Yes  Previous procedures reviewed -Yes  Previous medical records reviewed/summarized -Yes    ASSESSMENT  1. Rheumatoid Arthritis -(has improved)- The patient continues to do well on her current regimen. She should continue on Leflunomide 20 mg daily. I will order labs today. Follow up in 5-6 months. 2. OA -(has worsened)- I suspect the patient's complaints are related to this. Recommended use of topical Diclofenac and paraffin bath for her hand pain.  Tylenol and ROM exercises were recommended. NSAIDs are contraindicated due to kidney disease. 3. Drug therapy monitoring for toxicity (leflunomide) - CBC, BUN, Cr, AST, ALT and albumin every 3 months; eye exams every 6-12 months for retinal toxicity. 4. Trochanteric bursitis -(is unchanged)- This was not a concern today. She should continue rehabilitation exercises including piriformas stretch, iliotibial band stretch, straight leg stretch, wall squat with ball and gluteal strengthening as needed. PLAN  1. Leflunomide 20 mg daily  2. Topical Diclofenac, Paraffin bath   3. Tylenol PRN  4. Check CBC & CMP   5. Return in 5-6 months    Vy Hassan MD   Adult and Pediatric Rheumatology     [facility], [address], Phone [phone], Fax [phone]    Visiting  of Pediatrics    Department of Pediatrics, [facility] of [address] [place] [address], [address] [place] [address], Phone [phone], Fax [phone]    There are no Patient Instructions on file for this visit.     cc:  Giuseppe Huynh MD    Written by anne Chaudhary, as dictated by Dr. Soni Burgess M.D.

## 2021-12-01 NOTE — PROGRESS NOTES
Chief Complaint   Patient presents with    Joint Pain     1. Have you been to the ER, urgent care clinic since your last visit? Hospitalized since your last visit? No    2. Have you seen or consulted any other health care providers outside of the [address] [place] [address] since your last visit? Include any pap smears or colon screening.  No

## 2021-12-09 DIAGNOSIS — E78.00 PURE HYPERCHOLESTEROLEMIA: ICD-10-CM

## 2021-12-09 DIAGNOSIS — E11.9 CONTROLLED TYPE 2 DIABETES MELLITUS WITHOUT COMPLICATION, WITHOUT LONG-TERM CURRENT USE OF INSULIN (HCC): ICD-10-CM

## 2021-12-09 RX ORDER — BLOOD SUGAR DIAGNOSTIC
STRIP MISCELLANEOUS
Qty: 50 STRIP | Refills: 0 | Status: SHIPPED | OUTPATIENT
Start: 2021-12-09 | End: 2022-05-13

## 2021-12-09 RX ORDER — ATORVASTATIN CALCIUM 10 MG/1
TABLET, FILM COATED ORAL
Qty: 90 TABLET | Refills: 0 | Status: SHIPPED | OUTPATIENT
Start: 2021-12-09 | End: 2022-03-03

## 2021-12-27 DIAGNOSIS — I10 ESSENTIAL HYPERTENSION: ICD-10-CM

## 2021-12-28 RX ORDER — LISINOPRIL 5 MG/1
TABLET ORAL
Qty: 90 TABLET | Refills: 0 | Status: SHIPPED | OUTPATIENT
Start: 2021-12-28 | End: 2022-04-01 | Stop reason: SDUPTHER

## 2022-01-05 LAB
ALBUMIN SERPL-MCNC: 4.5 G/DL (ref 3.8–4.8)
ALBUMIN/GLOB SERPL: 1.7 {RATIO} (ref 1.2–2.2)
ALP SERPL-CCNC: 63 IU/L (ref 44–121)
ALT SERPL-CCNC: 29 IU/L (ref 0–32)
AST SERPL-CCNC: 18 IU/L (ref 0–40)
BASOPHILS # BLD AUTO: 0 X10E3/UL (ref 0–0.2)
BASOPHILS NFR BLD AUTO: 1 %
BILIRUB SERPL-MCNC: 0.5 MG/DL (ref 0–1.2)
BUN SERPL-MCNC: 23 MG/DL (ref 8–27)
BUN/CREAT SERPL: 21 (ref 12–28)
CALCIUM SERPL-MCNC: 9.9 MG/DL (ref 8.7–10.3)
CHLORIDE SERPL-SCNC: 107 MMOL/L (ref 96–106)
CO2 SERPL-SCNC: 27 MMOL/L (ref 20–29)
CREAT SERPL-MCNC: 1.1 MG/DL (ref 0.57–1)
EOSINOPHIL # BLD AUTO: 0.1 X10E3/UL (ref 0–0.4)
EOSINOPHIL NFR BLD AUTO: 5 %
ERYTHROCYTE [DISTWIDTH] IN BLOOD BY AUTOMATED COUNT: 11.1 % (ref 11.7–15.4)
GLOBULIN SER CALC-MCNC: 2.7 G/DL (ref 1.5–4.5)
GLUCOSE SERPL-MCNC: 147 MG/DL (ref 65–99)
HCT VFR BLD AUTO: 37.7 % (ref 34–46.6)
HGB BLD-MCNC: 12.7 G/DL (ref 11.1–15.9)
IMM GRANULOCYTES # BLD AUTO: 0 X10E3/UL (ref 0–0.1)
IMM GRANULOCYTES NFR BLD AUTO: 0 %
LYMPHOCYTES # BLD AUTO: 0.8 X10E3/UL (ref 0.7–3.1)
LYMPHOCYTES NFR BLD AUTO: 28 %
MCH RBC QN AUTO: 30.8 PG (ref 26.6–33)
MCHC RBC AUTO-ENTMCNC: 33.7 G/DL (ref 31.5–35.7)
MCV RBC AUTO: 91 FL (ref 79–97)
MONOCYTES # BLD AUTO: 0.4 X10E3/UL (ref 0.1–0.9)
MONOCYTES NFR BLD AUTO: 14 %
NEUTROPHILS # BLD AUTO: 1.5 X10E3/UL (ref 1.4–7)
NEUTROPHILS NFR BLD AUTO: 52 %
PLATELET # BLD AUTO: 167 X10E3/UL (ref 150–450)
POTASSIUM SERPL-SCNC: 4.6 MMOL/L (ref 3.5–5.2)
PROT SERPL-MCNC: 7.2 G/DL (ref 6–8.5)
RBC # BLD AUTO: 4.13 X10E6/UL (ref 3.77–5.28)
SODIUM SERPL-SCNC: 148 MMOL/L (ref 134–144)
WBC # BLD AUTO: 2.9 X10E3/UL (ref 3.4–10.8)

## 2022-01-12 ENCOUNTER — TELEPHONE (OUTPATIENT)
Dept: RHEUMATOLOGY | Age: 72
End: 2022-01-12

## 2022-01-12 NOTE — TELEPHONE ENCOUNTER
PT called to discuss lab results w/ nurse or physician. Pt also requested recent labs be faxed to PCP Lashanda Kumar.     Best number is [phone]

## 2022-03-03 DIAGNOSIS — E78.00 PURE HYPERCHOLESTEROLEMIA: ICD-10-CM

## 2022-03-03 RX ORDER — ATORVASTATIN CALCIUM 10 MG/1
TABLET, FILM COATED ORAL
Qty: 90 TABLET | Refills: 0 | Status: SHIPPED | OUTPATIENT
Start: 2022-03-03 | End: 2022-06-06

## 2022-03-10 DIAGNOSIS — E11.9 CONTROLLED TYPE 2 DIABETES MELLITUS WITHOUT COMPLICATION, WITHOUT LONG-TERM CURRENT USE OF INSULIN (HCC): ICD-10-CM

## 2022-03-18 PROBLEM — E11.21 TYPE 2 DIABETES WITH NEPHROPATHY (HCC): Status: ACTIVE | Noted: 2018-04-03

## 2022-03-19 PROBLEM — M85.861 OSTEOPENIA OF BOTH LOWER LEGS: Status: ACTIVE | Noted: 2017-11-21

## 2022-03-19 PROBLEM — M05.79 RHEUMATOID ARTHRITIS INVOLVING MULTIPLE SITES WITH POSITIVE RHEUMATOID FACTOR (HCC): Status: ACTIVE | Noted: 2019-01-29

## 2022-03-19 PROBLEM — M85.862 OSTEOPENIA OF BOTH LOWER LEGS: Status: ACTIVE | Noted: 2017-11-21

## 2022-03-20 PROBLEM — Z87.39 HISTORY OF RHEUMATOID ARTHRITIS: Status: ACTIVE | Noted: 2017-11-21

## 2022-03-20 PROBLEM — M51.36 BULGING OF LUMBAR INTERVERTEBRAL DISC: Status: ACTIVE | Noted: 2021-05-17

## 2022-03-20 PROBLEM — M54.32 SCIATICA OF LEFT SIDE: Status: ACTIVE | Noted: 2021-06-01

## 2022-03-20 PROBLEM — M51.369 BULGING OF LUMBAR INTERVERTEBRAL DISC: Status: ACTIVE | Noted: 2021-05-17

## 2022-04-01 DIAGNOSIS — I10 ESSENTIAL HYPERTENSION: ICD-10-CM

## 2022-04-01 RX ORDER — LISINOPRIL 5 MG/1
TABLET ORAL
Qty: 90 TABLET | Refills: 0 | Status: SHIPPED | OUTPATIENT
Start: 2022-04-01 | End: 2022-06-30

## 2022-04-11 ENCOUNTER — OFFICE VISIT (OUTPATIENT)
Dept: FAMILY MEDICINE CLINIC | Age: 72
End: 2022-04-11
Payer: MEDICARE

## 2022-04-11 VITALS
TEMPERATURE: 97.5 F | OXYGEN SATURATION: 97 % | BODY MASS INDEX: 25.61 KG/M2 | HEART RATE: 82 BPM | WEIGHT: 150 LBS | RESPIRATION RATE: 20 BRPM | HEIGHT: 64 IN | SYSTOLIC BLOOD PRESSURE: 138 MMHG | DIASTOLIC BLOOD PRESSURE: 84 MMHG

## 2022-04-11 DIAGNOSIS — M05.742 RHEUMATOID ARTHRITIS INVOLVING BOTH HANDS WITH POSITIVE RHEUMATOID FACTOR (HCC): ICD-10-CM

## 2022-04-11 DIAGNOSIS — E11.21 TYPE 2 DIABETES WITH NEPHROPATHY (HCC): ICD-10-CM

## 2022-04-11 DIAGNOSIS — E11.9 CONTROLLED TYPE 2 DIABETES MELLITUS WITHOUT COMPLICATION, WITHOUT LONG-TERM CURRENT USE OF INSULIN (HCC): ICD-10-CM

## 2022-04-11 DIAGNOSIS — B35.1 ONYCHOMYCOSIS: ICD-10-CM

## 2022-04-11 DIAGNOSIS — Z12.31 ENCOUNTER FOR SCREENING MAMMOGRAM FOR MALIGNANT NEOPLASM OF BREAST: ICD-10-CM

## 2022-04-11 DIAGNOSIS — M05.741 RHEUMATOID ARTHRITIS INVOLVING BOTH HANDS WITH POSITIVE RHEUMATOID FACTOR (HCC): ICD-10-CM

## 2022-04-11 DIAGNOSIS — Z00.00 MEDICARE ANNUAL WELLNESS VISIT, SUBSEQUENT: Primary | ICD-10-CM

## 2022-04-11 DIAGNOSIS — E78.00 PURE HYPERCHOLESTEROLEMIA: ICD-10-CM

## 2022-04-11 DIAGNOSIS — Z78.0 POST-MENOPAUSE: ICD-10-CM

## 2022-04-11 DIAGNOSIS — E11.9 ENCOUNTER FOR DIABETIC FOOT EXAM (HCC): ICD-10-CM

## 2022-04-11 DIAGNOSIS — E55.9 VITAMIN D DEFICIENCY: ICD-10-CM

## 2022-04-11 DIAGNOSIS — Z13.820 SCREENING FOR OSTEOPOROSIS: ICD-10-CM

## 2022-04-11 PROCEDURE — G8419 CALC BMI OUT NRM PARAM NOF/U: HCPCS | Performed by: INTERNAL MEDICINE

## 2022-04-11 PROCEDURE — G8536 NO DOC ELDER MAL SCRN: HCPCS | Performed by: INTERNAL MEDICINE

## 2022-04-11 PROCEDURE — G8752 SYS BP LESS 140: HCPCS | Performed by: INTERNAL MEDICINE

## 2022-04-11 PROCEDURE — G8510 SCR DEP NEG, NO PLAN REQD: HCPCS | Performed by: INTERNAL MEDICINE

## 2022-04-11 PROCEDURE — G0439 PPPS, SUBSEQ VISIT: HCPCS | Performed by: INTERNAL MEDICINE

## 2022-04-11 PROCEDURE — 1090F PRES/ABSN URINE INCON ASSESS: CPT | Performed by: INTERNAL MEDICINE

## 2022-04-11 PROCEDURE — G9899 SCRN MAM PERF RSLTS DOC: HCPCS | Performed by: INTERNAL MEDICINE

## 2022-04-11 PROCEDURE — 99214 OFFICE O/P EST MOD 30 MIN: CPT | Performed by: INTERNAL MEDICINE

## 2022-04-11 PROCEDURE — G8754 DIAS BP LESS 90: HCPCS | Performed by: INTERNAL MEDICINE

## 2022-04-11 PROCEDURE — 3017F COLORECTAL CA SCREEN DOC REV: CPT | Performed by: INTERNAL MEDICINE

## 2022-04-11 PROCEDURE — 2022F DILAT RTA XM EVC RTNOPTHY: CPT | Performed by: INTERNAL MEDICINE

## 2022-04-11 PROCEDURE — 3044F HG A1C LEVEL LT 7.0%: CPT | Performed by: INTERNAL MEDICINE

## 2022-04-11 PROCEDURE — G0463 HOSPITAL OUTPT CLINIC VISIT: HCPCS | Performed by: INTERNAL MEDICINE

## 2022-04-11 PROCEDURE — 1101F PT FALLS ASSESS-DOCD LE1/YR: CPT | Performed by: INTERNAL MEDICINE

## 2022-04-11 PROCEDURE — G8427 DOCREV CUR MEDS BY ELIG CLIN: HCPCS | Performed by: INTERNAL MEDICINE

## 2022-04-11 PROCEDURE — G8399 PT W/DXA RESULTS DOCUMENT: HCPCS | Performed by: INTERNAL MEDICINE

## 2022-04-11 RX ORDER — MELOXICAM 15 MG/1
TABLET ORAL
COMMUNITY
Start: 2022-01-21 | End: 2022-04-27

## 2022-04-11 NOTE — PROGRESS NOTES
Identified pt with two pt identifiers(name and ). Chief Complaint   Patient presents with    Annual Wellness Visit    Diabetes        Health Maintenance Due   Topic    Shingrix Vaccine Age 50> (1 of 2)    DTaP/Tdap/Td series (1 - Tdap)    Foot Exam Q1     MICROALBUMIN Q1     Bone Densitometry     Medicare Yearly Exam     Lipid Screen        Wt Readings from Last 3 Encounters:   22 150 lb (68 kg)   21 147 lb 6.4 oz (66.9 kg)   10/21/21 148 lb (67.1 kg)     Temp Readings from Last 3 Encounters:   22 97.5 °F (36.4 °C) (Temporal)   21 97.8 °F (36.6 °C) (Oral)   10/21/21 97.8 °F (36.6 °C) (Temporal)     BP Readings from Last 3 Encounters:   22 138/84   21 (!) 159/84   10/21/21 122/80     Pulse Readings from Last 3 Encounters:   22 82   21 81   10/21/21 79         Learning Assessment:  :     Learning Assessment 2021 2021 10/16/2020 2020 12/3/2019 2019 4/3/2019   PRIMARY LEARNER Patient Patient Patient Patient Patient Patient Patient   HIGHEST LEVEL OF EDUCATION - PRIMARY LEARNER  - - - - - - -   BARRIERS PRIMARY LEARNER - - - - - - -   CO-LEARNER CAREGIVER No No No No No No No   PRIMARY LANGUAGE ENGLISH ENGLISH ENGLISH ENGLISH ENGLISH ENGLISH ENGLISH   LEARNER PREFERENCE PRIMARY DEMONSTRATION DEMONSTRATION DEMONSTRATION DEMONSTRATION DEMONSTRATION DEMONSTRATION DEMONSTRATION   ANSWERED BY patient  patient  patient  patient patient patient  patient   RELATIONSHIP SELF SELF SELF SELF SELF SELF SELF       Depression Screening:  :     3 most recent PHQ Screens 2022   Little interest or pleasure in doing things Not at all   Feeling down, depressed, irritable, or hopeless Not at all   Total Score PHQ 2 0       Fall Risk Assessment:  :     Fall Risk Assessment, last 12 mths 2022   Able to walk? Yes   Fall in past 12 months? 0   Do you feel unsteady?  0   Are you worried about falling 0   Is the gait abnormal? -   Number of falls in past 12 months -   Fall with injury? -       Abuse Screening:  :     Abuse Screening Questionnaire 4/11/2022 10/21/2021 9/1/2021 6/1/2021 10/1/2020 5/7/2019 2/21/2018   Do you ever feel afraid of your partner? N N N N N N N   Are you in a relationship with someone who physically or mentally threatens you? N N N N N N N   Is it safe for you to go home? Y Y Y Y Y Y Y       Coordination of Care Questionnaire:  :     1) Have you been to an emergency room, urgent care clinic since your last visit? no   Hospitalized since your last visit? no             2) Have you seen or consulted any other health care providers outside of [address] [place] [address] since your last visit? no  (Include any pap smears or colon screenings in this section.)    3) Do you have an Advance Directive on file? no  Are you interested in receiving information about Advance Directives? no    4. For patients aged 39-70: Has the patient had a colonoscopy / FIT/ Cologuard? Yes - no Care Gap present      If the patient is female:    5. For patients aged 41-77: Has the patient had a mammogram within the past 2 years? Yes - no Care Gap present      6. For patients aged 21-65: Has the patient had a pap smear?  NA - based on age or sex

## 2022-04-11 NOTE — ASSESSMENT & PLAN NOTE
Lab Results   Component Value Date/Time    Hemoglobin A1c 5.3 05/18/2021 01:15 AM    Hemoglobin A1c, External 6.7 10/17/2017 12:00 AM   Well controlled. Will continue to manage.

## 2022-04-11 NOTE — PATIENT INSTRUCTIONS
Medicare Wellness Visit, Female     The best way to live healthy is to have a lifestyle where you eat a well-balanced diet, exercise regularly, limit alcohol use, and quit all forms of tobacco/nicotine, if applicable. Regular preventive services are another way to keep healthy. Preventive services (vaccines, screening tests, monitoring & exams) can help personalize your care plan, which helps you manage your own care. Screening tests can find health problems at the earliest stages, when they are easiest to treat. [facility] follows the current, evidence-based guidelines published by the [facility] Lopez ([facility]) when recommending preventive services for our patients. Because we follow these guidelines, sometimes recommendations change over time as research supports it. (For example, mammograms used to be recommended annually. Even though Medicare will still pay for an annual mammogram, the newer guidelines recommend a mammogram every two years for women of average risk). Of course, you and your doctor may decide to screen more often for some diseases, based on your risk and your co-morbidities (chronic disease you are already diagnosed with). Preventive services for you include:  - Medicare offers their members a free annual wellness visit, which is time for you and your primary care provider to discuss and plan for your preventive service needs. Take advantage of this benefit every year!  -All adults over the age of 72 should receive the recommended pneumonia vaccines. Current USPSTF guidelines recommend a series of two vaccines for the best pneumonia protection.   -All adults should have a flu vaccine yearly and a tetanus vaccine every 10 years.   -All adults age 48 and older should receive the shingles vaccines (series of two vaccines).       -All adults age 38-68 who are overweight should have a diabetes screening test once every three years.   -All adults born between [birth year] and 1964 should be screened once for Hepatitis C.  -Other screening tests and preventive services for persons with diabetes include: an eye exam to screen for diabetic retinopathy, a kidney function test, a foot exam, and stricter control over your cholesterol.   -Cardiovascular screening for adults with routine risk involves an electrocardiogram (ECG) at intervals determined by your doctor.   -Colorectal cancer screenings should be done for adults age 54-65 with no increased risk factors for colorectal cancer. There are a number of acceptable methods of screening for this type of cancer. Each test has its own benefits and drawbacks. Discuss with your doctor what is most appropriate for you during your annual wellness visit. The different tests include: colonoscopy (considered the best screening method), a fecal occult blood test, a fecal DNA test, and sigmoidoscopy.    -A bone mass density test is recommended when a woman turns 65 to screen for osteoporosis. This test is only recommended one time, as a screening. Some providers will use this same test as a disease monitoring tool if you already have osteoporosis. -Breast cancer screenings are recommended every other year for women of normal risk, age 54-69.  -Cervical cancer screenings for women over age 72 are only recommended with certain risk factors.      Here is a list of your current Health Maintenance items (your personalized list of preventive services) with a due date:  Health Maintenance Due   Topic Date Due    Shingles Vaccine (1 of 2) Reviewed    DTaP/Tdap/Td  (1 - Tdap) Reviewed    Albumin Urine Test  Ordered    Bone Densitometry  Ordered    Cholesterol Test   Ordered

## 2022-04-11 NOTE — PROGRESS NOTES
Chief Complaint   Patient presents with    Annual Wellness Visit    Diabetes     This is the Subsequent Medicare Annual Wellness Exam, performed 12 months or more after the Initial AWV or the last Subsequent AWV    I have reviewed the patient's medical history in detail and updated the computerized patient record. Assessment/Plan   Education and counseling provided:  Are appropriate based on today's review and evaluation  End-of-Life planning (with patient's consent)  Pneumococcal Vaccine  Influenza Vaccine  Hepatitis B Vaccine  Colorectal cancer screening tests  Cardiovascular screening blood test  Bone mass measurement (DEXA)  Screening for glaucoma  Diabetes screening test    Diagnoses and all orders for this visit:    1. Medicare annual wellness visit, subsequent   Anticipatory guidance discussed. Immunizations reviewed   HM updated. 2. Rheumatoid arthritis involving both hands with positive rheumatoid factor ([facility] [address]   She is followed by Rheumatology at this time. 3. Type 2 diabetes with nephropathy [facility])  Assessment & Plan:  Lab Results   Component Value Date/Time    Hemoglobin A1c 5.3 05/18/2021 01:15 AM    Hemoglobin A1c, External 6.7 10/17/2017 12:00 AM   Well controlled. Will continue to manage. Orders:  -      DIABETES FOOT EXAM    4. Onychomycosis   Will treat conservatively as it is mild. Suggested Vinegar foot soaks daily. 5. Controlled type 2 diabetes mellitus without complication, without long-term current use of insulin (HCC)  -     HEMOGLOBIN A1C WITH EAG; Future  -     MICROALBUMIN, UR, RAND W/ MICROALB/CREAT RATIO; Future    6. Pure hypercholesterolemia  -     METABOLIC PANEL, COMPREHENSIVE; Future  -     CBC WITH AUTOMATED DIFF; Future  -     LIPID PANEL; Future    7. Vitamin D deficiency  -     VITAMIN D, 25 HYDROXY; Future    8.  Encounter for diabetic foot exam ([facility] [address]  -      DIABETES FOOT EXAM    9. Screening for osteoporosis  -     DEXA BONE DENSITY STUDY AXIAL; Future    10. Post-menopause  -     DEXA BONE DENSITY STUDY AXIAL; Future    11. Encounter for screening mammogram for malignant neoplasm of breast  -     HERMINIA MAMMO BI SCREENING INCL CAD; Future    Other orders  -     diph,pertuss,acel,,tetanus vac,PF, (ADACEL) 2 Lf-(2.5-5-3-5 mcg)-5Lf/0.5 mL syrg vaccine; 0.5 mL by IntraMUSCular route once for 1 dose. I have discussed the diagnosis with the patient and the intended treatment plan as seen in the above orders. The patient has received an after-visit summary and questions were answered concerning future plans. Asked to return should symptoms worsen or not improve with treatment. Any pending labs and studies will be relayed to patient when they become available. Pt verbalizes understanding of plan of care and denies further questions or concerns at this time. Follow-up and Dispositions    · Return in about 1 year (around 4/11/2023), or if symptoms worsen or fail to improve, for Follow up 6 months for chronic issues. Depression Risk Factor Screening     3 most recent PHQ Screens 4/11/2022   Little interest or pleasure in doing things Not at all   Feeling down, depressed, irritable, or hopeless Not at all   Total Score PHQ 2 0       Alcohol & Drug Abuse Risk Screen    Do you average more than 1 drink per night or more than 7 drinks a week:  No  On any one occasion in the past three months have you have had more than 3 drinks containing alcohol:  No          Functional Ability and Level of Safety    Hearing: Hearing is good. Activities of Daily Living: The home contains: no safety equipment. Patient does total self care      Ambulation: with no difficulty     Fall Risk:  Fall Risk Assessment, last 12 mths 4/11/2022   Able to walk? Yes   Fall in past 12 months? 0   Do you feel unsteady? 0   Are you worried about falling 0   Is the gait abnormal? -   Number of falls in past 12 months -   Fall with injury?  -      Abuse Screen:  Patient is not abused       Cognitive Screening    Has your family/caregiver stated any concerns about your memory: no  Cognitive Screening: Normal - Clock Drawing Test, Mini Cog Test     Visit Vitals  /84 (BP 1 Location: Right arm, BP Patient Position: Sitting, BP Cuff Size: Adult)   Pulse 82   Temp 97.5 °F (36.4 °C) (Temporal)   Resp 20   Ht 5' 4\" (1.626 m)   Wt 150 lb (68 kg)   SpO2 97%   BMI 25.75 kg/m²     General appearance: alert, well appearing, and in no distress. Chest: clear to auscultation, no wheezes, rales or rhonchi, symmetric air entry. CVS exam: normal rate, regular rhythm, normal S1, S2, no murmurs, rubs, clicks or gallops. Abdominal exam: soft, nontender, nondistended, no masses or organomegaly. Exam of extremities: peripheral pulses normal, no pedal edema, no clubbing or cyanosis  Skin exam - normal coloration and turgor, no rashes, no suspicious skin lesions noted. Neurological exam reveals alert, oriented, normal speech, no focal findings or movement disorder noted.     Diabetic foot exam:      Left Foot:              Visual Exam: normal               Pulse DP: 2+ (normal)              Filament test: normal sensation               Vibratory sensation: Vibratory sensation: normal                       Right Foot:              Visual Exam: normal               Pulse DP: 2+ (normal)              Filament test: normal sensation               Vibratory sensation: Vibratory sensation: normal      Health Maintenance Due     Health Maintenance Due   Topic Date Due    Shingrix Vaccine Age 49> (1 of 2) Reviewed    DTaP/Tdap/Td series (1 - Tdap) Discussed    Foot Exam Q1  Done    MICROALBUMIN Q1  Ordered    Bone Densitometry  Ordered    Medicare Yearly Exam  4/12/2023    Lipid Screen  Ordered       Patient Care Team   Patient Care Team:  Naomy Umana MD as PCP - General (Internal Medicine)  Naomy Umana [facility]    History     Patient Active Problem List   Diagnosis Code    Diverticulosis K57.90    Infectious gastroenteritis and colitis A09    Benign essential hypertension I10    Diabetes mellitus type II, controlled ([facility] [address] E11.9    Anemia D64.9    Anemia associated with acute blood loss D62    History of rheumatoid arthritis Z87.39    Osteopenia of both lower legs M85.861, M85.862    Type 2 diabetes with nephropathy (HCC) E11.21    Rheumatoid arthritis involving multiple sites with positive rheumatoid factor (HCC) M05.79    Bulging of lumbar intervertebral disc M51.26    Sciatica of left side M54.32     Past Medical History:   Diagnosis Date    Acid reflux     Colitis 04/16/2015    Colitis     Diabetes mellitus, type II ([facility] [address]     Diverticulosis of colon 04/20/2015    Hyperlipemia     Hypertension     Osteopenia     of the spine Dexa done 2014, 2017 and due 2019    RA (rheumatoid arthritis) ([facility] [address]       Past Surgical History:   Procedure Laterality Date    HX COLONOSCOPY  04/20/2015    Due 2023    HX GYN      HX HYSTERECTOMY      HX TOTAL ABDOMINAL HYSTERECTOMY      SC ABDOMEN SURGERY PROC UNLISTED      SC BIOPSY OF BREAST, NEEDLE CORE  2017    benign findings - Fibrosis     Current Outpatient Medications   Medication Sig Dispense Refill    lisinopriL (PRINIVIL, ZESTRIL) 5 mg tablet TAKE ONE TABLET BY MOUTH DAILY 90 Tablet 0    empagliflozin (Jardiance) 25 mg tablet TAKE ONE TABLET BY MOUTH DAILY 30 Tablet 5    atorvastatin (LIPITOR) 10 mg tablet TAKE ONE TABLET BY MOUTH DAILY 90 Tablet 0    OneTouch Ultra Test strip USE ONE STRIP TO TEST DAILY E11.9 50 Strip 0    leflunomide (ARAVA) 20 mg tablet Take 1 Tablet by mouth daily. 30 Tablet 6    gabapentin (NEURONTIN) 300 mg capsule Take 300 mg by mouth two (2) times a day.  diclofenac (VOLTAREN) 1 % gel Apply 4 g to affected area four (4) times daily. PLEASE GIVE GENERIC IF AVAILABLE.  100 g 3    glipiZIDE SR (GLUCOTROL XL) 5 mg CR tablet TAKE ONE TABLET BY MOUTH DAILY 90 Tablet 0    acetaminophen (TYLENOL) 325 mg tablet Take 2 Tablets by mouth every six (6) hours as needed for Pain or Fever. 60 Tablet 0    bisacodyL (DULCOLAX) 10 mg supp Insert 10 mg into rectum daily as needed for Constipation. Please use if no bowel movement for 2 days despite of oral MiraLAX, Starr-Colace like stool softener/stimulant bowel regime. 10 Suppository 0    nystatin (MYCOSTATIN) powder Apply  to affected area four (4) times daily. 1 Bottle 5    ascorbic acid, vitamin C, (Vitamin C) 500 mg tablet Take 500 mg by mouth daily.  PNV PY.60/ZTCUEFR fum/folic ac (PRENATAL PO) Take  by mouth.  fluticasone (FLONASE) 50 mcg/actuation nasal spray 2 sprays per nostril once a day  Indications: Allergic Rhinitis (Patient taking differently: as needed. 2 sprays per nostril once a day  Indications: inflammation of the nose due to an allergy) 1 Bottle 3    cholecalciferol (VITAMIN D3) 1,000 unit cap Take 1,000 Units by mouth daily.  calcium carbonate (TUMS) 200 mg calcium (500 mg) chew Take 1 Tab by mouth daily. PRN      L. ACIDOPHILUS/BIFID. ANIMALIS (DAILY PROBIOTIC PO) Take 1 Tab by mouth once over twenty-four (24) hours.  aspirin delayed-release 81 mg tablet Take 81 mg by mouth daily.  meloxicam (MOBIC) 15 mg tablet  (Patient not taking: Reported on 4/11/2022)      methylPREDNISolone (MEDROL DOSEPACK) 4 mg tablet Take as directed. (Patient not taking: Reported on 12/1/2021) 1 Dose Pack 0    methocarbamoL (ROBAXIN) 500 mg tablet Take 500 mg by mouth four (4) times daily. As needed (Patient not taking: Reported on 12/1/2021)      HYDROcodone-acetaminophen (NORCO) 5-325 mg per tablet 1 Tablet.  (Patient not taking: Reported on 12/1/2021)      hydrOXYchloroQUINE (PLAQUENIL) 200 mg tablet TAKE ONE AND ONE-HALF (1 & 1/2) TABLET BY MOUTH DAILY (Patient not taking: Reported on 12/1/2021) 45 Tab 3    terbinafine HCL (LAMISIL) 250 mg tablet  (Patient not taking: Reported on 12/1/2021)      cyanocobalamin 1,000 mcg tablet Take 1,000 mcg by mouth daily. (Patient not taking: Reported on 4/11/2022)       No Known Allergies    Family History   Problem Relation Age of Onset    Other Mother         diverticulosis    Heart Attack Mother     Heart Failure Father     Pulmonary Embolism Sister     Diabetes Brother     Cancer Brother         brain cancer     Social History     Tobacco Use    Smoking status: Never Smoker    Smokeless tobacco: Never Used   Substance Use Topics    Alcohol use: No       Kylah Bledsoe MD     Patient Instructions     Medicare Wellness Visit, Female     The best way to live healthy is to have a lifestyle where you eat a well-balanced diet, exercise regularly, limit alcohol use, and quit all forms of tobacco/nicotine, if applicable. Regular preventive services are another way to keep healthy. Preventive services (vaccines, screening tests, monitoring & exams) can help personalize your care plan, which helps you manage your own care. Screening tests can find health problems at the earliest stages, when they are easiest to treat. [facility] follows the current, evidence-based guidelines published by the [facility] ([facility]) when recommending preventive services for our patients. Because we follow these guidelines, sometimes recommendations change over time as research supports it. (For example, mammograms used to be recommended annually. Even though Medicare will still pay for an annual mammogram, the newer guidelines recommend a mammogram every two years for women of average risk). Of course, you and your doctor may decide to screen more often for some diseases, based on your risk and your co-morbidities (chronic disease you are already diagnosed with).      Preventive services for you include:  - Medicare offers their members a free annual wellness visit, which is time for you and your primary care provider to discuss and plan for your preventive service needs. Take advantage of this benefit every year!  -All adults over the age of 72 should receive the recommended pneumonia vaccines. Current USPSTF guidelines recommend a series of two vaccines for the best pneumonia protection.   -All adults should have a flu vaccine yearly and a tetanus vaccine every 10 years.   -All adults age 48 and older should receive the shingles vaccines (series of two vaccines). -All adults age 38-68 who are overweight should have a diabetes screening test once every three years.   -All adults born between [birth year] and 1964 should be screened once for Hepatitis C.  -Other screening tests and preventive services for persons with diabetes include: an eye exam to screen for diabetic retinopathy, a kidney function test, a foot exam, and stricter control over your cholesterol.   -Cardiovascular screening for adults with routine risk involves an electrocardiogram (ECG) at intervals determined by your doctor.   -Colorectal cancer screenings should be done for adults age 54-65 with no increased risk factors for colorectal cancer. There are a number of acceptable methods of screening for this type of cancer. Each test has its own benefits and drawbacks. Discuss with your doctor what is most appropriate for you during your annual wellness visit. The different tests include: colonoscopy (considered the best screening method), a fecal occult blood test, a fecal DNA test, and sigmoidoscopy.    -A bone mass density test is recommended when a woman turns 65 to screen for osteoporosis. This test is only recommended one time, as a screening. Some providers will use this same test as a disease monitoring tool if you already have osteoporosis. -Breast cancer screenings are recommended every other year for women of normal risk, age 54-69.  -Cervical cancer screenings for women over age 72 are only recommended with certain risk factors.      Here is a list of your current Health Maintenance items (your personalized list of preventive services) with a due date:  Health Maintenance Due   Topic Date Due    Shingles Vaccine (1 of 2) Reviewed    DTaP/Tdap/Td  (1 - Tdap) Reviewed    Albumin Urine Test  Ordered    Bone Densitometry  Ordered    Cholesterol Test   Ordered

## 2022-04-12 LAB
25(OH)D3 SERPL-MCNC: 57.6 NG/ML (ref 30–100)
ALBUMIN SERPL-MCNC: 4.3 G/DL (ref 3.5–5)
ALBUMIN/GLOB SERPL: 1.2 {RATIO} (ref 1.1–2.2)
ALP SERPL-CCNC: 58 U/L (ref 45–117)
ALT SERPL-CCNC: 40 U/L (ref 12–78)
ANION GAP SERPL CALC-SCNC: 8 MMOL/L (ref 5–15)
AST SERPL-CCNC: 16 U/L (ref 15–37)
BASOPHILS # BLD: 0 K/UL (ref 0–0.1)
BASOPHILS NFR BLD: 1 % (ref 0–1)
BILIRUB SERPL-MCNC: 0.6 MG/DL (ref 0.2–1)
BUN SERPL-MCNC: 24 MG/DL (ref 6–20)
BUN/CREAT SERPL: 21 (ref 12–20)
CALCIUM SERPL-MCNC: 9.5 MG/DL (ref 8.5–10.1)
CHLORIDE SERPL-SCNC: 109 MMOL/L (ref 97–108)
CHOLEST SERPL-MCNC: 154 MG/DL
CO2 SERPL-SCNC: 25 MMOL/L (ref 21–32)
CREAT SERPL-MCNC: 1.14 MG/DL (ref 0.55–1.02)
CREAT UR-MCNC: 58.6 MG/DL
DIFFERENTIAL METHOD BLD: ABNORMAL
EOSINOPHIL # BLD: 0 K/UL (ref 0–0.4)
EOSINOPHIL NFR BLD: 1 % (ref 0–7)
ERYTHROCYTE [DISTWIDTH] IN BLOOD BY AUTOMATED COUNT: 12.2 % (ref 11.5–14.5)
EST. AVERAGE GLUCOSE BLD GHB EST-MCNC: 131 MG/DL
GLOBULIN SER CALC-MCNC: 3.5 G/DL (ref 2–4)
GLUCOSE SERPL-MCNC: 157 MG/DL (ref 65–100)
HBA1C MFR BLD: 6.2 % (ref 4–5.6)
HCT VFR BLD AUTO: 39.4 % (ref 35–47)
HDLC SERPL-MCNC: 60 MG/DL
HDLC SERPL: 2.6 {RATIO} (ref 0–5)
HGB BLD-MCNC: 12.3 G/DL (ref 11.5–16)
IMM GRANULOCYTES # BLD AUTO: 0 K/UL (ref 0–0.04)
IMM GRANULOCYTES NFR BLD AUTO: 0 % (ref 0–0.5)
LDLC SERPL CALC-MCNC: 80.8 MG/DL (ref 0–100)
LYMPHOCYTES # BLD: 0.8 K/UL (ref 0.8–3.5)
LYMPHOCYTES NFR BLD: 26 % (ref 12–49)
MCH RBC QN AUTO: 30.3 PG (ref 26–34)
MCHC RBC AUTO-ENTMCNC: 31.2 G/DL (ref 30–36.5)
MCV RBC AUTO: 97 FL (ref 80–99)
MICROALBUMIN UR-MCNC: 1 MG/DL
MICROALBUMIN/CREAT UR-RTO: 17 MG/G (ref 0–30)
MONOCYTES # BLD: 0.3 K/UL (ref 0–1)
MONOCYTES NFR BLD: 10 % (ref 5–13)
NEUTS SEG # BLD: 1.9 K/UL (ref 1.8–8)
NEUTS SEG NFR BLD: 62 % (ref 32–75)
NRBC # BLD: 0 K/UL (ref 0–0.01)
NRBC BLD-RTO: 0 PER 100 WBC
PLATELET # BLD AUTO: 174 K/UL (ref 150–400)
PMV BLD AUTO: 12.1 FL (ref 8.9–12.9)
POTASSIUM SERPL-SCNC: 4.3 MMOL/L (ref 3.5–5.1)
PROT SERPL-MCNC: 7.8 G/DL (ref 6.4–8.2)
RBC # BLD AUTO: 4.06 M/UL (ref 3.8–5.2)
RBC MORPH BLD: ABNORMAL
SODIUM SERPL-SCNC: 142 MMOL/L (ref 136–145)
TRIGL SERPL-MCNC: 66 MG/DL (ref ?–150)
VLDLC SERPL CALC-MCNC: 13.2 MG/DL
WBC # BLD AUTO: 3 K/UL (ref 3.6–11)

## 2022-04-15 DIAGNOSIS — E11.9 CONTROLLED TYPE 2 DIABETES MELLITUS WITHOUT COMPLICATION, WITHOUT LONG-TERM CURRENT USE OF INSULIN (HCC): ICD-10-CM

## 2022-04-15 RX ORDER — GLIPIZIDE 5 MG/1
TABLET, FILM COATED, EXTENDED RELEASE ORAL
Qty: 90 TABLET | Refills: 0 | Status: SHIPPED | OUTPATIENT
Start: 2022-04-15 | End: 2022-06-30

## 2022-04-21 ENCOUNTER — APPOINTMENT (OUTPATIENT)
Dept: MAMMOGRAPHY | Age: 72
End: 2022-04-21
Attending: INTERNAL MEDICINE
Payer: MEDICARE

## 2022-04-21 ENCOUNTER — HOSPITAL ENCOUNTER (OUTPATIENT)
Dept: MAMMOGRAPHY | Age: 72
Discharge: HOME | End: 2022-04-21
Attending: INTERNAL MEDICINE
Payer: MEDICARE

## 2022-04-21 DIAGNOSIS — Z12.31 ENCOUNTER FOR SCREENING MAMMOGRAM FOR MALIGNANT NEOPLASM OF BREAST: ICD-10-CM

## 2022-04-21 PROCEDURE — 77067 SCR MAMMO BI INCL CAD: CPT

## 2022-04-22 ENCOUNTER — TELEPHONE (OUTPATIENT)
Dept: FAMILY MEDICINE CLINIC | Age: 72
End: 2022-04-22

## 2022-04-22 NOTE — TELEPHONE ENCOUNTER
----- Message from Elan Castor MD sent at 4/22/2022  8:49 AM EDT -----  Mammogram was normal and recommendation is for repeat in 1 year.    Thanks,   Dr. Verna Chilel

## 2022-04-27 ENCOUNTER — TELEPHONE (OUTPATIENT)
Dept: FAMILY MEDICINE CLINIC | Age: 72
End: 2022-04-27

## 2022-04-27 ENCOUNTER — VIRTUAL VISIT (OUTPATIENT)
Dept: FAMILY MEDICINE CLINIC | Age: 72
End: 2022-04-27
Payer: MEDICARE

## 2022-04-27 DIAGNOSIS — J06.9 URI WITH COUGH AND CONGESTION: Primary | ICD-10-CM

## 2022-04-27 DIAGNOSIS — J01.90 ACUTE NON-RECURRENT SINUSITIS, UNSPECIFIED LOCATION: ICD-10-CM

## 2022-04-27 PROCEDURE — 99443 PR PHYS/QHP TELEPHONE EVALUATION 21-30 MIN: CPT | Performed by: INTERNAL MEDICINE

## 2022-04-27 RX ORDER — ASCORBIC ACID 500 MG
500 TABLET ORAL DAILY
Qty: 30 TABLET | Refills: 0 | Status: SHIPPED | OUTPATIENT
Start: 2022-04-27

## 2022-04-27 RX ORDER — SWAB
1 SWAB, NON-MEDICATED MISCELLANEOUS DAILY
Qty: 30 TABLET | Refills: 0
Start: 2022-04-27 | End: 2022-05-27

## 2022-04-27 RX ORDER — AZITHROMYCIN 250 MG/1
TABLET, FILM COATED ORAL
Qty: 6 TABLET | Refills: 0 | Status: SHIPPED | OUTPATIENT
Start: 2022-04-27 | End: 2022-05-02

## 2022-04-27 RX ORDER — GLUCOSAMINE SULFATE 1500 MG
1000 POWDER IN PACKET (EA) ORAL DAILY
Qty: 30 CAPSULE | Refills: 0
Start: 2022-04-27 | End: 2022-05-27

## 2022-04-27 RX ORDER — FLUTICASONE PROPIONATE 50 MCG
SPRAY, SUSPENSION (ML) NASAL
Qty: 1 EACH | Refills: 0
Start: 2022-04-27

## 2022-04-27 NOTE — PROGRESS NOTES
Chief Complaint   Patient presents with    Cough    Cough with sputum       Walter Wong, who was evaluated through a synchronous (real-time) audio only encounter, and/or her healthcare decision maker, is aware that it is a billable service, which includes applicable co-pays, with coverage as determined by her insurance carrier. She provided verbal consent to proceed and patient identification was verified. This visit was conducted pursuant to the emergency declaration under the [address] [place] [address], [address] [place] [address] and the [employer] Act. A caregiver was present when appropriate. Ability to conduct physical exam was limited. The patient was located at home in a state where the provider was licensed to provide care. --Quique Zhang MD on 4/27/2022 at 1:12 PM      Assessment & Plan:   Diagnoses and all orders for this visit:    1. URI with cough and congestion  -     azithromycin (ZITHROMAX) 250 mg tablet; Take 2 tablets today, then take 1 tablet daily    I spent at least 21 minutes on this visit with this established patient. We discussed the expected course, resolution and complications of the diagnosis(es) in detail. Medication risks, benefits, costs, interactions, and alternatives were discussed as indicated. I advised her to contact the office if her condition worsens, changes or fails to improve as anticipated. She expressed understanding with the diagnosis(es) and plan. Subjective:   71F with h/o T2DM, HTN, HLD who recently had dental work about 2-weeks ago. She was treated with amoxicillin for dental abscess. Now, she has had a cough, congestion and brown colored sputum. She stopped the antibiotic about 5-days ago. She tends to get these once a year. She is UTD on her COVID-19 vaccinations. We also spent some of the time updating and reconciling her medication lists.      Patient Active Problem List Diagnosis Date Noted    Sciatica of left side 06/01/2021    Bulging of lumbar intervertebral disc 05/17/2021    Rheumatoid arthritis involving multiple sites with positive rheumatoid factor ([facility] [address] 01/29/2019    Type 2 diabetes with nephropathy ([facility] [address] 04/03/2018    History of rheumatoid arthritis 11/21/2017    Osteopenia of both lower legs 11/21/2017    Diverticulosis 04/20/2015    Infectious gastroenteritis and colitis 04/20/2015    Benign essential hypertension 04/20/2015    Diabetes mellitus type II, controlled ([facility] [address] 04/20/2015    Anemia 04/20/2015    Anemia associated with acute blood loss 04/20/2015       Current Outpatient Medications   Medication Sig Dispense Refill    azithromycin (ZITHROMAX) 250 mg tablet Take 2 tablets today, then take 1 tablet daily 6 Tablet 0    glipiZIDE SR (GLUCOTROL XL) 5 mg CR tablet TAKE ONE TABLET BY MOUTH DAILY 90 Tablet 0    lisinopriL (PRINIVIL, ZESTRIL) 5 mg tablet TAKE ONE TABLET BY MOUTH DAILY 90 Tablet 0    empagliflozin (Jardiance) 25 mg tablet TAKE ONE TABLET BY MOUTH DAILY 30 Tablet 5    atorvastatin (LIPITOR) 10 mg tablet TAKE ONE TABLET BY MOUTH DAILY 90 Tablet 0    OneTouch Ultra Test strip USE ONE STRIP TO TEST DAILY E11.9 50 Strip 0    leflunomide (ARAVA) 20 mg tablet Take 1 Tablet by mouth daily. 30 Tablet 6    aspirin delayed-release 81 mg tablet Take 81 mg by mouth daily.          No Known Allergies      Past Medical History:   Diagnosis Date    Acid reflux     Colitis 04/16/2015    Colitis     Diabetes mellitus, type II ([facility] [address]     Diverticulosis of colon 04/20/2015    Hyperlipemia     Hypertension     Osteopenia     of the spine Dexa done 2014, 2017 and due 2019    RA (rheumatoid arthritis) ([facility] [address]        Past Surgical History:   Procedure Laterality Date    HX COLONOSCOPY  04/20/2015    Due 2023    HX GYN      HX HYSTERECTOMY      HX TOTAL ABDOMINAL HYSTERECTOMY      VA ABDOMEN SURGERY PROC UNLISTED      VA BIOPSY OF BREAST, NEEDLE CORE  2017    benign findings - Fibrosis       Family History   Problem Relation Age of Onset    Other Mother         diverticulosis    Heart Attack Mother     Heart Failure Father     Pulmonary Embolism Sister     Diabetes Brother     Cancer Brother         brain cancer     Social History     Tobacco Use    Smoking status: Never Smoker    Smokeless tobacco: Never Used   Substance Use Topics    Alcohol use: No       Review of Systems   Constitutional: Positive for malaise/fatigue. HENT: Positive for congestion. Eyes: Negative. Respiratory: Positive for sputum production (brown). Cardiovascular: Negative. Gastrointestinal: Negative. Genitourinary: Negative. Musculoskeletal: Negative. Skin: Negative. Neurological: Negative. Endo/Heme/Allergies: Negative. Psychiatric/Behavioral: Negative. All other systems reviewed and are negative. Objective: There were no vitals taken for this visit.     General: alert, cooperative, no distress   Mental  status: normal mood, behavior, speech and thought processes, able to follow commands   Psychiatric: normal affect, consistent with stated mood, no evidence of hallucinations

## 2022-04-27 NOTE — TELEPHONE ENCOUNTER
Pt has cough, congestion and sore throat and wants to know if she can be worked in for virtual as office is booked until next Thursday? If pt can be worked in what day and time?      Call pt at [phone]

## 2022-05-04 ENCOUNTER — OFFICE VISIT (OUTPATIENT)
Dept: RHEUMATOLOGY | Age: 72
End: 2022-05-04
Payer: MEDICARE

## 2022-05-04 VITALS
HEART RATE: 92 BPM | SYSTOLIC BLOOD PRESSURE: 161 MMHG | WEIGHT: 143 LBS | RESPIRATION RATE: 16 BRPM | BODY MASS INDEX: 24.55 KG/M2 | TEMPERATURE: 98.3 F | OXYGEN SATURATION: 98 % | DIASTOLIC BLOOD PRESSURE: 87 MMHG

## 2022-05-04 DIAGNOSIS — M05.741 RHEUMATOID ARTHRITIS INVOLVING BOTH HANDS WITH POSITIVE RHEUMATOID FACTOR (HCC): Primary | ICD-10-CM

## 2022-05-04 DIAGNOSIS — M05.742 RHEUMATOID ARTHRITIS INVOLVING BOTH HANDS WITH POSITIVE RHEUMATOID FACTOR (HCC): Primary | ICD-10-CM

## 2022-05-04 PROCEDURE — G8420 CALC BMI NORM PARAMETERS: HCPCS | Performed by: PEDIATRICS

## 2022-05-04 PROCEDURE — G9899 SCRN MAM PERF RSLTS DOC: HCPCS | Performed by: PEDIATRICS

## 2022-05-04 PROCEDURE — 99214 OFFICE O/P EST MOD 30 MIN: CPT | Performed by: PEDIATRICS

## 2022-05-04 PROCEDURE — G8510 SCR DEP NEG, NO PLAN REQD: HCPCS | Performed by: PEDIATRICS

## 2022-05-04 PROCEDURE — G8753 SYS BP > OR = 140: HCPCS | Performed by: PEDIATRICS

## 2022-05-04 PROCEDURE — G8754 DIAS BP LESS 90: HCPCS | Performed by: PEDIATRICS

## 2022-05-04 PROCEDURE — G8536 NO DOC ELDER MAL SCRN: HCPCS | Performed by: PEDIATRICS

## 2022-05-04 PROCEDURE — 1090F PRES/ABSN URINE INCON ASSESS: CPT | Performed by: PEDIATRICS

## 2022-05-04 PROCEDURE — G8399 PT W/DXA RESULTS DOCUMENT: HCPCS | Performed by: PEDIATRICS

## 2022-05-04 PROCEDURE — G8427 DOCREV CUR MEDS BY ELIG CLIN: HCPCS | Performed by: PEDIATRICS

## 2022-05-04 PROCEDURE — 1101F PT FALLS ASSESS-DOCD LE1/YR: CPT | Performed by: PEDIATRICS

## 2022-05-04 PROCEDURE — 3017F COLORECTAL CA SCREEN DOC REV: CPT | Performed by: PEDIATRICS

## 2022-05-04 PROCEDURE — G0463 HOSPITAL OUTPT CLINIC VISIT: HCPCS | Performed by: PEDIATRICS

## 2022-05-04 NOTE — PROGRESS NOTES
RHEUMATOLOGY PROBLEM LIST AND CHIEF COMPLAINT  1. Rheumatoid Arthritis - diagnosed 30+ years ago, treated to remission with Methotrexate (few years), positive RF, CCP, flare in 2019    Therapy History:   Prior DMARDs: Methotrexate (past response, put into remission), Plaquenil (1/2020-7/2021)  Current NSAIDs: Tylenol Arthritis  Current DMARDs: Leflunomide (4/2019-current, stopped briefly)     HISTORY OF PRESENT ILLNESS  This is a 80 y.o.  female. Today, the patient complains of no joint pain. Location: hip  Severity: 4 on a scale of 0-10  Timing: none at this time   Duration: 4 months  Context/Associated signs and symptoms: The patient reports doing well today. Her chief complaint is pain over her left hip notably when she lays on it for an extended period of time. She states this may be related to her previous hairline fracture. She reports difficulty writing due to pain over her fingers and wrists along with decreased  strength. She continues on Leflunomide 20 mg daily. Labs reviewed included mildly low WBC and mildly abnormal GFR.      RHEUMATOLOGY REVIEW OF SYSTEMS   Positives as per history  Negatives as follows:  Ariel Morse:  Denies unexplained persistent fevers, weight change, chronic fatigue  HEAD/EYES:   Denies eye redness, blurry vision or sudden loss of vision, dry eyes, HA, temporal artery pain  ENT:    Denies oral/nasal ulcers, recurrent sinus infections, dry mouth  RESPIRATORY:  No pleuritic pain, history of pleural effusions, hemoptysis, exertional dyspnea  CARDIOVASCULAR:  Denies chest pain, history of pericardial effusions  GASTRO:   Denies heartburn, esophageal dysmotility, abdominal pain, nausea, vomiting, diarrhea, blood in the stool  HEMATOLOGIC:  No easy bruising, purpura, swollen lymph nodes  SKIN:    Denies alopecia, ulcers, nodules, sun sensitivity, unexplained persistent rash   VASCULAR:   Denies edema, cyanosis, raynaud phenomenon  NEUROLOGIC:  Denies specific muscle weakness, paresthesias   PSYCHIATRIC:  No sleep disturbance / snoring, depression, anxiety  MSK:    No morning stiffness >1 hour, SI joint pain, persistent joint swelling, persistent joint pain    PAST MEDICAL HISTORY  Reviewed with patient, significant changes in medical history - no    PHYSICAL EXAM  Blood pressure (!) 161/87, pulse 92, temperature 98.3 °F (36.8 °C), temperature source Oral, resp. rate 16, weight 143 lb (64.9 kg), SpO2 98 %. GENERAL APPEARANCE: Well-nourished, no acute distress  EYES: No scleral erythema, conjunctival injection  ENT: No oral ulcer, parotid enlargement  NECK: No adenopathy, thyroid enlargement  CARDIOVASCULAR: Heart rhythm is regular. No murmur, rub, gallop  CHEST: Normal vesicular breath sounds. No wheezes, rales, pleural friction rubs  ABDOMINAL: The abdomen is soft and nontender. Bowel sounds are normal  EXTREMITIES: There is no evidence of clubbing, cyanosis, edema  SKIN: No rash, palpable purpura, digital ulcer, abnormal thickening, normal nailfold capillaries   NEUROLOGICAL: Normal gait and station, full strength in upper and lower extremities,  normal sensation to light touch  MUSCULOSKELETAL:   Upper extremities - Right wrist dROM with mild fullness - resolved. Mild synovial thickening of b/l 2nd and 3rd MCP joints. No synovitis noted, decreased dexterity   Lower extremities - B/L bony prominence in knee (L>R). B/L Crepitus in knee (L>R). OA changes in her feet. LABS, RADIOLOGY AND PROCEDURES  Previous labs reviewed -Yes  Previous radiology reviewed -Yes  Previous procedures reviewed -Yes  Previous medical records reviewed/summarized -Yes    ASSESSMENT  1. Rheumatoid Arthritis -(has improved)- The patient's disease remains well managed on her current regimen. She should continue on Leflunomide 20 mg daily. I will provide patient with a referral to hand surgeon (Dr. Luis Alfredo Huitron) for evaluation of her pain. Continue OT exercises at home. Labs are not needed today. Follow up in 6 months. 2. OA -(has worsened)- This is likely contributing to her discomfort. Recommended use of topical Diclofenac and paraffin bath for her hand pain. Tylenol and ROM exercises were recommended. NSAIDs are contraindicated due to kidney disease. 3. Drug therapy monitoring for toxicity (leflunomide) - CBC, BUN, Cr, AST, ALT and albumin every 3 months; eye exams every 6-12 months for retinal toxicity. 4. Trochanteric bursitis -(is unchanged)- This was not discussed at length today. She should continue rehabilitation exercises including piriformas stretch, iliotibial band stretch, straight leg stretch, wall squat with ball and gluteal strengthening as needed. PLAN  1. Leflunomide 20 mg daily  2. Topical Diclofenac, Paraffin bath   3. Tylenol PRN  4. Referral to Dr. Franco Dimas (Dr. Cal Schilder; Dr. Franco Dimas)   5. Return in 6 months    Vy Tillman MD  Adult and Pediatric Rheumatology     [facility], [address] [place] [address], Phone [phone], Fax [phone]     Visiting  of Pediatrics    Department of Pediatrics, [facility] of [address] [place] [address], [address] [place] [address], Phone [phone], Fax [phone]    There are no Patient Instructions on file for this visit.     cc:  Willa Christopher MD    Written by anne Valdes, as dictated by Dr. Daniel Burnette M.D.

## 2022-05-04 NOTE — PROGRESS NOTES
Chief Complaint   Patient presents with    Joint Pain     1. Have you been to the ER, urgent care clinic since your last visit? Hospitalized since your last visit? No    2. Have you seen or consulted any other health care providers outside of the [address] [place] [address] since your last visit? Include any pap smears or colon screening.  No

## 2022-05-05 ENCOUNTER — NURSE TRIAGE (OUTPATIENT)
Dept: OTHER | Facility: CLINIC | Age: 72
End: 2022-05-05

## 2022-05-05 ENCOUNTER — TELEPHONE (OUTPATIENT)
Dept: FAMILY MEDICINE CLINIC | Age: 72
End: 2022-05-05

## 2022-05-05 ENCOUNTER — HOSPITAL ENCOUNTER (OUTPATIENT)
Dept: GENERAL RADIOLOGY | Age: 72
Discharge: HOME | End: 2022-05-05
Payer: MEDICARE

## 2022-05-05 DIAGNOSIS — J98.11 ATELECTASIS: Primary | ICD-10-CM

## 2022-05-05 DIAGNOSIS — R05.3 PERSISTENT COUGH FOR 3 WEEKS OR LONGER: ICD-10-CM

## 2022-05-05 DIAGNOSIS — R05.8 PRODUCTIVE COUGH: ICD-10-CM

## 2022-05-05 DIAGNOSIS — R05.8 PRODUCTIVE COUGH: Primary | ICD-10-CM

## 2022-05-05 PROCEDURE — 71046 X-RAY EXAM CHEST 2 VIEWS: CPT

## 2022-05-05 RX ORDER — PREDNISONE 10 MG/1
TABLET ORAL
Qty: 21 TABLET | Refills: 0 | Status: SHIPPED | OUTPATIENT
Start: 2022-05-05

## 2022-05-05 RX ORDER — ALBUTEROL SULFATE 90 UG/1
2 AEROSOL, METERED RESPIRATORY (INHALATION)
Qty: 18 G | Refills: 3 | Status: SHIPPED | OUTPATIENT
Start: 2022-05-05

## 2022-05-05 RX ORDER — AMOXICILLIN AND CLAVULANATE POTASSIUM 875; 125 MG/1; MG/1
1 TABLET, FILM COATED ORAL EVERY 12 HOURS
Qty: 20 TABLET | Refills: 0 | Status: SHIPPED | OUTPATIENT
Start: 2022-05-05 | End: 2022-05-15

## 2022-05-05 NOTE — TELEPHONE ENCOUNTER
L/M with Dr Trinidad Macias note about going to get a chest x-ray ASAP. Order is in system so she just needs to go to out pt imaging to have done.

## 2022-05-05 NOTE — TELEPHONE ENCOUNTER
Received call from Sher Laws at [facility] with Red Flag Complaint. Subjective: Caller states \"my cough is worse \"     Current Symptoms: Was seen last week and treated with zpak. States she still has cough and congestion and now has more coughing and wheezing at night. Productive cough is still brown. Denies chest pain. Denies asthma history or clots. Has HTN no other cardiac issues. Mild shortness of breath at night. Onset: 2 days ago; worsening    Associated Symptoms: NA    Pain Severity: 0/10; Temperature: no fever  by unknown method    What has been tried: nothing    LMP: NA Pregnant: NA    Recommended disposition: See PCP within 24 Hours    Care advice provided, patient verbalizes understanding; denies any other questions or concerns; instructed to call back for any new or worsening symptoms. Patient/Caller agrees with recommended disposition; writer provided warm transfer to [employer] at [facility] for appointment scheduling    Attention Provider: Thank you for allowing me to participate in the care of your patient. The patient was connected to triage in response to information provided to the [facility]. Please do not respond through this encounter as the response is not directed to a shared pool.         Reason for Disposition   Coughing up dino-colored (reddish-brown) sputum    Protocols used: COUGH - ACUTE PRODUCTIVE-ADULT-AH

## 2022-05-05 NOTE — TELEPHONE ENCOUNTER
----- Message from Ronni Reyes MD sent at 5/5/2022  3:58 PM EDT -----  Please let patient know that her CXR showed no lobar consolidation (that means no pneumonia), but she did have so evidence of mild atelectasis (that is where the lungs aren't getting all the air that they need. Sometimes we see this in asthma and COPD. Given that she is also bringing up sputum, I will put her on Augmentin and also add an inhaler 2 puffs every 4 hours. Would like to avoid steroids because she has diabetes, but I think a very small dose would be OK. I will send everything to the pharmacy.      Thanks,   Dr. Yayo Vinson

## 2022-05-05 NOTE — PROGRESS NOTES
Please let patient know that her CXR showed no lobar consolidation (that means no pneumonia), but she did have so evidence of mild atelectasis (that is where the lungs aren't getting all the air that they need. Sometimes we see this in asthma and COPD. Given that she is also bringing up sputum, I will put her on Augmentin and also add an inhaler 2 puffs every 4 hours. Would like to avoid steroids because she has diabetes, but I think a very small dose would be OK. I will send everything to the pharmacy.      Thanks,   Dr. Eduard Reece

## 2022-05-05 NOTE — TELEPHONE ENCOUNTER
Patient says she is still coughing up brown mucus and has congestion. She finished her antibiotic, but feels like it did not work. Pt would like an in-person appt so that Dr Gt Thomas could listen to her lungs. I explained that we do not see sick pts in office, and she understood. But would like to know what to do next? And is concerned about her lungs.      Call back @ [phone]

## 2022-05-05 NOTE — TELEPHONE ENCOUNTER
L/M that chest x-ray order has been placed so we can see what is going on. Asked pt to go and get done ASAP so we can treat accordingly. Asked pt to call me with any questions.

## 2022-05-06 ENCOUNTER — TELEPHONE (OUTPATIENT)
Dept: FAMILY MEDICINE CLINIC | Age: 72
End: 2022-05-06

## 2022-05-06 ENCOUNTER — NURSE TRIAGE (OUTPATIENT)
Dept: OTHER | Facility: CLINIC | Age: 72
End: 2022-05-06

## 2022-05-06 NOTE — TELEPHONE ENCOUNTER
Received call from Linda at [facility] with "[employer] Subjective: Caller states \"I took all my daily dose of prednisone, took all 6 around 0830, instead of 2 pills as directed( 2 at breakfast, 2 at lunch and 1 at supper and at bedtime. \"     Current Symptoms: Took all 6 Prednisone pills this morning. Blood sugar 327. Prescribed for chest congestion    Denies weakness, dizziness, chest pain, SOB,nausea     Onset: this moring     Associated Symptoms: NA    Pain Severity: 0/10; Temperature: n/a        What has been tried: nothing    LMP: NA Pregnant: NA    Recommended disposition: Call Transferred to PCP office. [facility] office. Spoke to [place]. [facility] spoke to Dr. Darryl Doherty nurse      Care advice provided, patient verbalizes understanding; denies any other questions or concerns; instructed to call back for any new or worsening symptoms. Attention Provider: Thank you for allowing me to participate in the care of your patient. The patient was connected to triage in response to information provided to the [facility]. Please do not respond through this encounter as the response is not directed to a shared pool.         Reason for Disposition   DOUBLE DOSE (an extra dose or lesser amount) of prescription drug and NO symptoms (Exception: a double dose of antibiotics)    Protocols used: MEDICATION QUESTION CALL-ADULT-OH

## 2022-05-06 NOTE — TELEPHONE ENCOUNTER
Spoke to pt and she stated at 8:30 she took the whole days worth of prednisone for a total of 6 pills. She is now concerned. What does she need to do?

## 2022-05-13 DIAGNOSIS — E11.9 CONTROLLED TYPE 2 DIABETES MELLITUS WITHOUT COMPLICATION, WITHOUT LONG-TERM CURRENT USE OF INSULIN (HCC): ICD-10-CM

## 2022-05-13 RX ORDER — BLOOD SUGAR DIAGNOSTIC
STRIP MISCELLANEOUS
Qty: 50 STRIP | Refills: 5 | Status: SHIPPED | OUTPATIENT
Start: 2022-05-13 | End: 2022-07-18 | Stop reason: SDUPTHER

## 2022-05-25 ENCOUNTER — HOSPITAL ENCOUNTER (OUTPATIENT)
Dept: MAMMOGRAPHY | Age: 72
Discharge: HOME | End: 2022-05-25
Attending: INTERNAL MEDICINE
Payer: MEDICARE

## 2022-05-25 DIAGNOSIS — Z78.0 POST-MENOPAUSE: ICD-10-CM

## 2022-05-25 DIAGNOSIS — Z13.820 SCREENING FOR OSTEOPOROSIS: ICD-10-CM

## 2022-05-25 PROCEDURE — 77080 DXA BONE DENSITY AXIAL: CPT

## 2022-06-01 ENCOUNTER — TELEPHONE (OUTPATIENT)
Dept: FAMILY MEDICINE CLINIC | Age: 72
End: 2022-06-01

## 2022-06-01 NOTE — TELEPHONE ENCOUNTER
----- Message from Ru Carty MD sent at 6/1/2022  1:43 PM EDT -----  Bone density showed osteopenia. OSTEOPENIA RECOMMENDATIONS:     - Vitamin D = 4299-4410 IU/day   - Calcium = 1200 mg/day   - Gentle weight bearing exercises   - Bone density screening every 2-years. A pharmacist should be able to show you the best combination of these drugs. We will discuss this more after your follow up visit.      Thanks,   Dr. Haley Morocho

## 2022-06-01 NOTE — PROGRESS NOTES
Bone density showed osteopenia. OSTEOPENIA RECOMMENDATIONS:     - Vitamin D = 8117-1423 IU/day   - Calcium = 1200 mg/day   - Gentle weight bearing exercises   - Bone density screening every 2-years. A pharmacist should be able to show you the best combination of these drugs. We will discuss this more after your follow up visit.      Thanks,   Dr. Candelaria Marquis

## 2022-06-06 ENCOUNTER — TELEPHONE (OUTPATIENT)
Dept: FAMILY MEDICINE CLINIC | Age: 72
End: 2022-06-06

## 2022-06-06 DIAGNOSIS — E78.00 PURE HYPERCHOLESTEROLEMIA: ICD-10-CM

## 2022-06-06 RX ORDER — ATORVASTATIN CALCIUM 10 MG/1
TABLET, FILM COATED ORAL
Qty: 90 TABLET | Refills: 0 | Status: SHIPPED | OUTPATIENT
Start: 2022-06-06 | End: 2022-09-06

## 2022-06-06 NOTE — TELEPHONE ENCOUNTER
Spoke to pt and relayed Dr Hernandez Cord message. She understood. Area L Indication Text: Tumors in this location are included in Area L (trunk and extremities).  Mohs surgery is indicated for larger tumors, or tumors with aggressive histologic features, in these anatomic locations.

## 2022-06-06 NOTE — TELEPHONE ENCOUNTER
----- Message from Catina Jenkins sent at 6/6/2022  8:21 AM EDT -----  Subject: Message to Provider    QUESTIONS  Information for Provider?  calling as pt has been vomiting all   night and has a temp of 100.5 as of 8:15 AM, did give pt Tylenol to get   fever down. Need to know what pt can be given to help with the nausea as   she is not vomiting any longer but is still feeling nauseated. Sugar is at   220 @ about an hour ago.  ---------------------------------------------------------------------------  --------------  CALL BACK INFO  What is the best way for the office to contact you? OK to leave message on   voicemail  Preferred Call Back Phone Number?  [phone]  ---------------------------------------------------------------------------  --------------  SCRIPT ANSWERS  undefined

## 2022-06-15 ENCOUNTER — TELEPHONE (OUTPATIENT)
Dept: FAMILY MEDICINE CLINIC | Age: 72
End: 2022-06-15

## 2022-06-15 RX ORDER — NIRMATRELVIR AND RITONAVIR 300-100 MG
KIT ORAL
Qty: 1 BOX | Refills: 0 | Status: SHIPPED | OUTPATIENT
Start: 2022-06-15

## 2022-06-15 NOTE — TELEPHONE ENCOUNTER
Spoke to pt and symptoms started that day. No fever. Just has congestions and slight cough do to drainage. Drainage now has a slight yellow tinge to it first thing in AM.  tested positive over the week end and was given antivirals to help.

## 2022-06-15 NOTE — TELEPHONE ENCOUNTER
Patient called and said she thinks she needs antibiotics as patient has tested positive for covid Monday 06/13 she states she only congestion but it feels as if she did back in April when Dr. Uriel Meade prescribed her antibiotics last. She was wanting to speak with a nurse in regards to what she can do       [phone]

## 2022-06-29 DIAGNOSIS — I10 ESSENTIAL HYPERTENSION: ICD-10-CM

## 2022-06-29 DIAGNOSIS — E11.9 CONTROLLED TYPE 2 DIABETES MELLITUS WITHOUT COMPLICATION, WITHOUT LONG-TERM CURRENT USE OF INSULIN (HCC): ICD-10-CM

## 2022-06-30 RX ORDER — LISINOPRIL 5 MG/1
TABLET ORAL
Qty: 90 TABLET | Refills: 0 | Status: SHIPPED | OUTPATIENT
Start: 2022-06-30 | End: 2022-10-03

## 2022-06-30 RX ORDER — GLIPIZIDE 5 MG/1
TABLET, FILM COATED, EXTENDED RELEASE ORAL
Qty: 90 TABLET | Refills: 0 | Status: SHIPPED | OUTPATIENT
Start: 2022-06-30 | End: 2022-09-16

## 2022-07-18 ENCOUNTER — OFFICE VISIT (OUTPATIENT)
Dept: FAMILY MEDICINE CLINIC | Age: 72
End: 2022-07-18
Payer: MEDICARE

## 2022-07-18 ENCOUNTER — TELEPHONE (OUTPATIENT)
Dept: FAMILY MEDICINE CLINIC | Age: 72
End: 2022-07-18

## 2022-07-18 VITALS
RESPIRATION RATE: 20 BRPM | BODY MASS INDEX: 23.9 KG/M2 | DIASTOLIC BLOOD PRESSURE: 82 MMHG | OXYGEN SATURATION: 96 % | WEIGHT: 140 LBS | HEART RATE: 81 BPM | TEMPERATURE: 98.4 F | SYSTOLIC BLOOD PRESSURE: 132 MMHG | HEIGHT: 64 IN

## 2022-07-18 DIAGNOSIS — E78.00 PURE HYPERCHOLESTEROLEMIA: ICD-10-CM

## 2022-07-18 DIAGNOSIS — E55.9 VITAMIN D DEFICIENCY: ICD-10-CM

## 2022-07-18 DIAGNOSIS — L30.4 INTERTRIGO: ICD-10-CM
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
## 2022-07-18 DIAGNOSIS — E11.9 CONTROLLED TYPE 2 DIABETES MELLITUS WITHOUT COMPLICATION, WITHOUT LONG-TERM CURRENT USE OF INSULIN (HCC): ICD-10-CM

## 2022-07-18 PROCEDURE — G8536 NO DOC ELDER MAL SCRN: HCPCS | Performed by: INTERNAL MEDICINE

## 2022-07-18 PROCEDURE — 3017F COLORECTAL CA SCREEN DOC REV: CPT | Performed by: INTERNAL MEDICINE

## 2022-07-18 PROCEDURE — 2022F DILAT RTA XM EVC RTNOPTHY: CPT | Performed by: INTERNAL MEDICINE

## 2022-07-18 PROCEDURE — G8754 DIAS BP LESS 90: HCPCS | Performed by: INTERNAL MEDICINE

## 2022-07-18 PROCEDURE — G8399 PT W/DXA RESULTS DOCUMENT: HCPCS | Performed by: INTERNAL MEDICINE

## 2022-07-18 PROCEDURE — G0463 HOSPITAL OUTPT CLINIC VISIT: HCPCS | Performed by: INTERNAL MEDICINE

## 2022-07-18 PROCEDURE — G9899 SCRN MAM PERF RSLTS DOC: HCPCS | Performed by: INTERNAL MEDICINE

## 2022-07-18 PROCEDURE — G8752 SYS BP LESS 140: HCPCS | Performed by: INTERNAL MEDICINE

## 2022-07-18 PROCEDURE — 1123F ACP DISCUSS/DSCN MKR DOCD: CPT | Performed by: INTERNAL MEDICINE

## 2022-07-18 PROCEDURE — 1101F PT FALLS ASSESS-DOCD LE1/YR: CPT | Performed by: INTERNAL MEDICINE

## 2022-07-18 PROCEDURE — G8420 CALC BMI NORM PARAMETERS: HCPCS | Performed by: INTERNAL MEDICINE

## 2022-07-18 PROCEDURE — 99214 OFFICE O/P EST MOD 30 MIN: CPT | Performed by: INTERNAL MEDICINE

## 2022-07-18 PROCEDURE — G8427 DOCREV CUR MEDS BY ELIG CLIN: HCPCS | Performed by: INTERNAL MEDICINE

## 2022-07-18 PROCEDURE — G8432 DEP SCR NOT DOC, RNG: HCPCS | Performed by: INTERNAL MEDICINE

## 2022-07-18 PROCEDURE — 1090F PRES/ABSN URINE INCON ASSESS: CPT | Performed by: INTERNAL MEDICINE

## 2022-07-18 PROCEDURE — 3044F HG A1C LEVEL LT 7.0%: CPT | Performed by: INTERNAL MEDICINE

## 2022-07-18 RX ORDER — ZOSTER VACCINE RECOMBINANT, ADJUVANTED 50 MCG/0.5
0.5 KIT INTRAMUSCULAR ONCE
Qty: 0.5 ML | Refills: 1 | Status: SHIPPED | OUTPATIENT
Start: 2022-07-18 | End: 2022-07-18

## 2022-07-18 RX ORDER — NYSTATIN 100000 [USP'U]/G
POWDER TOPICAL AS NEEDED
COMMUNITY
End: 2022-07-18 | Stop reason: SDUPTHER

## 2022-07-18 RX ORDER — NYSTATIN 100000 [USP'U]/G
POWDER TOPICAL AS NEEDED
Qty: 60 G | Refills: 5 | Status: SHIPPED | OUTPATIENT
Start: 2022-07-18

## 2022-07-18 RX ORDER — BLOOD SUGAR DIAGNOSTIC
STRIP MISCELLANEOUS
Qty: 50 STRIP | Refills: 5 | Status: SHIPPED | OUTPATIENT
Start: 2022-07-18

## 2022-07-18 RX ORDER — IBUPROFEN 800 MG/1
800 TABLET ORAL
COMMUNITY
Start: 2022-05-17

## 2022-07-18 NOTE — PATIENT INSTRUCTIONS
Preventing Falls: Care Instructions  Your Care Instructions     Getting around your home safely can be a challenge if you have injuries or health problems that make it easy for you to fall. Loose rugs and furniture in walkways are among the dangers for many older people who have problems walking or who have poor eyesight. People who have conditions such as arthritis, osteoporosis, or dementia also have to be careful not to fall. You can make your home safer with a few simple measures. Follow-up care is a key part of your treatment and safety. Be sure to make and go to all appointments, and call your doctor if you are having problems. It's also a good idea to know your test results and keep a list of the medicines you take. How can you care for yourself at home? Taking care of yourself  · Exercise regularly to improve your strength, muscle tone, and balance. Walk if you can. Swimming may be a good choice if you cannot walk easily. · Have your vision and hearing checked each year or any time you notice a change. If you have trouble seeing and hearing, you might not be able to avoid objects and could lose your balance. · Know the side effects of the medicines you take. Ask your doctor or pharmacist whether the medicines you take can affect your balance. Sleeping pills or sedatives can affect your balance. · Limit the amount of alcohol you drink. Alcohol can impair your balance and other senses. · Ask your doctor whether calluses or corns on your feet need to be removed. If you wear loose-fitting shoes because of calluses or corns, you can lose your balance and fall. · Talk to your doctor if you have numbness in your feet. · You may get dizzy if you do not drink enough water. To prevent dehydration, drink plenty of fluids. Choose water and other clear liquids.  If you have kidney, heart, or liver disease and have to limit fluids, talk with your doctor before you increase the amount of fluids you drink.  Preventing falls at home  · Remove raised doorway thresholds, throw rugs, and clutter. Repair loose carpet or raised areas in the floor. · Move furniture and electrical cords to keep them out of walking paths. · Use nonskid floor wax, and wipe up spills right away, especially on ceramic tile floors. · If you use a walker or cane, put rubber tips on it. If you use crutches, clean the bottoms of them regularly with an abrasive pad, such as steel wool. · Keep your house well lit, especially [place], and outside walkways. Use night-lights in areas such as hallways and bathrooms. Add extra light switches or use remote switches (such as switches that go on or off when you clap your hands) to make it easier to turn lights on if you have to get up during the night. · Install sturdy handrails on stairways. · Move items in your cabinets so that the things you use a lot are on the lower shelves (about waist level). · Keep a cordless phone and a flashlight with new batteries by your bed. If possible, put a phone in each of the main rooms of your house, or carry a cell phone in case you fall and cannot reach a phone. Or, you can wear a device around your neck or wrist. You push a button that sends a signal for help. · Wear low-heeled shoes that fit well and give your feet good support. Use footwear with nonskid soles. Check the heels and soles of your shoes for wear. Repair or replace worn heels or soles. · Do not wear socks without shoes on wood floors. · Walk on the grass when the sidewalks are slippery. If you live in an area that gets snow and ice in the winter, sprinkle salt on slippery steps and sidewalks. Or ask a family member or friend to do this for you. Preventing falls in the bath  · Install grab bars and nonskid mats inside and outside your shower or tub and near the toilet and sinks. · Use shower chairs and bath benches.   · Use a hand-held shower head that will allow you to sit while showering. · Get into a tub or shower by putting the weaker leg in first. Get out of a tub or shower with your strong side first.  · Repair loose toilet seats and consider installing a raised toilet seat to make getting on and off the toilet easier. · Keep your bathroom door unlocked while you are in the shower. Where can you learn more? Go to http://libby-raeann.info/  Enter G117 in the search box to learn more about \"Preventing Falls: Care Instructions. \"  Current as of: September 8, 2021               Content Version: 13.2  © 2006-2022 [employer]. Care instructions adapted under license by [employer] (which disclaims liability or warranty for this information). If you have questions about a medical condition or this instruction, always ask your healthcare professional. [address] any warranty or liability for your use of this information. Preventing Falls: Care Instructions  Your Care Instructions     Getting around your home safely can be a challenge if you have injuries or health problems that make it easy for you to fall. Loose rugs and furniture in walkways are among the dangers for many older people who have problems walking or who have poor eyesight. People who have conditions such as arthritis, osteoporosis, or dementia also have to be careful not to fall. You can make your home safer with a few simple measures. Follow-up care is a key part of your treatment and safety. Be sure to make and go to all appointments, and call your doctor if you are having problems. It's also a good idea to know your test results and keep a list of the medicines you take. How can you care for yourself at home? Taking care of yourself  · Exercise regularly to improve your strength, muscle tone, and balance. Walk if you can. Swimming may be a good choice if you cannot walk easily.   · Have your vision and hearing checked each year or any time you notice a change. If you have trouble seeing and hearing, you might not be able to avoid objects and could lose your balance. · Know the side effects of the medicines you take. Ask your doctor or pharmacist whether the medicines you take can affect your balance. Sleeping pills or sedatives can affect your balance. · Limit the amount of alcohol you drink. Alcohol can impair your balance and other senses. · Ask your doctor whether calluses or corns on your feet need to be removed. If you wear loose-fitting shoes because of calluses or corns, you can lose your balance and fall. · Talk to your doctor if you have numbness in your feet. · You may get dizzy if you do not drink enough water. To prevent dehydration, drink plenty of fluids. Choose water and other clear liquids. If you have kidney, heart, or liver disease and have to limit fluids, talk with your doctor before you increase the amount of fluids you drink. Preventing falls at home  · Remove raised doorway thresholds, throw rugs, and clutter. Repair loose carpet or raised areas in the floor. · Move furniture and electrical cords to keep them out of walking paths. · Use nonskid floor wax, and wipe up spills right away, especially on ceramic tile floors. · If you use a walker or cane, put rubber tips on it. If you use crutches, clean the bottoms of them regularly with an abrasive pad, such as steel wool. · Keep your house well lit, especially [facility], and outside walkways. Use night-lights in areas such as hallways and bathrooms. Add extra light switches or use remote switches (such as switches that go on or off when you clap your hands) to make it easier to turn lights on if you have to get up during the night. · Install sturdy handrails on stairways. · Move items in your cabinets so that the things you use a lot are on the lower shelves (about waist level). · Keep a cordless phone and a flashlight with new batteries by your bed.  If possible, put a phone in each of the main rooms of your house, or carry a cell phone in case you fall and cannot reach a phone. Or, you can wear a device around your neck or wrist. You push a button that sends a signal for help. · Wear low-heeled shoes that fit well and give your feet good support. Use footwear with nonskid soles. Check the heels and soles of your shoes for wear. Repair or replace worn heels or soles. · Do not wear socks without shoes on wood floors. · Walk on the grass when the sidewalks are slippery. If you live in an area that gets snow and ice in the winter, sprinkle salt on slippery steps and sidewalks. Or ask a family member or friend to do this for you. Preventing falls in the bath  · Install grab bars and nonskid mats inside and outside your shower or tub and near the toilet and sinks. · Use shower chairs and bath benches. · Use a hand-held shower head that will allow you to sit while showering. · Get into a tub or shower by putting the weaker leg in first. Get out of a tub or shower with your strong side first.  · Repair loose toilet seats and consider installing a raised toilet seat to make getting on and off the toilet easier. · Keep your bathroom door unlocked while you are in the shower. Where can you learn more? Go to http://www.mccrary.com/  Enter G117 in the search box to learn more about \"Preventing Falls: Care Instructions. \"  Current as of: September 8, 2021               Content Version: 13.2  © 0216-2763 [email or web address]. Care instructions adapted under license by [employer] (which disclaims liability or warranty for this information). If you have questions about a medical condition or this instruction, always ask your healthcare professional. [place] [address] any warranty or liability for your use of this information.

## 2022-07-18 NOTE — PROGRESS NOTES
Chief Complaint   Patient presents with    Nail Problem    Numbness     LT big toe    Fall     7/2/2022 fell backward    Hip Pain     LT from fall       Assessment/ Plan:   Diagnoses and all orders for this visit:    1. Fall, initial encounter  Comments:  hurt left side of her upper thigh and hip area. Orders:  -     XR SPINE LUMB 2 OR 3 V; Future  -     XR HIP LT W OR WO PELV 2-3 VWS; Future    2. Controlled type 2 diabetes mellitus without complication, without long-term current use of insulin (HCC)  -     glucose blood VI test strips (OneTouch Ultra Test) strip; USE ONE STRIP TO TEST DAILY  -     METABOLIC PANEL, COMPREHENSIVE; Future  -     HEMOGLOBIN A1C WITH EAG; Future    3. Intertrigo  -     nystatin (MYCOSTATIN) powder; Apply  to affected area as needed (intertrigo). 4. Pure hypercholesterolemia  -     METABOLIC PANEL, COMPREHENSIVE; Future  -     CBC WITH AUTOMATED DIFF; Future  -     LIPID PANEL; Future    5. Vitamin D deficiency  -     VITAMIN D, 25 HYDROXY; Future    Other orders  -     varicella-zoster recombinant, PF, (Shingrix, PF,) 50 mcg/0.5 mL susr injection; 0.5 mL by IntraMUSCular route once for 1 dose. 2nd dose in 2-4 months. I have discussed the diagnosis with the patient and the intended treatment plan as seen in the above orders. The patient has received an after-visit summary and questions were answered concerning future plans. Asked to return should symptoms worsen or not improve with treatment. Any pending labs and studies will be relayed to patient when they become available. Pt verbalizes understanding of plan of care and denies further questions or concerns at this time. Follow-up and Dispositions    Return in about 3 months (around 10/18/2022), or if symptoms worsen or fail to improve. Subjective:   Ashlee Eaton is a 80 y.o. female who presents s/p a fall just before the 4th of July when she hit the L-hip and lower back.  She has had surgery on her spine about 6-months ago. She fell after trying to straighten up an area rug in her home. She now has pain over the L-hip and lower back. Needs xrays of the back and hip. She also has had numbness in her L-great toe that proceeded her fall. Mostly, she describes that it is probably because of a fungal infection under the toenail. She is seeing a podiatrist. She denies any pain or open lesions on her feet. She also is here for follow up of T2DM. She reports that her BS have been stable. HISTORICAL  PMH, PSH, FHX, SOCHX, ALLERGIES and MES were reviewed and updated today. Review of Systems  Review of Systems   Musculoskeletal:  Positive for back pain and joint pain (hip and back). All other systems reviewed and are negative. Objective:     Vitals:    07/18/22 1319   BP: 132/82   Pulse: 81   Resp: 20   Temp: 98.4 °F (36.9 °C)   TempSrc: Temporal   SpO2: 96%   Weight: 140 lb (63.5 kg)   Height: 5' 4\" (1.626 m)       Physical Exam  Vitals and nursing note reviewed. Constitutional:       Appearance: Normal appearance. HENT:      Head: Normocephalic and atraumatic. Mouth/Throat:      Mouth: Mucous membranes are moist.   Eyes:      Extraocular Movements: Extraocular movements intact. Conjunctiva/sclera: Conjunctivae normal.      Pupils: Pupils are equal, round, and reactive to light. Cardiovascular:      Rate and Rhythm: Normal rate and regular rhythm. Pulses: Normal pulses. Heart sounds: Normal heart sounds. Pulmonary:      Effort: Pulmonary effort is normal.      Breath sounds: Normal breath sounds. Musculoskeletal:      Cervical back: Normal range of motion and neck supple. Neurological:      General: No focal deficit present. Mental Status: She is alert. Mental status is at baseline. Cranial Nerves: No cranial nerve deficit. Sensory: No sensory deficit. Motor: No weakness.       Coordination: Coordination normal.      Gait: Gait normal.      Deep Tendon Reflexes: Reflexes normal.   Psychiatric:         Mood and Affect: Mood normal.         Behavior: Behavior normal.         Thought Content: Thought content normal.         Judgment: Judgment normal.     Christopher Chew MD  [facility]       Patient Instructions        Preventing Falls: Care Instructions  Your Care Instructions     Getting around your home safely can be a challenge if you have injuries or health problems that make it easy for you to fall. Loose rugs and furniture in walkways are among the dangers for many older people who have problems walking or who have poor eyesight. People who have conditions such as arthritis, osteoporosis, or dementia also have to be careful not to fall. You can make your home safer with a few simple measures. Follow-up care is a key part of your treatment and safety. Be sure to make and go to all appointments, and call your doctor if you are having problems. It's also a good idea to know your test results and keep a list of the medicines you take. How can you care for yourself at home? Taking care of yourself  Exercise regularly to improve your strength, muscle tone, and balance. Walk if you can. Swimming may be a good choice if you cannot walk easily. Have your vision and hearing checked each year or any time you notice a change. If you have trouble seeing and hearing, you might not be able to avoid objects and could lose your balance. Know the side effects of the medicines you take. Ask your doctor or pharmacist whether the medicines you take can affect your balance. Sleeping pills or sedatives can affect your balance. Limit the amount of alcohol you drink. Alcohol can impair your balance and other senses. Ask your doctor whether calluses or corns on your feet need to be removed. If you wear loose-fitting shoes because of calluses or corns, you can lose your balance and fall. Talk to your doctor if you have numbness in your feet.   You may get dizzy if you do not drink enough water. To prevent dehydration, drink plenty of fluids. Choose water and other clear liquids. If you have kidney, heart, or liver disease and have to limit fluids, talk with your doctor before you increase the amount of fluids you drink. Preventing falls at home  Remove raised doorway thresholds, throw rugs, and clutter. Repair loose carpet or raised areas in the floor. Move furniture and electrical cords to keep them out of walking paths. Use nonskid floor wax, and wipe up spills right away, especially on ceramic tile floors. If you use a walker or cane, put rubber tips on it. If you use crutches, clean the bottoms of them regularly with an abrasive pad, such as steel wool. Keep your house well lit, especially stairways, porches, and outside walkways. Use night-lights in areas such as hallways and bathrooms. Add extra light switches or use remote switches (such as switches that go on or off when you clap your hands) to make it easier to turn lights on if you have to get up during the night. Install sturdy handrails on stairways. Move items in your cabinets so that the things you use a lot are on the lower shelves (about waist level). Keep a cordless phone and a flashlight with new batteries by your bed. If possible, put a phone in each of the main rooms of your house, or carry a cell phone in case you fall and cannot reach a phone. Or, you can wear a device around your neck or wrist. You push a button that sends a signal for help. Wear low-heeled shoes that fit well and give your feet good support. Use footwear with nonskid soles. Check the heels and soles of your shoes for wear. Repair or replace worn heels or soles. Do not wear socks without shoes on wood floors. Walk on the grass when the sidewalks are slippery. If you live in an area that gets snow and ice in the winter, sprinkle salt on slippery steps and sidewalks.  Or ask a family member or friend to do this for you.  Preventing falls in the bath  Install grab bars and nonskid mats inside and outside your shower or tub and near the toilet and sinks. Use shower chairs and bath benches. Use a hand-held shower head that will allow you to sit while showering. Get into a tub or shower by putting the weaker leg in first. Get out of a tub or shower with your strong side first.  Repair loose toilet seats and consider installing a raised toilet seat to make getting on and off the toilet easier. Keep your bathroom door unlocked while you are in the shower. Where can you learn more? Go to http://www.mccrary.com/  Enter G117 in the search box to learn more about \"Preventing Falls: Care Instructions. \"  Current as of: September 8, 2021               Content Version: 13.2  © 4644-7686 [employer]. Care instructions adapted under license by [employer] (which disclaims liability or warranty for this information). If you have questions about a medical condition or this instruction, always ask your healthcare professional. [place] [address] any warranty or liability for your use of this information. Preventing Falls: Care Instructions  Your Care Instructions     Getting around your home safely can be a challenge if you have injuries or health problems that make it easy for you to fall. Loose rugs and furniture in walkways are among the dangers for many older people who have problems walking or who have poor eyesight. People who have conditions such as arthritis, osteoporosis, or dementia also have to be careful not to fall. You can make your home safer with a few simple measures. Follow-up care is a key part of your treatment and safety. Be sure to make and go to all appointments, and call your doctor if you are having problems. It's also a good idea to know your test results and keep a list of the medicines you take.   How can you care for yourself at home?  Taking care of yourself  Exercise regularly to improve your strength, muscle tone, and balance. Walk if you can. Swimming may be a good choice if you cannot walk easily. Have your vision and hearing checked each year or any time you notice a change. If you have trouble seeing and hearing, you might not be able to avoid objects and could lose your balance. Know the side effects of the medicines you take. Ask your doctor or pharmacist whether the medicines you take can affect your balance. Sleeping pills or sedatives can affect your balance. Limit the amount of alcohol you drink. Alcohol can impair your balance and other senses. Ask your doctor whether calluses or corns on your feet need to be removed. If you wear loose-fitting shoes because of calluses or corns, you can lose your balance and fall. Talk to your doctor if you have numbness in your feet. You may get dizzy if you do not drink enough water. To prevent dehydration, drink plenty of fluids. Choose water and other clear liquids. If you have kidney, heart, or liver disease and have to limit fluids, talk with your doctor before you increase the amount of fluids you drink. Preventing falls at home  Remove raised doorway thresholds, throw rugs, and clutter. Repair loose carpet or raised areas in the floor. Move furniture and electrical cords to keep them out of walking paths. Use nonskid floor wax, and wipe up spills right away, especially on ceramic tile floors. If you use a walker or cane, put rubber tips on it. If you use crutches, clean the bottoms of them regularly with an abrasive pad, such as steel wool. Keep your house well lit, especially stairways, porches, and outside walkways. Use night-lights in areas such as hallways and bathrooms. Add extra light switches or use remote switches (such as switches that go on or off when you clap your hands) to make it easier to turn lights on if you have to get up during the night.   Install sturdy handrails on stairways. Move items in your cabinets so that the things you use a lot are on the lower shelves (about waist level). Keep a cordless phone and a flashlight with new batteries by your bed. If possible, put a phone in each of the main rooms of your house, or carry a cell phone in case you fall and cannot reach a phone. Or, you can wear a device around your neck or wrist. You push a button that sends a signal for help. Wear low-heeled shoes that fit well and give your feet good support. Use footwear with nonskid soles. Check the heels and soles of your shoes for wear. Repair or replace worn heels or soles. Do not wear socks without shoes on wood floors. Walk on the grass when the sidewalks are slippery. If you live in an area that gets snow and ice in the winter, sprinkle salt on slippery steps and sidewalks. Or ask a family member or friend to do this for you. Preventing falls in the bath  Install grab bars and nonskid mats inside and outside your shower or tub and near the toilet and sinks. Use shower chairs and bath benches. Use a hand-held shower head that will allow you to sit while showering. Get into a tub or shower by putting the weaker leg in first. Get out of a tub or shower with your strong side first.  Repair loose toilet seats and consider installing a raised toilet seat to make getting on and off the toilet easier. Keep your bathroom door unlocked while you are in the shower. Where can you learn more? Go to http://www.mccrary.com/  Enter G117 in the search box to learn more about \"Preventing Falls: Care Instructions. \"  Current as of: September 8, 2021               Content Version: 13.2  © 2809-3934 [employer], [place]. Care instructions adapted under license by [employer] (which disclaims liability or warranty for this information).  If you have questions about a medical condition or this instruction, always ask your healthcare professional. [address] any warranty or liability for your use of this information.

## 2022-07-18 NOTE — TELEPHONE ENCOUNTER
Pharmacy called in regards to nystatin powder instructions. They need more detailed instructions (\"do not exceed\", or how much to apply)     Call their back line at [phone] with verbal instructions or fax new prescription.

## 2022-07-18 NOTE — PROGRESS NOTES
Identified pt with two pt identifiers(name and ). Chief Complaint   Patient presents with    Nail Problem    Numbness     LT big toe    Fall     2022 fell backward    Hip Pain     LT from fall        Health Maintenance Due   Topic    Shingrix Vaccine Age 50> (1 of 2)       Wt Readings from Last 3 Encounters:   22 140 lb (63.5 kg)   22 143 lb (64.9 kg)   22 150 lb (68 kg)     Temp Readings from Last 3 Encounters:   22 98.4 °F (36.9 °C) (Temporal)   22 98.3 °F (36.8 °C) (Oral)   22 97.5 °F (36.4 °C) (Temporal)     BP Readings from Last 3 Encounters:   22 132/82   22 (!) 161/87   22 138/84     Pulse Readings from Last 3 Encounters:   22 81   22 92   22 82         Learning Assessment:  :     Learning Assessment 2022 2021 2021 10/16/2020 2020 12/3/2019 2019   PRIMARY LEARNER Patient Patient Patient Patient Patient Patient Patient   HIGHEST LEVEL OF EDUCATION - PRIMARY LEARNER  - - - - - - -   BARRIERS PRIMARY LEARNER - - - - - - -   CO-LEARNER CAREGIVER No No No No No No No   PRIMARY LANGUAGE ENGLISH ENGLISH ENGLISH ENGLISH ENGLISH ENGLISH ENGLISH   LEARNER PREFERENCE PRIMARY DEMONSTRATION DEMONSTRATION DEMONSTRATION DEMONSTRATION DEMONSTRATION DEMONSTRATION DEMONSTRATION   ANSWERED BY patient  patient  patient  patient  patient patient patient    RELATIONSHIP SELF SELF SELF SELF SELF SELF SELF       Depression Screening:  :     3 most recent PHQ Screens 2022   Little interest or pleasure in doing things Not at all   Feeling down, depressed, irritable, or hopeless Not at all   Total Score PHQ 2 0       Fall Risk Assessment:  :     Fall Risk Assessment, last 12 mths 2022   Able to walk? Yes   Fall in past 12 months? 0   Do you feel unsteady? 0   Are you worried about falling 0   Is the gait abnormal? -   Number of falls in past 12 months -   Fall with injury?  -       Abuse Screening:  :     Abuse Screening Questionnaire 4/11/2022 10/21/2021 9/1/2021 6/1/2021 10/1/2020 5/7/2019 2/21/2018   Do you ever feel afraid of your partner? N N N N N N N   Are you in a relationship with someone who physically or mentally threatens you? N N N N N N N   Is it safe for you to go home? Y Y Y Y Y Y Y       Coordination of Care Questionnaire:  :     1) Have you been to an emergency room, urgent care clinic since your last visit? yes [facility] 6/2022  Hospitalized since your last visit? no             2) Have you seen or consulted any other health care providers outside of [address] [place] [address] since your last visit? no  (Include any pap smears or colon screenings in this section.)    3) Do you have an Advance Directive on file? no  Are you interested in receiving information about Advance Directives? no    Patient is accompanied by  I have received verbal consent from Alpa Harrell to discuss any/all medical information while they are present in the room. 4.  For patients aged 39-70: Has the patient had a colonoscopy / FIT/ Cologuard? Yes - no Care Gap present      If the patient is female:    5. For patients aged 41-77: Has the patient had a mammogram within the past 2 years? Yes - no Care Gap present      6. For patients aged 21-65: Has the patient had a pap smear?  Yes - no Care Gap present

## 2022-07-25 RX ORDER — LEFLUNOMIDE 20 MG/1
TABLET ORAL
Qty: 30 TABLET | Refills: 6 | Status: SHIPPED | OUTPATIENT
Start: 2022-07-25

## 2022-07-26 ENCOUNTER — LAB ONLY (OUTPATIENT)
Dept: FAMILY MEDICINE CLINIC | Age: 72
End: 2022-07-26

## 2022-07-26 ENCOUNTER — HOSPITAL ENCOUNTER (OUTPATIENT)
Dept: GENERAL RADIOLOGY | Age: 72
Discharge: HOME | End: 2022-07-26
Payer: MEDICARE

## 2022-07-26 DIAGNOSIS — E55.9 VITAMIN D DEFICIENCY: ICD-10-CM

## 2022-07-26 DIAGNOSIS — E78.00 PURE HYPERCHOLESTEROLEMIA: ICD-10-CM
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
## 2022-07-26 DIAGNOSIS — E11.9 CONTROLLED TYPE 2 DIABETES MELLITUS WITHOUT COMPLICATION, WITHOUT LONG-TERM CURRENT USE OF INSULIN (HCC): ICD-10-CM

## 2022-07-26 PROCEDURE — 73502 X-RAY EXAM HIP UNI 2-3 VIEWS: CPT

## 2022-07-26 PROCEDURE — 72100 X-RAY EXAM L-S SPINE 2/3 VWS: CPT

## 2022-07-27 LAB
25(OH)D3 SERPL-MCNC: 78.4 NG/ML (ref 30–100)
ALBUMIN SERPL-MCNC: 4.1 G/DL (ref 3.5–5)
ALBUMIN/GLOB SERPL: 1.3 {RATIO} (ref 1.1–2.2)
ALP SERPL-CCNC: 55 U/L (ref 45–117)
ALT SERPL-CCNC: 34 U/L (ref 12–78)
ANION GAP SERPL CALC-SCNC: 9 MMOL/L (ref 5–15)
AST SERPL-CCNC: 16 U/L (ref 15–37)
BASOPHILS # BLD: 0 K/UL (ref 0–0.1)
BASOPHILS NFR BLD: 1 % (ref 0–1)
BILIRUB SERPL-MCNC: 0.6 MG/DL (ref 0.2–1)
BUN SERPL-MCNC: 30 MG/DL (ref 6–20)
BUN/CREAT SERPL: 25 (ref 12–20)
CALCIUM SERPL-MCNC: 9.6 MG/DL (ref 8.5–10.1)
CHLORIDE SERPL-SCNC: 110 MMOL/L (ref 97–108)
CHOLEST SERPL-MCNC: 141 MG/DL
CO2 SERPL-SCNC: 28 MMOL/L (ref 21–32)
CREAT SERPL-MCNC: 1.2 MG/DL (ref 0.55–1.02)
DIFFERENTIAL METHOD BLD: ABNORMAL
EOSINOPHIL # BLD: 0 K/UL (ref 0–0.4)
EOSINOPHIL NFR BLD: 1 % (ref 0–7)
ERYTHROCYTE [DISTWIDTH] IN BLOOD BY AUTOMATED COUNT: 12 % (ref 11.5–14.5)
EST. AVERAGE GLUCOSE BLD GHB EST-MCNC: 128 MG/DL
GLOBULIN SER CALC-MCNC: 3.2 G/DL (ref 2–4)
GLUCOSE SERPL-MCNC: 190 MG/DL (ref 65–100)
HBA1C MFR BLD: 6.1 % (ref 4–5.6)
HCT VFR BLD AUTO: 39.2 % (ref 35–47)
HDLC SERPL-MCNC: 55 MG/DL
HDLC SERPL: 2.6 {RATIO} (ref 0–5)
HGB BLD-MCNC: 12.6 G/DL (ref 11.5–16)
IMM GRANULOCYTES # BLD AUTO: 0 K/UL (ref 0–0.04)
IMM GRANULOCYTES NFR BLD AUTO: 0 % (ref 0–0.5)
LDLC SERPL CALC-MCNC: 67.2 MG/DL (ref 0–100)
LYMPHOCYTES # BLD: 0.7 K/UL (ref 0.8–3.5)
LYMPHOCYTES NFR BLD: 23 % (ref 12–49)
MCH RBC QN AUTO: 31.3 PG (ref 26–34)
MCHC RBC AUTO-ENTMCNC: 32.1 G/DL (ref 30–36.5)
MCV RBC AUTO: 97.3 FL (ref 80–99)
MONOCYTES # BLD: 0.3 K/UL (ref 0–1)
MONOCYTES NFR BLD: 9 % (ref 5–13)
NEUTS SEG # BLD: 2 K/UL (ref 1.8–8)
NEUTS SEG NFR BLD: 66 % (ref 32–75)
NRBC # BLD: 0 K/UL (ref 0–0.01)
NRBC BLD-RTO: 0 PER 100 WBC
PLATELET # BLD AUTO: 151 K/UL (ref 150–400)
PMV BLD AUTO: 12 FL (ref 8.9–12.9)
POTASSIUM SERPL-SCNC: 4.7 MMOL/L (ref 3.5–5.1)
PROT SERPL-MCNC: 7.3 G/DL (ref 6.4–8.2)
RBC # BLD AUTO: 4.03 M/UL (ref 3.8–5.2)
RBC MORPH BLD: ABNORMAL
SODIUM SERPL-SCNC: 147 MMOL/L (ref 136–145)
TRIGL SERPL-MCNC: 94 MG/DL (ref ?–150)
VLDLC SERPL CALC-MCNC: 18.8 MG/DL
WBC # BLD AUTO: 3 K/UL (ref 3.6–11)

## 2022-07-28 ENCOUNTER — TELEPHONE (OUTPATIENT)
Dept: FAMILY MEDICINE CLINIC | Age: 72
End: 2022-07-28

## 2022-07-28 NOTE — PROGRESS NOTES
Xray of the lumbar spine shows no new problems. In fact, there is restoration of the alignment of her spine and the hardware is intact without any loosening.

## 2022-07-28 NOTE — PROGRESS NOTES
Labs were stable. Hemoglobin A1C is 6.1 about the same as 3-months ago. Sodium was elevated, suggesting that she needs to drink more water. Mild kidney disease. Would improve with drinking more water. Needs to follow up in 4 months.

## 2022-07-28 NOTE — PROGRESS NOTES
The xray of the hip is normal and shows no new or acute abnormality. More than likely, the fall is causing soft tissue/muscular pain at this time. No fractures or dislocation of the hip or spine.

## 2022-07-28 NOTE — TELEPHONE ENCOUNTER
----- Message from Naina Muniz MD sent at 7/28/2022  7:47 AM EDT -----  Estel Olvin of the lumbar spine shows no new problems. In fact, there is restoration of the alignment of her spine and the hardware is intact without any loosening.

## 2022-07-28 NOTE — TELEPHONE ENCOUNTER
----- Message from Paul Rahman MD sent at 7/28/2022  7:48 AM EDT -----  The xray of the hip is normal and shows no new or acute abnormality. More than likely, the fall is causing soft tissue/muscular pain at this time. No fractures or dislocation of the hip or spine.

## 2022-07-28 NOTE — TELEPHONE ENCOUNTER
----- Message from Alpa Romeo MD sent at 7/28/2022 12:35 PM EDT -----  Labs were stable. Hemoglobin A1C is 6.1 about the same as 3-months ago. Sodium was elevated, suggesting that she needs to drink more water. Mild kidney disease. Would improve with drinking more water. Needs to follow up in 4 months.

## 2022-08-10 ENCOUNTER — TELEPHONE (OUTPATIENT)
Dept: FAMILY MEDICINE CLINIC | Age: 72
End: 2022-08-10

## 2022-08-10 NOTE — TELEPHONE ENCOUNTER
Spoke to pt and asked her to contact her insurance to see who is in network and let us know who she is seeing and when if she needs a new referral. She understood.

## 2022-08-10 NOTE — TELEPHONE ENCOUNTER
Patient requesting another referral to podiatry. Patient stated that she went once before and did not like the medications she was prescribed by (Leslie Cox DPM). Please return call    Patient did not reach out to her insurance to see who was in network.

## 2022-08-12 ENCOUNTER — TELEPHONE (OUTPATIENT)
Dept: FAMILY MEDICINE CLINIC | Age: 72
End: 2022-08-12

## 2022-08-12 ENCOUNTER — NURSE TRIAGE (OUTPATIENT)
Dept: OTHER | Facility: CLINIC | Age: 72
End: 2022-08-12

## 2022-08-12 NOTE — TELEPHONE ENCOUNTER
Pt wants a referral for podiatry and any doctor that takes medicare is fine  Does not want the same doctor from back in 2020 on file    Pt also has a tingling in her right arm and wants advice as well     Call pt at [phone]

## 2022-08-12 NOTE — TELEPHONE ENCOUNTER
Received call from [place] at [facility] with Red Flag Complaint. Subjective: Caller states \"tingling in the right arm that starts in the upper arm and radiates down the arm\"     Current Symptoms: tingling in the right arm that starts in the upper arm and radiates down the arm    Onset: 5 days ago; gradual    Associated Symptoms:  none    Pain Severity: denies    Temperature: n/a    What has been tried: start back taking calcium pills     LMP: NA Pregnant: NA    Recommended disposition: Go to [facility] for further evaluation of tingling down  The right arm. Care advice provided, patient verbalizes understanding; denies any other questions or concerns; instructed to call back for any new or worsening symptoms. Patient/Caller agrees with recommended disposition; writer provided warm transfer to [facility] for appointment scheduling    Attention Provider: Thank you for allowing me to participate in the care of your patient. The patient was connected to triage in response to information provided to the [facility]. Please do not respond through this encounter as the response is not directed to a shared pool.         Reason for Disposition   Tingling (e.g., pins and needles) of the face, arm or leg on one side of the body, that ispresent now  (Exceptions: chronic/recurrent symptom lasting > 4 weeks or tingling fromknown cause, such as: bumped elbow, carpal tunnel syndrome, pinched nerve, frostbite)    Protocols used: Neurologic Deficit-ADULT-[place]

## 2022-08-18 DIAGNOSIS — M05.79 RHEUMATOID ARTHRITIS INVOLVING MULTIPLE SITES WITH POSITIVE RHEUMATOID FACTOR (HCC): ICD-10-CM

## 2022-08-18 DIAGNOSIS — M25.50 MULTIPLE JOINT PAIN: ICD-10-CM

## 2022-08-18 RX ORDER — DICLOFENAC SODIUM 10 MG/G
GEL TOPICAL
Qty: 100 G | Refills: 0 | Status: SHIPPED | OUTPATIENT
Start: 2022-08-18

## 2022-08-23 ENCOUNTER — OFFICE VISIT (OUTPATIENT)
Dept: FAMILY MEDICINE CLINIC | Age: 72
End: 2022-08-23
Payer: MEDICARE

## 2022-08-23 VITALS
DIASTOLIC BLOOD PRESSURE: 76 MMHG | TEMPERATURE: 97.3 F | RESPIRATION RATE: 18 BRPM | HEART RATE: 84 BPM | SYSTOLIC BLOOD PRESSURE: 132 MMHG | OXYGEN SATURATION: 97 % | BODY MASS INDEX: 23.9 KG/M2 | WEIGHT: 140 LBS | HEIGHT: 64 IN

## 2022-08-23 DIAGNOSIS — R20.0 NUMBNESS AND TINGLING OF RIGHT ARM: Primary | ICD-10-CM

## 2022-08-23 DIAGNOSIS — R20.2 NUMBNESS AND TINGLING OF RIGHT ARM: Primary | ICD-10-CM

## 2022-08-23 PROCEDURE — G8752 SYS BP LESS 140: HCPCS | Performed by: INTERNAL MEDICINE

## 2022-08-23 PROCEDURE — G8420 CALC BMI NORM PARAMETERS: HCPCS | Performed by: INTERNAL MEDICINE

## 2022-08-23 PROCEDURE — G8536 NO DOC ELDER MAL SCRN: HCPCS | Performed by: INTERNAL MEDICINE

## 2022-08-23 PROCEDURE — 1090F PRES/ABSN URINE INCON ASSESS: CPT | Performed by: INTERNAL MEDICINE

## 2022-08-23 PROCEDURE — 3017F COLORECTAL CA SCREEN DOC REV: CPT | Performed by: INTERNAL MEDICINE

## 2022-08-23 PROCEDURE — 1123F ACP DISCUSS/DSCN MKR DOCD: CPT | Performed by: INTERNAL MEDICINE

## 2022-08-23 PROCEDURE — G8754 DIAS BP LESS 90: HCPCS | Performed by: INTERNAL MEDICINE

## 2022-08-23 PROCEDURE — 99213 OFFICE O/P EST LOW 20 MIN: CPT | Performed by: INTERNAL MEDICINE

## 2022-08-23 PROCEDURE — 1101F PT FALLS ASSESS-DOCD LE1/YR: CPT | Performed by: INTERNAL MEDICINE

## 2022-08-23 PROCEDURE — G8399 PT W/DXA RESULTS DOCUMENT: HCPCS | Performed by: INTERNAL MEDICINE

## 2022-08-23 PROCEDURE — G0463 HOSPITAL OUTPT CLINIC VISIT: HCPCS | Performed by: INTERNAL MEDICINE

## 2022-08-23 PROCEDURE — G8427 DOCREV CUR MEDS BY ELIG CLIN: HCPCS | Performed by: INTERNAL MEDICINE

## 2022-08-23 PROCEDURE — G8432 DEP SCR NOT DOC, RNG: HCPCS | Performed by: INTERNAL MEDICINE

## 2022-08-23 PROCEDURE — G9899 SCRN MAM PERF RSLTS DOC: HCPCS | Performed by: INTERNAL MEDICINE

## 2022-08-23 NOTE — PROGRESS NOTES
Identified pt with two pt identifiers(name and ). Chief Complaint   Patient presents with    Tingling     Started in RT upper arm and is now radiating down to hand x 2 wks        Health Maintenance Due   Topic    Shingrix Vaccine Age 50> (1 of 2)    COVID-19 Vaccine (4 - Booster for Moderna series)       Wt Readings from Last 3 Encounters:   22 140 lb (63.5 kg)   22 140 lb (63.5 kg)   22 143 lb (64.9 kg)     Temp Readings from Last 3 Encounters:   22 97.3 °F (36.3 °C) (Temporal)   22 98.4 °F (36.9 °C) (Temporal)   22 98.3 °F (36.8 °C) (Oral)     BP Readings from Last 3 Encounters:   22 132/76   22 132/82   22 (!) 161/87     Pulse Readings from Last 3 Encounters:   22 84   22 81   22 92         Learning Assessment:  :     Learning Assessment 2022 2021 2021 10/16/2020 2020 12/3/2019 2019   PRIMARY LEARNER Patient Patient Patient Patient Patient Patient Patient   HIGHEST LEVEL OF EDUCATION - PRIMARY LEARNER  - - - - - - -   BARRIERS PRIMARY LEARNER - - - - - - -   CO-LEARNER CAREGIVER No No No No No No No   PRIMARY LANGUAGE ENGLISH ENGLISH ENGLISH ENGLISH ENGLISH ENGLISH ENGLISH   LEARNER PREFERENCE PRIMARY DEMONSTRATION DEMONSTRATION DEMONSTRATION DEMONSTRATION DEMONSTRATION DEMONSTRATION DEMONSTRATION   ANSWERED BY patient  patient  patient  patient  patient patient patient    RELATIONSHIP SELF SELF SELF SELF SELF SELF SELF       Depression Screening:  :     3 most recent PHQ Screens 2022   Little interest or pleasure in doing things Not at all   Feeling down, depressed, irritable, or hopeless Not at all   Total Score PHQ 2 0       Fall Risk Assessment:  :     Fall Risk Assessment, last 12 mths 2022   Able to walk? Yes   Fall in past 12 months? 0   Do you feel unsteady? 0   Are you worried about falling 0   Is the gait abnormal? -   Number of falls in past 12 months -   Fall with injury?  -       Abuse Screening:  :     Abuse Screening Questionnaire 4/11/2022 10/21/2021 9/1/2021 6/1/2021 10/1/2020 5/7/2019 2/21/2018   Do you ever feel afraid of your partner? N N N N N N N   Are you in a relationship with someone who physically or mentally threatens you? N N N N N N N   Is it safe for you to go home? Y Y Y Y Y Y Y       Coordination of Care Questionnaire:  :     1) Have you been to an emergency room, urgent care clinic since your last visit? no   Hospitalized since your last visit? no             2) Have you seen or consulted any other health care providers outside of [address] [place] [address] since your last visit? yes  Dr Chele Hoang 8/2022    3) Do you have an Advance Directive on file? no  Are you interested in receiving information about Advance Directives? no    4. For patients aged 39-70: Has the patient had a colonoscopy / FIT/ Cologuard? Yes - no Care Gap present      If the patient is female:    5. For patients aged 41-77: Has the patient had a mammogram within the past 2 years? Yes - no Care Gap present      6. For patients aged 21-65: Has the patient had a pap smear?  NA - based on age or sex

## 2022-08-23 NOTE — PROGRESS NOTES
Chief Complaint   Patient presents with    Tingling     Started in RT upper arm and is now radiating down to hand x 2 wks     Assessment/ Plan:   Diagnoses and all orders for this visit:    1. Numbness and tingling of right arm  -     XR SPINE CERV 4 OR 5 V; Future   We discussed that the tingling and numbness in the right arm could be coming from cervical disc disease. Xray results will help to direct treatment plan. Meanwhile, elevation. Mindful of position while sleeping and Tylenol Arthritis strength if any pain. She was also given some shoulder stretching exercises. I have discussed the diagnosis with the patient and the intended treatment plan as seen in the above orders. The patient has received an after-visit summary and questions were answered concerning future plans. Asked to return should symptoms worsen or not improve with treatment. Any pending labs and studies will be relayed to patient when they become available. Pt verbalizes understanding of plan of care and denies further questions or concerns at this time. Face to face time: 20 minutes  Note preparation and records review day of service: 5 minutes  Total provider time day of service: 25 minutes    Follow-up and Dispositions    Return if symptoms worsen or fail to improve. Subjective:   Amos Gamboa is a 80 y.o. female with h/o T2DM, chronic lumbar radiculopathy s/p laminectomy who presents with acute onset of tingling in the right arm of unclear etiology. First started in the upper shoulder and then down to the right arm. Denies any injury. Does not remember any activity that could have provoked the occurrence. She is otherwise doing well and no other major concerns or issues today. HISTORICAL  PMH, PSH, FHX, SOCHX, ALLERGIES and MES were reviewed and updated today. Review of Systems  Review of Systems   Constitutional: Negative. HENT: Negative. Eyes: Negative. Respiratory: Negative. Cardiovascular: Negative. Gastrointestinal: Negative. Genitourinary: Negative. Musculoskeletal: Negative. Skin: Negative. Neurological:  Positive for tingling (and numbness of right arm). Endo/Heme/Allergies: Negative. Psychiatric/Behavioral: Negative. All other systems reviewed and are negative. Objective:   Visit Vitals  /76 (BP 1 Location: Left upper arm, BP Patient Position: Sitting, BP Cuff Size: Adult)   Pulse 84   Temp 97.3 °F (36.3 °C) (Temporal)   Resp 18   Ht 5' 4\" (1.626 m)   Wt 140 lb (63.5 kg)   SpO2 97%   BMI 24.03 kg/m²         Physical Exam  Vitals and nursing note reviewed. Constitutional:       Appearance: Normal appearance. HENT:      Head: Normocephalic and atraumatic. Mouth/Throat:      Mouth: Mucous membranes are moist.   Eyes:      Extraocular Movements: Extraocular movements intact. Conjunctiva/sclera: Conjunctivae normal.      Pupils: Pupils are equal, round, and reactive to light. Cardiovascular:      Rate and Rhythm: Normal rate and regular rhythm. Pulses: Normal pulses. Heart sounds: Normal heart sounds. Pulmonary:      Effort: Pulmonary effort is normal.      Breath sounds: Normal breath sounds. Musculoskeletal:         General: No swelling, tenderness, deformity or signs of injury. Normal range of motion. Cervical back: Normal range of motion and neck supple. Right lower leg: No edema. Left lower leg: No edema. Neurological:      General: No focal deficit present. Mental Status: She is alert. Mental status is at baseline. Cranial Nerves: No cranial nerve deficit. Sensory: No sensory deficit. Motor: No weakness. Coordination: Coordination normal.      Gait: Gait normal.      Deep Tendon Reflexes: Reflexes normal.   Psychiatric:         Mood and Affect: Mood normal.         Behavior: Behavior normal.         Thought Content:  Thought content normal.         Judgment: Judgment normal.     Olaf Mejia MD  [address] [place] [address]

## 2022-08-29 ENCOUNTER — HOSPITAL ENCOUNTER (OUTPATIENT)
Dept: GENERAL RADIOLOGY | Age: 72
Discharge: HOME | End: 2022-08-29
Payer: MEDICARE

## 2022-08-29 DIAGNOSIS — R20.0 NUMBNESS AND TINGLING OF RIGHT ARM: ICD-10-CM

## 2022-08-29 DIAGNOSIS — R20.2 NUMBNESS AND TINGLING OF RIGHT ARM: ICD-10-CM

## 2022-08-29 PROCEDURE — 72050 X-RAY EXAM NECK SPINE 4/5VWS: CPT

## 2022-08-31 NOTE — PROGRESS NOTES
Please let patient know that her Xray shows multilevel canal and foraminal stenoses. No acute fracture or dislocation, but she has significant cervical disease. I can give her referral to see orthopedist for further evaluation.

## 2022-09-02 ENCOUNTER — TELEPHONE (OUTPATIENT)
Dept: FAMILY MEDICINE CLINIC | Age: 72
End: 2022-09-02

## 2022-09-02 DIAGNOSIS — M48.02 CERVICAL STENOSIS OF SPINAL CANAL: Primary | ICD-10-CM

## 2022-09-02 NOTE — TELEPHONE ENCOUNTER
----- Message from Nahomy Linder MD sent at 8/31/2022  7:40 PM EDT -----  Please let patient know that her Xray shows multilevel canal and foraminal stenoses. No acute fracture or dislocation, but she has significant cervical disease. I can give her referral to see orthopedist for further evaluation.

## 2022-09-06 DIAGNOSIS — E78.00 PURE HYPERCHOLESTEROLEMIA: ICD-10-CM

## 2022-09-06 RX ORDER — ATORVASTATIN CALCIUM 10 MG/1
TABLET, FILM COATED ORAL
Qty: 90 TABLET | Refills: 0 | Status: SHIPPED | OUTPATIENT
Start: 2022-09-06

## 2022-09-16 DIAGNOSIS — E11.9 CONTROLLED TYPE 2 DIABETES MELLITUS WITHOUT COMPLICATION, WITHOUT LONG-TERM CURRENT USE OF INSULIN (HCC): ICD-10-CM

## 2022-09-16 RX ORDER — GLIPIZIDE 5 MG/1
TABLET, FILM COATED, EXTENDED RELEASE ORAL
Qty: 90 TABLET | Refills: 0 | Status: SHIPPED | OUTPATIENT
Start: 2022-09-16

## 2022-10-03 DIAGNOSIS — E11.9 CONTROLLED TYPE 2 DIABETES MELLITUS WITHOUT COMPLICATION, WITHOUT LONG-TERM CURRENT USE OF INSULIN (HCC): ICD-10-CM

## 2022-10-03 DIAGNOSIS — I10 ESSENTIAL HYPERTENSION: ICD-10-CM

## 2022-10-03 RX ORDER — LISINOPRIL 5 MG/1
TABLET ORAL
Qty: 90 TABLET | Refills: 1 | Status: SHIPPED | OUTPATIENT
Start: 2022-10-03

## 2022-11-16 ENCOUNTER — OFFICE VISIT (OUTPATIENT)
Dept: RHEUMATOLOGY | Age: 72
End: 2022-11-16
Payer: MEDICARE

## 2022-11-16 VITALS
SYSTOLIC BLOOD PRESSURE: 190 MMHG | DIASTOLIC BLOOD PRESSURE: 108 MMHG | WEIGHT: 138 LBS | BODY MASS INDEX: 23.69 KG/M2 | TEMPERATURE: 97.9 F | HEART RATE: 78 BPM | OXYGEN SATURATION: 98 % | RESPIRATION RATE: 16 BRPM

## 2022-11-16 DIAGNOSIS — M05.742 RHEUMATOID ARTHRITIS INVOLVING BOTH HANDS WITH POSITIVE RHEUMATOID FACTOR (HCC): Primary | ICD-10-CM

## 2022-11-16 DIAGNOSIS — M05.741 RHEUMATOID ARTHRITIS INVOLVING BOTH HANDS WITH POSITIVE RHEUMATOID FACTOR (HCC): Primary | ICD-10-CM

## 2022-11-16 PROCEDURE — 99214 OFFICE O/P EST MOD 30 MIN: CPT | Performed by: PEDIATRICS

## 2022-11-16 PROCEDURE — 1123F ACP DISCUSS/DSCN MKR DOCD: CPT | Performed by: PEDIATRICS

## 2022-11-16 PROCEDURE — G9899 SCRN MAM PERF RSLTS DOC: HCPCS | Performed by: PEDIATRICS

## 2022-11-16 PROCEDURE — G8755 DIAS BP > OR = 90: HCPCS | Performed by: PEDIATRICS

## 2022-11-16 PROCEDURE — G8510 SCR DEP NEG, NO PLAN REQD: HCPCS | Performed by: PEDIATRICS

## 2022-11-16 PROCEDURE — 3078F DIAST BP <80 MM HG: CPT | Performed by: PEDIATRICS

## 2022-11-16 PROCEDURE — G8399 PT W/DXA RESULTS DOCUMENT: HCPCS | Performed by: PEDIATRICS

## 2022-11-16 PROCEDURE — G0463 HOSPITAL OUTPT CLINIC VISIT: HCPCS | Performed by: PEDIATRICS

## 2022-11-16 PROCEDURE — G8753 SYS BP > OR = 140: HCPCS | Performed by: PEDIATRICS

## 2022-11-16 PROCEDURE — 1090F PRES/ABSN URINE INCON ASSESS: CPT | Performed by: PEDIATRICS

## 2022-11-16 PROCEDURE — 3074F SYST BP LT 130 MM HG: CPT | Performed by: PEDIATRICS

## 2022-11-16 PROCEDURE — 3017F COLORECTAL CA SCREEN DOC REV: CPT | Performed by: PEDIATRICS

## 2022-11-16 PROCEDURE — G8420 CALC BMI NORM PARAMETERS: HCPCS | Performed by: PEDIATRICS

## 2022-11-16 PROCEDURE — G8536 NO DOC ELDER MAL SCRN: HCPCS | Performed by: PEDIATRICS

## 2022-11-16 PROCEDURE — G8427 DOCREV CUR MEDS BY ELIG CLIN: HCPCS | Performed by: PEDIATRICS

## 2022-11-16 PROCEDURE — 1101F PT FALLS ASSESS-DOCD LE1/YR: CPT | Performed by: PEDIATRICS

## 2022-11-16 RX ORDER — LEFLUNOMIDE 20 MG/1
20 TABLET ORAL DAILY
Qty: 90 TABLET | Refills: 1 | Status: SHIPPED | OUTPATIENT
Start: 2022-11-16

## 2022-11-16 NOTE — PROGRESS NOTES
Chief Complaint   Patient presents with    Joint Pain     1. Have you been to the ER, urgent care clinic since your last visit? Hospitalized since your last visit? No    2. Have you seen or consulted any other health care providers outside of the [address] [place] [address] since your last visit? Include any pap smears or colon screening.  No

## 2022-11-16 NOTE — PROGRESS NOTES
RHEUMATOLOGY PROBLEM LIST AND CHIEF COMPLAINT  1. Rheumatoid Arthritis - diagnosed 30+ years ago, treated to remission with Methotrexate (few years), positive RF, CCP, flare in 2019    Therapy History:   Prior DMARDs: Methotrexate (past response, put into remission), Plaquenil (1/2020-7/2021)  Current NSAIDs: Tylenol Arthritis  Current DMARDs: Leflunomide (4/2019-current, stopped briefly)     HISTORY OF PRESENT ILLNESS  This is a 80 y.o.  female. Today, the patient complains of no joint pain. Location: hands, neck, shoulder  Severity: 5 on a scale of 0-10  Timing: intermittent    Duration: 4 months  Context/Associated signs and symptoms: The patient continues to have hand pain. She saw Dr. Adama Spencer who referred her to hand therapy. She has been working with hand therapy to improve her  strength. She is also in PT for neck pain and shoulder pain. Notes an occasional shooting pain / tingling from her neck down her right arm. She has been using topical diclofenac which helps with pain. She continues on leflunomide 20 mg daily for RA.  Of note, her BP today is elevated at 190/108, but she did not take her medication this AM.     RHEUMATOLOGY REVIEW OF SYSTEMS   Positives as per history  Negatives as follows:  CONSTITUTlONAL:  Denies unexplained persistent fevers, weight change, chronic fatigue  HEAD/EYES:   Denies eye redness, blurry vision or sudden loss of vision, dry eyes, HA, temporal artery pain  ENT:    Denies oral/nasal ulcers, recurrent sinus infections, dry mouth  RESPIRATORY:  No pleuritic pain, history of pleural effusions, hemoptysis, exertional dyspnea  CARDIOVASCULAR:  Denies chest pain, history of pericardial effusions  GASTRO:   Denies heartburn, esophageal dysmotility, abdominal pain, nausea, vomiting, diarrhea, blood in the stool  HEMATOLOGIC:  No easy bruising, purpura, swollen lymph nodes  SKIN:    Denies alopecia, ulcers, nodules, sun sensitivity, unexplained persistent rash VASCULAR:   Denies edema, cyanosis, raynaud phenomenon  NEUROLOGIC:  Denies specific muscle weakness, paresthesias   PSYCHIATRIC:  No sleep disturbance / snoring, depression, anxiety  MSK:    No morning stiffness >1 hour, SI joint pain, persistent joint swelling, persistent joint pain    PAST MEDICAL HISTORY  Reviewed with patient, significant changes in medical history - no    PHYSICAL EXAM  Blood pressure (!) 190/108, pulse 78, temperature 97.9 °F (36.6 °C), temperature source Oral, resp. rate 16, weight 138 lb (62.6 kg), SpO2 98 %. GENERAL APPEARANCE: Well-nourished, no acute distress  EYES: No scleral erythema, conjunctival injection  ENT: No oral ulcer, parotid enlargement  NECK: No adenopathy, thyroid enlargement  CARDIOVASCULAR: Heart rhythm is regular. No murmur, rub, gallop  CHEST: Normal vesicular breath sounds. No wheezes, rales, pleural friction rubs  ABDOMINAL: The abdomen is soft and nontender. Bowel sounds are normal  EXTREMITIES: There is no evidence of clubbing, cyanosis, edema  SKIN: No rash, palpable purpura, digital ulcer, abnormal thickening, normal nailfold capillaries   NEUROLOGICAL: Normal gait and station, full strength in upper and lower extremities,  normal sensation to light touch  MUSCULOSKELETAL:   Upper extremities - Right wrist dROM with mild fullness - resolved. Mild synovial thickening of b/l 2nd and 3rd MCP joints. No synovitis noted, decreased dexterity   Lower extremities - B/L bony prominence in knee (L>R). B/L Crepitus in knee (L>R). OA changes in her feet. LABS, RADIOLOGY AND PROCEDURES  Previous labs reviewed -Yes  Previous radiology reviewed -Yes  Previous procedures reviewed -Yes  Previous medical records reviewed/summarized -Yes    ASSESSMENT  1. Rheumatoid Arthritis -(has improved)- The patient's disease remains well managed on her current regimen. She should continue on Leflunomide 20 mg daily.  I ordered lab work today to be done in January 2023.    2. OA -(has worsened)- This is likely contributing to her discomfort. Recommended use of topical Diclofenac and paraffin bath for her hand pain. She should continue with hand therapy and PT. Tylenol and ROM exercises were recommended. NSAIDs are contraindicated due to kidney disease. I recommend having an EMG if the tingling in her right arm does not improve. 3. Drug therapy monitoring for toxicity (leflunomide) - CBC, BUN, Cr, AST, ALT and albumin every 3 months; eye exams every 6-12 months for retinal toxicity. 4. Trochanteric bursitis -(is unchanged)- This was not discussed at length today. She should continue rehabilitation exercises including piriformas stretch, iliotibial band stretch, straight leg stretch, wall squat with ball and gluteal strengthening as needed. PLAN  1. Leflunomide 20 mg daily  2. Topical Diclofenac, Paraffin bath   3. Check CBC and CMP in January 2023  4. Return in 6 months    Vy Munoz MD  Adult and Pediatric Rheumatology     [facility], [address] [place] [address], Phone [phone], Fax [phone]    Visiting  of Pediatrics    Department of Pediatrics, [facility] of [address] [place] [address], [address] [place] [address], Phone [phone], Fax [phone]    There are no Patient Instructions on file for this visit. cc:  MD MARK Bauer, Mel Andrews MD, personally performed the services described in the documentation as scribed by Chris Montejo in my presence and have reviewed and agree with the note as scribed.

## 2022-12-14 DIAGNOSIS — E78.00 PURE HYPERCHOLESTEROLEMIA: ICD-10-CM

## 2022-12-14 RX ORDER — ATORVASTATIN CALCIUM 10 MG/1
TABLET, FILM COATED ORAL
Qty: 90 TABLET | Refills: 0 | Status: SHIPPED | OUTPATIENT
Start: 2022-12-14

## 2022-12-23 DIAGNOSIS — E11.9 CONTROLLED TYPE 2 DIABETES MELLITUS WITHOUT COMPLICATION, WITHOUT LONG-TERM CURRENT USE OF INSULIN (HCC): ICD-10-CM

## 2022-12-23 RX ORDER — GLIPIZIDE 5 MG/1
TABLET, FILM COATED, EXTENDED RELEASE ORAL
Qty: 90 TABLET | Refills: 0 | Status: SHIPPED | OUTPATIENT
Start: 2022-12-23

## 2023-01-12 ENCOUNTER — OFFICE VISIT (OUTPATIENT)
Dept: FAMILY MEDICINE CLINIC | Age: 73
End: 2023-01-12
Payer: MEDICARE

## 2023-01-12 VITALS
RESPIRATION RATE: 17 BRPM | HEIGHT: 64 IN | WEIGHT: 140.2 LBS | HEART RATE: 70 BPM | DIASTOLIC BLOOD PRESSURE: 90 MMHG | BODY MASS INDEX: 23.93 KG/M2 | OXYGEN SATURATION: 99 % | TEMPERATURE: 97.7 F | SYSTOLIC BLOOD PRESSURE: 172 MMHG

## 2023-01-12 DIAGNOSIS — I25.10 CORONARY ARTERY DISEASE INVOLVING NATIVE CORONARY ARTERY OF NATIVE HEART WITHOUT ANGINA PECTORIS: Primary | ICD-10-CM

## 2023-01-12 DIAGNOSIS — R30.0 DYSURIA: ICD-10-CM

## 2023-01-12 DIAGNOSIS — B37.0 THRUSH: ICD-10-CM

## 2023-01-12 DIAGNOSIS — J06.9 URI WITH COUGH AND CONGESTION: ICD-10-CM

## 2023-01-12 DIAGNOSIS — N18.1 STAGE 1 CHRONIC KIDNEY DISEASE: ICD-10-CM

## 2023-01-12 DIAGNOSIS — R73.03 PREDIABETES: ICD-10-CM

## 2023-01-12 LAB
BILIRUB UR QL STRIP: NEGATIVE
GLUCOSE UR-MCNC: NORMAL MG/DL
KETONES P FAST UR STRIP-MCNC: NEGATIVE MG/DL
PH UR STRIP: 5 [PH] (ref 4.6–8)
PROT UR QL STRIP: NEGATIVE
SP GR UR STRIP: 1.01 (ref 1–1.03)
UA UROBILINOGEN AMB POC: NORMAL (ref 0.2–1)
URINALYSIS CLARITY POC: CLEAR
URINALYSIS COLOR POC: YELLOW
URINE BLOOD POC: NEGATIVE
URINE LEUKOCYTES POC: NEGATIVE
URINE NITRITES POC: NEGATIVE

## 2023-01-12 PROCEDURE — G0463 HOSPITAL OUTPT CLINIC VISIT: HCPCS | Performed by: FAMILY MEDICINE

## 2023-01-12 PROCEDURE — 81001 URINALYSIS AUTO W/SCOPE: CPT | Performed by: FAMILY MEDICINE

## 2023-01-12 RX ORDER — AZITHROMYCIN 250 MG/1
TABLET, FILM COATED ORAL
Qty: 6 TABLET | Refills: 0 | Status: SHIPPED | OUTPATIENT
Start: 2023-01-12 | End: 2023-01-17

## 2023-01-12 RX ORDER — AMLODIPINE BESYLATE 10 MG/1
10 TABLET ORAL DAILY
Qty: 90 TABLET | Refills: 0 | Status: SHIPPED | OUTPATIENT
Start: 2023-01-12 | End: 2023-01-12

## 2023-01-12 RX ORDER — DOXYLAMINE/DM/ACETAMINOPHEN/GG 6.25-10 MG
1 CAPSULE, SEQUENTIAL ORAL EVERY 12 HOURS
Qty: 120 CAPSULE | Refills: 0 | Status: SHIPPED | OUTPATIENT
Start: 2023-01-12

## 2023-01-12 RX ORDER — NYSTATIN 100000 [USP'U]/ML
200000 SUSPENSION ORAL 4 TIMES DAILY
Qty: 473 ML | Refills: 0 | Status: SHIPPED | OUTPATIENT
Start: 2023-01-12

## 2023-01-12 RX ORDER — METOPROLOL TARTRATE 25 MG/1
25 TABLET, FILM COATED ORAL 2 TIMES DAILY
Qty: 60 TABLET | Refills: 0 | Status: SHIPPED | OUTPATIENT
Start: 2023-01-12 | End: 2023-01-12

## 2023-01-12 NOTE — PROGRESS NOTES
Identified pt with two pt identifiers(name and ). Chief Complaint   Patient presents with    Cold Symptoms     Cough, sinus pressure and headache going on 2 weeks now         Health Maintenance Due   Topic    Shingles Vaccine (1 of 2)    COVID-19 Vaccine (4 - Booster for Moderna series)    Flu Vaccine (1)       Wt Readings from Last 3 Encounters:   22 138 lb (62.6 kg)   22 140 lb (63.5 kg)   22 140 lb (63.5 kg)     Temp Readings from Last 3 Encounters:   22 97.9 °F (36.6 °C) (Oral)   22 97.3 °F (36.3 °C) (Temporal)   22 98.4 °F (36.9 °C) (Temporal)     BP Readings from Last 3 Encounters:   22 (!) 190/108   22 132/76   22 132/82     Pulse Readings from Last 3 Encounters:   22 78   22 84   22 81         Learning Assessment:  :     Learning Assessment 2022 2022 2021 2021 10/16/2020 2020 12/3/2019   PRIMARY LEARNER Patient Patient Patient Patient Patient Patient Patient   HIGHEST LEVEL OF EDUCATION - PRIMARY LEARNER  - - - - - - -   BARRIERS PRIMARY LEARNER - - - - - - -   CO-LEARNER CAREGIVER - No No No No No No   PRIMARY LANGUAGE ENGLISH ENGLISH ENGLISH ENGLISH ENGLISH ENGLISH ENGLISH   LEARNER PREFERENCE PRIMARY DEMONSTRATION DEMONSTRATION DEMONSTRATION DEMONSTRATION DEMONSTRATION DEMONSTRATION DEMONSTRATION   ANSWERED BY patient patient  patient  patient  patient  patient patient   RELATIONSHIP SELF SELF SELF SELF SELF SELF SELF       Depression Screening:  :     3 most recent PHQ Screens 2022   Little interest or pleasure in doing things Not at all   Feeling down, depressed, irritable, or hopeless Not at all   Total Score PHQ 2 0       Fall Risk Assessment:  :     Fall Risk Assessment, last 12 mths 2022   Able to walk? Yes   Fall in past 12 months? 0   Do you feel unsteady? 0   Are you worried about falling 0   Is the gait abnormal? -   Number of falls in past 12 months -   Fall with injury?  - Abuse Screening:  :     Abuse Screening Questionnaire 4/11/2022 10/21/2021 9/1/2021 6/1/2021 10/1/2020 5/7/2019 2/21/2018   Do you ever feel afraid of your partner? N N N N N N N   Are you in a relationship with someone who physically or mentally threatens you? N N N N N N N   Is it safe for you to go home? Y Y Y Y Y Y Y       Coordination of Care Questionnaire:  :     1) Have you been to an emergency room, urgent care clinic since your last visit? no   Hospitalized since your last visit? no             2) Have you seen or consulted any other health care providers outside of [address] [place] [address] since your last visit? no  (Include any pap smears or colon screenings in this section.)    3) Do you have an Advance Directive on file? no  Are you interested in receiving information about Advance Directives? no    Patient is accompanied by self I have received verbal consent from [facility] to discuss any/all medical information while they are present in the room. 4.  For patients aged 39-70: Has the patient had a colonoscopy / FIT/ Cologuard? Yes - no Care Gap present      If the patient is female:    5. For patients aged 41-77: Has the patient had a mammogram within the past 2 years? Yes - no Care Gap present      6. For patients aged 21-65: Has the patient had a pap smear?  NA - based on age or sex

## 2023-01-13 ENCOUNTER — TELEPHONE (OUTPATIENT)
Dept: FAMILY MEDICINE CLINIC | Age: 73
End: 2023-01-13

## 2023-01-13 LAB
APPEARANCE UR: CLEAR
BACTERIA URNS QL MICRO: NEGATIVE /HPF
BILIRUB UR QL: NEGATIVE
COLOR UR: ABNORMAL
EPITH CASTS URNS QL MICRO: ABNORMAL /LPF
GLUCOSE UR STRIP.AUTO-MCNC: >1000 MG/DL
HGB UR QL STRIP: NEGATIVE
KETONES UR QL STRIP.AUTO: NEGATIVE MG/DL
LEUKOCYTE ESTERASE UR QL STRIP.AUTO: NEGATIVE
NITRITE UR QL STRIP.AUTO: NEGATIVE
PH UR STRIP: 6 (ref 5–8)
PROT UR STRIP-MCNC: NEGATIVE MG/DL
RBC #/AREA URNS HPF: ABNORMAL /HPF (ref 0–5)
SP GR UR REFRACTOMETRY: 1.01 (ref 1–1.03)
UROBILINOGEN UR QL STRIP.AUTO: 0.2 EU/DL (ref 0.2–1)
WBC URNS QL MICRO: ABNORMAL /HPF (ref 0–4)

## 2023-01-13 NOTE — TELEPHONE ENCOUNTER
Pt wants clarification on the blood pressure medication she was prescribed.  Unsure if she should take it because it is new to her    [phone]

## 2023-01-14 LAB
BACTERIA SPEC CULT: NORMAL
SERVICE CMNT-IMP: NORMAL

## 2023-01-15 NOTE — PROGRESS NOTES
Александр Chang (: 1950) is a 71 y.o. female, established patient, here for evaluation of the following chief complaint(s):  Cold Symptoms (Cough, sinus pressure and headache going on 2 weeks now )       ASSESSMENT/PLAN:  Below is the assessment and plan developed based on review of pertinent history, physical exam, labs, studies, and medications. 1. Coronary artery disease involving native coronary artery of native heart without angina pectoris  -     ECHO ADULT COMPLETE; Future  2. Stage 1 chronic kidney disease  -     ECHO ADULT COMPLETE; Future  -     URINALYSIS W/MICROSCOPIC; Future  -     CULTURE, URINE; Future  3. URI with cough and congestion  -     doxylamine-DM-acetaminophen-gg (Coricidin HBP Max Cold-Flu D-N) 6.25 mg-10 mg- 325 mg (nt) CpSQ; Take 1 Capsule by mouth every twelve (12) hours. , Normal, Disp-120 Capsule, R-0  -     azithromycin (ZITHROMAX) 250 mg tablet; Take 2 tablets today, then take 1 tablet daily, Normal, Disp-6 Tablet, R-0  -     nystatin (MYCOSTATIN) 100,000 unit/mL suspension; Take 2 mL by mouth four (4) times daily. swish and spit, Normal, Disp-473 mL, R-0  4. Thrush  -     nystatin (MYCOSTATIN) 100,000 unit/mL suspension; Take 2 mL by mouth four (4) times daily. swish and spit, Normal, Disp-473 mL, R-0  -     AMB POC URINALYSIS DIP STICK AUTO W/ MICRO  5. Prediabetes   -     CULTURE, URINE; Future  6. Dysuria  -     AMB POC URINALYSIS DIP STICK AUTO W/ MICRO      No follow-ups on file. SUBJECTIVE/OBJECTIVE:  Subjective:     Александр Chang is an 71 y.o. female here for evaluation of cough. The cough is non-productive, without wheezing, dyspnea or hemoptysis and is aggravated by nothing. Onset of symptoms was 2 weeks ago, unchanged since that time. Associated symptoms include chest pain, SOB, wheezing. Patient does not have a history of asthma. Patient does not have a history of environmental allergens. Patient does not have recent travel.  Patient does not have a history of smoking. Patient  does not have previous Chest X-ray. Patient does not have had a PPD done. Review of Systems   Constitutional:  Negative for chills, fatigue and fever. HENT:  Positive for congestion, mouth sores, postnasal drip and rhinorrhea. Eyes:  Positive for discharge, redness and itching. Negative for visual disturbance. Respiratory:  Positive for cough and wheezing. Negative for chest tightness and shortness of breath. Cardiovascular: Negative. Gastrointestinal: Negative. Endocrine: Negative. Genitourinary: Negative. Musculoskeletal: Negative. Allergic/Immunologic: Positive for environmental allergies. Neurological: Negative. Hematological: Negative. Psychiatric/Behavioral: Negative. Physical Exam  Constitutional:       Appearance: Normal appearance. She is normal weight. HENT:      Head: Normocephalic and atraumatic. Right Ear: External ear normal.      Left Ear: External ear normal.      Nose: Nose normal.      Mouth/Throat:      Mouth: Mucous membranes are moist.      Comments: thrush  Eyes:      Extraocular Movements: Extraocular movements intact. Conjunctiva/sclera: Conjunctivae normal.      Pupils: Pupils are equal, round, and reactive to light. Cardiovascular:      Rate and Rhythm: Normal rate. Pulses: Normal pulses. Heart sounds: Normal heart sounds. Pulmonary:      Effort: Pulmonary effort is normal.      Breath sounds: Normal breath sounds. Abdominal:      General: Abdomen is flat. Bowel sounds are normal.      Palpations: Abdomen is soft. Musculoskeletal:      Cervical back: Normal range of motion and neck supple. Skin:     General: Skin is warm. Capillary Refill: Capillary refill takes less than 2 seconds. Neurological:      General: No focal deficit present. Mental Status: She is alert and oriented to person, place, and time. Mental status is at baseline.    Psychiatric:         Mood and Affect: Mood normal.         Behavior: Behavior normal.         Thought Content: Thought content normal.         Judgment: Judgment normal.           An electronic signature was used to authenticate this note.   -- Laila Ovalle MD

## 2023-01-19 ENCOUNTER — OFFICE VISIT (OUTPATIENT)
Dept: FAMILY MEDICINE CLINIC | Age: 73
End: 2023-01-19
Payer: MEDICARE

## 2023-01-19 VITALS
TEMPERATURE: 97.4 F | OXYGEN SATURATION: 99 % | WEIGHT: 139.2 LBS | HEIGHT: 64 IN | RESPIRATION RATE: 17 BRPM | SYSTOLIC BLOOD PRESSURE: 132 MMHG | HEART RATE: 72 BPM | BODY MASS INDEX: 23.76 KG/M2 | DIASTOLIC BLOOD PRESSURE: 78 MMHG

## 2023-01-19 DIAGNOSIS — I10 PRIMARY HYPERTENSION: Primary | ICD-10-CM

## 2023-01-19 PROCEDURE — G0463 HOSPITAL OUTPT CLINIC VISIT: HCPCS | Performed by: FAMILY MEDICINE

## 2023-01-19 RX ORDER — AMLODIPINE BESYLATE 10 MG/1
TABLET ORAL
COMMUNITY
Start: 2023-01-12

## 2023-01-19 RX ORDER — METOPROLOL TARTRATE 25 MG/1
TABLET, FILM COATED ORAL
COMMUNITY
Start: 2023-01-12 | End: 2023-01-19

## 2023-01-19 NOTE — PROGRESS NOTES
Identified pt with two pt identifiers(name and ). Chief Complaint   Patient presents with    Blood Pressure Check     Patient is here for BP check         Health Maintenance Due   Topic    Shingles Vaccine (1 of 2)    COVID-19 Vaccine (4 - Booster for Moderna series)    Flu Vaccine (1)       Wt Readings from Last 3 Encounters:   23 140 lb 3.2 oz (63.6 kg)   22 138 lb (62.6 kg)   22 140 lb (63.5 kg)     Temp Readings from Last 3 Encounters:   23 97.7 °F (36.5 °C) (Temporal)   22 97.9 °F (36.6 °C) (Oral)   22 97.3 °F (36.3 °C) (Temporal)     BP Readings from Last 3 Encounters:   23 (!) 172/90   22 (!) 190/108   22 132/76     Pulse Readings from Last 3 Encounters:   23 70   22 78   22 84         Learning Assessment:  :     Learning Assessment 2022 2022 2021 2021 10/16/2020 2020 12/3/2019   PRIMARY LEARNER Patient Patient Patient Patient Patient Patient Patient   HIGHEST LEVEL OF EDUCATION - PRIMARY LEARNER  - - - - - - -   BARRIERS PRIMARY LEARNER - - - - - - -   CO-LEARNER CAREGIVER - No No No No No No   PRIMARY LANGUAGE ENGLISH ENGLISH ENGLISH ENGLISH ENGLISH ENGLISH ENGLISH   LEARNER PREFERENCE PRIMARY DEMONSTRATION DEMONSTRATION DEMONSTRATION DEMONSTRATION DEMONSTRATION DEMONSTRATION DEMONSTRATION   ANSWERED BY patient patient  patient  patient  patient  patient patient   RELATIONSHIP SELF SELF SELF SELF SELF SELF SELF       Depression Screening:  :     3 most recent PHQ Screens 2022   Little interest or pleasure in doing things Not at all   Feeling down, depressed, irritable, or hopeless Not at all   Total Score PHQ 2 0       Fall Risk Assessment:  :     Fall Risk Assessment, last 12 mths 2022   Able to walk? Yes   Fall in past 12 months? 0   Do you feel unsteady? 0   Are you worried about falling 0   Is the gait abnormal? -   Number of falls in past 12 months -   Fall with injury?  -       Abuse Screening:  :     Abuse Screening Questionnaire 4/11/2022 10/21/2021 9/1/2021 6/1/2021 10/1/2020 5/7/2019 2/21/2018   Do you ever feel afraid of your partner? N N N N N N N   Are you in a relationship with someone who physically or mentally threatens you? N N N N N N N   Is it safe for you to go home? Y Y Y Y Y Y Y       Coordination of Care Questionnaire:  :     1) Have you been to an emergency room, urgent care clinic since your last visit? no   Hospitalized since your last visit? no             2) Have you seen or consulted any other health care providers outside of [address] [place] [address] since your last visit? no  (Include any pap smears or colon screenings in this section.)    3) Do you have an Advance Directive on file? no  Are you interested in receiving information about Advance Directives? no    Patient is accompanied by self I have received verbal consent from Krissy Whaley to discuss any/all medical information while they are present in the room. 4.  For patients aged 39-70: Has the patient had a colonoscopy / FIT/ Cologuard? Yes - no Care Gap present      If the patient is female:    5. For patients aged 41-77: Has the patient had a mammogram within the past 2 years? Yes - no Care Gap present      6. For patients aged 21-65: Has the patient had a pap smear?  NA - based on age or sex

## 2023-01-20 ENCOUNTER — TELEPHONE (OUTPATIENT)
Dept: FAMILY MEDICINE CLINIC | Age: 73
End: 2023-01-20

## 2023-01-20 NOTE — PROGRESS NOTES
Bhargav Hernandez (: 1950) is a 71 y.o. female, established patient, here for evaluation of the following chief complaint(s):  Blood Pressure Check (Patient is here for BP check )       ASSESSMENT/PLAN:  Below is the assessment and plan developed based on review of pertinent history, physical exam, labs, studies, and medications. 1. Primary hypertension      No follow-ups on file. SUBJECTIVE/OBJECTIVE:  Subjective:     Bhargav Hernandez is an 71 y.o. female here for evaluation of high blood pressure. During LOV, patient had started taking metoprolol along with amlodipine and lisinopril. Urine studies showed that the patient had 1000 + glucose in urine, oral thrush. Today, she would like to know if she should continue the regimen she is on. Review of Systems   Constitutional:  Negative for chills, fatigue and fever. HENT:  Negative for congestion, mouth sores, postnasal drip and rhinorrhea. Eyes:  Negative for discharge, redness, itching and visual disturbance. Respiratory:  Negative for cough, chest tightness, shortness of breath and wheezing. Cardiovascular: Negative. Gastrointestinal: Negative. Endocrine: Negative. Genitourinary: Negative. Musculoskeletal: Negative. Allergic/Immunologic: Positive for environmental allergies. Neurological: Negative. Hematological: Negative. Psychiatric/Behavioral: Negative. Physical Exam  Constitutional:       Appearance: Normal appearance. She is normal weight. HENT:      Head: Normocephalic and atraumatic. Right Ear: External ear normal.      Left Ear: External ear normal.      Nose: Nose normal.      Mouth/Throat:      Mouth: Mucous membranes are moist.      Comments: thrush  Eyes:      Extraocular Movements: Extraocular movements intact. Conjunctiva/sclera: Conjunctivae normal.      Pupils: Pupils are equal, round, and reactive to light. Cardiovascular:      Rate and Rhythm: Normal rate.       Pulses: Normal pulses. Heart sounds: Normal heart sounds. Pulmonary:      Effort: Pulmonary effort is normal.      Breath sounds: Normal breath sounds. Abdominal:      General: Abdomen is flat. Bowel sounds are normal.      Palpations: Abdomen is soft. Musculoskeletal:         General: Normal range of motion. Cervical back: Normal range of motion and neck supple. Skin:     General: Skin is warm. Capillary Refill: Capillary refill takes less than 2 seconds. Neurological:      General: No focal deficit present. Mental Status: She is alert and oriented to person, place, and time. Mental status is at baseline. Psychiatric:         Mood and Affect: Mood normal.         Behavior: Behavior normal.         Thought Content: Thought content normal.         Judgment: Judgment normal.           An electronic signature was used to authenticate this note.   -- Galdino Garcia MD

## 2023-01-24 ENCOUNTER — LAB ONLY (OUTPATIENT)
Dept: FAMILY MEDICINE CLINIC | Age: 73
End: 2023-01-24
Payer: MEDICARE

## 2023-01-24 ENCOUNTER — OFFICE VISIT (OUTPATIENT)
Dept: FAMILY MEDICINE CLINIC | Age: 73
End: 2023-01-24
Payer: MEDICARE

## 2023-01-24 VITALS
SYSTOLIC BLOOD PRESSURE: 138 MMHG | HEART RATE: 80 BPM | RESPIRATION RATE: 16 BRPM | OXYGEN SATURATION: 95 % | BODY MASS INDEX: 23.39 KG/M2 | DIASTOLIC BLOOD PRESSURE: 68 MMHG | HEIGHT: 64 IN | TEMPERATURE: 97 F | WEIGHT: 137 LBS

## 2023-01-24 DIAGNOSIS — I10 PRIMARY HYPERTENSION: Primary | ICD-10-CM

## 2023-01-24 DIAGNOSIS — E55.9 VITAMIN D DEFICIENCY: ICD-10-CM

## 2023-01-24 DIAGNOSIS — M05.741 RHEUMATOID ARTHRITIS INVOLVING BOTH HANDS WITH POSITIVE RHEUMATOID FACTOR (HCC): ICD-10-CM

## 2023-01-24 DIAGNOSIS — M05.742 RHEUMATOID ARTHRITIS INVOLVING BOTH HANDS WITH POSITIVE RHEUMATOID FACTOR (HCC): ICD-10-CM

## 2023-01-24 DIAGNOSIS — E11.21 TYPE 2 DIABETES WITH NEPHROPATHY (HCC): ICD-10-CM

## 2023-01-24 DIAGNOSIS — E78.2 MIXED HYPERLIPIDEMIA: ICD-10-CM

## 2023-01-24 PROCEDURE — 3046F HEMOGLOBIN A1C LEVEL >9.0%: CPT | Performed by: INTERNAL MEDICINE

## 2023-01-24 PROCEDURE — 3075F SYST BP GE 130 - 139MM HG: CPT | Performed by: INTERNAL MEDICINE

## 2023-01-24 PROCEDURE — 3017F COLORECTAL CA SCREEN DOC REV: CPT | Performed by: INTERNAL MEDICINE

## 2023-01-24 PROCEDURE — G8399 PT W/DXA RESULTS DOCUMENT: HCPCS | Performed by: INTERNAL MEDICINE

## 2023-01-24 PROCEDURE — G8536 NO DOC ELDER MAL SCRN: HCPCS | Performed by: INTERNAL MEDICINE

## 2023-01-24 PROCEDURE — G8420 CALC BMI NORM PARAMETERS: HCPCS | Performed by: INTERNAL MEDICINE

## 2023-01-24 PROCEDURE — 1123F ACP DISCUSS/DSCN MKR DOCD: CPT | Performed by: INTERNAL MEDICINE

## 2023-01-24 PROCEDURE — 1090F PRES/ABSN URINE INCON ASSESS: CPT | Performed by: INTERNAL MEDICINE

## 2023-01-24 PROCEDURE — G8510 SCR DEP NEG, NO PLAN REQD: HCPCS | Performed by: INTERNAL MEDICINE

## 2023-01-24 PROCEDURE — 2022F DILAT RTA XM EVC RTNOPTHY: CPT | Performed by: INTERNAL MEDICINE

## 2023-01-24 PROCEDURE — G9899 SCRN MAM PERF RSLTS DOC: HCPCS | Performed by: INTERNAL MEDICINE

## 2023-01-24 PROCEDURE — G0463 HOSPITAL OUTPT CLINIC VISIT: HCPCS | Performed by: INTERNAL MEDICINE

## 2023-01-24 PROCEDURE — G8427 DOCREV CUR MEDS BY ELIG CLIN: HCPCS | Performed by: INTERNAL MEDICINE

## 2023-01-24 PROCEDURE — 1101F PT FALLS ASSESS-DOCD LE1/YR: CPT | Performed by: INTERNAL MEDICINE

## 2023-01-24 PROCEDURE — 3078F DIAST BP <80 MM HG: CPT | Performed by: INTERNAL MEDICINE

## 2023-01-24 PROCEDURE — 99213 OFFICE O/P EST LOW 20 MIN: CPT | Performed by: INTERNAL MEDICINE

## 2023-01-24 NOTE — ASSESSMENT & PLAN NOTE
well controlled, continue current medications, continue current treatment plan  Lab Results   Component Value Date/Time    Hemoglobin A1c 6.1 (H) 07/26/2022 09:35 AM    Hemoglobin A1c, External 6.7 10/17/2017 12:00 AM   Diabetic Goals:  HgA1C <7,  LDL cholesterol <100, Blood pressure <140/80. The patient is asked to make an attempt to improve diet and exercise patterns to aid in medical management of this problem. Potential long term end organ damage is discussed as well. I also recommend yearly eye exams and daily foot care.

## 2023-01-24 NOTE — PROGRESS NOTES
Chief Complaint   Patient presents with    Medication Evaluation     Patient here to discuss some of her medications        Assessment/ Plan:   Diagnoses and all orders for this visit:    1. Primary hypertension  Recently had Amlodipine and Metoprolol added to her regimen. BP's for the last 2-visits have been stable and well controlled. No bradycardia. She is on a low dose of Lisinopril 5 mgs. She has had a cough, but was thought to be due to infection. We discussed stopping the Lisinopril and changing to Losartan if her cough persists. For now, we will continue with the Amlodipine, Metoprolol and Lisinopril. BP seems to be well controlled. She has no side effects. 2. Rheumatoid arthritis involving both hands with positive rheumatoid factor ([facility]   Monitored by specialist. She is doing well. Next visit is in May 2022. Followed by Dr. Celena Brink. 3. Type 2 diabetes with nephropathy ([facility]  Assessment & Plan:  well controlled, continue current medications, continue current treatment plan  Lab Results   Component Value Date/Time    Hemoglobin A1c 6.1 (H) 07/26/2022 09:35 AM    Hemoglobin A1c, External 6.7 10/17/2017 12:00 AM   Diabetic Goals:  HgA1C <7,  LDL cholesterol <100, Blood pressure <140/80. The patient is asked to make an attempt to improve diet and exercise patterns to aid in medical management of this problem. Potential long term end organ damage is discussed as well. I also recommend yearly eye exams and daily foot care. I have discussed the diagnosis with the patient and the intended treatment plan as seen in the above orders. The patient has received an after-visit summary and questions were answered concerning future plans. Asked to return should symptoms worsen or not improve with treatment. Any pending labs and studies will be relayed to patient when they become available. Pt verbalizes understanding of plan of care and denies further questions or concerns at this time. Follow-up and Dispositions    Return in about 4 months (around 5/24/2023), or if symptoms worsen or fail to improve, for   Follow up Type II DM,   Check BP. Subjective:   Vincent Cain is a 71 y.o. female with history of T2DM, HTN, HLD and vitamin D deficiency. She presents today for follow up and discussion about her BP medications. Her home BP readings are better. She has no side effects. We reviewed her medications and discussed in detailed as outlined above. She needs fasting labs today. Other wise, she has been doing well. We reviewed all of her medications and plan for future evaluations. HISTORICAL  PMH, PSH, FHX, SOCHX, ALLERGIES and MES were reviewed and updated today. Review of Systems  Review of Systems   Constitutional: Negative. HENT: Negative. Eyes: Negative. Respiratory: Negative. Cardiovascular: Negative. Gastrointestinal: Negative. Genitourinary: Negative. Musculoskeletal: Negative. Skin: Negative. Neurological: Negative. Endo/Heme/Allergies: Negative. Psychiatric/Behavioral: Negative. All other systems reviewed and are negative. Objective:     Vitals:    01/24/23 0912   BP: 138/68   Pulse: 80   Resp: 16   Temp: 97 °F (36.1 °C)   TempSrc: Temporal   SpO2: 95%   Weight: 137 lb (62.1 kg)   Height: 5' 4\" (1.626 m)       Physical Exam  Vitals and nursing note reviewed. Constitutional:       Appearance: Normal appearance. HENT:      Head: Normocephalic and atraumatic. Mouth/Throat:      Mouth: Mucous membranes are moist.   Eyes:      Extraocular Movements: Extraocular movements intact. Conjunctiva/sclera: Conjunctivae normal.      Pupils: Pupils are equal, round, and reactive to light. Cardiovascular:      Rate and Rhythm: Normal rate and regular rhythm. Pulses: Normal pulses. Heart sounds: Normal heart sounds.    Pulmonary:      Effort: Pulmonary effort is normal.      Breath sounds: Normal breath sounds. Musculoskeletal:      Cervical back: Normal range of motion and neck supple. Neurological:      General: No focal deficit present. Mental Status: She is alert. Mental status is at baseline. Cranial Nerves: No cranial nerve deficit. Sensory: No sensory deficit. Motor: No weakness. Coordination: Coordination normal.      Gait: Gait normal.      Deep Tendon Reflexes: Reflexes normal.   Psychiatric:         Mood and Affect: Mood normal.         Behavior: Behavior normal.         Thought Content:  Thought content normal.         Judgment: Judgment normal.     Epifanio Rosa MD  [facility]   01/24/23

## 2023-01-24 NOTE — ASSESSMENT & PLAN NOTE
monitored by specialist. No acute findings meriting change in the plan  She is followed by Dr. Lane Watters. She saw him in the fall and then another appointment in the spring (May 30, 2023)    So far, doing wel.

## 2023-01-24 NOTE — PROGRESS NOTES
Identified pt with two pt identifiers(name and ). Chief Complaint   Patient presents with    Medication Evaluation     Patient here to discuss some of her medications         Health Maintenance Due   Topic    Shingles Vaccine (1 of 2)    COVID-19 Vaccine (4 - Booster for Moderna series)    Flu Vaccine (1)       Wt Readings from Last 3 Encounters:   23 137 lb (62.1 kg)   23 139 lb 3.2 oz (63.1 kg)   23 140 lb 3.2 oz (63.6 kg)     Temp Readings from Last 3 Encounters:   23 97 °F (36.1 °C) (Temporal)   23 97.4 °F (36.3 °C) (Temporal)   23 97.7 °F (36.5 °C) (Temporal)     BP Readings from Last 3 Encounters:   23 138/68   23 132/78   23 (!) 172/90     Pulse Readings from Last 3 Encounters:   23 80   23 72   23 70         Learning Assessment:  :     Learning Assessment 2022 2022 2021 2021 10/16/2020 2020 12/3/2019   PRIMARY LEARNER Patient Patient Patient Patient Patient Patient Patient   HIGHEST LEVEL OF EDUCATION - PRIMARY LEARNER  - - - - - - -   BARRIERS PRIMARY LEARNER - - - - - - -   CO-LEARNER CAREGIVER - No No No No No No   PRIMARY LANGUAGE ENGLISH ENGLISH ENGLISH ENGLISH ENGLISH ENGLISH ENGLISH   LEARNER PREFERENCE PRIMARY DEMONSTRATION DEMONSTRATION DEMONSTRATION DEMONSTRATION DEMONSTRATION DEMONSTRATION DEMONSTRATION   ANSWERED BY patient patient  patient  patient  patient  patient patient   RELATIONSHIP SELF SELF SELF SELF SELF SELF SELF       Depression Screening:  :     3 most recent PHQ Screens 2023   Little interest or pleasure in doing things Not at all   Feeling down, depressed, irritable, or hopeless Not at all   Total Score PHQ 2 0       Fall Risk Assessment:  :     Fall Risk Assessment, last 12 mths 2022   Able to walk? Yes   Fall in past 12 months? 0   Do you feel unsteady?  0   Are you worried about falling 0   Is the gait abnormal? -   Number of falls in past 12 months -   Fall with injury? -       Abuse Screening:  :     Abuse Screening Questionnaire 1/24/2023 4/11/2022 10/21/2021 9/1/2021 6/1/2021 10/1/2020 5/7/2019   Do you ever feel afraid of your partner? N N N N N N N   Are you in a relationship with someone who physically or mentally threatens you? N N N N N N N   Is it safe for you to go home? Y Y Y Y Y Y Y       Coordination of Care Questionnaire:  :     1) Have you been to an emergency room, urgent care clinic since your last visit? no   Hospitalized since your last visit? no             2) Have you seen or consulted any other health care providers outside of [address] [place] [address] since your last visit? no  (Include any pap smears or colon screenings in this section.)    3) Do you have an Advance Directive on file? no  Are you interested in receiving information about Advance Directives? yes    Patient is accompanied by self I have received verbal consent from Naren Laguerre to discuss any/all medical information while they are present in the room. 4.  For patients aged 39-70: Has the patient had a colonoscopy / FIT/ Cologuard? Yes - no Care Gap present      If the patient is female:    5. For patients aged 41-77: Has the patient had a mammogram within the past 2 years? Yes - no Care Gap present      6. For patients aged 21-65: Has the patient had a pap smear?  NA - based on age or sex

## 2023-01-25 LAB
25(OH)D3 SERPL-MCNC: 47.8 NG/ML (ref 30–100)
ALBUMIN SERPL-MCNC: 4.3 G/DL (ref 3.5–5)
ALBUMIN/GLOB SERPL: 1.2 (ref 1.1–2.2)
ALP SERPL-CCNC: 60 U/L (ref 45–117)
ALT SERPL-CCNC: 32 U/L (ref 12–78)
ANION GAP SERPL CALC-SCNC: 3 MMOL/L (ref 5–15)
AST SERPL-CCNC: 14 U/L (ref 15–37)
BILIRUB SERPL-MCNC: 0.6 MG/DL (ref 0.2–1)
BUN SERPL-MCNC: 36 MG/DL (ref 6–20)
BUN/CREAT SERPL: 30 (ref 12–20)
CALCIUM SERPL-MCNC: 9.8 MG/DL (ref 8.5–10.1)
CHLORIDE SERPL-SCNC: 110 MMOL/L (ref 97–108)
CHOLEST SERPL-MCNC: 154 MG/DL
CO2 SERPL-SCNC: 29 MMOL/L (ref 21–32)
CREAT SERPL-MCNC: 1.21 MG/DL (ref 0.55–1.02)
EST. AVERAGE GLUCOSE BLD GHB EST-MCNC: 131 MG/DL
GLOBULIN SER CALC-MCNC: 3.5 G/DL (ref 2–4)
GLUCOSE SERPL-MCNC: 196 MG/DL (ref 65–100)
HBA1C MFR BLD: 6.2 % (ref 4–5.6)
HDLC SERPL-MCNC: 60 MG/DL
HDLC SERPL: 2.6 (ref 0–5)
LDLC SERPL CALC-MCNC: 74.8 MG/DL (ref 0–100)
POTASSIUM SERPL-SCNC: 4.5 MMOL/L (ref 3.5–5.1)
PROT SERPL-MCNC: 7.8 G/DL (ref 6.4–8.2)
SODIUM SERPL-SCNC: 142 MMOL/L (ref 136–145)
TRIGL SERPL-MCNC: 96 MG/DL (ref ?–150)
VLDLC SERPL CALC-MCNC: 19.2 MG/DL

## 2023-01-25 NOTE — PROGRESS NOTES
Please let patient know that her labs were stable. She does need to drink a little more water. Continues to show mild renal insufficiency. Cholesterol is perfect. HBA1C is stable at 6.2, so good glucose control. Diabetic Goals:  HgA1C <7,  LDL cholesterol <100, Blood pressure <140/80. The patient is asked to make an attempt to improve diet and exercise patterns to aid in medical management of this problem. I also recommend yearly eye exams and daily foot care. Follow up as we discussed. Every 3-4 months. Thanks!

## 2023-01-26 ENCOUNTER — TELEPHONE (OUTPATIENT)
Dept: FAMILY MEDICINE CLINIC | Age: 73
End: 2023-01-26

## 2023-01-26 NOTE — TELEPHONE ENCOUNTER
----- Message from Rosie Donahue MD sent at 1/25/2023  7:10 AM EST -----  Please let patient know that her labs were stable. She does need to drink a little more water. Continues to show mild renal insufficiency. Cholesterol is perfect. HBA1C is stable at 6.2, so good glucose control. Diabetic Goals:  HgA1C <7,  LDL cholesterol <100, Blood pressure <140/80. The patient is asked to make an attempt to improve diet and exercise patterns to aid in medical management of this problem. I also recommend yearly eye exams and daily foot care. Follow up as we discussed. Every 3-4 months. Thanks!

## 2023-02-22 ENCOUNTER — HOSPITAL ENCOUNTER (OUTPATIENT)
Dept: NON INVASIVE DIAGNOSTICS | Age: 73
Discharge: HOME | End: 2023-02-22
Attending: FAMILY MEDICINE
Payer: MEDICARE

## 2023-02-22 VITALS
WEIGHT: 140.21 LBS | DIASTOLIC BLOOD PRESSURE: 62 MMHG | HEIGHT: 64 IN | SYSTOLIC BLOOD PRESSURE: 128 MMHG | BODY MASS INDEX: 23.94 KG/M2

## 2023-02-22 DIAGNOSIS — N18.1 STAGE 1 CHRONIC KIDNEY DISEASE: ICD-10-CM

## 2023-02-22 DIAGNOSIS — I25.10 CORONARY ARTERY DISEASE INVOLVING NATIVE CORONARY ARTERY OF NATIVE HEART WITHOUT ANGINA PECTORIS: ICD-10-CM

## 2023-02-22 LAB
ECHO AO ROOT DIAM: 2.7 CM
ECHO AO ROOT INDEX: 1.61 CM/M2
ECHO AV AREA PEAK VELOCITY: 2.5 CM2
ECHO AV AREA/BSA PEAK VELOCITY: 1.5 CM2/M2
ECHO AV PEAK GRADIENT: 4 MMHG
ECHO AV PEAK VELOCITY: 1 M/S
ECHO AV VELOCITY RATIO: 0.8
ECHO EST RA PRESSURE: 3 MMHG
ECHO LA DIAMETER INDEX: 2.08 CM/M2
ECHO LA DIAMETER: 3.5 CM
ECHO LA TO AORTIC ROOT RATIO: 1.3
ECHO LA VOL 2C: 54 ML (ref 22–52)
ECHO LA VOL 4C: 49 ML (ref 22–52)
ECHO LA VOL BP: 55 ML (ref 22–52)
ECHO LA VOL/BSA BIPLANE: 33 ML/M2 (ref 16–34)
ECHO LA VOLUME AREA LENGTH: 58 ML
ECHO LA VOLUME INDEX A2C: 32 ML/M2 (ref 16–34)
ECHO LA VOLUME INDEX A4C: 29 ML/M2 (ref 16–34)
ECHO LA VOLUME INDEX AREA LENGTH: 35 ML/M2 (ref 16–34)
ECHO LV E' LATERAL VELOCITY: 6 CM/S
ECHO LV E' SEPTAL VELOCITY: 6 CM/S
ECHO LV FRACTIONAL SHORTENING: 44 % (ref 28–44)
ECHO LV INTERNAL DIMENSION DIASTOLE INDEX: 2.44 CM/M2
ECHO LV INTERNAL DIMENSION DIASTOLIC: 4.1 CM (ref 3.9–5.3)
ECHO LV INTERNAL DIMENSION SYSTOLIC INDEX: 1.37 CM/M2
ECHO LV INTERNAL DIMENSION SYSTOLIC: 2.3 CM
ECHO LV IVSD: 0.6 CM (ref 0.6–0.9)
ECHO LV MASS 2D: 67.1 G (ref 67–162)
ECHO LV MASS INDEX 2D: 40 G/M2 (ref 43–95)
ECHO LV POSTERIOR WALL DIASTOLIC: 0.6 CM (ref 0.6–0.9)
ECHO LV RELATIVE WALL THICKNESS RATIO: 0.29
ECHO LVOT AREA: 3.1 CM2
ECHO LVOT DIAM: 2 CM
ECHO LVOT PEAK GRADIENT: 3 MMHG
ECHO LVOT PEAK VELOCITY: 0.8 M/S
ECHO MV A VELOCITY: 0.98 M/S
ECHO MV AREA PHT: 2.9 CM2
ECHO MV E DECELERATION TIME (DT): 261.8 MS
ECHO MV E VELOCITY: 0.69 M/S
ECHO MV E/A RATIO: 0.7
ECHO MV E/E' LATERAL: 11.5
ECHO MV E/E' RATIO (AVERAGED): 11.5
ECHO MV E/E' SEPTAL: 11.5
ECHO MV PRESSURE HALF TIME (PHT): 75.9 MS
ECHO MV REGURGITANT PEAK GRADIENT: 3 MMHG
ECHO MV REGURGITANT PEAK VELOCITY: 0.8 M/S
ECHO RIGHT VENTRICULAR SYSTOLIC PRESSURE (RVSP): 29 MMHG
ECHO RV FREE WALL PEAK S': 13 CM/S
ECHO RV TAPSE: 2.3 CM (ref 1.7–?)
ECHO TV REGURGITANT MAX VELOCITY: 2.55 M/S
ECHO TV REGURGITANT PEAK GRADIENT: 26 MMHG

## 2023-02-22 PROCEDURE — 93306 TTE W/DOPPLER COMPLETE: CPT

## 2023-02-22 PROCEDURE — 93306 TTE W/DOPPLER COMPLETE: CPT | Performed by: SPECIALIST

## 2023-03-02 DIAGNOSIS — I77.810 AORTIC ROOT DILATION (HCC): Primary | ICD-10-CM

## 2023-03-07 ENCOUNTER — TELEPHONE (OUTPATIENT)
Dept: FAMILY MEDICINE CLINIC | Age: 73
End: 2023-03-07

## 2023-03-07 NOTE — TELEPHONE ENCOUNTER
Patient has been waiting to get the results of her echo that was done on 2/22/2023 and ordered by Dr Miky Zimmer    [phone]

## 2023-03-10 DIAGNOSIS — E78.00 PURE HYPERCHOLESTEROLEMIA: ICD-10-CM

## 2023-03-10 RX ORDER — ATORVASTATIN CALCIUM 10 MG/1
TABLET, FILM COATED ORAL
Qty: 90 TABLET | Refills: 0 | Status: SHIPPED | OUTPATIENT
Start: 2023-03-10

## 2023-03-22 RX ORDER — METOPROLOL TARTRATE 25 MG/1
TABLET, FILM COATED ORAL
Qty: 90 TABLET | Refills: 0 | Status: SHIPPED | OUTPATIENT
Start: 2023-03-22

## 2023-03-27 DIAGNOSIS — E11.9 CONTROLLED TYPE 2 DIABETES MELLITUS WITHOUT COMPLICATION, WITHOUT LONG-TERM CURRENT USE OF INSULIN (HCC): ICD-10-CM

## 2023-03-28 RX ORDER — GLIPIZIDE 5 MG/1
TABLET, FILM COATED, EXTENDED RELEASE ORAL
Qty: 90 TABLET | Refills: 0 | Status: SHIPPED | OUTPATIENT
Start: 2023-03-28

## 2023-03-29 ENCOUNTER — TRANSCRIBE ORDER (OUTPATIENT)
Dept: SCHEDULING | Age: 73
End: 2023-03-29

## 2023-03-29 DIAGNOSIS — Z12.31 SCREENING MAMMOGRAM FOR BREAST CANCER: Primary | ICD-10-CM

## 2023-04-19 ENCOUNTER — APPOINTMENT (OUTPATIENT)
Dept: FAMILY MEDICINE CLINIC | Age: 73
End: 2023-04-19

## 2023-04-19 ENCOUNTER — OFFICE VISIT (OUTPATIENT)
Dept: FAMILY MEDICINE CLINIC | Age: 73
End: 2023-04-19

## 2023-04-19 VITALS
DIASTOLIC BLOOD PRESSURE: 80 MMHG | BODY MASS INDEX: 23.42 KG/M2 | HEART RATE: 82 BPM | RESPIRATION RATE: 16 BRPM | OXYGEN SATURATION: 99 % | SYSTOLIC BLOOD PRESSURE: 138 MMHG | TEMPERATURE: 97.2 F | WEIGHT: 137.2 LBS | HEIGHT: 64 IN

## 2023-04-19 DIAGNOSIS — R93.1 ABNORMAL ECHOCARDIOGRAM: ICD-10-CM

## 2023-04-19 DIAGNOSIS — E78.00 PURE HYPERCHOLESTEROLEMIA: ICD-10-CM

## 2023-04-19 DIAGNOSIS — E55.9 VITAMIN D DEFICIENCY: ICD-10-CM

## 2023-04-19 DIAGNOSIS — E11.21 TYPE 2 DIABETES WITH NEPHROPATHY (HCC): ICD-10-CM

## 2023-04-19 DIAGNOSIS — I10 PRIMARY HYPERTENSION: ICD-10-CM

## 2023-04-19 DIAGNOSIS — E11.9 CONTROLLED TYPE 2 DIABETES MELLITUS WITHOUT COMPLICATION, WITHOUT LONG-TERM CURRENT USE OF INSULIN (HCC): ICD-10-CM

## 2023-04-19 DIAGNOSIS — Z00.00 MEDICARE ANNUAL WELLNESS VISIT, SUBSEQUENT: Primary | ICD-10-CM

## 2023-04-19 RX ORDER — LOSARTAN POTASSIUM 25 MG/1
25 TABLET ORAL DAILY
Qty: 90 TABLET | Refills: 1 | Status: SHIPPED | OUTPATIENT
Start: 2023-04-19 | End: 2023-07-18

## 2023-04-19 NOTE — PROGRESS NOTES
CC:  Chief Complaint   Patient presents with    Annual Wellness Visit       This is the Subsequent Medicare Annual Wellness Exam, performed 12 months or more after the Initial AWV or the last Subsequent AWV    I have reviewed the patient's medical history in detail and updated the computerized patient record. 71 y.o. F who presents for this AWV. She has generally been doing well. We reviewed her health record and medications. She does need to be on either an ACEI or an ARB due to diabetes. Her lisinopril was discontinued for unclear reasons. She also had a slightly abnormal ECHO and needs to have evaluation by cardiology based on her diabetes, HTN and HLD. Concerns: none    Assessment/Plan   Education and counseling provided:  Are appropriate based on today's review and evaluation  End-of-Life planning (with patient's consent)  Pneumococcal Vaccine  Influenza Vaccine  Hepatitis B Vaccine  Screening Mammography  Colorectal cancer screening tests  Cardiovascular screening blood test  Bone mass measurement (DEXA)  Screening for glaucoma  Diabetes screening test    1. Medicare annual wellness visit, subsequent  Anticipatory guidance discussed. Immunizations reviewed  HM updated. 2. Controlled type 2 diabetes mellitus without complication, without long-term current use of insulin (HCC)  -     MICROALBUMIN, UR, RAND W/ MICROALB/CREAT RATIO; Future  -     REFERRAL TO CARDIOLOGY  3. Pure hypercholesterolemia  -     METABOLIC PANEL, COMPREHENSIVE; Future  -     CBC WITH AUTOMATED DIFF; Future  -     LIPID PANEL; Future  -     REFERRAL TO CARDIOLOGY  4. Vitamin D deficiency  -     VITAMIN D, 25 HYDROXY; Future  5. Type 2 diabetes with nephropathy (HCC)  -     HEMOGLOBIN A1C WITH EAG; Future  -     losartan (COZAAR) 25 mg tablet; Take 1 Tablet by mouth daily for 90 days. , Normal, Disp-90 Tablet, R-1  6. Primary hypertension  -     METABOLIC PANEL, COMPREHENSIVE; Future  -     losartan (COZAAR) 25 mg tablet;  Take 1 Tablet by mouth daily for 90 days. , Normal, Disp-90 Tablet, R-1  7. Abnormal echocardiogram  -     REFERRAL TO CARDIOLOGY    I have discussed the diagnosis with the patient and the intended treatment plan as seen in the above orders. The patient has received an after-visit summary and questions were answered concerning future plans. Asked to return should symptoms worsen or not improve with treatment. Any pending labs and studies will be relayed to patient when they become available. Pt verbalizes understanding of plan of care and denies further questions or concerns at this time. Follow-up and Dispositions    Return in about 1 year (around 4/19/2024), or if symptoms worsen or fail to improve, for   Follow up Type II DM,   Every 3 months for diabetic care. Depression Risk Factor Screening     3 most recent PHQ Screens 4/19/2023   Little interest or pleasure in doing things Not at all   Feeling down, depressed, irritable, or hopeless Not at all   Total Score PHQ 2 0       Alcohol & Drug Abuse Risk Screen    Do you average more than 1 drink per night or more than 7 drinks a week:  No    On any one occasion in the past three months have you have had more than 3 drinks containing alcohol:  No          Functional Ability and Level of Safety    Hearing: Hearing is good. Activities of Daily Living: The home contains: handrails and grab bars  Patient does total self care      Ambulation: with no difficulty     Fall Risk:  Fall Risk Assessment, last 12 mths 4/19/2023   Able to walk? Yes   Fall in past 12 months? 0   Do you feel unsteady? 0   Are you worried about falling 0   Is the gait abnormal? -   Number of falls in past 12 months -   Fall with injury?  -      Abuse Screen:  Patient is not abused     Cognitive Screening    Has your family/caregiver stated any concerns about your memory: no  Cognitive Screening: Normal - Clock Drawing Test, Mini Cog Test    Visit Vitals  /80 (BP 1 Location: Right upper arm, BP Patient Position: Sitting)   Pulse 82   Temp 97.2 °F (36.2 °C) (Temporal)   Resp 16   Ht 5' 4\" (1.626 m)   Wt 137 lb 3.2 oz (62.2 kg)   SpO2 99%   BMI 23.55 kg/m²     General: alert, cooperative, no distress   Mental  status: normal mood, behavior, speech, dress, motor activity, and thought processes, able to follow commands   HENT: NCAT   Neck: no visualized mass   Resp: no respiratory distress   Neuro: no gross deficits   Skin: no discoloration or lesions of concern on visible areas   Psychiatric: normal affect, consistent with stated mood, no evidence of hallucinations       Health Maintenance Due     Health Maintenance Due   Topic Date Due    Shingles Vaccine (1 of 2) Reviewed    COVID-19 Vaccine (4 - Booster for Moderna series) Reviewed    Eye Exam Retinal or Dilated  Reviewed    Breast Cancer Screen Mammogram  Reviewed       Patient Care Team   Patient Care Team:  Patrick Marcelo MD as PCP - General (Internal Medicine Physician)  Patrick Marcelo MD as PCP - [facility] Empaneled Provider    History     Patient Active Problem List   Diagnosis Code    Diverticulosis K57.90    Infectious gastroenteritis and colitis A09    Benign essential hypertension I10    Diabetes mellitus type II, controlled ([address] E11.9    Anemia D64.9    Anemia associated with acute blood loss D62    History of rheumatoid arthritis Z87.39    Osteopenia of both lower legs M85.861, M85.862    Type 2 diabetes with nephropathy (HCC) E11.21    Rheumatoid arthritis involving multiple sites with positive rheumatoid factor ([address] M05.79    Bulging of lumbar intervertebral disc M51.36    Sciatica of left side M54.32     Past Medical History:   Diagnosis Date    Acid reflux     Colitis 04/16/2015    Colitis     Diabetes mellitus, type II ([address]     Diverticulosis of colon 04/20/2015    Hyperlipemia     Hypertension     Osteopenia     of the spine Dexa done 2014, 2017 and due 2019    RA (rheumatoid arthritis) ([address]       Past Surgical History: Procedure Laterality Date    HX COLONOSCOPY  04/20/2015    Due 2023    HX GYN      HX HYSTERECTOMY      HX TOTAL ABDOMINAL HYSTERECTOMY      MD BX BREAST NEEDLE CORE W/O IMAGING GUIDANCE SPX  2017    benign findings - Fibrosis    MD UNLISTED PROCEDURE ABDOMEN PERITONEUM & OMENTUM       Current Outpatient Medications   Medication Sig Dispense Refill    amLODIPine (NORVASC) 10 mg tablet TAKE ONE TABLET BY MOUTH DAILY 90 Tablet 1    glipiZIDE SR (GLUCOTROL XL) 5 mg CR tablet TAKE ONE TABLET BY MOUTH DAILY 90 Tablet 0    metoprolol tartrate (LOPRESSOR) 25 mg tablet TAKE ONE TABLET BY MOUTH TWICE A DAY 90 Tablet 0    atorvastatin (LIPITOR) 10 mg tablet TAKE ONE TABLET BY MOUTH DAILY 90 Tablet 0    leflunomide (ARAVA) 20 mg tablet Take 1 Tablet by mouth daily. 90 Tablet 1    empagliflozin (Jardiance) 25 mg tablet TAKE ONE TABLET BY MOUTH DAILY 90 Tablet 1    diclofenac (VOLTAREN) 1 % gel APPLY TO AFFECTED AREA(S)  FOUR GRAM TOPICALLY FOUR TIMES A  g 0    ibuprofen (MOTRIN) 800 mg tablet Take 1 Tablet by mouth every eight (8) hours as needed. nystatin (MYCOSTATIN) powder Apply  to affected area as needed (intertrigo). 60 g 5    glucose blood VI test strips (OneTouch Ultra Test) strip USE ONE STRIP TO TEST DAILY 50 Strip 5    nirmatrelvir-ritonavir (Paxlovid, EUA,) 150 mg x 2- 100 mg tablet Take 1 tablet 2 x daily x 5 days. 1 Box 0    albuterol (PROVENTIL HFA, VENTOLIN HFA, PROAIR HFA) 90 mcg/actuation inhaler Take 2 Puffs by inhalation every four (4) hours as needed for Wheezing or Cough. 18 g 3    ascorbic acid, vitamin C, (Vitamin C) 500 mg tablet Take 1 Tablet by mouth daily. 30 Tablet 0    fluticasone propionate (FLONASE) 50 mcg/actuation nasal spray 2 sprays per nostril once a day  Indications: inflammation of the nose due to an allergy 1 Each 0    aspirin delayed-release 81 mg tablet Take 1 Tablet by mouth daily.       doxylamine-DM-acetaminophen-gg (Coricidin HBP Max Cold-Flu D-N) 6.25 mg-10 mg- 325 mg (nt) CpSQ Take 1 Capsule by mouth every twelve (12) hours. (Patient not taking: Reported on 1/24/2023) 120 Capsule 0    nystatin (MYCOSTATIN) 100,000 unit/mL suspension Take 2 mL by mouth four (4) times daily. swish and spit (Patient not taking: Reported on 1/24/2023) 473 mL 0    lisinopriL (PRINIVIL, ZESTRIL) 5 mg tablet TAKE ONE TABLET BY MOUTH DAILY (Patient not taking: Reported on 4/19/2023) 90 Tablet 1    tolnaftate (TINACTIN) 1 % external solution Apply  to affected area two (2) times a day.  (Patient not taking: Reported on 1/24/2023)       No Known Allergies    Family History   Problem Relation Age of Onset    Other Mother         diverticulosis    Heart Attack Mother     Heart Failure Father     Pulmonary Embolism Sister     Diabetes Brother     Cancer Brother         brain cancer     Social History     Tobacco Use    Smoking status: Never    Smokeless tobacco: Never   Substance Use Topics    Alcohol use: No       Annabel Harrington MD  [address] [employer]  04/19/2023

## 2023-04-19 NOTE — PROGRESS NOTES
Identified pt with two pt identifiers(name and ). Chief Complaint   Patient presents with    Annual Wellness Visit        Health Maintenance Due   Topic    Shingles Vaccine (1 of 2)    COVID-19 Vaccine (4 - Booster for Moderna series)    Eye Exam Retinal or Dilated     Foot Exam Q1     Diabetic Alb to Cr ratio (uACR) test     Medicare Yearly Exam     Breast Cancer Screen Mammogram        Wt Readings from Last 3 Encounters:   23 137 lb 3.2 oz (62.2 kg)   23 140 lb 3.4 oz (63.6 kg)   23 137 lb (62.1 kg)     Temp Readings from Last 3 Encounters:   23 97.2 °F (36.2 °C) (Temporal)   23 97 °F (36.1 °C) (Temporal)   23 97.4 °F (36.3 °C) (Temporal)     BP Readings from Last 3 Encounters:   23 (!) 156/70   23 128/62   23 138/68     Pulse Readings from Last 3 Encounters:   23 82   23 80   23 72         Learning Assessment:  :     Learning Assessment 2022 2022 2021 2021 10/16/2020 2020 12/3/2019   PRIMARY LEARNER Patient Patient Patient Patient Patient Patient Patient   HIGHEST LEVEL OF EDUCATION - PRIMARY LEARNER  - - - - - - -   BARRIERS PRIMARY LEARNER - - - - - - -   CO-LEARNER CAREGIVER - No No No No No No   PRIMARY LANGUAGE ENGLISH ENGLISH ENGLISH ENGLISH ENGLISH ENGLISH ENGLISH   LEARNER PREFERENCE PRIMARY DEMONSTRATION DEMONSTRATION DEMONSTRATION DEMONSTRATION DEMONSTRATION DEMONSTRATION DEMONSTRATION   ANSWERED BY patient patient  patient  patient  patient  patient patient   RELATIONSHIP SELF SELF SELF SELF SELF SELF SELF       Depression Screening:  :     3 most recent PHQ Screens 2023   Little interest or pleasure in doing things Not at all   Feeling down, depressed, irritable, or hopeless Not at all   Total Score PHQ 2 0       Fall Risk Assessment:  :     Fall Risk Assessment, last 12 mths 2023   Able to walk? Yes   Fall in past 12 months? 0   Do you feel unsteady?  0   Are you worried about falling 0 Is the gait abnormal? -   Number of falls in past 12 months -   Fall with injury? -       Abuse Screening:  :     Abuse Screening Questionnaire 4/19/2023 1/24/2023 4/11/2022 10/21/2021 9/1/2021 6/1/2021 10/1/2020   Do you ever feel afraid of your partner? N N N N N N N   Are you in a relationship with someone who physically or mentally threatens you? N N N N N N N   Is it safe for you to go home? Y Y Y Y Y Y Y       Coordination of Care Questionnaire:  :     1) Have you been to an emergency room, urgent care clinic since your last visit? no   Hospitalized since your last visit? no             2) Have you seen or consulted any other health care providers outside of [address] [place] [address] since your last visit? no  (Include any pap smears or colon screenings in this section.)    3) Do you have an Advance Directive on file? no  Are you interested in receiving information about Advance Directives? yes    Patient is accompanied by self I have received verbal consent from Barak Jose to discuss any/all medical information while they are present in the room. 4.  For patients aged 39-70: Has the patient had a colonoscopy / FIT/ Cologuard? Yes - no Care Gap present      If the patient is female:    5. For patients aged 41-77: Has the patient had a mammogram within the past 2 years? Yes - no Care Gap present      6. For patients aged 21-65: Has the patient had a pap smear?  NA - based on age or sex

## 2023-04-20 LAB
25(OH)D3 SERPL-MCNC: 48.5 NG/ML (ref 30–100)
ALBUMIN SERPL-MCNC: 4.4 G/DL (ref 3.5–5)
ALBUMIN/GLOB SERPL: 1.4 (ref 1.1–2.2)
ALP SERPL-CCNC: 59 U/L (ref 45–117)
ALT SERPL-CCNC: 42 U/L (ref 12–78)
ANION GAP SERPL CALC-SCNC: 2 MMOL/L (ref 5–15)
AST SERPL-CCNC: 17 U/L (ref 15–37)
BASOPHILS # BLD: 0 K/UL (ref 0–0.1)
BASOPHILS NFR BLD: 1 % (ref 0–1)
BILIRUB SERPL-MCNC: 0.7 MG/DL (ref 0.2–1)
BUN SERPL-MCNC: 25 MG/DL (ref 6–20)
BUN/CREAT SERPL: 24 (ref 12–20)
CALCIUM SERPL-MCNC: 10.3 MG/DL (ref 8.5–10.1)
CHLORIDE SERPL-SCNC: 108 MMOL/L (ref 97–108)
CHOLEST SERPL-MCNC: 147 MG/DL
CO2 SERPL-SCNC: 30 MMOL/L (ref 21–32)
CREAT SERPL-MCNC: 1.04 MG/DL (ref 0.55–1.02)
CREAT UR-MCNC: 81.3 MG/DL
DIFFERENTIAL METHOD BLD: NORMAL
EOSINOPHIL # BLD: 0 K/UL (ref 0–0.4)
EOSINOPHIL NFR BLD: 1 % (ref 0–7)
ERYTHROCYTE [DISTWIDTH] IN BLOOD BY AUTOMATED COUNT: 12 % (ref 11.5–14.5)
EST. AVERAGE GLUCOSE BLD GHB EST-MCNC: 126 MG/DL
GLOBULIN SER CALC-MCNC: 3.1 G/DL (ref 2–4)
GLUCOSE SERPL-MCNC: 164 MG/DL (ref 65–100)
HBA1C MFR BLD: 6 % (ref 4–5.6)
HCT VFR BLD AUTO: 40.3 % (ref 35–47)
HDLC SERPL-MCNC: 63 MG/DL
HDLC SERPL: 2.3 (ref 0–5)
HGB BLD-MCNC: 12.8 G/DL (ref 11.5–16)
IMM GRANULOCYTES # BLD AUTO: 0 K/UL (ref 0–0.04)
IMM GRANULOCYTES NFR BLD AUTO: 0 % (ref 0–0.5)
LDLC SERPL CALC-MCNC: 64.2 MG/DL (ref 0–100)
LYMPHOCYTES # BLD: 0.9 K/UL (ref 0.8–3.5)
LYMPHOCYTES NFR BLD: 26 % (ref 12–49)
MCH RBC QN AUTO: 30.5 PG (ref 26–34)
MCHC RBC AUTO-ENTMCNC: 31.8 G/DL (ref 30–36.5)
MCV RBC AUTO: 96 FL (ref 80–99)
MICROALBUMIN UR-MCNC: 0.93 MG/DL
MICROALBUMIN/CREAT UR-RTO: 11 MG/G (ref 0–30)
MONOCYTES # BLD: 0.3 K/UL (ref 0–1)
MONOCYTES NFR BLD: 9 % (ref 5–13)
NEUTS SEG # BLD: 2.2 K/UL (ref 1.8–8)
NEUTS SEG NFR BLD: 63 % (ref 32–75)
NRBC # BLD: 0 K/UL (ref 0–0.01)
NRBC BLD-RTO: 0 PER 100 WBC
PLATELET # BLD AUTO: 186 K/UL (ref 150–400)
PMV BLD AUTO: 11.7 FL (ref 8.9–12.9)
POTASSIUM SERPL-SCNC: 4.4 MMOL/L (ref 3.5–5.1)
PROT SERPL-MCNC: 7.5 G/DL (ref 6.4–8.2)
RBC # BLD AUTO: 4.2 M/UL (ref 3.8–5.2)
SODIUM SERPL-SCNC: 140 MMOL/L (ref 136–145)
TRIGL SERPL-MCNC: 99 MG/DL (ref ?–150)
VLDLC SERPL CALC-MCNC: 19.8 MG/DL
WBC # BLD AUTO: 3.6 K/UL (ref 3.6–11)

## 2023-04-23 DIAGNOSIS — Z12.31 SCREENING MAMMOGRAM FOR BREAST CANCER: Primary | ICD-10-CM

## 2023-04-24 DIAGNOSIS — E11.9 CONTROLLED TYPE 2 DIABETES MELLITUS WITHOUT COMPLICATION, WITHOUT LONG-TERM CURRENT USE OF INSULIN (HCC): ICD-10-CM

## 2023-04-25 NOTE — TELEPHONE ENCOUNTER
----- Message from Myranda Ruvalcaba MD sent at 4/25/2023  8:57 AM EDT -----  Please let patient know that her labs are stable and showed no worrisome findings. Continue with medications as prescribed. Follow up in 4-6 months.    Thanks

## 2023-04-25 NOTE — PROGRESS NOTES
Please let patient know that her labs are stable and showed no worrisome findings. Continue with medications as prescribed. Follow up in 4-6 months.    Thanks

## 2023-04-27 ENCOUNTER — HOSPITAL ENCOUNTER (OUTPATIENT)
Dept: MAMMOGRAPHY | Age: 73
Discharge: HOME | End: 2023-04-27
Attending: INTERNAL MEDICINE
Payer: MEDICARE

## 2023-04-27 DIAGNOSIS — Z12.31 SCREENING MAMMOGRAM FOR BREAST CANCER: ICD-10-CM

## 2023-04-27 PROCEDURE — 77067 SCR MAMMO BI INCL CAD: CPT

## 2023-05-30 ENCOUNTER — OFFICE VISIT (OUTPATIENT)
Age: 73
End: 2023-05-30
Payer: MEDICARE

## 2023-05-30 VITALS
SYSTOLIC BLOOD PRESSURE: 146 MMHG | HEART RATE: 87 BPM | WEIGHT: 139 LBS | OXYGEN SATURATION: 94 % | BODY MASS INDEX: 23.86 KG/M2 | RESPIRATION RATE: 16 BRPM | DIASTOLIC BLOOD PRESSURE: 76 MMHG | TEMPERATURE: 98.4 F

## 2023-05-30 DIAGNOSIS — M05.741 RHEUMATOID ARTHRITIS WITH RHEUMATOID FACTOR OF RIGHT HAND WITHOUT ORGAN OR SYSTEMS INVOLVEMENT (HCC): Primary | ICD-10-CM

## 2023-05-30 PROCEDURE — 3017F COLORECTAL CA SCREEN DOC REV: CPT | Performed by: PEDIATRICS

## 2023-05-30 PROCEDURE — G8399 PT W/DXA RESULTS DOCUMENT: HCPCS | Performed by: PEDIATRICS

## 2023-05-30 PROCEDURE — 99214 OFFICE O/P EST MOD 30 MIN: CPT | Performed by: PEDIATRICS

## 2023-05-30 PROCEDURE — G8420 CALC BMI NORM PARAMETERS: HCPCS | Performed by: PEDIATRICS

## 2023-05-30 PROCEDURE — G8427 DOCREV CUR MEDS BY ELIG CLIN: HCPCS | Performed by: PEDIATRICS

## 2023-05-30 PROCEDURE — 3074F SYST BP LT 130 MM HG: CPT | Performed by: PEDIATRICS

## 2023-05-30 PROCEDURE — 1123F ACP DISCUSS/DSCN MKR DOCD: CPT | Performed by: PEDIATRICS

## 2023-05-30 PROCEDURE — 3078F DIAST BP <80 MM HG: CPT | Performed by: PEDIATRICS

## 2023-05-30 PROCEDURE — 1036F TOBACCO NON-USER: CPT | Performed by: PEDIATRICS

## 2023-05-30 PROCEDURE — 1090F PRES/ABSN URINE INCON ASSESS: CPT | Performed by: PEDIATRICS

## 2023-05-30 RX ORDER — LEFLUNOMIDE 20 MG/1
20 TABLET ORAL DAILY
Qty: 90 TABLET | Refills: 1 | Status: SHIPPED | OUTPATIENT
Start: 2023-05-30

## 2023-05-30 RX ORDER — LOSARTAN POTASSIUM 25 MG/1
TABLET ORAL
COMMUNITY
Start: 2023-04-19

## 2023-05-30 ASSESSMENT — PATIENT HEALTH QUESTIONNAIRE - PHQ9
SUM OF ALL RESPONSES TO PHQ QUESTIONS 1-9: 0
SUM OF ALL RESPONSES TO PHQ QUESTIONS 1-9: 0
1. LITTLE INTEREST OR PLEASURE IN DOING THINGS: 0
2. FEELING DOWN, DEPRESSED OR HOPELESS: 0
SUM OF ALL RESPONSES TO PHQ QUESTIONS 1-9: 0
SUM OF ALL RESPONSES TO PHQ9 QUESTIONS 1 & 2: 0
SUM OF ALL RESPONSES TO PHQ QUESTIONS 1-9: 0

## 2023-05-30 NOTE — PROGRESS NOTES
RHEUMATOLOGY PROBLEM LIST AND CHIEF COMPLAINT  1. Rheumatoid Arthritis - diagnosed 30+ years ago, treated to remission with Methotrexate (few years), positive RF, CCP, flare in 2019    Therapy History:   Prior DMARDs: Methotrexate (past response, put into remission), Plaquenil (1/2020-7/2021)  Current NSAIDs: Tylenol Arthritis  Current DMARDs: Leflunomide (4/2019-current, stopped briefly)     HISTORY OF PRESENT ILLNESS  This is a 71 y.o.  female. Today, the patient complains of no joint pain. Location: hands, neck, shoulder  Severity: 0 on a scale of 0-10  Timing: intermittent    Duration: 6 months  Context/Associated signs and symptoms: The patient has been doing well. She continues on leflunomide 20 mg daily. She continues to have right hand pain mainly with writing and has been doing PT for pain. She mentions that her BP medications were adjusted by her PCP and she is now on 3 medications for this.      RHEUMATOLOGY REVIEW OF SYSTEMS   Positives as per history  Negatives as follows:  Jill Shah:  Denies unexplained persistent fevers, weight change, chronic fatigue  HEAD/EYES:   Denies eye redness, blurry vision or sudden loss of vision, dry eyes, HA, temporal artery pain  ENT:    Denies oral/nasal ulcers, recurrent sinus infections, dry mouth  RESPIRATORY:  No pleuritic pain, history of pleural effusions, hemoptysis, exertional dyspnea  CARDIOVASCULAR:  Denies chest pain, history of pericardial effusions  GASTRO:   Denies heartburn, esophageal dysmotility, abdominal pain, nausea, vomiting, diarrhea, blood in the stool  HEMATOLOGIC:  No easy bruising, purpura, swollen lymph nodes  SKIN:    Denies alopecia, ulcers, nodules, sun sensitivity, unexplained persistent rash   VASCULAR:   Denies edema, cyanosis, raynaud phenomenon  NEUROLOGIC:  Denies specific muscle weakness, paresthesias   PSYCHIATRIC:  No sleep disturbance / snoring, depression, anxiety  MSK:    No morning stiffness >1 hour, SI

## 2023-05-30 NOTE — PROGRESS NOTES
Chief Complaint   Patient presents with    Joint Pain     1. Have you been to the ER, urgent care clinic since your last visit? Hospitalized since your last visit? No    2. Have you seen or consulted any other health care providers outside of the [address] [place] [address] since your last visit? Include any pap smears or colon screening.  No

## 2023-05-30 NOTE — PROGRESS NOTES
RHEUMATOLOGY PROBLEM LIST AND CHIEF COMPLAINT  1. Rheumatoid Arthritis - diagnosed 30+ years ago, treated to remission with Methotrexate (few years), positive RF, CCP, flare in 2019     Therapy History:   Prior DMARDs: Methotrexate (past response, put into remission), Plaquenil (1/2020-7/2021)  Current NSAIDs: Tylenol Arthritis  Current DMARDs: Leflunomide (4/2019-current, stopped briefly)      HISTORY OF PRESENT ILLNESS  This is a 71 y.o.  female. Today, the patient complains of no joint pain. Location: hands, neck, shoulder  Severity: 0 on a scale of 0-10  Timing: intermittent    Duration: 6 months  Context/Associated signs and symptoms: The patient has been doing well. She continues on leflunomide 20 mg daily. She continues to have right hand pain mainly with writing and has been doing PT for pain. She mentions that her BP medications were adjusted by her PCP and she is now on 3 medications for this.       RHEUMATOLOGY REVIEW OF SYSTEMS   Positives as per history  Negatives as follows:  Eddi Jamesin:    Denies unexplained persistent fevers, weight change, chronic fatigue  HEAD/EYES:              Denies eye redness, blurry vision or sudden loss of vision, dry eyes, HA, temporal artery pain  ENT:                            Denies oral/nasal ulcers, recurrent sinus infections, dry mouth  RESPIRATORY:         No pleuritic pain, history of pleural effusions, hemoptysis, exertional dyspnea  CARDIOVASCULAR:             Denies chest pain, history of pericardial effusions  GASTRO:                    Denies heartburn, esophageal dysmotility, abdominal pain, nausea, vomiting, diarrhea, blood in the stool  HEMATOLOGIC:        No easy bruising, purpura, swollen lymph nodes  SKIN:                           Denies alopecia, ulcers, nodules, sun sensitivity, unexplained persistent rash   VASCULAR:                Denies edema, cyanosis, raynaud phenomenon  NEUROLOGIC:           Denies specific muscle weakness,

## 2023-05-31 PROBLEM — N18.30 CHRONIC RENAL DISEASE, STAGE III (HCC): Status: ACTIVE | Noted: 2023-05-31

## 2023-06-06 ENCOUNTER — TELEPHONE (OUTPATIENT)
Age: 73
End: 2023-06-06

## 2023-06-06 NOTE — TELEPHONE ENCOUNTER
Patient called in. .    States she would like a refill on the antibiotic that she gets every year around this time.  States Dr Judi Echols should know which one shes referring to to take care of the \"discolored mucus\"     [phone]

## 2023-06-07 ENCOUNTER — TELEMEDICINE (OUTPATIENT)
Age: 73
End: 2023-06-07
Payer: MEDICARE

## 2023-06-07 DIAGNOSIS — N18.30 STAGE 3 CHRONIC KIDNEY DISEASE, UNSPECIFIED WHETHER STAGE 3A OR 3B CKD (HCC): ICD-10-CM

## 2023-06-07 DIAGNOSIS — J01.01 ACUTE RECURRENT MAXILLARY SINUSITIS: Primary | ICD-10-CM

## 2023-06-07 PROCEDURE — 99442 PR PHYS/QHP TELEPHONE EVALUATION 11-20 MIN: CPT | Performed by: INTERNAL MEDICINE

## 2023-06-07 RX ORDER — AZITHROMYCIN 250 MG/1
250 TABLET, FILM COATED ORAL SEE ADMIN INSTRUCTIONS
Qty: 6 TABLET | Refills: 0 | Status: SHIPPED | OUTPATIENT
Start: 2023-06-07 | End: 2023-06-12

## 2023-06-07 NOTE — ASSESSMENT & PLAN NOTE
Stable.    Lab Results   Component Value Date    CREATININE 1.04 (H) 04/19/2023    BUN 25 (H) 04/19/2023     04/19/2023    K 4.4 04/19/2023     04/19/2023    CO2 30 04/19/2023

## 2023-06-07 NOTE — PROGRESS NOTES
Chief Complaint   Patient presents with    Sinusitis      Vicki Burns is a 71 y.o. female who was seen by synchronous (real-time) video only technology on 6/7/2023 for Sinusitis    Vicki Burns, was evaluated through a synchronous (real-time) audio-video encounter. The patient (or guardian if applicable) is aware that this is a billable service. Verbal consent to proceed has been obtained within the past 12 months. The visit was conducted pursuant to the emergency declaration under the [address] [place] [address], [address] [place] [address] and the [employer] Act. Patient identification was verified, and a caregiver was present when appropriate. The patient was located in a state where the provider was credentialed to provide care. Patrick Vela MD    Assessment & Plan:   1. Acute recurrent maxillary sinusitis  -     azithromycin (ZITHROMAX) 250 MG tablet; Take 1 tablet by mouth See Admin Instructions for 5 days 500mg on day 1 followed by 250mg on days 2 - 5, Disp-6 tablet, R-0Normal    2. Stage 3 chronic kidney disease, unspecified whether stage 3a or 3b CKD ([facility] [address]  Assessment & Plan:  Stable. Lab Results   Component Value Date    CREATININE 1.04 (H) 04/19/2023    BUN 25 (H) 04/19/2023     04/19/2023    K 4.4 04/19/2023     04/19/2023    CO2 30 04/19/2023       Subjective:   72F with h/o T2DM and has had episodes of cough and sinusitis. She has similar symptoms every year at this time. She reports that she has tried OTC medications and nothing is helping. Yellow/Green nasal secretions. Low grade chills. No fever. Review of Systems   Constitutional:  Positive for fatigue. HENT:  Positive for congestion, sinus pressure and sinus pain. Eyes: Negative. Respiratory: Negative. Cardiovascular: Negative. Gastrointestinal: Negative. Endocrine: Negative. Genitourinary: Negative.     Musculoskeletal:

## 2023-06-09 ENCOUNTER — OFFICE VISIT (OUTPATIENT)
Age: 73
End: 2023-06-09
Payer: MEDICARE

## 2023-06-09 VITALS
HEART RATE: 76 BPM | HEIGHT: 64 IN | SYSTOLIC BLOOD PRESSURE: 120 MMHG | WEIGHT: 139.2 LBS | DIASTOLIC BLOOD PRESSURE: 64 MMHG | OXYGEN SATURATION: 97 % | BODY MASS INDEX: 23.76 KG/M2

## 2023-06-09 DIAGNOSIS — I10 PRIMARY HYPERTENSION: ICD-10-CM

## 2023-06-09 DIAGNOSIS — I34.0 NONRHEUMATIC MITRAL VALVE REGURGITATION: Primary | ICD-10-CM

## 2023-06-09 DIAGNOSIS — E78.5 DYSLIPIDEMIA: ICD-10-CM

## 2023-06-09 DIAGNOSIS — Z76.89 ESTABLISHING CARE WITH NEW DOCTOR, ENCOUNTER FOR: ICD-10-CM

## 2023-06-09 PROCEDURE — 99214 OFFICE O/P EST MOD 30 MIN: CPT | Performed by: INTERNAL MEDICINE

## 2023-06-09 PROCEDURE — 93005 ELECTROCARDIOGRAM TRACING: CPT | Performed by: INTERNAL MEDICINE

## 2023-06-09 ASSESSMENT — PATIENT HEALTH QUESTIONNAIRE - PHQ9
1. LITTLE INTEREST OR PLEASURE IN DOING THINGS: 0
SUM OF ALL RESPONSES TO PHQ QUESTIONS 1-9: 0
SUM OF ALL RESPONSES TO PHQ QUESTIONS 1-9: 0
SUM OF ALL RESPONSES TO PHQ9 QUESTIONS 1 & 2: 0
SUM OF ALL RESPONSES TO PHQ QUESTIONS 1-9: 0
2. FEELING DOWN, DEPRESSED OR HOPELESS: 0
SUM OF ALL RESPONSES TO PHQ QUESTIONS 1-9: 0

## 2023-06-09 NOTE — PROGRESS NOTES
All orders entered per verbal orders of  Dr. Butch Chi:    CAC score- Family hx of CAD.       See Dr. Darío Prince in 6 months
Reynaldo Rodriguez is a 71 y.o. female    had concerns including New Patient. Vitals:    06/09/23 1356   BP: 120/64   Site: Left Upper Arm   Position: Sitting   Cuff Size: Medium Adult   Pulse: 76   SpO2: 97%   Weight: 139 lb 3.2 oz (63.1 kg)   Height: 5' 4\" (1.626 m)        Chest pain no    SOB no    Dizziness no    Swelling no    Refills no    Covid Vaccinated yes      1. Have you been to the ER, urgent care clinic since your last visit? Hospitalized since your last visit? no    2. Have you seen or consulted any other health care providers outside of the [address] [place] [address] since your last visit? Include any pap smears or colon screening.  no
BNPNT   Lab Results   Component Value Date/Time     04/19/2023 11:28 AM    K 4.4 04/19/2023 11:28 AM     04/19/2023 11:28 AM    CO2 30 04/19/2023 11:28 AM    BUN 25 04/19/2023 11:28 AM    GFRAA 54 07/26/2022 09:35 AM      Lab Results   Component Value Date/Time     04/19/2023 11:28 AM    K 4.4 04/19/2023 11:28 AM     04/19/2023 11:28 AM    CO2 30 04/19/2023 11:28 AM    BUN 25 04/19/2023 11:28 AM    GFRAA 54 07/26/2022 09:35 AM    ALT 42 04/19/2023 11:28 AM    GLOB 3.1 04/19/2023 11:28 AM      No results found for: HBA1C, [id number]      No results for input(s): CPK, CKMB in the last 72 hours. Invalid input(s): CKQMB, CPKMB, TROIQ      0  Problem List:     [unfilled]      Chrystal Giles.  Mario Enriquez MD, [facility] [place]

## 2023-06-11 ASSESSMENT — ENCOUNTER SYMPTOMS
SINUS PRESSURE: 1
SINUS PAIN: 1
EYES NEGATIVE: 1
ALLERGIC/IMMUNOLOGIC NEGATIVE: 1
GASTROINTESTINAL NEGATIVE: 1
RESPIRATORY NEGATIVE: 1

## 2023-06-22 RX ORDER — ATORVASTATIN CALCIUM 10 MG/1
TABLET, FILM COATED ORAL
Qty: 90 TABLET | Refills: 0 | Status: SHIPPED | OUTPATIENT
Start: 2023-06-22

## 2023-06-27 ENCOUNTER — HOSPITAL ENCOUNTER (OUTPATIENT)
Facility: HOSPITAL | Age: 73
Discharge: HOME | End: 2023-06-30
Attending: INTERNAL MEDICINE

## 2023-06-27 DIAGNOSIS — E78.5 DYSLIPIDEMIA: ICD-10-CM

## 2023-06-27 PROCEDURE — 75571 CT HRT W/O DYE W/CA TEST: CPT

## 2023-07-03 ENCOUNTER — TELEPHONE (OUTPATIENT)
Facility: CLINIC | Age: 73
End: 2023-07-03

## 2023-07-03 RX ORDER — GLIPIZIDE 5 MG/1
5 TABLET, FILM COATED, EXTENDED RELEASE ORAL DAILY
Qty: 90 TABLET | Refills: 0 | Status: SHIPPED
Start: 2023-07-03 | End: 2023-07-03 | Stop reason: SDUPTHER

## 2023-07-03 RX ORDER — GLIPIZIDE 5 MG/1
5 TABLET, FILM COATED, EXTENDED RELEASE ORAL DAILY
Qty: 90 TABLET | Refills: 0 | Status: SHIPPED | OUTPATIENT
Start: 2023-07-03

## 2023-07-03 NOTE — PROGRESS NOTES
Patient going out of town tomorrow early morning and needed a refill on diabetic medication glipizide   5 mg ER DAILY. Confirmed no hypoglycemic events. Script sent. Refills will go back to PCP.

## 2023-07-03 NOTE — TELEPHONE ENCOUNTER
Pt called in to [facility] pharmacy last week to get her refill of glipiZIDE (GLUCOTROL XL) 5 MG and they said it would be enough time for her to get her rx this week since shes going out of town tomorrow morning. Pt says Shawn Oleary still doesn't have the refill available for her bc they need approval from us.
Refill sent to [facility].
97.6

## 2023-07-31 RX ORDER — BLOOD SUGAR DIAGNOSTIC
STRIP MISCELLANEOUS
Qty: 50 STRIP | Refills: 11 | Status: SHIPPED | OUTPATIENT
Start: 2023-07-31 | End: 2023-08-02 | Stop reason: SDUPTHER

## 2023-08-01 ENCOUNTER — TELEPHONE (OUTPATIENT)
Age: 73
End: 2023-08-01

## 2023-08-02 RX ORDER — BLOOD SUGAR DIAGNOSTIC
STRIP MISCELLANEOUS
Qty: 50 STRIP | Refills: 11 | Status: SHIPPED | OUTPATIENT
Start: 2023-08-02

## 2023-08-09 ENCOUNTER — OFFICE VISIT (OUTPATIENT)
Age: 73
End: 2023-08-09
Payer: MEDICARE

## 2023-08-09 ENCOUNTER — NURSE ONLY (OUTPATIENT)
Age: 73
End: 2023-08-09
Payer: MEDICARE

## 2023-08-09 VITALS
HEIGHT: 64 IN | TEMPERATURE: 97.5 F | HEART RATE: 71 BPM | OXYGEN SATURATION: 99 % | DIASTOLIC BLOOD PRESSURE: 62 MMHG | SYSTOLIC BLOOD PRESSURE: 128 MMHG | WEIGHT: 137.2 LBS | RESPIRATION RATE: 18 BRPM | BODY MASS INDEX: 23.42 KG/M2

## 2023-08-09 DIAGNOSIS — R35.0 URINARY FREQUENCY: ICD-10-CM

## 2023-08-09 DIAGNOSIS — R05.3 PERSISTENT COUGH: ICD-10-CM

## 2023-08-09 DIAGNOSIS — R05.8 PRODUCTIVE COUGH: Primary | ICD-10-CM

## 2023-08-09 DIAGNOSIS — R05.8 PRODUCTIVE COUGH: ICD-10-CM

## 2023-08-09 DIAGNOSIS — R53.83 MALAISE AND FATIGUE: ICD-10-CM

## 2023-08-09 DIAGNOSIS — M25.551 BILATERAL HIP PAIN: ICD-10-CM

## 2023-08-09 DIAGNOSIS — M25.552 BILATERAL HIP PAIN: ICD-10-CM

## 2023-08-09 DIAGNOSIS — R53.81 MALAISE AND FATIGUE: ICD-10-CM

## 2023-08-09 LAB
APPEARANCE UR: ABNORMAL
BACTERIA URNS QL MICRO: NEGATIVE /HPF
BASOPHILS # BLD: 0 K/UL (ref 0–0.1)
BASOPHILS NFR BLD: 1 % (ref 0–1)
BILIRUB UR QL: NEGATIVE
COLOR UR: ABNORMAL
DIFFERENTIAL METHOD BLD: ABNORMAL
EOSINOPHIL # BLD: 0.1 K/UL (ref 0–0.4)
EOSINOPHIL NFR BLD: 2 % (ref 0–7)
EPITH CASTS URNS QL MICRO: ABNORMAL /LPF
ERYTHROCYTE [DISTWIDTH] IN BLOOD BY AUTOMATED COUNT: 11.8 % (ref 11.5–14.5)
GLUCOSE UR STRIP.AUTO-MCNC: >1000 MG/DL
HCT VFR BLD AUTO: 40.5 % (ref 35–47)
HGB BLD-MCNC: 13 G/DL (ref 11.5–16)
HGB UR QL STRIP: NEGATIVE
IMM GRANULOCYTES # BLD AUTO: 0 K/UL (ref 0–0.04)
IMM GRANULOCYTES NFR BLD AUTO: 0 % (ref 0–0.5)
KETONES UR QL STRIP.AUTO: NEGATIVE MG/DL
LEUKOCYTE ESTERASE UR QL STRIP.AUTO: NEGATIVE
LYMPHOCYTES # BLD: 0.9 K/UL (ref 0.8–3.5)
LYMPHOCYTES NFR BLD: 27 % (ref 12–49)
MCH RBC QN AUTO: 30.2 PG (ref 26–34)
MCHC RBC AUTO-ENTMCNC: 32.1 G/DL (ref 30–36.5)
MCV RBC AUTO: 94.2 FL (ref 80–99)
MONOCYTES # BLD: 0.4 K/UL (ref 0–1)
MONOCYTES NFR BLD: 11 % (ref 5–13)
NEUTS SEG # BLD: 2 K/UL (ref 1.8–8)
NEUTS SEG NFR BLD: 59 % (ref 32–75)
NITRITE UR QL STRIP.AUTO: NEGATIVE
NRBC # BLD: 0 K/UL (ref 0–0.01)
NRBC BLD-RTO: 0 PER 100 WBC
PH UR STRIP: 5.5 (ref 5–8)
PLATELET # BLD AUTO: 172 K/UL (ref 150–400)
PMV BLD AUTO: 11.7 FL (ref 8.9–12.9)
PROT UR STRIP-MCNC: NEGATIVE MG/DL
RBC # BLD AUTO: 4.3 M/UL (ref 3.8–5.2)
RBC #/AREA URNS HPF: ABNORMAL /HPF (ref 0–5)
SP GR UR REFRACTOMETRY: >1.03 (ref 1–1.03)
URINE CULTURE IF INDICATED: ABNORMAL
UROBILINOGEN UR QL STRIP.AUTO: 0.2 EU/DL (ref 0.2–1)
WBC # BLD AUTO: 3.5 K/UL (ref 3.6–11)
WBC URNS QL MICRO: ABNORMAL /HPF (ref 0–4)

## 2023-08-09 PROCEDURE — 99213 OFFICE O/P EST LOW 20 MIN: CPT | Performed by: INTERNAL MEDICINE

## 2023-08-09 PROCEDURE — G8420 CALC BMI NORM PARAMETERS: HCPCS | Performed by: INTERNAL MEDICINE

## 2023-08-09 PROCEDURE — 1090F PRES/ABSN URINE INCON ASSESS: CPT | Performed by: INTERNAL MEDICINE

## 2023-08-09 PROCEDURE — G8427 DOCREV CUR MEDS BY ELIG CLIN: HCPCS | Performed by: INTERNAL MEDICINE

## 2023-08-09 PROCEDURE — 3074F SYST BP LT 130 MM HG: CPT | Performed by: INTERNAL MEDICINE

## 2023-08-09 PROCEDURE — 1123F ACP DISCUSS/DSCN MKR DOCD: CPT | Performed by: INTERNAL MEDICINE

## 2023-08-09 PROCEDURE — 3017F COLORECTAL CA SCREEN DOC REV: CPT | Performed by: INTERNAL MEDICINE

## 2023-08-09 PROCEDURE — 1036F TOBACCO NON-USER: CPT | Performed by: INTERNAL MEDICINE

## 2023-08-09 PROCEDURE — 3078F DIAST BP <80 MM HG: CPT | Performed by: INTERNAL MEDICINE

## 2023-08-09 PROCEDURE — G8399 PT W/DXA RESULTS DOCUMENT: HCPCS | Performed by: INTERNAL MEDICINE

## 2023-08-09 RX ORDER — PREDNISONE 50 MG/1
50 TABLET ORAL DAILY
Qty: 5 TABLET | Refills: 0 | Status: SHIPPED | OUTPATIENT
Start: 2023-08-09 | End: 2023-08-14

## 2023-08-09 RX ORDER — AZITHROMYCIN 250 MG/1
250 TABLET, FILM COATED ORAL SEE ADMIN INSTRUCTIONS
Qty: 6 TABLET | Refills: 0 | Status: SHIPPED | OUTPATIENT
Start: 2023-08-09 | End: 2023-08-14

## 2023-08-09 SDOH — ECONOMIC STABILITY: HOUSING INSECURITY
IN THE LAST 12 MONTHS, WAS THERE A TIME WHEN YOU DID NOT HAVE A STEADY PLACE TO SLEEP OR SLEPT IN A SHELTER (INCLUDING NOW)?: NO

## 2023-08-09 SDOH — ECONOMIC STABILITY: FOOD INSECURITY: WITHIN THE PAST 12 MONTHS, THE FOOD YOU BOUGHT JUST DIDN'T LAST AND YOU DIDN'T HAVE MONEY TO GET MORE.: NEVER TRUE

## 2023-08-09 SDOH — ECONOMIC STABILITY: INCOME INSECURITY: HOW HARD IS IT FOR YOU TO PAY FOR THE VERY BASICS LIKE FOOD, HOUSING, MEDICAL CARE, AND HEATING?: SOMEWHAT HARD

## 2023-08-09 SDOH — ECONOMIC STABILITY: FOOD INSECURITY: WITHIN THE PAST 12 MONTHS, YOU WORRIED THAT YOUR FOOD WOULD RUN OUT BEFORE YOU GOT MONEY TO BUY MORE.: NEVER TRUE

## 2023-08-09 ASSESSMENT — ENCOUNTER SYMPTOMS
ALLERGIC/IMMUNOLOGIC NEGATIVE: 1
EYES NEGATIVE: 1
COUGH: 1
GASTROINTESTINAL NEGATIVE: 1

## 2023-08-09 NOTE — PROGRESS NOTES
Chief Complaint   Patient presents with    Cough     Patient c/o productive cough x 5 weeks. Assessment/ Plan:   Hubert Zazueta was seen today for cough and medication refill. Diagnoses and all orders for this visit:    Productive cough  -     Comprehensive Metabolic Panel; Future  -     CBC with Auto Differential; Future    Urinary frequency  -     Urinalysis with Reflex to Culture; Future    Persistent cough  -     Comprehensive Metabolic Panel; Future  -     CBC with Auto Differential; Future    Malaise and fatigue  -     Comprehensive Metabolic Panel; Future  -     CBC with Auto Differential; Future  -     predniSONE (DELTASONE) 50 MG tablet; Take 1 tablet by mouth daily for 5 days    Bilateral hip pain  -     predniSONE (DELTASONE) 50 MG tablet; Take 1 tablet by mouth daily for 5 days    Other orders  -     azithromycin (ZITHROMAX) 250 MG tablet; Take 1 tablet by mouth See Admin Instructions for 5 days 500mg on day 1 followed by 250mg on days 2 - 5    Atypical cough and productive. Exam is benign, but ongoing for the past 5-weeks. Patient has traveled via cruise at the onset. She did not seek medical attention or check COVID-19. She is vaccinated, but not boosted. Given the duration of her symptoms, she does not qualify for Paxlovid. Will treat as an atypical pneumonia and also obtain a few labs. Also, more than likely, this is causing a flare of her arthritis and so will give a few days burst of prednisone as well. I have discussed the diagnosis with the patient and the intended treatment plan as seen in the above orders. The patient has received an after-visit summary and questions were answered concerning future plans. Asked to return should symptoms worsen or not improve with treatment. Any pending labs and studies will be relayed to patient when they become available. Pt verbalizes understanding of plan of care and denies further questions or concerns at this time.      Follow Up:  Return if

## 2023-08-10 LAB
ALBUMIN SERPL-MCNC: 4.1 G/DL (ref 3.5–5)
ALBUMIN/GLOB SERPL: 1.2 (ref 1.1–2.2)
ALP SERPL-CCNC: 64 U/L (ref 45–117)
ALT SERPL-CCNC: 36 U/L (ref 12–78)
ANION GAP SERPL CALC-SCNC: 4 MMOL/L (ref 5–15)
AST SERPL-CCNC: 15 U/L (ref 15–37)
BILIRUB SERPL-MCNC: 0.5 MG/DL (ref 0.2–1)
BUN SERPL-MCNC: 29 MG/DL (ref 6–20)
BUN/CREAT SERPL: 27 (ref 12–20)
CALCIUM SERPL-MCNC: 9.5 MG/DL (ref 8.5–10.1)
CHLORIDE SERPL-SCNC: 109 MMOL/L (ref 97–108)
CO2 SERPL-SCNC: 29 MMOL/L (ref 21–32)
CREAT SERPL-MCNC: 1.07 MG/DL (ref 0.55–1.02)
GLOBULIN SER CALC-MCNC: 3.4 G/DL (ref 2–4)
GLUCOSE SERPL-MCNC: 183 MG/DL (ref 65–100)
POTASSIUM SERPL-SCNC: 4.7 MMOL/L (ref 3.5–5.1)
PROT SERPL-MCNC: 7.5 G/DL (ref 6.4–8.2)
SODIUM SERPL-SCNC: 142 MMOL/L (ref 136–145)

## 2023-08-14 ENCOUNTER — TELEPHONE (OUTPATIENT)
Age: 73
End: 2023-08-14

## 2023-08-14 NOTE — TELEPHONE ENCOUNTER
----- Message from Philip Henderson MD sent at 8/11/2023  9:35 AM EDT -----  Please let patient know that her labs are stable. The urinalysis suggest that she needs to drink more water. No other major worrisome findings. Glucose was a little elevated, but she was not fasting. Hope she is feeling better. Follow up as we discussed.

## 2023-09-14 RX ORDER — ATORVASTATIN CALCIUM 10 MG/1
10 TABLET, FILM COATED ORAL DAILY
Qty: 90 TABLET | Refills: 0 | Status: SHIPPED | OUTPATIENT
Start: 2023-09-14

## 2023-09-18 RX ORDER — ATORVASTATIN CALCIUM 10 MG/1
10 TABLET, FILM COATED ORAL DAILY
Qty: 90 TABLET | Refills: 0 | Status: SHIPPED | OUTPATIENT
Start: 2023-09-18

## 2023-09-25 NOTE — TELEPHONE ENCOUNTER
REFILL REQUESTED FOR metoprolol tartrate (LOPRESSOR) 25 MG tablet SENT TO [address] [facility] [id number] - [place], [place] - [address] [facility] [phone] [facility] [phone]

## 2023-09-27 RX ORDER — NYSTATIN 100000 [USP'U]/G
POWDER TOPICAL PRN
Qty: 15 G | Refills: 3 | Status: SHIPPED | OUTPATIENT
Start: 2023-09-27

## 2023-09-27 RX ORDER — GLIPIZIDE 5 MG/1
5 TABLET, FILM COATED, EXTENDED RELEASE ORAL DAILY
Qty: 90 TABLET | Refills: 0 | Status: SHIPPED | OUTPATIENT
Start: 2023-09-27

## 2023-09-27 NOTE — TELEPHONE ENCOUNTER
metoprolol tartrate (LOPRESSOR) 25 MG tablet was sent to the wrong pharmacy ; needs to be sent to   [facility]   [address] [phone] - F [phone]    glipiZIDE (GLUCOTROL XL) 5 MG extended release tablet  and  nystatin (MYCOSTATIN) 070276 UNIT/GM powder also need to be refilled.

## 2023-10-09 RX ORDER — AMLODIPINE BESYLATE 10 MG/1
10 TABLET ORAL DAILY
Qty: 90 TABLET | Refills: 0 | Status: SHIPPED | OUTPATIENT
Start: 2023-10-09

## 2023-10-13 RX ORDER — LOSARTAN POTASSIUM 25 MG/1
25 TABLET ORAL DAILY
Qty: 90 TABLET | Refills: 0 | Status: SHIPPED | OUTPATIENT
Start: 2023-10-13

## 2023-10-21 NOTE — TELEPHONE ENCOUNTER
Left voicemail informing pt to give the office a return call to set up a follow up appt per Dr Galileo Green message below   Please let patient know that she needs to make a follow up appointment.  Checking to see if she is still taking medication - plaquenil Adult

## 2023-10-23 RX ORDER — LOSARTAN POTASSIUM 25 MG/1
25 TABLET ORAL DAILY
Qty: 90 TABLET | Refills: 0 | Status: SHIPPED | OUTPATIENT
Start: 2023-10-23

## 2023-10-31 ENCOUNTER — NURSE ONLY (OUTPATIENT)
Age: 73
End: 2023-10-31
Payer: MEDICARE

## 2023-10-31 ENCOUNTER — OFFICE VISIT (OUTPATIENT)
Age: 73
End: 2023-10-31
Payer: MEDICARE

## 2023-10-31 VITALS
OXYGEN SATURATION: 98 % | DIASTOLIC BLOOD PRESSURE: 78 MMHG | SYSTOLIC BLOOD PRESSURE: 136 MMHG | BODY MASS INDEX: 23.76 KG/M2 | TEMPERATURE: 97 F | HEIGHT: 64 IN | WEIGHT: 139.2 LBS | RESPIRATION RATE: 16 BRPM | HEART RATE: 92 BPM

## 2023-10-31 DIAGNOSIS — E11.9 TYPE 2 DIABETES MELLITUS WITHOUT COMPLICATION, WITHOUT LONG-TERM CURRENT USE OF INSULIN (HCC): ICD-10-CM

## 2023-10-31 DIAGNOSIS — M54.16 LUMBAR BACK PAIN WITH RADICULOPATHY AFFECTING LOWER EXTREMITY: Primary | ICD-10-CM

## 2023-10-31 DIAGNOSIS — E55.9 VITAMIN D DEFICIENCY, UNSPECIFIED: ICD-10-CM

## 2023-10-31 DIAGNOSIS — E78.2 MIXED HYPERLIPIDEMIA: ICD-10-CM

## 2023-10-31 DIAGNOSIS — Z23 ENCOUNTER FOR IMMUNIZATION: ICD-10-CM

## 2023-10-31 PROBLEM — E11.22 TYPE 2 DIABETES MELLITUS WITH CHRONIC KIDNEY DISEASE (HCC): Status: ACTIVE | Noted: 2023-10-31

## 2023-10-31 PROCEDURE — 90694 VACC AIIV4 NO PRSRV 0.5ML IM: CPT | Performed by: INTERNAL MEDICINE

## 2023-10-31 PROCEDURE — G8399 PT W/DXA RESULTS DOCUMENT: HCPCS | Performed by: INTERNAL MEDICINE

## 2023-10-31 PROCEDURE — 3078F DIAST BP <80 MM HG: CPT | Performed by: INTERNAL MEDICINE

## 2023-10-31 PROCEDURE — 3044F HG A1C LEVEL LT 7.0%: CPT | Performed by: INTERNAL MEDICINE

## 2023-10-31 PROCEDURE — 99214 OFFICE O/P EST MOD 30 MIN: CPT | Performed by: INTERNAL MEDICINE

## 2023-10-31 PROCEDURE — 3075F SYST BP GE 130 - 139MM HG: CPT | Performed by: INTERNAL MEDICINE

## 2023-10-31 PROCEDURE — 2022F DILAT RTA XM EVC RTNOPTHY: CPT | Performed by: INTERNAL MEDICINE

## 2023-10-31 PROCEDURE — 3017F COLORECTAL CA SCREEN DOC REV: CPT | Performed by: INTERNAL MEDICINE

## 2023-10-31 PROCEDURE — PBSHW INFLUENZA, FLUAD, (AGE 65 Y+), IM, PF, 0.5 ML: Performed by: INTERNAL MEDICINE

## 2023-10-31 PROCEDURE — G8420 CALC BMI NORM PARAMETERS: HCPCS | Performed by: INTERNAL MEDICINE

## 2023-10-31 PROCEDURE — 1123F ACP DISCUSS/DSCN MKR DOCD: CPT | Performed by: INTERNAL MEDICINE

## 2023-10-31 PROCEDURE — G8427 DOCREV CUR MEDS BY ELIG CLIN: HCPCS | Performed by: INTERNAL MEDICINE

## 2023-10-31 PROCEDURE — 1090F PRES/ABSN URINE INCON ASSESS: CPT | Performed by: INTERNAL MEDICINE

## 2023-10-31 PROCEDURE — G8484 FLU IMMUNIZE NO ADMIN: HCPCS | Performed by: INTERNAL MEDICINE

## 2023-10-31 PROCEDURE — 1036F TOBACCO NON-USER: CPT | Performed by: INTERNAL MEDICINE

## 2023-10-31 ASSESSMENT — ENCOUNTER SYMPTOMS
BACK PAIN: 1
RESPIRATORY NEGATIVE: 1
ALLERGIC/IMMUNOLOGIC NEGATIVE: 1
EYES NEGATIVE: 1
GASTROINTESTINAL NEGATIVE: 1

## 2023-10-31 ASSESSMENT — PATIENT HEALTH QUESTIONNAIRE - PHQ9
SUM OF ALL RESPONSES TO PHQ QUESTIONS 1-9: 0
1. LITTLE INTEREST OR PLEASURE IN DOING THINGS: 0
SUM OF ALL RESPONSES TO PHQ QUESTIONS 1-9: 0
SUM OF ALL RESPONSES TO PHQ QUESTIONS 1-9: 0
2. FEELING DOWN, DEPRESSED OR HOPELESS: 0
SUM OF ALL RESPONSES TO PHQ9 QUESTIONS 1 & 2: 0
SUM OF ALL RESPONSES TO PHQ QUESTIONS 1-9: 0

## 2023-10-31 NOTE — PATIENT INSTRUCTIONS
The type of procedure you had: May 2021.      PROCEDURES/SURGERIES: Procedure(s):   L5-S1 LAMINECTOMY AND DISCECTOMY WITH INSTRUMENTATED FUSION, MEDTRONIC PLIF, PEDICLE SCREWS (O-ARM) (URGENT)

## 2023-11-01 ENCOUNTER — HOSPITAL ENCOUNTER (OUTPATIENT)
Facility: HOSPITAL | Age: 73
Discharge: HOME | End: 2023-11-04
Payer: MEDICARE

## 2023-11-01 DIAGNOSIS — M54.16 LUMBAR BACK PAIN WITH RADICULOPATHY AFFECTING LOWER EXTREMITY: ICD-10-CM

## 2023-11-01 LAB
25(OH)D3 SERPL-MCNC: 65.1 NG/ML (ref 30–100)
ALBUMIN SERPL-MCNC: 4.8 G/DL (ref 3.5–5)
ALBUMIN/GLOB SERPL: 1.5 (ref 1.1–2.2)
ALP SERPL-CCNC: 69 U/L (ref 45–117)
ALT SERPL-CCNC: 36 U/L (ref 12–78)
ANION GAP SERPL CALC-SCNC: 7 MMOL/L (ref 5–15)
AST SERPL-CCNC: 15 U/L (ref 15–37)
BASOPHILS # BLD: 0 K/UL (ref 0–0.1)
BASOPHILS NFR BLD: 1 % (ref 0–1)
BILIRUB SERPL-MCNC: 0.6 MG/DL (ref 0.2–1)
BUN SERPL-MCNC: 26 MG/DL (ref 6–20)
BUN/CREAT SERPL: 21 (ref 12–20)
CALCIUM SERPL-MCNC: 10.3 MG/DL (ref 8.5–10.1)
CHLORIDE SERPL-SCNC: 106 MMOL/L (ref 97–108)
CHOLEST SERPL-MCNC: 167 MG/DL
CO2 SERPL-SCNC: 29 MMOL/L (ref 21–32)
CREAT SERPL-MCNC: 1.24 MG/DL (ref 0.55–1.02)
DIFFERENTIAL METHOD BLD: NORMAL
EOSINOPHIL # BLD: 0.1 K/UL (ref 0–0.4)
EOSINOPHIL NFR BLD: 1 % (ref 0–7)
ERYTHROCYTE [DISTWIDTH] IN BLOOD BY AUTOMATED COUNT: 11.5 % (ref 11.5–14.5)
EST. AVERAGE GLUCOSE BLD GHB EST-MCNC: 143 MG/DL
GLOBULIN SER CALC-MCNC: 3.2 G/DL (ref 2–4)
GLUCOSE SERPL-MCNC: 163 MG/DL (ref 65–100)
HBA1C MFR BLD: 6.6 % (ref 4–5.6)
HCT VFR BLD AUTO: 40.5 % (ref 35–47)
HDLC SERPL-MCNC: 61 MG/DL
HDLC SERPL: 2.7 (ref 0–5)
HGB BLD-MCNC: 12.9 G/DL (ref 11.5–16)
IMM GRANULOCYTES # BLD AUTO: 0 K/UL (ref 0–0.04)
IMM GRANULOCYTES NFR BLD AUTO: 0 % (ref 0–0.5)
LDLC SERPL CALC-MCNC: 75.2 MG/DL (ref 0–100)
LYMPHOCYTES # BLD: 1 K/UL (ref 0.8–3.5)
LYMPHOCYTES NFR BLD: 23 % (ref 12–49)
MCH RBC QN AUTO: 30.3 PG (ref 26–34)
MCHC RBC AUTO-ENTMCNC: 31.9 G/DL (ref 30–36.5)
MCV RBC AUTO: 95.1 FL (ref 80–99)
MONOCYTES # BLD: 0.5 K/UL (ref 0–1)
MONOCYTES NFR BLD: 10 % (ref 5–13)
NEUTS SEG # BLD: 2.8 K/UL (ref 1.8–8)
NEUTS SEG NFR BLD: 65 % (ref 32–75)
NRBC # BLD: 0 K/UL (ref 0–0.01)
NRBC BLD-RTO: 0 PER 100 WBC
PLATELET # BLD AUTO: 195 K/UL (ref 150–400)
PMV BLD AUTO: 11.8 FL (ref 8.9–12.9)
POTASSIUM SERPL-SCNC: 4.3 MMOL/L (ref 3.5–5.1)
PROT SERPL-MCNC: 8 G/DL (ref 6.4–8.2)
RBC # BLD AUTO: 4.26 M/UL (ref 3.8–5.2)
SODIUM SERPL-SCNC: 142 MMOL/L (ref 136–145)
TRIGL SERPL-MCNC: 154 MG/DL
VLDLC SERPL CALC-MCNC: 30.8 MG/DL
WBC # BLD AUTO: 4.4 K/UL (ref 3.6–11)

## 2023-11-01 PROCEDURE — 72100 X-RAY EXAM L-S SPINE 2/3 VWS: CPT

## 2023-11-03 ENCOUNTER — TELEPHONE (OUTPATIENT)
Age: 73
End: 2023-11-03

## 2023-11-03 NOTE — TELEPHONE ENCOUNTER
----- Message from Melissa Patino MD sent at 11/3/2023  7:48 AM EDT -----  Please let patient know that her labs are showing some loss of control of her diabetes. Her A1C is now 6.6 and higher than 6-months ago. Her lipid panels are stable. Overall, her labs show that she is dehydrated and needs to drink more water. I would like to see her in 2-4 weeks to review the labs and see if we need to adjust her medications. Also, did she have the xrays done on her back? I have not seen the results. They are not back yet. Thanks!

## 2023-12-05 ENCOUNTER — OFFICE VISIT (OUTPATIENT)
Age: 73
End: 2023-12-05
Payer: MEDICARE

## 2023-12-05 VITALS
OXYGEN SATURATION: 94 % | HEART RATE: 75 BPM | RESPIRATION RATE: 16 BRPM | TEMPERATURE: 97.8 F | DIASTOLIC BLOOD PRESSURE: 79 MMHG | BODY MASS INDEX: 24.03 KG/M2 | SYSTOLIC BLOOD PRESSURE: 130 MMHG | WEIGHT: 140 LBS

## 2023-12-05 DIAGNOSIS — M05.742 RHEUMATOID ARTHRITIS WITH RHEUMATOID FACTOR OF LEFT HAND WITHOUT ORGAN OR SYSTEMS INVOLVEMENT (HCC): ICD-10-CM

## 2023-12-05 DIAGNOSIS — M05.741 RHEUMATOID ARTHRITIS WITH RHEUMATOID FACTOR OF RIGHT HAND WITHOUT ORGAN OR SYSTEMS INVOLVEMENT (HCC): Primary | ICD-10-CM

## 2023-12-05 PROCEDURE — 99214 OFFICE O/P EST MOD 30 MIN: CPT | Performed by: PEDIATRICS

## 2023-12-05 PROCEDURE — 3017F COLORECTAL CA SCREEN DOC REV: CPT | Performed by: PEDIATRICS

## 2023-12-05 PROCEDURE — G8420 CALC BMI NORM PARAMETERS: HCPCS | Performed by: PEDIATRICS

## 2023-12-05 PROCEDURE — 1036F TOBACCO NON-USER: CPT | Performed by: PEDIATRICS

## 2023-12-05 PROCEDURE — G8484 FLU IMMUNIZE NO ADMIN: HCPCS | Performed by: PEDIATRICS

## 2023-12-05 PROCEDURE — G8399 PT W/DXA RESULTS DOCUMENT: HCPCS | Performed by: PEDIATRICS

## 2023-12-05 PROCEDURE — 3078F DIAST BP <80 MM HG: CPT | Performed by: PEDIATRICS

## 2023-12-05 PROCEDURE — 3074F SYST BP LT 130 MM HG: CPT | Performed by: PEDIATRICS

## 2023-12-05 PROCEDURE — G8427 DOCREV CUR MEDS BY ELIG CLIN: HCPCS | Performed by: PEDIATRICS

## 2023-12-05 PROCEDURE — 1123F ACP DISCUSS/DSCN MKR DOCD: CPT | Performed by: PEDIATRICS

## 2023-12-05 PROCEDURE — 1090F PRES/ABSN URINE INCON ASSESS: CPT | Performed by: PEDIATRICS

## 2023-12-05 RX ORDER — LEFLUNOMIDE 20 MG/1
20 TABLET ORAL DAILY
Qty: 90 TABLET | Refills: 1 | Status: SHIPPED | OUTPATIENT
Start: 2023-12-05

## 2023-12-05 NOTE — PROGRESS NOTES
Chief Complaint   Patient presents with    Joint Pain     1. Have you been to the ER, urgent care clinic since your last visit? Hospitalized since your last visit? No    2. Have you seen or consulted any other health care providers outside of the [address] [place] [address] since your last visit? Include any pap smears or colon screening.  No
recommended. NSAIDs are contraindicated due to kidney disease. 3. Drug therapy monitoring for toxicity (leflunomide) - CBC, BUN, Cr, AST, ALT and albumin every 3 months; eye exams every 6-12 months for retinal toxicity. 4. Trochanteric bursitis -(is unchanged)- This was not discussed at length today. She should continue rehabilitation exercises including piriformas stretch, iliotibial band stretch, straight leg stretch, wall squat with ball and gluteal strengthening as needed. PLAN  1. Leflunomide 20 mg daily  2. Topical Diclofenac, Paraffin bath   3. Return in 6 months     Mariah Finn MD  Adult and Pediatric Rheumatology     [facility], [facility], [facility] [place], Phone [phone], Fax [phone]     Visiting  of Pediatrics    Department of Pediatrics, [facility] of [address] [place] [address], [address] [place] [address], Phone [phone], Fax [phone]    There are no Patient Instructions on file for this visit. cc:  Marisela Carney MD    I, Zenaida Llamas MD, personally performed the services described in the documentation as scribed by Rex Becerril in my presence and have reviewed and agree with the note as scribed.

## 2023-12-26 ENCOUNTER — TELEPHONE (OUTPATIENT)
Age: 73
End: 2023-12-26

## 2023-12-26 RX ORDER — GLIPIZIDE 5 MG/1
5 TABLET, FILM COATED, EXTENDED RELEASE ORAL DAILY
Qty: 90 TABLET | Refills: 0 | Status: SHIPPED | OUTPATIENT
Start: 2023-12-26

## 2023-12-26 NOTE — TELEPHONE ENCOUNTER
Pt called and said that her insurance will stop covering her   empagliflozin (Griselda Elsie) 25 MG tablet   After the first of the year and she said she will have a couple pills to hold her over a couple days after the 1st so she wanted to make an appt to discuss what to do about the medication but there was nothing available until 01/10 and she does not want to wait until then to not have something so she asked if I could send a message back to see if she can be fit it and the very beginning on Jan or for this week.  Can I fit her in?

## 2024-01-03 ENCOUNTER — OFFICE VISIT (OUTPATIENT)
Age: 74
End: 2024-01-03
Payer: MEDICARE

## 2024-01-03 VITALS
HEIGHT: 64 IN | DIASTOLIC BLOOD PRESSURE: 78 MMHG | WEIGHT: 140 LBS | HEART RATE: 58 BPM | BODY MASS INDEX: 23.9 KG/M2 | TEMPERATURE: 97.7 F | OXYGEN SATURATION: 99 % | RESPIRATION RATE: 16 BRPM | SYSTOLIC BLOOD PRESSURE: 150 MMHG

## 2024-01-03 DIAGNOSIS — E78.2 MIXED HYPERLIPIDEMIA: ICD-10-CM

## 2024-01-03 DIAGNOSIS — N18.30 STAGE 3 CHRONIC KIDNEY DISEASE, UNSPECIFIED WHETHER STAGE 3A OR 3B CKD (HCC): ICD-10-CM

## 2024-01-03 DIAGNOSIS — M54.16 LUMBAR RADICULOPATHY: ICD-10-CM

## 2024-01-03 DIAGNOSIS — E55.9 VITAMIN D DEFICIENCY: ICD-10-CM

## 2024-01-03 DIAGNOSIS — M05.742 RHEUMATOID ARTHRITIS WITH RHEUMATOID FACTOR OF LEFT HAND WITHOUT ORGAN OR SYSTEMS INVOLVEMENT (HCC): ICD-10-CM

## 2024-01-03 DIAGNOSIS — E11.21 TYPE 2 DIABETES MELLITUS WITH DIABETIC NEPHROPATHY, WITHOUT LONG-TERM CURRENT USE OF INSULIN (HCC): Primary | ICD-10-CM

## 2024-01-03 PROCEDURE — 3046F HEMOGLOBIN A1C LEVEL >9.0%: CPT | Performed by: INTERNAL MEDICINE

## 2024-01-03 PROCEDURE — 3078F DIAST BP <80 MM HG: CPT | Performed by: INTERNAL MEDICINE

## 2024-01-03 PROCEDURE — G8420 CALC BMI NORM PARAMETERS: HCPCS | Performed by: INTERNAL MEDICINE

## 2024-01-03 PROCEDURE — 99214 OFFICE O/P EST MOD 30 MIN: CPT | Performed by: INTERNAL MEDICINE

## 2024-01-03 PROCEDURE — 2022F DILAT RTA XM EVC RTNOPTHY: CPT | Performed by: INTERNAL MEDICINE

## 2024-01-03 PROCEDURE — G8484 FLU IMMUNIZE NO ADMIN: HCPCS | Performed by: INTERNAL MEDICINE

## 2024-01-03 PROCEDURE — 1123F ACP DISCUSS/DSCN MKR DOCD: CPT | Performed by: INTERNAL MEDICINE

## 2024-01-03 PROCEDURE — G8399 PT W/DXA RESULTS DOCUMENT: HCPCS | Performed by: INTERNAL MEDICINE

## 2024-01-03 PROCEDURE — 1036F TOBACCO NON-USER: CPT | Performed by: INTERNAL MEDICINE

## 2024-01-03 PROCEDURE — 3017F COLORECTAL CA SCREEN DOC REV: CPT | Performed by: INTERNAL MEDICINE

## 2024-01-03 PROCEDURE — 3077F SYST BP >= 140 MM HG: CPT | Performed by: INTERNAL MEDICINE

## 2024-01-03 PROCEDURE — G8427 DOCREV CUR MEDS BY ELIG CLIN: HCPCS | Performed by: INTERNAL MEDICINE

## 2024-01-03 PROCEDURE — 1090F PRES/ABSN URINE INCON ASSESS: CPT | Performed by: INTERNAL MEDICINE

## 2024-01-03 ASSESSMENT — PATIENT HEALTH QUESTIONNAIRE - PHQ9
2. FEELING DOWN, DEPRESSED OR HOPELESS: 0
SUM OF ALL RESPONSES TO PHQ9 QUESTIONS 1 & 2: 0
SUM OF ALL RESPONSES TO PHQ QUESTIONS 1-9: 0
1. LITTLE INTEREST OR PLEASURE IN DOING THINGS: 0
SUM OF ALL RESPONSES TO PHQ QUESTIONS 1-9: 0

## 2024-01-03 NOTE — ASSESSMENT & PLAN NOTE
Well-controlled, continue current medications and continue current treatment plan  Consider Farxiga.

## 2024-01-03 NOTE — PROGRESS NOTES
Identified pt with two pt identifiers(name and ).    Chief Complaint   Patient presents with    Medication Adjustment     Pt here for a med adjustment due to insurance coverage. Also she would like labs to check her A1C         Health Maintenance Due   Topic    Shingles vaccine (1 of 2)    Respiratory Syncytial Virus (RSV) Pregnant or age 60 yrs+ (1 - 1-dose 60+ series)    Diabetic retinal exam     COVID-19 Vaccine ( season)       Wt Readings from Last 3 Encounters:   24 63.5 kg (140 lb)   23 63.5 kg (140 lb)   10/31/23 63.1 kg (139 lb 3.2 oz)     Temp Readings from Last 3 Encounters:   24 97.7 °F (36.5 °C) (Temporal)   23 97.8 °F (36.6 °C) (Temporal)   10/31/23 97 °F (36.1 °C) (Temporal)     BP Readings from Last 3 Encounters:   24 (!) 150/78   23 130/79   10/31/23 136/78     Pulse Readings from Last 3 Encounters:   24 58   23 75   10/31/23 92           Depression Screening:  :         1/3/2024    11:36 AM 10/31/2023     2:56 PM 2023     1:55 PM 2023     1:32 PM 2023    10:00 AM 2023     9:00 AM 2022    11:16 AM   PHQ-9 Questionaire   Little interest or pleasure in doing things 0 0 0 0 0 0 0   Feeling down, depressed, or hopeless 0 0 0 0 0 0 0   PHQ-9 Total Score 0 0 0 0 0 0 0        Fall Risk Assessment:  :         2023     1:55 PM 2023     1:32 PM   Fall Risk   2 or more falls in past year? no no   Fall with injury in past year? no no        Abuse Screening:  :          No data to display                 Coordination of Care Questionnaire:  :     \"Have you been to the ER, urgent care clinic since your last visit?  Hospitalized since your last visit?\"    NO    “Have you seen or consulted any other health care providers outside of [facility] since your last visit?”    NO

## 2024-01-03 NOTE — ASSESSMENT & PLAN NOTE
Well-controlled, continue current medications and continue current treatment plan  Lab Results   Component Value Date    LABA1C 6.6 (H) 10/31/2023    LABA1C 6.0 (H) 04/19/2023    LABA1C 6.2 (H) 01/24/2023     Lab Results   Component Value Date    MALBCR 11 04/19/2023    LDLCALC 75.2 10/31/2023    CREATININE 1.24 (H) 10/31/2023

## 2024-01-03 NOTE — ASSESSMENT & PLAN NOTE
Monitored by specialist- no acute findings meriting change in the plan  She see's Dr. Correia and last evaluated in November 2023.  She continues to be maintained Leflunomide. No worrisome side effects.

## 2024-01-03 NOTE — PROGRESS NOTES
CC:  Chief Complaint   Patient presents with    Medication Adjustment     Pt here for a med adjustment due to insurance coverage. Also she would like labs to check her A1C      Assessment/Plan:   1. Type 2 diabetes mellitus with diabetic nephropathy, without long-term current use of insulin (HCC)  Assessment & Plan:  Well-controlled, continue current medications and continue current treatment plan  Lab Results   Component Value Date    LABA1C 6.6 (H) 10/31/2023    LABA1C 6.0 (H) 04/19/2023    LABA1C 6.2 (H) 01/24/2023     Lab Results   Component Value Date    MALBCR 11 04/19/2023    LDLCALC 75.2 10/31/2023    CREATININE 1.24 (H) 10/31/2023   Orders:  -     dapagliflozin (FARXIGA) 10 MG tablet; Take 1 tablet by mouth every morning, Disp-90 tablet, R-1Normal  -     Hemoglobin A1C; Future  2. Rheumatoid arthritis with rheumatoid factor of left hand without organ or systems involvement ([facility])  Assessment & Plan:  Monitored by specialist- no acute findings meriting change in the plan  She see's Dr. Correia and last evaluated in November 2023.  She continues to be maintained Leflunomide. No worrisome side effects.   3. Stage 3 chronic kidney disease, unspecified whether stage 3a or 3b CKD ([facility])  Assessment & Plan:  Well-controlled, continue current medications and continue current treatment plan  Consider Farxiga.   Orders:  -     dapagliflozin (FARXIGA) 10 MG tablet; Take 1 tablet by mouth every morning, Disp-90 tablet, R-1Normal  4. Mixed hyperlipidemia  -     CBC with Auto Differential; Future  -     Comprehensive Metabolic Panel; Future  -     Lipid Panel; Future  5. Vitamin D deficiency  -     Vitamin D 25 Hydroxy; Future  6. Lumbar radiculopathy  -     [facility] - Bryon Oates MD, NeurosurgeryDavid ([address])     I have discussed the diagnosis with the patient and the intended treatment plan as seen in the above orders. The patient has received an after-visit summary and questions were answered concerning future

## 2024-01-08 RX ORDER — AMLODIPINE BESYLATE 10 MG/1
10 TABLET ORAL DAILY
Qty: 90 TABLET | Refills: 0 | Status: SHIPPED | OUTPATIENT
Start: 2024-01-08

## 2024-01-17 ENCOUNTER — TELEPHONE (OUTPATIENT)
Age: 74
End: 2024-01-17

## 2024-01-17 NOTE — TELEPHONE ENCOUNTER
----- Message from Isabela Damon sent at 1/17/2024  1:04 PM EST -----  Subject: Message to Provider    QUESTIONS  Information for Provider? patient is requesting to send an message to dr. youngblood in regards to an request to have medical records to be sent to an   specialist provider; pt. state that she has been having pain of the lower   back that radiates down to her legs and dr. mace who is her   specialist is requesting to possibly have any imaging or test done on the   pt. to be faxed to him at the fax number of [phone].  ---------------------------------------------------------------------------  --------------  CALL BACK INFO  [phone]; OK to leave message on voicemail  ---------------------------------------------------------------------------  --------------  SCRIPT ANSWERS  Relationship to Patient? Self

## 2024-01-22 ENCOUNTER — TELEPHONE (OUTPATIENT)
Age: 74
End: 2024-01-22

## 2024-01-22 DIAGNOSIS — M05.741 RHEUMATOID ARTHRITIS WITH RHEUMATOID FACTOR OF RIGHT HAND WITHOUT ORGAN OR SYSTEMS INVOLVEMENT (HCC): Primary | ICD-10-CM

## 2024-01-22 NOTE — TELEPHONE ENCOUNTER
PT requested a refilled on Leflunomide, she'd like it to be sent to [facility] on [address]. PT was seen 12/05/23 and had labs on 1/11/23

## 2024-01-23 RX ORDER — AMLODIPINE BESYLATE 10 MG/1
10 TABLET ORAL DAILY
Qty: 90 TABLET | Refills: 0 | Status: SHIPPED | OUTPATIENT
Start: 2024-01-23

## 2024-01-23 NOTE — TELEPHONE ENCOUNTER
Last visit 12/5/23  Follow up 5/7/24     Lab Results   Component Value Date     10/31/2023    K 4.3 10/31/2023     10/31/2023    CO2 29 10/31/2023    BUN 26 (H) 10/31/2023    CREATININE 1.24 (H) 10/31/2023    GLUCOSE 163 (H) 10/31/2023    CALCIUM 10.3 (H) 10/31/2023    PROT 8.0 10/31/2023    LABALBU 4.8 10/31/2023    BILITOT 0.6 10/31/2023    ALKPHOS 69 10/31/2023    AST 15 10/31/2023    ALT 36 10/31/2023    LABGLOM 47 (L) 10/31/2023    GFRAA 54 (L) 07/26/2022    AGRATIO 1.5 10/31/2023    GLOB 3.2 10/31/2023     Lab Results   Component Value Date    WBC 4.4 10/31/2023    HGB 12.9 10/31/2023    HCT 40.5 10/31/2023    MCV 95.1 10/31/2023     10/31/2023

## 2024-01-24 RX ORDER — LEFLUNOMIDE 20 MG/1
20 TABLET ORAL DAILY
Qty: 90 TABLET | Refills: 1 | Status: SHIPPED | OUTPATIENT
Start: 2024-01-24

## 2024-01-29 RX ORDER — LOSARTAN POTASSIUM 25 MG/1
25 TABLET ORAL DAILY
Qty: 90 TABLET | Refills: 0 | Status: SHIPPED | OUTPATIENT
Start: 2024-01-29

## 2024-02-07 ENCOUNTER — NURSE ONLY (OUTPATIENT)
Age: 74
End: 2024-02-07

## 2024-02-07 DIAGNOSIS — E78.2 MIXED HYPERLIPIDEMIA: ICD-10-CM

## 2024-02-07 DIAGNOSIS — E11.21 TYPE 2 DIABETES MELLITUS WITH DIABETIC NEPHROPATHY, WITHOUT LONG-TERM CURRENT USE OF INSULIN (HCC): ICD-10-CM

## 2024-02-07 DIAGNOSIS — E55.9 VITAMIN D DEFICIENCY: ICD-10-CM

## 2024-02-07 LAB
ALBUMIN SERPL-MCNC: 4 G/DL (ref 3.5–5)
ALBUMIN/GLOB SERPL: 1.3 (ref 1.1–2.2)
ALP SERPL-CCNC: 61 U/L (ref 45–117)
ALT SERPL-CCNC: 34 U/L (ref 12–78)
ANION GAP SERPL CALC-SCNC: 6 MMOL/L (ref 5–15)
AST SERPL-CCNC: 14 U/L (ref 15–37)
BILIRUB SERPL-MCNC: 0.6 MG/DL (ref 0.2–1)
BUN SERPL-MCNC: 24 MG/DL (ref 6–20)
BUN/CREAT SERPL: 19 (ref 12–20)
CALCIUM SERPL-MCNC: 9.5 MG/DL (ref 8.5–10.1)
CHLORIDE SERPL-SCNC: 110 MMOL/L (ref 97–108)
CHOLEST SERPL-MCNC: 139 MG/DL
CO2 SERPL-SCNC: 27 MMOL/L (ref 21–32)
CREAT SERPL-MCNC: 1.27 MG/DL (ref 0.55–1.02)
GLOBULIN SER CALC-MCNC: 3.2 G/DL (ref 2–4)
GLUCOSE SERPL-MCNC: 220 MG/DL (ref 65–100)
HDLC SERPL-MCNC: 52 MG/DL
HDLC SERPL: 2.7 (ref 0–5)
LDLC SERPL CALC-MCNC: 65.4 MG/DL (ref 0–100)
POTASSIUM SERPL-SCNC: 4.3 MMOL/L (ref 3.5–5.1)
PROT SERPL-MCNC: 7.2 G/DL (ref 6.4–8.2)
SODIUM SERPL-SCNC: 143 MMOL/L (ref 136–145)
TRIGL SERPL-MCNC: 108 MG/DL
VLDLC SERPL CALC-MCNC: 21.6 MG/DL

## 2024-02-08 LAB
25(OH)D3 SERPL-MCNC: 73.8 NG/ML (ref 30–100)
BASOPHILS # BLD: 0 K/UL (ref 0–0.1)
BASOPHILS NFR BLD: 1 % (ref 0–1)
DIFFERENTIAL METHOD BLD: ABNORMAL
EOSINOPHIL # BLD: 0 K/UL (ref 0–0.4)
EOSINOPHIL NFR BLD: 1 % (ref 0–7)
ERYTHROCYTE [DISTWIDTH] IN BLOOD BY AUTOMATED COUNT: 11.7 % (ref 11.5–14.5)
EST. AVERAGE GLUCOSE BLD GHB EST-MCNC: 128 MG/DL
HBA1C MFR BLD: 6.1 % (ref 4–5.6)
HCT VFR BLD AUTO: 39 % (ref 35–47)
HGB BLD-MCNC: 12.7 G/DL (ref 11.5–16)
IMM GRANULOCYTES # BLD AUTO: 0 K/UL (ref 0–0.04)
IMM GRANULOCYTES NFR BLD AUTO: 0 % (ref 0–0.5)
LYMPHOCYTES # BLD: 0.8 K/UL (ref 0.8–3.5)
LYMPHOCYTES NFR BLD: 26 % (ref 12–49)
MCH RBC QN AUTO: 30.4 PG (ref 26–34)
MCHC RBC AUTO-ENTMCNC: 32.6 G/DL (ref 30–36.5)
MCV RBC AUTO: 93.3 FL (ref 80–99)
MONOCYTES # BLD: 0.4 K/UL (ref 0–1)
MONOCYTES NFR BLD: 12 % (ref 5–13)
NEUTS SEG # BLD: 1.8 K/UL (ref 1.8–8)
NEUTS SEG NFR BLD: 60 % (ref 32–75)
NRBC # BLD: 0 K/UL (ref 0–0.01)
NRBC BLD-RTO: 0 PER 100 WBC
PLATELET # BLD AUTO: 190 K/UL (ref 150–400)
PMV BLD AUTO: 11.8 FL (ref 8.9–12.9)
RBC # BLD AUTO: 4.18 M/UL (ref 3.8–5.2)
RBC MORPH BLD: ABNORMAL
WBC # BLD AUTO: 3 K/UL (ref 3.6–11)

## 2024-02-09 ENCOUNTER — TELEPHONE (OUTPATIENT)
Age: 74
End: 2024-02-09

## 2024-02-09 NOTE — TELEPHONE ENCOUNTER
----- Message from Amanda Espinoza MD sent at 2/9/2024  8:08 AM EST -----  Please let patient know that her labs are stable and improved.   No worrisome findings.   Follow up for diabetic screening every 3-months and needs to be seen to repeat BP.

## 2024-03-11 RX ORDER — ATORVASTATIN CALCIUM 10 MG/1
10 TABLET, FILM COATED ORAL DAILY
Qty: 90 TABLET | Refills: 0 | Status: SHIPPED | OUTPATIENT
Start: 2024-03-11

## 2024-03-19 ENCOUNTER — TELEPHONE (OUTPATIENT)
Age: 74
End: 2024-03-19

## 2024-03-25 RX ORDER — NYSTATIN 100000 [USP'U]/G
POWDER TOPICAL PRN
Qty: 60 G | Refills: 5 | Status: SHIPPED | OUTPATIENT
Start: 2024-03-25

## 2024-03-25 RX ORDER — LOSARTAN POTASSIUM 25 MG/1
25 TABLET ORAL DAILY
Qty: 90 TABLET | Refills: 0 | Status: SHIPPED | OUTPATIENT
Start: 2024-03-25

## 2024-03-25 RX ORDER — AMLODIPINE BESYLATE 10 MG/1
10 TABLET ORAL DAILY
Qty: 90 TABLET | Refills: 0 | Status: SHIPPED | OUTPATIENT
Start: 2024-03-25

## 2024-04-08 ENCOUNTER — TRANSCRIBE ORDERS (OUTPATIENT)
Facility: HOSPITAL | Age: 74
End: 2024-04-08

## 2024-04-08 DIAGNOSIS — Z12.31 ENCOUNTER FOR SCREENING MAMMOGRAM FOR MALIGNANT NEOPLASM OF BREAST: Primary | ICD-10-CM

## 2024-04-24 ENCOUNTER — OFFICE VISIT (OUTPATIENT)
Age: 74
End: 2024-04-24
Payer: MEDICARE

## 2024-04-24 VITALS
OXYGEN SATURATION: 98 % | WEIGHT: 139.8 LBS | HEIGHT: 64 IN | RESPIRATION RATE: 16 BRPM | DIASTOLIC BLOOD PRESSURE: 76 MMHG | BODY MASS INDEX: 23.87 KG/M2 | SYSTOLIC BLOOD PRESSURE: 138 MMHG | TEMPERATURE: 97 F | HEART RATE: 74 BPM

## 2024-04-24 DIAGNOSIS — Z00.00 MEDICARE ANNUAL WELLNESS VISIT, SUBSEQUENT: Primary | ICD-10-CM

## 2024-04-24 DIAGNOSIS — E55.9 VITAMIN D DEFICIENCY: ICD-10-CM

## 2024-04-24 DIAGNOSIS — E11.21 TYPE 2 DIABETES MELLITUS WITH DIABETIC NEPHROPATHY, WITHOUT LONG-TERM CURRENT USE OF INSULIN (HCC): ICD-10-CM

## 2024-04-24 DIAGNOSIS — R53.83 MALAISE AND FATIGUE: ICD-10-CM

## 2024-04-24 DIAGNOSIS — E78.2 MIXED HYPERLIPIDEMIA: ICD-10-CM

## 2024-04-24 DIAGNOSIS — R53.81 MALAISE AND FATIGUE: ICD-10-CM

## 2024-04-24 LAB — HBA1C MFR BLD: 7.4 %

## 2024-04-24 PROCEDURE — G0439 PPPS, SUBSEQ VISIT: HCPCS | Performed by: INTERNAL MEDICINE

## 2024-04-24 PROCEDURE — 83036 HEMOGLOBIN GLYCOSYLATED A1C: CPT | Performed by: INTERNAL MEDICINE

## 2024-04-24 PROCEDURE — PBSHW AMB POC HEMOGLOBIN A1C: Performed by: INTERNAL MEDICINE

## 2024-04-24 PROCEDURE — 99214 OFFICE O/P EST MOD 30 MIN: CPT | Performed by: INTERNAL MEDICINE

## 2024-04-24 PROCEDURE — 1123F ACP DISCUSS/DSCN MKR DOCD: CPT | Performed by: INTERNAL MEDICINE

## 2024-04-24 PROCEDURE — 3017F COLORECTAL CA SCREEN DOC REV: CPT | Performed by: INTERNAL MEDICINE

## 2024-04-24 PROCEDURE — 3044F HG A1C LEVEL LT 7.0%: CPT | Performed by: INTERNAL MEDICINE

## 2024-04-24 PROCEDURE — 3078F DIAST BP <80 MM HG: CPT | Performed by: INTERNAL MEDICINE

## 2024-04-24 PROCEDURE — 3075F SYST BP GE 130 - 139MM HG: CPT | Performed by: INTERNAL MEDICINE

## 2024-04-24 ASSESSMENT — PATIENT HEALTH QUESTIONNAIRE - PHQ9
SUM OF ALL RESPONSES TO PHQ QUESTIONS 1-9: 0
2. FEELING DOWN, DEPRESSED OR HOPELESS: NOT AT ALL
SUM OF ALL RESPONSES TO PHQ9 QUESTIONS 1 & 2: 0
SUM OF ALL RESPONSES TO PHQ QUESTIONS 1-9: 0
1. LITTLE INTEREST OR PLEASURE IN DOING THINGS: NOT AT ALL

## 2024-04-24 ASSESSMENT — LIFESTYLE VARIABLES
HOW MANY STANDARD DRINKS CONTAINING ALCOHOL DO YOU HAVE ON A TYPICAL DAY: PATIENT DOES NOT DRINK
HOW OFTEN DO YOU HAVE A DRINK CONTAINING ALCOHOL: NEVER

## 2024-04-24 NOTE — PROGRESS NOTES
outside of [facility] since your last visit?”    NO            
(VITAMIN C) 500 MG tablet Take by mouth daily Yes Automatic Reconciliation, Ar   aspirin 81 MG EC tablet Take by mouth daily Yes Automatic Reconciliation, Ar   fluticasone (FLONASE) 50 MCG/ACT nasal spray 2 sprays per nostril once a day  Indications: inflammation of the nose due to an allergy Yes Automatic Reconciliation, Ar   ibuprofen (ADVIL;MOTRIN) 800 MG tablet Take by mouth every 8 hours as needed Yes Automatic Reconciliation, Ar       CareTeam (Including outside providers/suppliers regularly involved in providing care):   Patient Care Team:  Amanda Espinoza MD as PCP - General  Amanda Espinoza MD as PCP [facility]     Reviewed and updated this visit:  Tobacco  Allergies  Meds  Med Hx  Surg Hx  Soc Hx  Fam Hx         Amanda SUAZO. MD Alexis  [facility]  4/24/24

## 2024-04-24 NOTE — PATIENT INSTRUCTIONS
A Healthy Heart: Care Instructions  Overview     Coronary artery disease, also called heart disease, occurs when a substance called plaque builds up in the vessels that supply oxygen-rich blood to your heart muscle. This can narrow the blood vessels and reduce blood flow. A heart attack happens when blood flow is completely blocked. A high-fat diet, smoking, and other factors increase the risk of heart disease.  Your doctor has found that you have a chance of having heart disease. A heart-healthy lifestyle can help keep your heart healthy and prevent heart disease. This lifestyle includes eating healthy, being active, staying at a weight that's healthy for you, and not smoking or using tobacco. It also includes taking medicines as directed, managing other health conditions, and trying to get a healthy amount of sleep.  Follow-up care is a key part of your treatment and safety. Be sure to make and go to all appointments, and call your doctor if you are having problems. It's also a good idea to know your test results and keep a list of the medicines you take.  How can you care for yourself at home?  Diet    Use less salt when you cook and eat. This helps lower your blood pressure. Taste food before salting. Add only a little salt when you think you need it. With time, your taste buds will adjust to less salt.     Eat fewer snack items, fast foods, canned soups, and other high-salt, high-fat, processed foods.     Read food labels and try to avoid saturated and trans fats. They increase your risk of heart disease by raising cholesterol levels.     Limit the amount of solid fat--butter, margarine, and shortening--you eat. Use olive, peanut, or canola oil when you cook. Bake, broil, and steam foods instead of frying them.     Eat a variety of fruit and vegetables every day. Dark green, deep orange, red, or yellow fruits and vegetables are especially good for you. Examples include spinach, carrots, peaches, and berries.

## 2024-05-07 ENCOUNTER — OFFICE VISIT (OUTPATIENT)
Age: 74
End: 2024-05-07
Payer: MEDICARE

## 2024-05-07 VITALS
TEMPERATURE: 97.9 F | OXYGEN SATURATION: 95 % | BODY MASS INDEX: 24.41 KG/M2 | DIASTOLIC BLOOD PRESSURE: 79 MMHG | WEIGHT: 140 LBS | RESPIRATION RATE: 16 BRPM | SYSTOLIC BLOOD PRESSURE: 148 MMHG | HEART RATE: 79 BPM

## 2024-05-07 DIAGNOSIS — M05.741 RHEUMATOID ARTHRITIS WITH RHEUMATOID FACTOR OF RIGHT HAND WITHOUT ORGAN OR SYSTEMS INVOLVEMENT (HCC): Primary | ICD-10-CM

## 2024-05-07 PROCEDURE — 99214 OFFICE O/P EST MOD 30 MIN: CPT | Performed by: PEDIATRICS

## 2024-05-07 PROCEDURE — 1090F PRES/ABSN URINE INCON ASSESS: CPT | Performed by: PEDIATRICS

## 2024-05-07 PROCEDURE — G8420 CALC BMI NORM PARAMETERS: HCPCS | Performed by: PEDIATRICS

## 2024-05-07 PROCEDURE — 3077F SYST BP >= 140 MM HG: CPT | Performed by: PEDIATRICS

## 2024-05-07 PROCEDURE — G8427 DOCREV CUR MEDS BY ELIG CLIN: HCPCS | Performed by: PEDIATRICS

## 2024-05-07 PROCEDURE — 1036F TOBACCO NON-USER: CPT | Performed by: PEDIATRICS

## 2024-05-07 PROCEDURE — 1123F ACP DISCUSS/DSCN MKR DOCD: CPT | Performed by: PEDIATRICS

## 2024-05-07 PROCEDURE — 3017F COLORECTAL CA SCREEN DOC REV: CPT | Performed by: PEDIATRICS

## 2024-05-07 PROCEDURE — G8399 PT W/DXA RESULTS DOCUMENT: HCPCS | Performed by: PEDIATRICS

## 2024-05-07 PROCEDURE — 3078F DIAST BP <80 MM HG: CPT | Performed by: PEDIATRICS

## 2024-05-07 ASSESSMENT — PATIENT HEALTH QUESTIONNAIRE - PHQ9
1. LITTLE INTEREST OR PLEASURE IN DOING THINGS: NOT AT ALL
SUM OF ALL RESPONSES TO PHQ QUESTIONS 1-9: 0
2. FEELING DOWN, DEPRESSED OR HOPELESS: NOT AT ALL
SUM OF ALL RESPONSES TO PHQ QUESTIONS 1-9: 0
SUM OF ALL RESPONSES TO PHQ9 QUESTIONS 1 & 2: 0

## 2024-05-07 NOTE — PROGRESS NOTES
RHEUMATOLOGY PROBLEM LIST AND CHIEF COMPLAINT  1. Rheumatoid Arthritis - diagnosed 30+ years ago, treated to remission with Methotrexate (few years), positive RF, CCP, flare in 2019     Therapy History:   Prior DMARDs: Methotrexate (past response, put into remission), Plaquenil (1/2020-7/2021)  Current NSAIDs: Tylenol Arthritis  Current DMARDs: Leflunomide (4/2019-current, stopped briefly)      HISTORY OF PRESENT ILLNESS  This is a 73 y.o.  female.  Today, the patient complains of joint pain.  Location: right leg  Severity: 7 on a scale of 0-10  Timing: intermittent    Duration: 6 months  Context/Associated signs and symptoms: The patient continues on leflunomide 20 mg daily. She has been doing well overall. She has been doing PT for right leg pain. She also notes pain along her right lateral thigh.      RHEUMATOLOGY REVIEW OF SYSTEMS   Positives as per history  Negatives as follows:  CONSTITUTlONAL:    Denies unexplained persistent fevers, weight change, chronic fatigue  HEAD/EYES:              Denies eye redness, blurry vision or sudden loss of vision, dry eyes, HA, temporal artery pain  ENT:                            Denies oral/nasal ulcers, recurrent sinus infections, dry mouth  RESPIRATORY:         No pleuritic pain, history of pleural effusions, hemoptysis, exertional dyspnea  CARDIOVASCULAR:             Denies chest pain, history of pericardial effusions  GASTRO:                    Denies heartburn, esophageal dysmotility, abdominal pain, nausea, vomiting, diarrhea, blood in the stool  HEMATOLOGIC:        No easy bruising, purpura, swollen lymph nodes  SKIN:                           Denies alopecia, ulcers, nodules, sun sensitivity, unexplained persistent rash   VASCULAR:                Denies edema, cyanosis, raynaud phenomenon  NEUROLOGIC:           Denies specific muscle weakness, paresthesias   PSYCHIATRIC:           No sleep disturbance / snoring, depression, anxiety  MSK:

## 2024-05-07 NOTE — PROGRESS NOTES
Chief Complaint   Patient presents with    Joint Pain     1. Have you been to the ER, urgent care clinic since your last visit?  Hospitalized since your last visit?No    2. Have you seen or consulted any other health care providers outside of the [facility] since your last visit?  Include any pap smears or colon screening. No

## 2024-05-28 ENCOUNTER — TELEPHONE (OUTPATIENT)
Age: 74
End: 2024-05-28

## 2024-05-28 NOTE — TELEPHONE ENCOUNTER
----- Message from Yamileth Mckeon sent at 5/28/2024 12:23 PM EDT -----  Subject: Appointment Request    Reason for Call: Established Patient Appointment needed: Routine Existing   Condition Follow Up    QUESTIONS    Reason for appointment request? Available appointments did not meet   patient need     Additional Information for Provider? patient called would like a sooner   appointment, concerned that her Blood Sugar is running high every day   around 200, and would like the nurse or doctor to call her with sooner   appointment or with suggestion on what to do   ---------------------------------------------------------------------------  --------------  CALL BACK INFO  [phone]; OK to leave message on voicemail  ---------------------------------------------------------------------------  --------------  SCRIPT ANSWERS

## 2024-05-28 NOTE — TELEPHONE ENCOUNTER
No available appointments with Dr. Espinoza, PCP out of office.     Offered acute visit with another provider, patient declined.

## 2024-06-06 ENCOUNTER — OFFICE VISIT (OUTPATIENT)
Age: 74
End: 2024-06-06
Payer: MEDICARE

## 2024-06-06 VITALS
SYSTOLIC BLOOD PRESSURE: 134 MMHG | TEMPERATURE: 97.9 F | WEIGHT: 139.4 LBS | DIASTOLIC BLOOD PRESSURE: 72 MMHG | HEIGHT: 64 IN | BODY MASS INDEX: 23.8 KG/M2 | HEART RATE: 82 BPM | RESPIRATION RATE: 16 BRPM | OXYGEN SATURATION: 100 %

## 2024-06-06 DIAGNOSIS — J01.00 ACUTE NON-RECURRENT MAXILLARY SINUSITIS: ICD-10-CM

## 2024-06-06 DIAGNOSIS — E11.9 CONTROLLED TYPE 2 DIABETES MELLITUS WITHOUT COMPLICATION, WITHOUT LONG-TERM CURRENT USE OF INSULIN (HCC): Primary | ICD-10-CM

## 2024-06-06 PROCEDURE — 3017F COLORECTAL CA SCREEN DOC REV: CPT | Performed by: FAMILY MEDICINE

## 2024-06-06 PROCEDURE — 99213 OFFICE O/P EST LOW 20 MIN: CPT | Performed by: FAMILY MEDICINE

## 2024-06-06 PROCEDURE — G8427 DOCREV CUR MEDS BY ELIG CLIN: HCPCS | Performed by: FAMILY MEDICINE

## 2024-06-06 PROCEDURE — 3044F HG A1C LEVEL LT 7.0%: CPT | Performed by: FAMILY MEDICINE

## 2024-06-06 PROCEDURE — 2022F DILAT RTA XM EVC RTNOPTHY: CPT | Performed by: FAMILY MEDICINE

## 2024-06-06 PROCEDURE — 1036F TOBACCO NON-USER: CPT | Performed by: FAMILY MEDICINE

## 2024-06-06 PROCEDURE — 3075F SYST BP GE 130 - 139MM HG: CPT | Performed by: FAMILY MEDICINE

## 2024-06-06 PROCEDURE — G8420 CALC BMI NORM PARAMETERS: HCPCS | Performed by: FAMILY MEDICINE

## 2024-06-06 PROCEDURE — 3078F DIAST BP <80 MM HG: CPT | Performed by: FAMILY MEDICINE

## 2024-06-06 PROCEDURE — 1090F PRES/ABSN URINE INCON ASSESS: CPT | Performed by: FAMILY MEDICINE

## 2024-06-06 PROCEDURE — G8399 PT W/DXA RESULTS DOCUMENT: HCPCS | Performed by: FAMILY MEDICINE

## 2024-06-06 PROCEDURE — 1123F ACP DISCUSS/DSCN MKR DOCD: CPT | Performed by: FAMILY MEDICINE

## 2024-06-06 RX ORDER — FLUCONAZOLE 150 MG/1
150 TABLET ORAL
Qty: 2 TABLET | Refills: 0 | Status: SHIPPED | OUTPATIENT
Start: 2024-06-06 | End: 2024-06-12

## 2024-06-06 RX ORDER — CETIRIZINE HYDROCHLORIDE 10 MG/1
10 TABLET ORAL DAILY
Qty: 30 TABLET | Refills: 0 | Status: SHIPPED | OUTPATIENT
Start: 2024-06-06

## 2024-06-06 RX ORDER — AZELASTINE 1 MG/ML
1 SPRAY, METERED NASAL 2 TIMES DAILY
Qty: 30 ML | Refills: 5 | Status: SHIPPED | OUTPATIENT
Start: 2024-06-06

## 2024-06-06 NOTE — PROGRESS NOTES
Identified pt with two pt identifiers(name and ).    Chief Complaint   Patient presents with    Sinus Problem     Patient has been having a lot drainage, headache and cough and green mucus the past 2 weeks almost 3 weeks         Health Maintenance Due   Topic    Shingles vaccine (1 of 2)    Respiratory Syncytial Virus (RSV) Pregnant or age 60 yrs+ (1 - 1-dose 60+ series)    Diabetic retinal exam     COVID-19 Vaccine ( season)    Diabetic Alb to Cr ratio (uACR) test     Breast cancer screen     DEXA (modify frequency per FRAX score)        Wt Readings from Last 3 Encounters:   24 63.5 kg (140 lb)   24 63.4 kg (139 lb 12.8 oz)   24 63.5 kg (140 lb)     Temp Readings from Last 3 Encounters:   24 97.9 °F (36.6 °C) (Oral)   24 97 °F (36.1 °C) (Temporal)   24 97.7 °F (36.5 °C) (Temporal)     BP Readings from Last 3 Encounters:   24 (!) 148/79   24 138/76   24 (!) 150/78     Pulse Readings from Last 3 Encounters:   24 79   24 74   24 58           Depression Screening:  :         2024     1:22 PM 2024    10:55 AM 1/3/2024    11:36 AM 10/31/2023     2:56 PM 2023     1:55 PM 2023     1:32 PM 2023    10:00 AM   PHQ-9 Questionaire   Little interest or pleasure in doing things 0 0 0 0 0 0 0   Feeling down, depressed, or hopeless 0 0 0 0 0 0 0   PHQ-9 Total Score 0 0 0 0 0 0 0        Fall Risk Assessment:  :         2024    10:55 AM 2023     1:55 PM 2023     1:32 PM   Fall Risk   2 or more falls in past year? no no no   Fall with injury in past year? no no no        Abuse Screening:  :          No data to display                 Coordination of Care Questionnaire:  :     \"Have you been to the ER, urgent care clinic since your last visit?  Hospitalized since your last visit?\"    NO    “Have you seen or consulted any other health care providers outside of [facility] since your last visit?”

## 2024-06-06 NOTE — PROGRESS NOTES
Assessment & Plan   1. Controlled type 2 diabetes mellitus without complication, without long-term current use of insulin ([facility])  Comments:  patient will RTC to follow up with Dr. Espinoza at the end of the month to discuss her diabetes.  2. Acute non-recurrent maxillary sinusitis  Comments:  stop flonase  start diflucan Q72hrs  Start azelastine  Orders:  -     fluconazole (DIFLUCAN) 150 MG tablet; Take 1 tablet by mouth every 72 hours for 6 days, Disp-2 tablet, R-0Normal  -     azelastine (ASTELIN) 0.1 % nasal spray; 1 spray by Nasal route 2 times daily Use in each nostril as directed, Disp-30 mL, R-5Normal  -     cetirizine (ZYRTEC) 10 MG tablet; Take 1 tablet by mouth daily, Disp-30 tablet, R-0Normal       No follow-ups on file.       Subjective   Nargis Shepard (:  1950) is a 73 y.o. female,Established patient, here for evaluation of the following chief complaint(s):  Sinus Problem (Patient has been having a lot drainage, headache and cough and green mucus the past 2 weeks almost 3 weeks )      SUBJECTIVE:   Nargis Shepard is a 73 y.o. female who complains of congestion, nasal blockage, post nasal drip, and dry cough for a few days. She denies a history of fatigue, fevers, and shortness of breath and does not a history of asthma. Patient does not smoke cigarettes.     PLAN:  Symptomatic therapy suggested: push fluids, rest, and return office visit prn if symptoms persist or worsen. Lack of antibiotic effectiveness discussed with her. Call or return to clinic prn if these symptoms worsen or fail to improve as anticipated.          Review of Systems   Constitutional:  Negative for activity change, appetite change, fatigue and fever.   HENT:  Positive for congestion, postnasal drip, rhinorrhea, sinus pressure and sinus pain. Negative for sneezing and sore throat.    Respiratory:  Negative for cough, chest tightness, shortness of breath and wheezing.    Cardiovascular:  Negative for chest pain,

## 2024-06-08 ASSESSMENT — ENCOUNTER SYMPTOMS
COUGH: 0
CHEST TIGHTNESS: 0
BACK PAIN: 0
ABDOMINAL DISTENTION: 0
ANAL BLEEDING: 0
WHEEZING: 0
RHINORRHEA: 1
ABDOMINAL PAIN: 0
DIARRHEA: 0
NAUSEA: 0
SINUS PAIN: 1
SHORTNESS OF BREATH: 0
CONSTIPATION: 0
SINUS PRESSURE: 1
VOMITING: 0
SORE THROAT: 0
BLOOD IN STOOL: 0

## 2024-06-18 RX ORDER — GLIPIZIDE 5 MG/1
5 TABLET, FILM COATED, EXTENDED RELEASE ORAL DAILY
Qty: 90 TABLET | Refills: 0 | Status: SHIPPED | OUTPATIENT
Start: 2024-06-18

## 2024-06-18 RX ORDER — ATORVASTATIN CALCIUM 10 MG/1
10 TABLET, FILM COATED ORAL DAILY
Qty: 90 TABLET | Refills: 0 | Status: SHIPPED | OUTPATIENT
Start: 2024-06-18

## 2024-06-25 ENCOUNTER — NURSE ONLY (OUTPATIENT)
Age: 74
End: 2024-06-25
Payer: MEDICARE

## 2024-06-25 ENCOUNTER — OFFICE VISIT (OUTPATIENT)
Age: 74
End: 2024-06-25
Payer: MEDICARE

## 2024-06-25 VITALS
RESPIRATION RATE: 15 BRPM | SYSTOLIC BLOOD PRESSURE: 142 MMHG | DIASTOLIC BLOOD PRESSURE: 80 MMHG | WEIGHT: 140.6 LBS | BODY MASS INDEX: 24.01 KG/M2 | OXYGEN SATURATION: 98 % | HEART RATE: 78 BPM | HEIGHT: 64 IN | TEMPERATURE: 96.9 F

## 2024-06-25 DIAGNOSIS — R53.83 MALAISE AND FATIGUE: ICD-10-CM

## 2024-06-25 DIAGNOSIS — Z78.0 POST-MENOPAUSE: ICD-10-CM

## 2024-06-25 DIAGNOSIS — R53.81 MALAISE AND FATIGUE: ICD-10-CM

## 2024-06-25 DIAGNOSIS — E11.21 TYPE 2 DIABETES MELLITUS WITH DIABETIC NEPHROPATHY, WITHOUT LONG-TERM CURRENT USE OF INSULIN (HCC): Primary | ICD-10-CM

## 2024-06-25 DIAGNOSIS — E55.9 VITAMIN D DEFICIENCY: ICD-10-CM

## 2024-06-25 DIAGNOSIS — E78.2 MIXED HYPERLIPIDEMIA: ICD-10-CM

## 2024-06-25 DIAGNOSIS — E11.21 TYPE 2 DIABETES MELLITUS WITH DIABETIC NEPHROPATHY, WITHOUT LONG-TERM CURRENT USE OF INSULIN (HCC): ICD-10-CM

## 2024-06-25 DIAGNOSIS — Z13.820 SCREENING FOR OSTEOPOROSIS: ICD-10-CM

## 2024-06-25 LAB — HBA1C MFR BLD: 6.2 %

## 2024-06-25 PROCEDURE — G8427 DOCREV CUR MEDS BY ELIG CLIN: HCPCS | Performed by: INTERNAL MEDICINE

## 2024-06-25 PROCEDURE — 2022F DILAT RTA XM EVC RTNOPTHY: CPT | Performed by: INTERNAL MEDICINE

## 2024-06-25 PROCEDURE — 3017F COLORECTAL CA SCREEN DOC REV: CPT | Performed by: INTERNAL MEDICINE

## 2024-06-25 PROCEDURE — 99214 OFFICE O/P EST MOD 30 MIN: CPT | Performed by: INTERNAL MEDICINE

## 2024-06-25 PROCEDURE — 3044F HG A1C LEVEL LT 7.0%: CPT | Performed by: INTERNAL MEDICINE

## 2024-06-25 PROCEDURE — G8399 PT W/DXA RESULTS DOCUMENT: HCPCS | Performed by: INTERNAL MEDICINE

## 2024-06-25 PROCEDURE — 3077F SYST BP >= 140 MM HG: CPT | Performed by: INTERNAL MEDICINE

## 2024-06-25 PROCEDURE — 3079F DIAST BP 80-89 MM HG: CPT | Performed by: INTERNAL MEDICINE

## 2024-06-25 PROCEDURE — 1123F ACP DISCUSS/DSCN MKR DOCD: CPT | Performed by: INTERNAL MEDICINE

## 2024-06-25 PROCEDURE — 1090F PRES/ABSN URINE INCON ASSESS: CPT | Performed by: INTERNAL MEDICINE

## 2024-06-25 PROCEDURE — 83036 HEMOGLOBIN GLYCOSYLATED A1C: CPT | Performed by: INTERNAL MEDICINE

## 2024-06-25 PROCEDURE — G8420 CALC BMI NORM PARAMETERS: HCPCS | Performed by: INTERNAL MEDICINE

## 2024-06-25 PROCEDURE — PBSHW AMB POC HEMOGLOBIN A1C: Performed by: INTERNAL MEDICINE

## 2024-06-25 PROCEDURE — 1036F TOBACCO NON-USER: CPT | Performed by: INTERNAL MEDICINE

## 2024-06-25 ASSESSMENT — PATIENT HEALTH QUESTIONNAIRE - PHQ9
SUM OF ALL RESPONSES TO PHQ9 QUESTIONS 1 & 2: 0
SUM OF ALL RESPONSES TO PHQ QUESTIONS 1-9: 0
SUM OF ALL RESPONSES TO PHQ QUESTIONS 1-9: 0
2. FEELING DOWN, DEPRESSED OR HOPELESS: NOT AT ALL
1. LITTLE INTEREST OR PLEASURE IN DOING THINGS: NOT AT ALL
SUM OF ALL RESPONSES TO PHQ QUESTIONS 1-9: 0
SUM OF ALL RESPONSES TO PHQ QUESTIONS 1-9: 0

## 2024-06-25 NOTE — PROGRESS NOTES
Identified pt with two pt identifiers(name and ).    Chief Complaint   Patient presents with    Diabetes     Follow up   Would like PCP to go over medications   Patient states her blood sugars have been all over the place  Patient would like to consider a glucose monitoring kit         Health Maintenance Due   Topic    Shingles vaccine (1 of 2)    Respiratory Syncytial Virus (RSV) Pregnant or age 60 yrs+ (1 - 1-dose 60+ series)    Diabetic retinal exam     COVID-19 Vaccine ( season)    Diabetic Alb to Cr ratio (uACR) test     Breast cancer screen     DEXA (modify frequency per FRAX score)        Wt Readings from Last 3 Encounters:   24 63.8 kg (140 lb 9.6 oz)   24 63.2 kg (139 lb 6.4 oz)   24 63.5 kg (140 lb)     Temp Readings from Last 3 Encounters:   24 96.9 °F (36.1 °C) (Temporal)   24 97.9 °F (36.6 °C) (Temporal)   24 97.9 °F (36.6 °C) (Oral)     BP Readings from Last 3 Encounters:   24 (!) 142/80   24 134/72   24 (!) 148/79     Pulse Readings from Last 3 Encounters:   24 78   24 82   24 79           Depression Screening:  :         2024     9:39 AM 2024     1:22 PM 2024    10:55 AM 1/3/2024    11:36 AM 10/31/2023     2:56 PM 2023     1:55 PM 2023     1:32 PM   PHQ-9 Questionaire   Little interest or pleasure in doing things 0 0 0 0 0 0 0   Feeling down, depressed, or hopeless 0 0 0 0 0 0 0   PHQ-9 Total Score 0 0 0 0 0 0 0        Fall Risk Assessment:  :         2024    10:55 AM 2023     1:55 PM 2023     1:32 PM   Fall Risk   2 or more falls in past year? no no no   Fall with injury in past year? no no no        Abuse Screening:  :          No data to display                 Coordination of Care Questionnaire:  :     \"Have you been to the ER, urgent care clinic since your last visit?  Hospitalized since your last visit?\"    NO    “Have you seen or consulted any other health care providers

## 2024-06-25 NOTE — PATIENT INSTRUCTIONS
Diabetes Goals:    Results for orders placed or performed in visit on 06/25/24   AMB POC HEMOGLOBIN A1C   Result Value Ref Range    Hemoglobin A1C, POC 6.2 %     Blood sugar goals:  Hemoglobin A1c under 7 is best. >65 7-8 is acceptable.   Fasting blood sugar   Blood sugar 2 hours after a meal under 180, 4 hours after a meal under 120  No hypoglycemia (sugars under 70 and symptomatic low sugars)     Blood sugar control with diet and exercise:  Exercise 45 minutes per day.  This makes your insulin work better.  It also allows insulin levels to fall helping with weight loss.  Every night, try to fast from your evening meal to breakfast (at least 12 hours) without eating anything.  This uses stored energy in your liver and makes insulin work better.  Avoid simples sugars such as table sugar in drinks (sodas, lemonade, sweet tea, wine), desserts, candy.  Also avoid fruit juices and high fructose corn syrup.    Avoid frequent consumption of fruit especially grapes and bananas.    A single serving of fruit daily is all you should have.    Drink less milk which has milk sugar in it.  Control your starch intake.    Men should have 3-4 carb portions per meal.    Women should have 2-3 carb portions per meal.    A carb serving is the equivalent of a slice of bread.    Control your blood pressure and cholesterol.    These problems are common in diabetes.  AND, don't smoke.    All of these problems contribute to heart disease and stroke risk.     Get a yearly eye exam and flu shot.    Make sure your vaccines are up to date particularly the pneumonia vaccines.     Inspect your feet daily.    Wear comfortable protective shoes and clean socks.    Seek medical care if there are sore, calluses or numbness and burning of your feet.     Diabetic Follow up:  Every 3-4 months.

## 2024-06-25 NOTE — PROGRESS NOTES
Chief Complaint   Patient presents with    Diabetes     Follow up   Would like PCP to go over medications   Patient states her blood sugars have been all over the place  Patient would like to consider a glucose monitoring kit      ASSESSMENT:  Diabetes Mellitus: well controlled     ASSESSMENT & PLAN:  1. Type 2 diabetes mellitus with diabetic nephropathy, without long-term current use of insulin (HCC)  -     AMB POC HEMOGLOBIN A1C  2. Screening for osteoporosis  -     DEXA BONE DENSITY AXIAL SKELETON; Future  3. Post-menopause  -     DEXA BONE DENSITY AXIAL SKELETON; Future    Results for orders placed or performed in visit on 06/25/24   AMB POC HEMOGLOBIN A1C   Result Value Ref Range    Hemoglobin A1C, POC 6.2 %     Well controlled T2DM. We will send for labs already ordered in May to evaluate kidney function. If the Metformin is helpful and not causing any secondary concerns, may which to continue.       I have discussed the diagnosis with the patient and the intended treatment plan as seen in the above orders. The patient has received an after-visit summary and questions were answered concerning future plans. Asked to return should symptoms worsen or not improve with treatment. Any pending labs and studies will be relayed to patient when they become available.     Pt verbalizes understanding of plan of care and denies further questions or concerns at this time.     Follow up:  Return in about 3 months (around 9/25/2024), or if symptoms worsen or fail to improve, for 3-month diabetic follow up.    SUBJECTIVE:  73 y.o. female for follow up of diabetes. Diabetic Review of Systems - medication compliance: compliant most of the time, diabetic diet compliance: compliant most of the time, home glucose monitoring: is performed regularly.  Other symptoms and concerns: she started taking her husbands left over Metformin 500 mgs because her BS were elevated. Today, her HBA1C is 6.2 and excellent. She does need fasting labs

## 2024-06-26 LAB
25(OH)D3 SERPL-MCNC: 96.8 NG/ML (ref 30–100)
ALBUMIN SERPL-MCNC: 3.9 G/DL (ref 3.5–5)
ALBUMIN/GLOB SERPL: 1.1 (ref 1.1–2.2)
ALP SERPL-CCNC: 60 U/L (ref 45–117)
ALT SERPL-CCNC: 21 U/L (ref 12–78)
ANION GAP SERPL CALC-SCNC: 3 MMOL/L (ref 5–15)
AST SERPL-CCNC: 12 U/L (ref 15–37)
BASOPHILS # BLD: 0 K/UL (ref 0–0.1)
BASOPHILS NFR BLD: 1 % (ref 0–1)
BILIRUB SERPL-MCNC: 0.5 MG/DL (ref 0.2–1)
BUN SERPL-MCNC: 29 MG/DL (ref 6–20)
BUN/CREAT SERPL: 25 (ref 12–20)
CALCIUM SERPL-MCNC: 9.9 MG/DL (ref 8.5–10.1)
CHLORIDE SERPL-SCNC: 109 MMOL/L (ref 97–108)
CHOLEST SERPL-MCNC: 141 MG/DL
CO2 SERPL-SCNC: 29 MMOL/L (ref 21–32)
CREAT SERPL-MCNC: 1.16 MG/DL (ref 0.55–1.02)
CREAT UR-MCNC: 53.8 MG/DL
DIFFERENTIAL METHOD BLD: NORMAL
EOSINOPHIL # BLD: 0.1 K/UL (ref 0–0.4)
EOSINOPHIL NFR BLD: 2 % (ref 0–7)
ERYTHROCYTE [DISTWIDTH] IN BLOOD BY AUTOMATED COUNT: 12 % (ref 11.5–14.5)
GLOBULIN SER CALC-MCNC: 3.7 G/DL (ref 2–4)
GLUCOSE SERPL-MCNC: 185 MG/DL (ref 65–100)
HCT VFR BLD AUTO: 35.8 % (ref 35–47)
HDLC SERPL-MCNC: 52 MG/DL
HDLC SERPL: 2.7 (ref 0–5)
HGB BLD-MCNC: 11.8 G/DL (ref 11.5–16)
IMM GRANULOCYTES # BLD AUTO: 0 K/UL (ref 0–0.04)
IMM GRANULOCYTES NFR BLD AUTO: 0 % (ref 0–0.5)
LDLC SERPL CALC-MCNC: 72 MG/DL (ref 0–100)
LYMPHOCYTES # BLD: 0.8 K/UL (ref 0.8–3.5)
LYMPHOCYTES NFR BLD: 20 % (ref 12–49)
MCH RBC QN AUTO: 30.6 PG (ref 26–34)
MCHC RBC AUTO-ENTMCNC: 33 G/DL (ref 30–36.5)
MCV RBC AUTO: 93 FL (ref 80–99)
MICROALBUMIN UR-MCNC: 0.52 MG/DL
MICROALBUMIN/CREAT UR-RTO: 10 MG/G (ref 0–30)
MONOCYTES # BLD: 0.4 K/UL (ref 0–1)
MONOCYTES NFR BLD: 10 % (ref 5–13)
NEUTS SEG # BLD: 2.7 K/UL (ref 1.8–8)
NEUTS SEG NFR BLD: 67 % (ref 32–75)
NRBC # BLD: 0 K/UL (ref 0–0.01)
NRBC BLD-RTO: 0 PER 100 WBC
PLATELET # BLD AUTO: 196 K/UL (ref 150–400)
PMV BLD AUTO: 11.8 FL (ref 8.9–12.9)
POTASSIUM SERPL-SCNC: 4.4 MMOL/L (ref 3.5–5.1)
PROT SERPL-MCNC: 7.6 G/DL (ref 6.4–8.2)
RBC # BLD AUTO: 3.85 M/UL (ref 3.8–5.2)
RBC MORPH BLD: NORMAL
SODIUM SERPL-SCNC: 141 MMOL/L (ref 136–145)
SPECIMEN HOLD: NORMAL
T4 FREE SERPL-MCNC: 0.9 NG/DL (ref 0.8–1.5)
TRIGL SERPL-MCNC: 85 MG/DL
TSH SERPL DL<=0.05 MIU/L-ACNC: 2.71 UIU/ML (ref 0.36–3.74)
VLDLC SERPL CALC-MCNC: 17 MG/DL
WBC # BLD AUTO: 4 K/UL (ref 3.6–11)

## 2024-06-27 ENCOUNTER — TELEPHONE (OUTPATIENT)
Age: 74
End: 2024-06-27

## 2024-06-27 DIAGNOSIS — E11.21 TYPE 2 DIABETES MELLITUS WITH DIABETIC NEPHROPATHY, WITHOUT LONG-TERM CURRENT USE OF INSULIN (HCC): Primary | ICD-10-CM

## 2024-06-27 RX ORDER — METFORMIN HYDROCHLORIDE 500 MG/1
500 TABLET, EXTENDED RELEASE ORAL
Qty: 90 TABLET | Refills: 1 | Status: SHIPPED | OUTPATIENT
Start: 2024-06-27

## 2024-06-27 NOTE — TELEPHONE ENCOUNTER
----- Message from Amanda Espinoza MD sent at 6/27/2024  9:00 AM EDT -----  Please let patient know that her labs are stable.   Her kidney function is stable and improved since 4-months ago.   Since the Metformin has been helpful, we will continue with 500 mgs daily.   I will send a script to her pharmacy.   Follow up with me in 3-4 months.   Keep up the good work!   Thanks!

## 2024-07-05 ENCOUNTER — HOSPITAL ENCOUNTER (OUTPATIENT)
Facility: HOSPITAL | Age: 74
End: 2024-07-05
Attending: INTERNAL MEDICINE
Payer: MEDICARE

## 2024-07-05 DIAGNOSIS — Z12.31 ENCOUNTER FOR SCREENING MAMMOGRAM FOR MALIGNANT NEOPLASM OF BREAST: ICD-10-CM

## 2024-07-05 PROCEDURE — 77067 SCR MAMMO BI INCL CAD: CPT

## 2024-07-25 DIAGNOSIS — N18.30 STAGE 3 CHRONIC KIDNEY DISEASE, UNSPECIFIED WHETHER STAGE 3A OR 3B CKD (HCC): ICD-10-CM

## 2024-07-25 DIAGNOSIS — E11.21 TYPE 2 DIABETES MELLITUS WITH DIABETIC NEPHROPATHY, WITHOUT LONG-TERM CURRENT USE OF INSULIN (HCC): ICD-10-CM

## 2024-07-25 RX ORDER — DAPAGLIFLOZIN 10 MG/1
10 TABLET, FILM COATED ORAL EVERY MORNING
Qty: 90 TABLET | Refills: 1 | Status: SHIPPED | OUTPATIENT
Start: 2024-07-25

## 2024-07-30 ENCOUNTER — TELEMEDICINE (OUTPATIENT)
Age: 74
End: 2024-07-30
Payer: MEDICARE

## 2024-07-30 ENCOUNTER — APPOINTMENT (OUTPATIENT)
Facility: HOSPITAL | Age: 74
End: 2024-07-30
Payer: MEDICARE

## 2024-07-30 ENCOUNTER — TELEPHONE (OUTPATIENT)
Age: 74
End: 2024-07-30

## 2024-07-30 ENCOUNTER — HOSPITAL ENCOUNTER (INPATIENT)
Facility: HOSPITAL | Age: 74
LOS: 2 days | Discharge: HOME | End: 2024-08-01
Attending: EMERGENCY MEDICINE | Admitting: FAMILY MEDICINE
Payer: MEDICARE

## 2024-07-30 DIAGNOSIS — U07.1 COVID-19: Primary | ICD-10-CM

## 2024-07-30 DIAGNOSIS — R11.2 NAUSEA AND VOMITING, UNSPECIFIED VOMITING TYPE: Primary | ICD-10-CM

## 2024-07-30 DIAGNOSIS — U07.1 COVID-19: ICD-10-CM

## 2024-07-30 DIAGNOSIS — R94.31 ABNORMAL EKG: ICD-10-CM

## 2024-07-30 DIAGNOSIS — J18.9 PNEUMONIA OF LEFT LOWER LOBE DUE TO INFECTIOUS ORGANISM: ICD-10-CM

## 2024-07-30 LAB
ALBUMIN SERPL-MCNC: 4 G/DL (ref 3.5–5)
ALBUMIN/GLOB SERPL: 1.1 (ref 1.1–2.2)
ALP SERPL-CCNC: 62 U/L (ref 45–117)
ALT SERPL-CCNC: 21 U/L (ref 12–78)
ANION GAP SERPL CALC-SCNC: 7 MMOL/L (ref 5–15)
AST SERPL-CCNC: 12 U/L (ref 15–37)
BASOPHILS # BLD: 0 K/UL (ref 0–0.1)
BASOPHILS NFR BLD: 0 % (ref 0–1)
BILIRUB SERPL-MCNC: 0.4 MG/DL (ref 0.2–1)
BUN SERPL-MCNC: 20 MG/DL (ref 6–20)
BUN/CREAT SERPL: 19 (ref 12–20)
CALCIUM SERPL-MCNC: 9.8 MG/DL (ref 8.5–10.1)
CHLORIDE SERPL-SCNC: 107 MMOL/L (ref 97–108)
CO2 SERPL-SCNC: 25 MMOL/L (ref 21–32)
COMMENT:: NORMAL
CREAT SERPL-MCNC: 1.03 MG/DL (ref 0.55–1.02)
DIFFERENTIAL METHOD BLD: ABNORMAL
EOSINOPHIL # BLD: 0 K/UL (ref 0–0.4)
EOSINOPHIL NFR BLD: 0 % (ref 0–7)
ERYTHROCYTE [DISTWIDTH] IN BLOOD BY AUTOMATED COUNT: 11.8 % (ref 11.5–14.5)
GLOBULIN SER CALC-MCNC: 3.8 G/DL (ref 2–4)
GLUCOSE SERPL-MCNC: 171 MG/DL (ref 65–100)
HCT VFR BLD AUTO: 39.5 % (ref 35–47)
HGB BLD-MCNC: 13.4 G/DL (ref 11.5–16)
IMM GRANULOCYTES # BLD AUTO: 0.1 K/UL (ref 0–0.04)
IMM GRANULOCYTES NFR BLD AUTO: 1 % (ref 0–0.5)
LIPASE SERPL-CCNC: 23 U/L (ref 13–75)
LYMPHOCYTES # BLD: 0.2 K/UL (ref 0.8–3.5)
LYMPHOCYTES NFR BLD: 2 % (ref 12–49)
MAGNESIUM SERPL-MCNC: 2.1 MG/DL (ref 1.6–2.4)
MCH RBC QN AUTO: 30.5 PG (ref 26–34)
MCHC RBC AUTO-ENTMCNC: 33.9 G/DL (ref 30–36.5)
MCV RBC AUTO: 90 FL (ref 80–99)
MONOCYTES # BLD: 0.5 K/UL (ref 0–1)
MONOCYTES NFR BLD: 6 % (ref 5–13)
NEUTS SEG # BLD: 7.8 K/UL (ref 1.8–8)
NEUTS SEG NFR BLD: 91 % (ref 32–75)
NRBC # BLD: 0 K/UL (ref 0–0.01)
NRBC BLD-RTO: 0 PER 100 WBC
PLATELET # BLD AUTO: 154 K/UL (ref 150–400)
PMV BLD AUTO: 11.1 FL (ref 8.9–12.9)
POTASSIUM SERPL-SCNC: 3.8 MMOL/L (ref 3.5–5.1)
PROT SERPL-MCNC: 7.8 G/DL (ref 6.4–8.2)
RBC # BLD AUTO: 4.39 M/UL (ref 3.8–5.2)
RBC MORPH BLD: ABNORMAL
SODIUM SERPL-SCNC: 139 MMOL/L (ref 136–145)
SPECIMEN HOLD: NORMAL
TROPONIN I SERPL HS-MCNC: 5 NG/L (ref 0–51)
WBC # BLD AUTO: 8.6 K/UL (ref 3.6–11)
WBC MORPH BLD: ABNORMAL

## 2024-07-30 PROCEDURE — 84145 PROCALCITONIN (PCT): CPT

## 2024-07-30 PROCEDURE — 93005 ELECTROCARDIOGRAM TRACING: CPT | Performed by: EMERGENCY MEDICINE

## 2024-07-30 PROCEDURE — 83735 ASSAY OF MAGNESIUM: CPT

## 2024-07-30 PROCEDURE — 87040 BLOOD CULTURE FOR BACTERIA: CPT

## 2024-07-30 PROCEDURE — 83605 ASSAY OF LACTIC ACID: CPT

## 2024-07-30 PROCEDURE — 96360 HYDRATION IV INFUSION INIT: CPT

## 2024-07-30 PROCEDURE — 71045 X-RAY EXAM CHEST 1 VIEW: CPT

## 2024-07-30 PROCEDURE — 36415 COLL VENOUS BLD VENIPUNCTURE: CPT

## 2024-07-30 PROCEDURE — 83690 ASSAY OF LIPASE: CPT

## 2024-07-30 PROCEDURE — 2580000003 HC RX 258: Performed by: PHYSICIAN ASSISTANT

## 2024-07-30 PROCEDURE — 80053 COMPREHEN METABOLIC PANEL: CPT

## 2024-07-30 PROCEDURE — 2060000000 HC ICU INTERMEDIATE R&B

## 2024-07-30 PROCEDURE — 84484 ASSAY OF TROPONIN QUANT: CPT

## 2024-07-30 PROCEDURE — 99213 OFFICE O/P EST LOW 20 MIN: CPT | Performed by: FAMILY MEDICINE

## 2024-07-30 PROCEDURE — 85025 COMPLETE CBC W/AUTO DIFF WBC: CPT

## 2024-07-30 PROCEDURE — 1100000000 HC RM PRIVATE

## 2024-07-30 PROCEDURE — 93005 ELECTROCARDIOGRAM TRACING: CPT | Performed by: HOSPITALIST

## 2024-07-30 PROCEDURE — 96361 HYDRATE IV INFUSION ADD-ON: CPT

## 2024-07-30 PROCEDURE — 1123F ACP DISCUSS/DSCN MKR DOCD: CPT | Performed by: FAMILY MEDICINE

## 2024-07-30 PROCEDURE — 99285 EMERGENCY DEPT VISIT HI MDM: CPT

## 2024-07-30 RX ORDER — ACETAMINOPHEN 650 MG/1
650 SUPPOSITORY RECTAL EVERY 6 HOURS PRN
Status: DISCONTINUED | OUTPATIENT
Start: 2024-07-30 | End: 2024-08-01 | Stop reason: HOSPADM

## 2024-07-30 RX ORDER — POLYETHYLENE GLYCOL 3350 17 G/17G
17 POWDER, FOR SOLUTION ORAL DAILY PRN
Status: DISCONTINUED | OUTPATIENT
Start: 2024-07-30 | End: 2024-08-01 | Stop reason: HOSPADM

## 2024-07-30 RX ORDER — ONDANSETRON 2 MG/ML
4 INJECTION INTRAMUSCULAR; INTRAVENOUS ONCE
Status: DISCONTINUED | OUTPATIENT
Start: 2024-07-30 | End: 2024-07-30

## 2024-07-30 RX ORDER — SODIUM CHLORIDE 0.9 % (FLUSH) 0.9 %
5-40 SYRINGE (ML) INJECTION PRN
Status: DISCONTINUED | OUTPATIENT
Start: 2024-07-30 | End: 2024-08-01 | Stop reason: HOSPADM

## 2024-07-30 RX ORDER — ONDANSETRON 4 MG/1
4 TABLET, ORALLY DISINTEGRATING ORAL 3 TIMES DAILY PRN
Qty: 21 TABLET | Refills: 0 | Status: SHIPPED | OUTPATIENT
Start: 2024-07-30

## 2024-07-30 RX ORDER — PROCHLORPERAZINE EDISYLATE 5 MG/ML
5 INJECTION INTRAMUSCULAR; INTRAVENOUS ONCE
Status: DISCONTINUED | OUTPATIENT
Start: 2024-07-30 | End: 2024-08-01 | Stop reason: HOSPADM

## 2024-07-30 RX ORDER — SODIUM CHLORIDE 0.9 % (FLUSH) 0.9 %
5-40 SYRINGE (ML) INJECTION EVERY 12 HOURS SCHEDULED
Status: DISCONTINUED | OUTPATIENT
Start: 2024-07-30 | End: 2024-08-01 | Stop reason: HOSPADM

## 2024-07-30 RX ORDER — ONDANSETRON 2 MG/ML
4 INJECTION INTRAMUSCULAR; INTRAVENOUS EVERY 6 HOURS PRN
Status: DISCONTINUED | OUTPATIENT
Start: 2024-07-30 | End: 2024-07-31

## 2024-07-30 RX ORDER — NIRMATRELVIR AND RITONAVIR 150-100 MG
KIT ORAL
Qty: 20 TABLET | Refills: 0 | Status: ON HOLD | OUTPATIENT
Start: 2024-07-30 | End: 2024-08-01 | Stop reason: HOSPADM

## 2024-07-30 RX ORDER — ACETAMINOPHEN 500 MG
1000 TABLET ORAL
Status: COMPLETED | OUTPATIENT
Start: 2024-07-30 | End: 2024-07-31

## 2024-07-30 RX ORDER — 0.9 % SODIUM CHLORIDE 0.9 %
1000 INTRAVENOUS SOLUTION INTRAVENOUS ONCE
Status: COMPLETED | OUTPATIENT
Start: 2024-07-30 | End: 2024-07-30

## 2024-07-30 RX ORDER — ENOXAPARIN SODIUM 100 MG/ML
40 INJECTION SUBCUTANEOUS DAILY
Status: DISCONTINUED | OUTPATIENT
Start: 2024-07-31 | End: 2024-08-01 | Stop reason: HOSPADM

## 2024-07-30 RX ORDER — ONDANSETRON 4 MG/1
4 TABLET, ORALLY DISINTEGRATING ORAL EVERY 8 HOURS PRN
Status: DISCONTINUED | OUTPATIENT
Start: 2024-07-30 | End: 2024-07-31

## 2024-07-30 RX ORDER — SODIUM CHLORIDE 9 MG/ML
INJECTION, SOLUTION INTRAVENOUS PRN
Status: DISCONTINUED | OUTPATIENT
Start: 2024-07-30 | End: 2024-08-01 | Stop reason: HOSPADM

## 2024-07-30 RX ORDER — ACETAMINOPHEN 325 MG/1
650 TABLET ORAL EVERY 6 HOURS PRN
Status: DISCONTINUED | OUTPATIENT
Start: 2024-07-30 | End: 2024-08-01 | Stop reason: HOSPADM

## 2024-07-30 RX ADMIN — SODIUM CHLORIDE 1000 ML: 9 INJECTION, SOLUTION INTRAVENOUS at 20:41

## 2024-07-30 ASSESSMENT — PATIENT HEALTH QUESTIONNAIRE - PHQ9
SUM OF ALL RESPONSES TO PHQ QUESTIONS 1-9: 0
SUM OF ALL RESPONSES TO PHQ QUESTIONS 1-9: 0
SUM OF ALL RESPONSES TO PHQ9 QUESTIONS 1 & 2: 0
SUM OF ALL RESPONSES TO PHQ QUESTIONS 1-9: 0
2. FEELING DOWN, DEPRESSED OR HOPELESS: NOT AT ALL
SUM OF ALL RESPONSES TO PHQ QUESTIONS 1-9: 0
1. LITTLE INTEREST OR PLEASURE IN DOING THINGS: NOT AT ALL

## 2024-07-30 ASSESSMENT — PAIN DESCRIPTION - ORIENTATION: ORIENTATION: ANTERIOR

## 2024-07-30 ASSESSMENT — PAIN DESCRIPTION - ONSET: ONSET: ON-GOING

## 2024-07-30 ASSESSMENT — PAIN DESCRIPTION - LOCATION: LOCATION: CHEST

## 2024-07-30 ASSESSMENT — PAIN SCALES - GENERAL: PAINLEVEL_OUTOF10: 8

## 2024-07-30 ASSESSMENT — PAIN DESCRIPTION - FREQUENCY: FREQUENCY: CONTINUOUS

## 2024-07-30 ASSESSMENT — PAIN DESCRIPTION - PAIN TYPE: TYPE: ACUTE PAIN

## 2024-07-30 ASSESSMENT — PAIN - FUNCTIONAL ASSESSMENT
PAIN_FUNCTIONAL_ASSESSMENT: ACTIVITIES ARE NOT PREVENTED
PAIN_FUNCTIONAL_ASSESSMENT: 0-10

## 2024-07-30 ASSESSMENT — PAIN DESCRIPTION - DESCRIPTORS: DESCRIPTORS: HEAVINESS

## 2024-07-30 NOTE — PROGRESS NOTES
[facility]  [address] [place] [address] [place] [address]  Phone: [phone]  Fax: [phone]      Nargis Shepard, was evaluated through a synchronous (real-time) audio-video encounter. The patient (or guardian if applicable) is aware that this is a billable service, which includes applicable co-pays. This Virtual Visit was conducted with patient's (and/or legal guardian's) consent. Patient identification was verified, and a caregiver was present when appropriate.   The patient was located at Home: [address] [place] [id number]  Provider was located at Facility (Appt Dept): [facility] [place] [address]  Confirm you are appropriately licensed, registered, or certified to deliver care in the state where the patient is located as indicated above. If you are not or unsure, please re-schedule the visit: Yes, I confirm.       Chief Complaint   Patient presents with    Positive For Covid-19     Pt tested positive today.      She is a 73 y.o. female who presents for acute virtual visit.  Usual patient of Dr. Espinoza.  Says that she felt \"funny\" last night.  Woke up and had sensation of room spinning.  Had episode of diarrhea, nausea, dry heaves.  Woke up this morning and still felt poorly.  Did a home COVID-19 test and this was positive.  She reports that her biggest symptom is nausea and dizziness when she gets up and moves around.  Does not have any URI symptoms.  She has not had any fever.    Reviewed PmHx, RxHx, FmHx, SocHx, AllgHx and updated and dated in the chart.      Objective:   There were no vitals filed for this visit.  Physical Examination:    Physical Exam  Nursing note reviewed.   Constitutional:       General: She is not in acute distress.     Appearance: Normal appearance. She is ill-appearing (weak, tired).   HENT:      Head: Normocephalic.   Pulmonary:      Effort: Pulmonary effort is normal. No respiratory distress.   Musculoskeletal:      Cervical back:

## 2024-07-30 NOTE — PROGRESS NOTES
Identified pt with two pt identifiers(name and ).    Chief Complaint   Patient presents with    Positive For Covid-19     Pt tested positive today.         Health Maintenance Due   Topic    Shingles vaccine (1 of 2)    Respiratory Syncytial Virus (RSV) Pregnant or age 60 yrs+ (1 - 1-dose 60+ series)    COVID-19 Vaccine ( season)    DEXA (modify frequency per FRAX score)        Wt Readings from Last 3 Encounters:   24 63.8 kg (140 lb 9.6 oz)   24 63.2 kg (139 lb 6.4 oz)   24 63.5 kg (140 lb)     Temp Readings from Last 3 Encounters:   24 96.9 °F (36.1 °C) (Temporal)   24 97.9 °F (36.6 °C) (Temporal)   24 97.9 °F (36.6 °C) (Oral)     BP Readings from Last 3 Encounters:   24 (!) 142/80   24 134/72   24 (!) 148/79     Pulse Readings from Last 3 Encounters:   24 78   24 82   24 79           Depression Screening:  :         2024     1:54 PM 2024     9:39 AM 2024     1:22 PM 2024    10:55 AM 1/3/2024    11:36 AM 10/31/2023     2:56 PM 2023     1:55 PM   PHQ-9 Questionaire   Little interest or pleasure in doing things 0 0 0 0 0 0 0   Feeling down, depressed, or hopeless 0 0 0 0 0 0 0   PHQ-9 Total Score 0 0 0 0 0 0 0        Fall Risk Assessment:  :         2024    10:55 AM 2023     1:55 PM 2023     1:32 PM   Fall Risk   Do you feel unsteady or are you worried about falling?  no no no   2 or more falls in past year? no no no   Fall with injury in past year? no no no        Abuse Screening:  :          No data to display                 Coordination of Care Questionnaire:  :     \"Have you been to the ER, urgent care clinic since your last visit?  Hospitalized since your last visit?\"    NO    “Have you seen or consulted any other health care providers outside of [facility] since your last visit?”    NO            Click Here for Release of Records Request

## 2024-07-30 NOTE — ED TRIAGE NOTES
Patient reports nausea/vomiting/diarrhea that started this morning. Has chest pain that started last night. Feels dizzy when she stands.

## 2024-07-30 NOTE — ED NOTES
7:32 PM    Patient has N/V/D since this morning and chest pain last night.  Feels heavy in chest. Dizzy with standing.     No fever or chills    Took zofran this morning and did not help    Patient vomiting in triage.      I have evaluated the patient as the Provider in Rapid Medical Evaluation (RME). I have reviewed her vital signs and the triage nurse assessment. I have talked with the patient and any available family and advised that I am the provider in triage and have ordered the appropriate study to initiate their work up based on the clinical presentation during my assessment. I have advised that the patient will be accommodated in the Main ED as soon as possible. I have also requested to contact the triage nurse or myself immediately if the patient experiences any changes in their condition during this brief waiting period.  ADIEL Duron Heidi J, PA-C  07/30/24 1934

## 2024-07-30 NOTE — TELEPHONE ENCOUNTER
Patient home test covid +  Symptoms: Dizzy when walking, vomitting, diarrhea nausea, normal temp.      Daughter Sharmila  [phone]     Can we do virtual?

## 2024-07-31 ENCOUNTER — APPOINTMENT (OUTPATIENT)
Facility: HOSPITAL | Age: 74
End: 2024-07-31
Payer: MEDICARE

## 2024-07-31 LAB
ALBUMIN SERPL-MCNC: 3.4 G/DL (ref 3.5–5)
ALBUMIN/GLOB SERPL: 1 (ref 1.1–2.2)
ALP SERPL-CCNC: 53 U/L (ref 45–117)
ALT SERPL-CCNC: 18 U/L (ref 12–78)
ANION GAP SERPL CALC-SCNC: 6 MMOL/L (ref 5–15)
APTT PPP: 24.8 SEC (ref 22.1–31)
AST SERPL-CCNC: 12 U/L (ref 15–37)
BASOPHILS # BLD: 0 K/UL (ref 0–0.1)
BASOPHILS NFR BLD: 0 % (ref 0–1)
BILIRUB SERPL-MCNC: 0.4 MG/DL (ref 0.2–1)
BUN SERPL-MCNC: 16 MG/DL (ref 6–20)
BUN/CREAT SERPL: 16 (ref 12–20)
CALCIUM SERPL-MCNC: 8.9 MG/DL (ref 8.5–10.1)
CHLORIDE SERPL-SCNC: 111 MMOL/L (ref 97–108)
CO2 SERPL-SCNC: 23 MMOL/L (ref 21–32)
COMMENT:: NORMAL
CREAT SERPL-MCNC: 0.97 MG/DL (ref 0.55–1.02)
DIFFERENTIAL METHOD BLD: ABNORMAL
EKG ATRIAL RATE: 110 BPM
EKG ATRIAL RATE: 116 BPM
EKG DIAGNOSIS: NORMAL
EKG DIAGNOSIS: NORMAL
EKG P AXIS: 52 DEGREES
EKG P AXIS: 95 DEGREES
EKG P-R INTERVAL: 112 MS
EKG P-R INTERVAL: 142 MS
EKG Q-T INTERVAL: 356 MS
EKG Q-T INTERVAL: 442 MS
EKG QRS DURATION: 68 MS
EKG QRS DURATION: 74 MS
EKG QTC CALCULATION (BAZETT): 481 MS
EKG QTC CALCULATION (BAZETT): 614 MS
EKG R AXIS: 31 DEGREES
EKG R AXIS: 78 DEGREES
EKG T AXIS: 39 DEGREES
EKG T AXIS: 43 DEGREES
EKG VENTRICULAR RATE: 110 BPM
EKG VENTRICULAR RATE: 116 BPM
EOSINOPHIL # BLD: 0 K/UL (ref 0–0.4)
EOSINOPHIL NFR BLD: 0 % (ref 0–7)
ERYTHROCYTE [DISTWIDTH] IN BLOOD BY AUTOMATED COUNT: 11.9 % (ref 11.5–14.5)
FLUAV RNA SPEC QL NAA+PROBE: NOT DETECTED
FLUBV RNA SPEC QL NAA+PROBE: NOT DETECTED
GLOBULIN SER CALC-MCNC: 3.5 G/DL (ref 2–4)
GLUCOSE BLD STRIP.AUTO-MCNC: 165 MG/DL (ref 65–117)
GLUCOSE SERPL-MCNC: 128 MG/DL (ref 65–100)
HCT VFR BLD AUTO: 32.9 % (ref 35–47)
HGB BLD-MCNC: 11.3 G/DL (ref 11.5–16)
IMM GRANULOCYTES # BLD AUTO: 0 K/UL (ref 0–0.04)
IMM GRANULOCYTES NFR BLD AUTO: 0 % (ref 0–0.5)
INR PPP: 1 (ref 0.9–1.1)
LACTATE SERPL-SCNC: 1.3 MMOL/L (ref 0.4–2)
LYMPHOCYTES # BLD: 0.2 K/UL (ref 0.8–3.5)
LYMPHOCYTES NFR BLD: 3 % (ref 12–49)
MCH RBC QN AUTO: 30.9 PG (ref 26–34)
MCHC RBC AUTO-ENTMCNC: 34.3 G/DL (ref 30–36.5)
MCV RBC AUTO: 89.9 FL (ref 80–99)
MONOCYTES # BLD: 0.5 K/UL (ref 0–1)
MONOCYTES NFR BLD: 8 % (ref 5–13)
NEUTS SEG # BLD: 5.6 K/UL (ref 1.8–8)
NEUTS SEG NFR BLD: 89 % (ref 32–75)
NRBC # BLD: 0 K/UL (ref 0–0.01)
NRBC BLD-RTO: 0 PER 100 WBC
PLATELET # BLD AUTO: 143 K/UL (ref 150–400)
PMV BLD AUTO: 11 FL (ref 8.9–12.9)
POTASSIUM SERPL-SCNC: 3.8 MMOL/L (ref 3.5–5.1)
PROCALCITONIN SERPL-MCNC: <0.05 NG/ML
PROT SERPL-MCNC: 6.9 G/DL (ref 6.4–8.2)
PROTHROMBIN TIME: 10.9 SEC (ref 9–11.1)
RBC # BLD AUTO: 3.66 M/UL (ref 3.8–5.2)
RBC MORPH BLD: ABNORMAL
SARS-COV-2 RNA RESP QL NAA+PROBE: DETECTED
SERVICE CMNT-IMP: ABNORMAL
SODIUM SERPL-SCNC: 140 MMOL/L (ref 136–145)
SPECIMEN HOLD: NORMAL
THERAPEUTIC RANGE: NORMAL SECS (ref 58–77)
TROPONIN I SERPL HS-MCNC: 8 NG/L (ref 0–51)
WBC # BLD AUTO: 6.3 K/UL (ref 3.6–11)

## 2024-07-31 PROCEDURE — 99222 1ST HOSP IP/OBS MODERATE 55: CPT | Performed by: INTERNAL MEDICINE

## 2024-07-31 PROCEDURE — 82962 GLUCOSE BLOOD TEST: CPT

## 2024-07-31 PROCEDURE — 85610 PROTHROMBIN TIME: CPT

## 2024-07-31 PROCEDURE — 2580000003 HC RX 258: Performed by: FAMILY MEDICINE

## 2024-07-31 PROCEDURE — 84484 ASSAY OF TROPONIN QUANT: CPT

## 2024-07-31 PROCEDURE — 2580000003 HC RX 258: Performed by: EMERGENCY MEDICINE

## 2024-07-31 PROCEDURE — 6360000002 HC RX W HCPCS: Performed by: EMERGENCY MEDICINE

## 2024-07-31 PROCEDURE — 2580000003 HC RX 258: Performed by: HOSPITALIST

## 2024-07-31 PROCEDURE — 6370000000 HC RX 637 (ALT 250 FOR IP): Performed by: FAMILY MEDICINE

## 2024-07-31 PROCEDURE — 1200000000 HC SEMI PRIVATE

## 2024-07-31 PROCEDURE — 6370000000 HC RX 637 (ALT 250 FOR IP): Performed by: EMERGENCY MEDICINE

## 2024-07-31 PROCEDURE — 80053 COMPREHEN METABOLIC PANEL: CPT

## 2024-07-31 PROCEDURE — 85025 COMPLETE CBC W/AUTO DIFF WBC: CPT

## 2024-07-31 PROCEDURE — 87636 SARSCOV2 & INF A&B AMP PRB: CPT

## 2024-07-31 PROCEDURE — 85730 THROMBOPLASTIN TIME PARTIAL: CPT

## 2024-07-31 PROCEDURE — 6360000002 HC RX W HCPCS: Performed by: FAMILY MEDICINE

## 2024-07-31 PROCEDURE — 70450 CT HEAD/BRAIN W/O DYE: CPT

## 2024-07-31 PROCEDURE — 36415 COLL VENOUS BLD VENIPUNCTURE: CPT

## 2024-07-31 RX ORDER — AMLODIPINE BESYLATE 5 MG/1
10 TABLET ORAL DAILY
Status: DISCONTINUED | OUTPATIENT
Start: 2024-07-31 | End: 2024-08-01 | Stop reason: HOSPADM

## 2024-07-31 RX ORDER — INSULIN LISPRO 100 [IU]/ML
0-4 INJECTION, SOLUTION INTRAVENOUS; SUBCUTANEOUS NIGHTLY
Status: DISCONTINUED | OUTPATIENT
Start: 2024-07-31 | End: 2024-08-01 | Stop reason: HOSPADM

## 2024-07-31 RX ORDER — INSULIN GLARGINE 100 [IU]/ML
10 INJECTION, SOLUTION SUBCUTANEOUS NIGHTLY
Status: DISCONTINUED | OUTPATIENT
Start: 2024-07-31 | End: 2024-08-01 | Stop reason: HOSPADM

## 2024-07-31 RX ORDER — ASPIRIN 81 MG/1
81 TABLET ORAL DAILY
Status: DISCONTINUED | OUTPATIENT
Start: 2024-07-31 | End: 2024-08-01 | Stop reason: HOSPADM

## 2024-07-31 RX ORDER — AZELASTINE 1 MG/ML
1 SPRAY, METERED NASAL 2 TIMES DAILY
Status: DISCONTINUED | OUTPATIENT
Start: 2024-07-31 | End: 2024-08-01 | Stop reason: HOSPADM

## 2024-07-31 RX ORDER — ATORVASTATIN CALCIUM 10 MG/1
10 TABLET, FILM COATED ORAL DAILY
Status: DISCONTINUED | OUTPATIENT
Start: 2024-07-31 | End: 2024-08-01 | Stop reason: HOSPADM

## 2024-07-31 RX ORDER — LOSARTAN POTASSIUM 50 MG/1
25 TABLET ORAL DAILY
Status: DISCONTINUED | OUTPATIENT
Start: 2024-07-31 | End: 2024-08-01 | Stop reason: HOSPADM

## 2024-07-31 RX ORDER — INSULIN LISPRO 100 [IU]/ML
0-4 INJECTION, SOLUTION INTRAVENOUS; SUBCUTANEOUS
Status: DISCONTINUED | OUTPATIENT
Start: 2024-07-31 | End: 2024-08-01 | Stop reason: HOSPADM

## 2024-07-31 RX ORDER — CETIRIZINE HYDROCHLORIDE 10 MG/1
10 TABLET ORAL DAILY
Status: DISCONTINUED | OUTPATIENT
Start: 2024-07-31 | End: 2024-08-01 | Stop reason: HOSPADM

## 2024-07-31 RX ORDER — DEXTROSE MONOHYDRATE 100 MG/ML
INJECTION, SOLUTION INTRAVENOUS CONTINUOUS PRN
Status: DISCONTINUED | OUTPATIENT
Start: 2024-07-31 | End: 2024-08-01 | Stop reason: HOSPADM

## 2024-07-31 RX ORDER — SODIUM CHLORIDE, SODIUM LACTATE, POTASSIUM CHLORIDE, CALCIUM CHLORIDE 600; 310; 30; 20 MG/100ML; MG/100ML; MG/100ML; MG/100ML
INJECTION, SOLUTION INTRAVENOUS CONTINUOUS
Status: DISPENSED | OUTPATIENT
Start: 2024-07-31 | End: 2024-08-01

## 2024-07-31 RX ORDER — ASCORBIC ACID 500 MG
500 TABLET ORAL DAILY
Status: DISCONTINUED | OUTPATIENT
Start: 2024-07-31 | End: 2024-08-01 | Stop reason: HOSPADM

## 2024-07-31 RX ADMIN — ATORVASTATIN CALCIUM 10 MG: 10 TABLET, FILM COATED ORAL at 10:22

## 2024-07-31 RX ADMIN — ACETAMINOPHEN 1000 MG: 500 TABLET ORAL at 00:04

## 2024-07-31 RX ADMIN — ASPIRIN 81 MG: 81 TABLET, COATED ORAL at 10:21

## 2024-07-31 RX ADMIN — CETIRIZINE HYDROCHLORIDE 10 MG: 10 TABLET, FILM COATED ORAL at 10:21

## 2024-07-31 RX ADMIN — OXYCODONE HYDROCHLORIDE AND ACETAMINOPHEN 500 MG: 500 TABLET ORAL at 10:22

## 2024-07-31 RX ADMIN — METOPROLOL TARTRATE 25 MG: 25 TABLET, FILM COATED ORAL at 21:24

## 2024-07-31 RX ADMIN — ACETAMINOPHEN 650 MG: 325 TABLET ORAL at 14:37

## 2024-07-31 RX ADMIN — Medication 10 ML: at 09:15

## 2024-07-31 RX ADMIN — WATER 1000 MG: 1 INJECTION INTRAMUSCULAR; INTRAVENOUS; SUBCUTANEOUS at 00:05

## 2024-07-31 RX ADMIN — LOSARTAN POTASSIUM 25 MG: 50 TABLET, FILM COATED ORAL at 10:22

## 2024-07-31 RX ADMIN — ENOXAPARIN SODIUM 40 MG: 100 INJECTION SUBCUTANEOUS at 08:46

## 2024-07-31 RX ADMIN — INSULIN GLARGINE 10 UNITS: 100 INJECTION, SOLUTION SUBCUTANEOUS at 21:25

## 2024-07-31 RX ADMIN — ACETAMINOPHEN 650 MG: 325 TABLET ORAL at 08:46

## 2024-07-31 RX ADMIN — ACETAMINOPHEN 650 MG: 325 TABLET ORAL at 21:35

## 2024-07-31 RX ADMIN — AZITHROMYCIN MONOHYDRATE 500 MG: 500 INJECTION, POWDER, LYOPHILIZED, FOR SOLUTION INTRAVENOUS at 00:10

## 2024-07-31 RX ADMIN — METOPROLOL TARTRATE 25 MG: 25 TABLET, FILM COATED ORAL at 10:21

## 2024-07-31 RX ADMIN — AZELASTINE HYDROCHLORIDE 1 SPRAY: 137 SPRAY, METERED NASAL at 21:24

## 2024-07-31 RX ADMIN — SODIUM CHLORIDE, POTASSIUM CHLORIDE, SODIUM LACTATE AND CALCIUM CHLORIDE: 600; 310; 30; 20 INJECTION, SOLUTION INTRAVENOUS at 19:59

## 2024-07-31 ASSESSMENT — PAIN SCALES - GENERAL
PAINLEVEL_OUTOF10: 5
PAINLEVEL_OUTOF10: 4
PAINLEVEL_OUTOF10: 0
PAINLEVEL_OUTOF10: 5

## 2024-07-31 ASSESSMENT — PAIN DESCRIPTION - DESCRIPTORS
DESCRIPTORS: ACHING
DESCRIPTORS: ACHING

## 2024-07-31 ASSESSMENT — PAIN DESCRIPTION - FREQUENCY: FREQUENCY: CONTINUOUS

## 2024-07-31 ASSESSMENT — PAIN DESCRIPTION - LOCATION
LOCATION: HEAD
LOCATION: HEAD;ABDOMEN
LOCATION: HEAD

## 2024-07-31 ASSESSMENT — PAIN - FUNCTIONAL ASSESSMENT: PAIN_FUNCTIONAL_ASSESSMENT: ACTIVITIES ARE NOT PREVENTED

## 2024-07-31 ASSESSMENT — PAIN DESCRIPTION - ONSET: ONSET: ON-GOING

## 2024-07-31 ASSESSMENT — PAIN DESCRIPTION - ORIENTATION: ORIENTATION: ANTERIOR

## 2024-07-31 ASSESSMENT — PAIN DESCRIPTION - PAIN TYPE: TYPE: ACUTE PAIN

## 2024-07-31 NOTE — ED NOTES
ED TO INPATIENT SBAR HANDOFF    Patient Name: Nargis Shepard   :  1950  73 y.o.   MRN:  [id number]  ED Room #:  ER10/10  Family/Caregiver Present no       Situation  Code Status: Full Code     Allergies: Patient has no known allergies.  Weight: Patient Vitals for the past 96 hrs (Last 3 readings):   Weight   24 1933 62.8 kg (138 lb 7.2 oz)     Arrived from: home  Chief Complaint:   Chief Complaint   Patient presents with    Emesis    Chest Pain     Hospital Problem/Diagnosis:  Principal Problem:    Pneumonia of left lower lobe due to infectious organism  Resolved Problems:    * No resolved hospital problems. *    Imaging:   XR CHEST PORTABLE   Final Result   Mild patchy airspace opacity in the left lung base may represent   atelectasis or developing pneumonia..      Electronically signed by Jason Lynn      CT HEAD WO CONTRAST    (Results Pending)     Abnormal labs:   Abnormal Labs Reviewed   COVID-19 & INFLUENZA COMBO - Abnormal; Notable for the following components:       Result Value    SARS-CoV-2, PCR Detected (*)     All other components within normal limits   CBC WITH AUTO DIFFERENTIAL - Abnormal; Notable for the following components:    Neutrophils % 91 (*)     Lymphocytes % 2 (*)     Immature Granulocytes % 1 (*)     Lymphocytes Absolute 0.2 (*)     Immature Granulocytes Absolute 0.1 (*)     All other components within normal limits   COMPREHENSIVE METABOLIC PANEL - Abnormal; Notable for the following components:    Glucose 171 (*)     Creatinine 1.03 (*)     Est, Glom Filt Rate 58 (*)     AST 12 (*)     All other components within normal limits   COMPREHENSIVE METABOLIC PANEL W/ REFLEX TO MG FOR LOW K - Abnormal; Notable for the following components:    Chloride 111 (*)     Glucose 128 (*)     AST 12 (*)     Albumin 3.4 (*)     Albumin/Globulin Ratio 1.0 (*)     All other components within normal limits   CBC WITH AUTO DIFFERENTIAL - Abnormal; Notable for the following components:    RBC    Pain Scale: Pain Assessment  Pain Assessment: 0-10  Pain Level: 5  Patient's Stated Pain Goal: 0 - No pain  Pain Location: Head  Pain Orientation: Anterior  Pain Descriptors: Aching  Functional Pain Assessment: Activities are not prevented  Pain Type: Acute pain  Pain Frequency: Continuous  Pain Onset: On-going  Non-Pharmaceutical Pain Intervention(s): Rest  Response to Pain Intervention: Patient satisfied  Side Effects: No reported side effects  Last documented pain score (0-10 scale) Pain Level: 5  Last documented pain medication administered: acetaminophen 0846     Mental Status: oriented  Orientation Level: Orientation Level: Oriented X4  NIH Score: Total: 0  C-SSRS: Risk of Suicide: No Risk  Bedside swallow: 3 oz Water Swallow Screen: Pass  R[place] Coma Scale (GCS): Jewels Coma Scale  Eye Opening: Spontaneous  Best Verbal Response: Oriented  Best Motor Response: Obeys commands  R[place] Coma Scale Score: 15  Active LDA's:   Peripheral IV 07/30/24 Left;Proximal Forearm (Active)     PO Status: Regular  Pertinent or High Risk Medications/Drips: no   If Yes, please provide details:   Titratable drips: no   Pending Blood Product Administration: no     Sepsis    Sepsis Risk Score   Predictive Model Details          15 (Low)  Factor Value    Calculated 7/31/2024 10:33 12% Total Active Inpatient Meds 26    [facility] EARLY DETECTION OF SEPSIS VERSION 2 Model 10% O2 Delivery Method ROOM AIR     7% Age 73 years old     7% Absolute Lymphocytes 0.2 K/UL     -6% Duration of Encounter .6 days     5% Albumin 3.4 g/dL     4% Has Imaging Procedure 1     -4% Change in RBC Count 4.39 M/uL -> 3.66 M/uL     -3% Relative Neutrophils 89 %     2% Respirations 19      Recent Labs     07/30/24 2027 07/31/24  0533   WBC 8.6 6.3     Blood Cultures Drawn: Yes  2320 pm  Repeat LA: Time Due Antibiotic Given: No  Fluid Resuscitation: Total needed , Status completed, amount 1000  S x 2 post-fluid resuscitation: N/A    Recommendation    Pending  orders   Consults ordered: IP CONSULT TO CARDIOLOGY    Consulted provider:      Plan for next 24 hours: monitor  Additional Comments:      If any further questions, please call Sending RN at ed  Electronically signed by: Electronically signed by Aminta Allen RN on 7/31/2024 at 10:34 AM

## 2024-07-31 NOTE — H&P
History and Physical    Date of Service:  7/30/2024  Primary Care Provider: Amanda Espinoza MD  Source of information: The patient and Chart review    Chief Complaint: Chest pain, nausea, vomiting, diarrhea, dizziness      History of Presenting Illness:   Nargis Shepard is a 73 y.o. female with past medical history of hypertension, hyperlipidemia, type 2 diabetes mellitus, GERD, colitis, osteopenia, rheumatoid arthritis presented to the emergency department chief complaints of chest pain, nausea, vomiting, diarrhea, and dizziness.  Specifically, patient complains of having dizziness feeling like the room is spinning every time she tries to sit upright or change position which becomes severe, constant, without specific alleviating factors.  She notes she is tired and has generalized weakness with nausea, initial dry heaves and diarrhea.  Diarrhea has resolved.  She reportedly had a positive COVID-19 test result at home.  She was seen by her PCP who ordered Paxlovid and Zofran ODT.  Patient notes that she has filled the prescription but has not taken the first dose.  She complains of chest pain described as heaviness over the past day which has remained moderate severity, without specific exacerbating or alleviating factors.  On arrival in ED [facility], initial recorded vital signs were temperature 98.7 °F, /77, heart rate 115, respiratory rate 20, O2 saturation 97% on room air.  Initial twelve-lead EKG at 1937 hrs. showed sinus tachycardia, ST/T wave changes in the inferior lateral leads at 116 bpm with prolonged QTc 614 ms.  Repeat 12-lead EKG shows sinus tachycardia, ST/T wave changes in the anterior and inferior  leads at 116 bpm.  Chest x-ray portable showed mild patchy airspace opacities in the left lung base which may represent atelectasis or developing pneumonia.  Abnormal labs included right and 1.03, GFR 58, blood glucose 171, neutrophils 91%.  ED ordered ceftriaxone 1000 mg IV x 1,      Smoking Tobacco Use: Never     Smokeless Tobacco Use: Never     Passive Exposure: Never   Alcohol Use: Not At Risk (4/24/2024)    AUDIT-C     Frequency of Alcohol Consumption: Never     Average Number of Drinks: Patient does not drink     Frequency of Binge Drinking: Never   Financial Resource Strain: Medium Risk (8/9/2023)    Overall Financial Resource Strain (CARDIA)     Difficulty of Paying Living Expenses: Somewhat hard   Food Insecurity: Not on file (8/9/2023)   Transportation Needs: Unknown (8/9/2023)    PRAPARE - Transportation     Lack of Transportation (Medical): Not on file     Lack of Transportation (Non-Medical): No   Physical Activity: Insufficiently Active (4/24/2024)    Exercise Vital Sign     Days of Exercise per Week: 2 days     Minutes of Exercise per Session: 30 min   Stress: Not on file   Social Connections: Not on file   Intimate Partner Violence: Not on file   Depression: Not at risk (7/30/2024)    PHQ-2     PHQ-2 Score: 0   Housing Stability: Unknown (8/9/2023)    Housing Stability Vital Sign     Unable to Pay for Housing in the Last Year: Not on file     Number of Places Lived in the Last Year: Not on file     Unstable Housing in the Last Year: No   Interpersonal Safety: Not At Risk (7/30/2024)    Interpersonal Safety Domain Source: IP Abuse Screening     Physical abuse: Denies     Verbal abuse: Denies     Emotional abuse: Denies     Financial abuse: Denies     Sexual abuse: Denies   Utilities: Not on file        Medications were reconciled to the best of my ability given all available resources at the time of admission. Route is PO if not otherwise noted.     Family and social history were personally reviewed, all pertinent and relevant details are outlined as above.    Objective:   BP (!)  121/68 (MAP 84)  Pulse (!)  93  Temp 98.7 °F (37.1 °C) (Oral)   Resp 15  Ht 1.626 m (5' 4\")   Wt 62.8 kg (138 lb 7.2 oz)   LMP  (LMP Unknown)   SpO2 98% on room air  BMI 23.76 kg/m²          PHYSICAL EXAM:   General: Patient in no acute respiratory distress.  HEENT: Normocephalic.  Atraumatic.  PERRLA EOMI.  Pupils 2 mm reactive bilateral.  Sclera anicteric.  Conjunctive clear.  Bilateral TMs intact. No otorrhea.  No rhinorrhea.  Nares patent.  Oropharynx clear.  Tongue midline/nonedematous.   Neck: Supple, no JVD, no meningeal signs.  No carotid bruits.  Trachea midline.  Respiratory/ Chest: Clear to auscultation bilaterally   CVS: RRR, S1 S2 heard, no murmurs/rubs/gallops  GI/ Abd: Soft.  Nontender.  Nondistended.  No rebound, guarding, or rigidity.  Normoactive bowel sounds.  No auscultated abdominal bruits or palpable abdominal mass.  Musculoskeletal/Ext: Mild tenderness of right hip with limited range of motion of bilateral lower extremities.  No clubbing, no cyanosis, no edema.  No acute bony/soft tissue deformity.   Neuro: GCS 15.  Moves all extremities x 4.  Strength 4/5 BUE & 3/5 BLE. No slurred speech.  No facial droop.  Sensation grossly intact.    Psych: A&Ox4.    Cap refill: Brisk, less than 3 seconds  Vascular/ Pulses: 2+ radial, 1+ dorsalis pedis pulse bilateral.  Integument/ Skin: Warm, dry, without rashes or lesions     Data Review:   I have independently reviewed and interpreted patient's lab and all other diagnostic data    Abnormal Labs Reviewed   CBC WITH AUTO DIFFERENTIAL - Abnormal; Notable for the following components:       Result Value    Neutrophils % 91 (*)     Lymphocytes % 2 (*)     Immature Granulocytes % 1 (*)     Lymphocytes Absolute 0.2 (*)     Immature Granulocytes Absolute 0.1 (*)     All other components within normal limits   COMPREHENSIVE METABOLIC PANEL - Abnormal; Notable for the following components:    Glucose 171 (*)     Creatinine 1.03 (*)     Est, Glom Filt Rate 58 (*)     AST 12 (*)     All other components within normal limits       [unfilled]    IMAGING:   XR CHEST PORTABLE   Final Result   Mild patchy airspace opacity in the left lung base may

## 2024-07-31 NOTE — DISCHARGE INSTRUCTIONS
HOSPITALIST DISCHARGE INSTRUCTIONS    NAME: Nargis Shepard   :  1950   MRN:  [id number]     Date/Time:  2024 3:15 PM    ADMIT DATE: 2024   DISCHARGE DATE: 2024         It is important that you take the medication exactly as they are prescribed.   Keep your medication in the bottles provided by the pharmacist and keep a list of the medication names, dosages, and times to be taken in your wallet.   Do not take other medications without consulting your doctor.       What to do at Home    Recommended diet:  regular diet    Recommended activity: activity as tolerated      If you have questions regarding the hospital related prescriptions or hospital related issues please direct your questions to your primary care doctor if you are unable to reach your hospital physician; your PCP works as an extension of your hospital doctor just like your hospital doctor is an extension of your PCP for your time at the hospital ([facility])    If you experience any of the following symptoms then please call your primary care physician or return to the emergency room if you cannot get hold of your doctor:    Fever, chills, nausea, vomiting, or persistent diarrhea  Worsening weakness or new problems with your speech or balance  Dark stools or visible blood in your stools  New Leg swelling or shortness of breath as these could be signs of a clot    Additional Instructions:      If You Test Positive for COVID-19 (Isolate)  Everyone, regardless of vaccination status.    Stay isolated at home for 5 days (from the day you tested positive)  If you have no symptoms or your symptoms are resolving after 5 days, you can leave your house.  Continue to wear a mask around others for 5 additional days.  If you have a fever, continue to stay home until your fever resolves.        Treatment of coronavirus does not require an antibiotic but if you are discharged on an antibiotic, you should complete

## 2024-07-31 NOTE — ED PROVIDER NOTES
[facility] EMERGENCY DEP  EMERGENCY DEPARTMENT ENCOUNTER      Pt Name: Nargis Shepard  MRN: [id number]  Birthdate 1950  Date of evaluation: 7/30/2024  Provider: Robert Saini MD      HISTORY OF PRESENT ILLNESS      73-year-old female history of diabetes, colitis, hypertension presents to the emergency department chief complaint of generalized not feeling well with nausea and vomiting and diarrhea.  Symptoms began last night.  She took a home COVID test which was positive.    The history is provided by the patient and medical records.           Nursing Notes were reviewed.    REVIEW OF SYSTEMS         Review of Systems        PAST MEDICAL HISTORY     Past Medical History:   Diagnosis Date    Acid reflux     Colitis 04/16/2015    Colitis     Diabetes mellitus, type II (HCC)     Diverticulosis of colon 04/20/2015    Hyperlipemia     Hypertension     Osteopenia     of the spine Dexa done 2014, 2017 and due 2019    RA (rheumatoid arthritis) (HCC)          SURGICAL HISTORY       Past Surgical History:   Procedure Laterality Date    BIOPSY OF BREAST, NEEDLE CORE  2017    benign findings - Fibrosis    COLONOSCOPY  04/20/2015    Due 2023    GYN      HYSTERECTOMY (CERVIX STATUS UNKNOWN)      HYSTERECTOMY, TOTAL ABDOMINAL (CERVIX REMOVED)      AK UNLISTED PROCEDURE ABDOMEN PERITONEUM & OMENTUM           CURRENT MEDICATIONS       Previous Medications    ALBUTEROL SULFATE HFA (PROVENTIL;VENTOLIN;PROAIR) 108 (90 BASE) MCG/ACT INHALER    Inhale 2 puffs into the lungs every 4 hours as needed    AMLODIPINE (NORVASC) 10 MG TABLET    TAKE 1 TABLET BY MOUTH DAILY    ASCORBIC ACID (VITAMIN C) 500 MG TABLET    Take by mouth daily    ASPIRIN 81 MG EC TABLET    Take by mouth daily    ATORVASTATIN (LIPITOR) 10 MG TABLET    TAKE 1 TABLET BY MOUTH DAILY    AZELASTINE (ASTELIN) 0.1 % NASAL SPRAY    1 spray by Nasal route 2 times daily Use in each nostril as directed    BLOOD GLUCOSE TEST STRIPS (ONETOUCH ULTRA) STRIP    USE ONE STRIP  TO TEST DAILY. E11.9    CETIRIZINE (ZYRTEC) 10 MG TABLET    Take 1 tablet by mouth daily    DICLOFENAC SODIUM (VOLTAREN) 1 % GEL    APPLY TO AFFECTED AREA(S)  FOUR GRAM TOPICALLY FOUR TIMES A DAY    FARXIGA 10 MG TABLET    TAKE 1 TABLET BY MOUTH EVERY MORNING    GLIPIZIDE (GLUCOTROL XL) 5 MG EXTENDED RELEASE TABLET    TAKE 1 TABLET BY MOUTH DAILY    IBUPROFEN (ADVIL;MOTRIN) 800 MG TABLET    Take by mouth every 8 hours as needed    LEFLUNOMIDE (ARAVA) 20 MG TABLET    Take 1 tablet by mouth daily    LOSARTAN (COZAAR) 25 MG TABLET    TAKE 1 TABLET BY MOUTH DAILY    METFORMIN (GLUCOPHAGE-XR) 500 MG EXTENDED RELEASE TABLET    Take 1 tablet by mouth daily (with breakfast)    METOPROLOL TARTRATE (LOPRESSOR) 25 MG TABLET    TAKE 1 TABLET BY MOUTH 2 TIMES A DAY    NIRMATRELVIR/RITONAVIR (PAXLOVID, 150/100,) 10 X 150 MG & 10 X 100MG TBPK    Take 2 tablets (one 150 mg nirmatrelvir and one 100 mg ritonavir tablets) by mouth every 12 hours for 5 days.    NYSTATIN (MYCOSTATIN) 373636 UNIT/GM POWDER    Apply topically as needed (for itching)    ONDANSETRON (ZOFRAN-ODT) 4 MG DISINTEGRATING TABLET    Take 1 tablet by mouth 3 times daily as needed for Nausea or Vomiting    SITAGLIPTIN (JANUVIA) 50 MG TABLET    Take 1 tablet by mouth daily       ALLERGIES     Patient has no known allergies.    FAMILY HISTORY       Family History   Problem Relation Age of Onset    Other Mother         diverticulosis    Heart Attack Mother     Heart Failure Father     Pulmonary Embolism Sister     Diabetes Brother     Cancer Brother         brain cancer    Other Brother         stent placed in over summer 2023          SOCIAL HISTORY       Social History     Socioeconomic History    Marital status:    Tobacco Use    Smoking status: Never     Passive exposure: Never    Smokeless tobacco: Never   Vaping Use    Vaping Use: Never used   Substance and Sexual Activity    Alcohol use: No    Drug use: No    Sexual activity: Not Currently     Social

## 2024-07-31 NOTE — ED PROVIDER NOTES
ED SIGN OUT NOTE  Care assumed at [facility] 9:13 PM EDT    Patient was signed out to me by Dr. Saini.     Patient is awaiting labs and completion of IV fluids for nausea related to COVID.  Chest x-ray shows mild patchy airspace disease of the left lung base.  Atelectasis versus pneumonia according to radiology.  White blood cell count is normal and patient vital signs are stable..    BP (!) 144/77   Pulse (!) 115   Temp 98.7 °F (37.1 °C) (Oral)   Resp 20   Ht 1.626 m (5' 4\")   Wt 62.8 kg (138 lb 7.2 oz)   LMP  (LMP Unknown)   SpO2 97%   BMI 23.76 kg/m²     Labs Reviewed   CBC WITH AUTO DIFFERENTIAL - Abnormal; Notable for the following components:       Result Value    Neutrophils % 91 (*)     Lymphocytes % 2 (*)     Immature Granulocytes % 1 (*)     Lymphocytes Absolute 0.2 (*)     Immature Granulocytes Absolute 0.1 (*)     All other components within normal limits   COMPREHENSIVE METABOLIC PANEL - Abnormal; Notable for the following components:    Glucose 171 (*)     Creatinine 1.03 (*)     Est, Glom Filt Rate 58 (*)     AST 12 (*)     All other components within normal limits   CULTURE, BLOOD 1   CULTURE, BLOOD 2   LIPASE   TROPONIN   EXTRA TUBES HOLD   MAGNESIUM   LACTIC ACID   LACTIC ACID     XR CHEST PORTABLE   Final Result   Mild patchy airspace opacity in the left lung base may represent   atelectasis or developing pneumonia..      Electronically signed by Jason Lynn          ED Course as of 07/30/24 2213 Tue Jul 30, 2024 1941 ED EKG interpretation:  Rhythm: sinus rhythm. Rate (approx.): 116.  Axis: normal.  ST segment: Nonspecific ST segment changes with prolonged QTc. This EKG was interpreted by Robert Saini MD,ED Physician. [JM]      ED Course User Index  [JM] Robert Saini MD       Diagnosis:   1. Nausea and vomiting, unspecified vomiting type    2. COVID-19    3. Pneumonia of left lower lobe due to infectious organism        Disposition:   Decision To Admit 07/30/2024

## 2024-07-31 NOTE — CONSULTS
[facility] CARDIOLOGY    MISAEL Addendum    I have seen, interviewed, and examined the patient.  I agree with the history, exam, and was involved in the formulation of the assessment and plan as detailed in the Cardiology/Cardiac Electrophysiology consultation documentation composed today by the Advance Practice Provider (MISAEL, NP, PA). Please see the MISAEL’s note for full details, below includes any additional revisions. I have performed the exam and medical decision making portions in its entirety.     Physical exam:  Visit Vitals  BP (!) 149/84   Pulse 82   Temp 98.8 °F (37.1 °C) (Oral)   Resp 18   Ht 1.626 m (5' 4\")   Wt 62.8 kg (138 lb 7.2 oz)   SpO2 100%   BMI 23.76 kg/m²         GENERAL: No acute distress  HEAD: AT/NC  HEENT: Sclerae anicteric, ocular muscles intact.  CARDIOVASCULAR: Regular rate and rhythm, normal S1/S2, no murmurs/rubs/gallops  RESPIRATORY: clear to auscultation bilaterally, without wheezes/rales/rhonchi  ABDOMINAL: soft, non-tender, non-distended, positive bowel sounds  EXTREMITIES: warm, well perfused, no lower extremities edema, no cyanosis.   NEURO: alert, oriented, answering questions appropriately, no focal neurologic deficits  PSY: normal mood    Data: Labs, ECG, telemetry personally reviewed and interpreted    Current active problems:   QTc prolongation  COVID infection/pneumonia  Hypertension    Assessment and Plan:   Cardiac electrophysiology was consulted regarding abnormal EKG with prolonged QT interval.  Patient presented with nausea vomiting diarrhea.  Was found to have positive COVID with chest x-ray demonstrated patchy airway opacity concerning for pneumonia.  Upon arrival she was in sinus tachycardia with prolonged QTc noted over 600 ms.  Repeat EKG today demonstrated heart rate 110 bpm and QTc of 481 ms  - No personal history of syncope, no family history of sudden cardiac death.  She had a brother and sister who passed away of complications from malignancy  - In absence of 
Martha

## 2024-07-31 NOTE — PROGRESS NOTES
[facility]                                                                                          Hospitalist Progress Note  Eitan Tadeo MD  Office Phone: [phone]        Date of Service:  2024  NAME:  Nargis Shepard  :  1950  MRN:  [id number]       Admission Summary:   Nargis Shepard is a 73 y.o. female with past medical history of hypertension, hyperlipidemia, type 2 diabetes mellitus, GERD, colitis, osteopenia, rheumatoid arthritis presented to the emergency department chief complaints of chest pain, nausea, vomiting, diarrhea, and dizziness.  Specifically, patient complains of having dizziness feeling like the room is spinning every time she tries to sit upright or change position which becomes severe, constant, without specific alleviating factors.  She notes she is tired and has generalized weakness with nausea, initial dry heaves and diarrhea.  Diarrhea has resolved.  She reportedly had a positive COVID-19 test result at home.  She was seen by her PCP who ordered Paxlovid and Zofran ODT.  Patient notes that she has filled the prescription but has not taken the first dose.  She complains of chest pain described as heaviness over the past day which has remained moderate severity, without specific exacerbating or alleviating factors.  On arrival in ED [facility], initial recorded vital signs were temperature 98.7 °F, /77, heart rate 115, respiratory rate 20, O2 saturation 97% on room air.  Initial twelve-lead EKG at 1937 hrs. showed sinus tachycardia, ST/T wave changes in the inferior lateral leads at 116 bpm with prolonged QTc 614 ms.  Repeat 12-lead EKG shows sinus tachycardia, ST/T wave changes in the anterior and inferior  leads at 116 bpm.  Chest x-ray portable showed mild patchy airspace opacities in the left lung base which may represent atelectasis or developing pneumonia.  Abnormal labs included right and 1.03, GFR 58, blood  mL IV syringe  1,000 mg IntraVENous Q24H    [START ON 8/1/2024] azithromycin (ZITHROMAX) 500 mg in sodium chloride 0.9 % 250 mL IVPB ([id number])  500 mg IntraVENous Q24H     ______________________________________________________________________  EXPECTED LENGTH OF STAY: 5  ACTUAL LENGTH OF STAY:          1                 Eitan Tadeo MD

## 2024-07-31 NOTE — PROGRESS NOTES
Discussed with RN,patient on room air.Changed bed assignment from intermediate to med/surg w/remote tele.

## 2024-08-01 ENCOUNTER — TELEPHONE (OUTPATIENT)
Age: 74
End: 2024-08-01

## 2024-08-01 VITALS
SYSTOLIC BLOOD PRESSURE: 115 MMHG | HEIGHT: 64 IN | RESPIRATION RATE: 20 BRPM | WEIGHT: 138.45 LBS | DIASTOLIC BLOOD PRESSURE: 56 MMHG | HEART RATE: 77 BPM | TEMPERATURE: 99 F | OXYGEN SATURATION: 95 % | BODY MASS INDEX: 23.64 KG/M2

## 2024-08-01 LAB
APPEARANCE UR: CLEAR
BACTERIA URNS QL MICRO: NEGATIVE /HPF
BILIRUB UR QL: NEGATIVE
COLOR UR: ABNORMAL
EKG ATRIAL RATE: 75 BPM
EKG DIAGNOSIS: NORMAL
EKG P AXIS: 56 DEGREES
EKG P-R INTERVAL: 134 MS
EKG Q-T INTERVAL: 404 MS
EKG QRS DURATION: 76 MS
EKG QTC CALCULATION (BAZETT): 451 MS
EKG R AXIS: 10 DEGREES
EKG T AXIS: 57 DEGREES
EKG VENTRICULAR RATE: 75 BPM
EPITH CASTS URNS QL MICRO: ABNORMAL /LPF
GLUCOSE BLD STRIP.AUTO-MCNC: 140 MG/DL (ref 65–117)
GLUCOSE BLD STRIP.AUTO-MCNC: 163 MG/DL (ref 65–117)
GLUCOSE BLD STRIP.AUTO-MCNC: 258 MG/DL (ref 65–117)
GLUCOSE UR STRIP.AUTO-MCNC: 500 MG/DL
HGB UR QL STRIP: NEGATIVE
HYALINE CASTS URNS QL MICRO: ABNORMAL /LPF (ref 0–5)
KETONES UR QL STRIP.AUTO: ABNORMAL MG/DL
LEUKOCYTE ESTERASE UR QL STRIP.AUTO: NEGATIVE
NITRITE UR QL STRIP.AUTO: NEGATIVE
PH UR STRIP: 5.5 (ref 5–8)
PROT UR STRIP-MCNC: NEGATIVE MG/DL
RBC #/AREA URNS HPF: ABNORMAL /HPF (ref 0–5)
SERVICE CMNT-IMP: ABNORMAL
SP GR UR REFRACTOMETRY: 1.01 (ref 1–1.03)
URINE CULTURE IF INDICATED: ABNORMAL
UROBILINOGEN UR QL STRIP.AUTO: 0.2 EU/DL (ref 0.2–1)
WBC URNS QL MICRO: ABNORMAL /HPF (ref 0–4)

## 2024-08-01 PROCEDURE — 6370000000 HC RX 637 (ALT 250 FOR IP): Performed by: FAMILY MEDICINE

## 2024-08-01 PROCEDURE — 2580000003 HC RX 258: Performed by: FAMILY MEDICINE

## 2024-08-01 PROCEDURE — 93005 ELECTROCARDIOGRAM TRACING: CPT | Performed by: HOSPITALIST

## 2024-08-01 PROCEDURE — 6360000002 HC RX W HCPCS: Performed by: FAMILY MEDICINE

## 2024-08-01 PROCEDURE — 81001 URINALYSIS AUTO W/SCOPE: CPT

## 2024-08-01 PROCEDURE — 82962 GLUCOSE BLOOD TEST: CPT

## 2024-08-01 RX ORDER — AZITHROMYCIN 500 MG/1
500 TABLET, FILM COATED ORAL DAILY
Qty: 1 PACKET | Refills: 0 | Status: SHIPPED | OUTPATIENT
Start: 2024-08-01 | End: 2024-08-04

## 2024-08-01 RX ADMIN — METOPROLOL TARTRATE 25 MG: 25 TABLET, FILM COATED ORAL at 09:34

## 2024-08-01 RX ADMIN — AMLODIPINE BESYLATE 10 MG: 5 TABLET ORAL at 09:33

## 2024-08-01 RX ADMIN — CETIRIZINE HYDROCHLORIDE 10 MG: 10 TABLET, FILM COATED ORAL at 09:33

## 2024-08-01 RX ADMIN — INSULIN LISPRO 2 UNITS: 100 INJECTION, SOLUTION INTRAVENOUS; SUBCUTANEOUS at 11:51

## 2024-08-01 RX ADMIN — ENOXAPARIN SODIUM 40 MG: 100 INJECTION SUBCUTANEOUS at 09:35

## 2024-08-01 RX ADMIN — LOSARTAN POTASSIUM 25 MG: 50 TABLET, FILM COATED ORAL at 09:34

## 2024-08-01 RX ADMIN — AZITHROMYCIN MONOHYDRATE 500 MG: 500 INJECTION, POWDER, LYOPHILIZED, FOR SOLUTION INTRAVENOUS at 00:25

## 2024-08-01 RX ADMIN — ATORVASTATIN CALCIUM 10 MG: 10 TABLET, FILM COATED ORAL at 09:34

## 2024-08-01 RX ADMIN — OXYCODONE HYDROCHLORIDE AND ACETAMINOPHEN 500 MG: 500 TABLET ORAL at 09:30

## 2024-08-01 RX ADMIN — ASPIRIN 81 MG: 81 TABLET, COATED ORAL at 09:29

## 2024-08-01 RX ADMIN — AZELASTINE HYDROCHLORIDE: 137 SPRAY, METERED NASAL at 09:34

## 2024-08-01 RX ADMIN — WATER 1000 MG: 1 INJECTION INTRAMUSCULAR; INTRAVENOUS; SUBCUTANEOUS at 00:24

## 2024-08-01 RX ADMIN — Medication 10 ML: at 09:29

## 2024-08-01 ASSESSMENT — PAIN SCALES - GENERAL
PAINLEVEL_OUTOF10: 0
PAINLEVEL_OUTOF10: 0

## 2024-08-01 NOTE — TELEPHONE ENCOUNTER
Per Dr. Cortes's consult note dated 7/30/24:  Would obtain 1 week Holter monitor to assess for any arrhythmias  - EP will follow up with outpatient monitor, EP will sign off at this time      Returned call to Debra at [phone]. Advised her that we do not place monitors on inpatients. Informed her that I would relay monitor request to our device clinic and have them mail a monitor to the patient's home address. Debra verbalizes understanding of all information.

## 2024-08-01 NOTE — DISCHARGE SUMMARY
68 -- --   08/01/24 0345 -- -- -- 66 -- --   08/01/24 0139 -- -- -- 72 -- --   07/31/24 2139 -- -- -- 78 -- --   07/31/24 1952 -- -- -- 82 -- --   07/31/24 1934 (!) 149/84 98.8 °F (37.1 °C) Oral 83 18 100 %   07/31/24 1800 -- -- -- 73 -- --   07/31/24 1600 -- -- -- 73 -- --     Gen:    Not in distress  Chest: Clear lungs  CVS:   Regular rhythm.  No edema  Abd:  Soft, not distended, not tender    Discharge/Recent Laboratory Results:  Recent Labs     07/30/24 2036 07/31/24  0533   NA  --  140   K  --  3.8   CL  --  111*   CO2  --  23   BUN  --  16   CREATININE  --  0.97   MG 2.1  --      Recent Labs     07/31/24  0533   HGB 11.3*   HCT 32.9*   WBC 6.3   *         _______________________________________________________________________  DISCHARGE MEDICATIONS:      Medication List        START taking these medications      azithromycin 500 MG tablet  Commonly known as: Zithromax  Take 1 tablet by mouth daily for 3 days            CONTINUE taking these medications      albuterol sulfate  (90 Base) MCG/ACT inhaler  Commonly known as: PROVENTIL;VENTOLIN;PROAIR     amLODIPine 10 MG tablet  Commonly known as: NORVASC  TAKE 1 TABLET BY MOUTH DAILY     ascorbic acid 500 MG tablet  Commonly known as: VITAMIN C     aspirin 81 MG EC tablet     atorvastatin 10 MG tablet  Commonly known as: LIPITOR  TAKE 1 TABLET BY MOUTH DAILY     azelastine 0.1 % nasal spray  Commonly known as: ASTELIN  1 spray by Nasal route 2 times daily Use in each nostril as directed     cetirizine 10 MG tablet  Commonly known as: ZYRTEC  Take 1 tablet by mouth daily     diclofenac sodium 1 % Gel  Commonly known as: VOLTAREN  APPLY TO AFFECTED AREA(S)  FOUR GRAM TOPICALLY FOUR TIMES A DAY     Farxiga 10 MG tablet  Generic drug: dapagliflozin  TAKE 1 TABLET BY MOUTH EVERY MORNING     glipiZIDE 5 MG extended release tablet  Commonly known as: GLUCOTROL XL  TAKE 1 TABLET BY MOUTH DAILY     ibuprofen 800 MG tablet  Commonly known as: ADVIL;MOTRIN

## 2024-08-01 NOTE — CARE COORDINATION
Care Management Initial Assessment       RUR: 10%  Readmission? No  1st IM letter given? Yes - 8/1/24  1st  letter given: No n/a    Transition of Care: likely home with followup with pcp/specialist    NOTE: patient is covid positive    Transport Plan: in car with family    Main contact is spouse- Nate Shepard- [phone]    Discharge pending:  -pending medical progress and care recommendations    1345: this CM met with pleasant patient at bedside; she is alert and oriented x 4; patient lives with spouse at stated address; patient is  normally independent in her adls and ambulation;  patient confirms her pcp and insurance     08/01/24 1356   Service Assessment   Patient Orientation Alert and Oriented   Cognition Alert   History Provided By Patient   Primary Caregiver Self   Support Systems Spouse/Significant Other   Patient's Healthcare Decision Maker is: Legal Next of Kin   PCP Verified by CM Yes   Last Visit to PCP Within last 6 months   Prior Functional Level Independent in ADLs/IADLs   Current Functional Level Independent in ADLs/IADLs   Can patient return to prior living arrangement Yes   Ability to make needs known: Good   Family able to assist with home care needs: Yes   Financial Resources Medicare   Social/Functional History   Lives With Spouse   Type of Home House   ADL Assistance Independent   Ambulation Assistance Independent   Transfer Assistance Independent   Discharge Planning   Type of Residence House   Living Arrangements Spouse/Significant Other   Patient expects to be discharged to: [facility] following   Alyssia Llamas RN

## 2024-08-01 NOTE — PROGRESS NOTES
1500: Attempted to call cardiology office of Dr. Cortes for heart monitor on hold 10 minutes each call x2.   1530: Attempted to call cardiology office again.  1545: Told  unable to get ahold of monitor rep and someone from cardiology office to place monitor on patient will call back. Unit phone number given.   1615: Received call from Rebecca Rodarte with Dr. Cortes's office told they do not place heart monitors on patients in the hospital that it will be mailed to the patients home address with instructions on how to apply.

## 2024-08-01 NOTE — TELEPHONE ENCOUNTER
Debra Bergman in [facility] is calling to speak to someone in regards to patient getting the event monitor. The patient will be getting discharged. Patient is in ROOM 624. She would like a return call at [phone].

## 2024-08-02 ENCOUNTER — TELEPHONE (OUTPATIENT)
Age: 74
End: 2024-08-02

## 2024-08-02 NOTE — TELEPHONE ENCOUNTER
LVM for Pt. To call and make an appointment.     Hospital/monitor follow up with EP NP    Please schedule     Thank you!

## 2024-08-02 NOTE — TELEPHONE ENCOUNTER
Enrolled with [employer] - Ordered and being shipped to patient's home address on file.  ETA within 5-7 business days.

## 2024-08-04 LAB
BACTERIA SPEC CULT: NORMAL
BACTERIA SPEC CULT: NORMAL
SERVICE CMNT-IMP: NORMAL
SERVICE CMNT-IMP: NORMAL

## 2024-08-05 RX ORDER — AMLODIPINE BESYLATE 10 MG/1
10 TABLET ORAL DAILY
Qty: 90 TABLET | Refills: 0 | Status: SHIPPED | OUTPATIENT
Start: 2024-08-05

## 2024-08-05 RX ORDER — LOSARTAN POTASSIUM 25 MG/1
25 TABLET ORAL DAILY
Qty: 90 TABLET | Refills: 0 | Status: SHIPPED | OUTPATIENT
Start: 2024-08-05

## 2024-08-06 ENCOUNTER — TELEPHONE (OUTPATIENT)
Age: 74
End: 2024-08-06

## 2024-08-06 NOTE — TELEPHONE ENCOUNTER
LVM for pt. To call and make an appointment with a EP NP.     DX: Hospital/monitor follow up per SONIYA Perez.    Please make appointment for the patient. 1st available.    Thank you!

## 2024-08-06 NOTE — TELEPHONE ENCOUNTER
I called patient to see if they were still having symptoms of Covid that was diagnosed on July 31st. Has Hospital follow up on 08/08/2024. Unable to leave VM mailbox full.

## 2024-08-08 ENCOUNTER — OFFICE VISIT (OUTPATIENT)
Age: 74
End: 2024-08-08

## 2024-08-08 VITALS
HEIGHT: 64 IN | DIASTOLIC BLOOD PRESSURE: 70 MMHG | RESPIRATION RATE: 16 BRPM | WEIGHT: 138.8 LBS | OXYGEN SATURATION: 95 % | BODY MASS INDEX: 23.7 KG/M2 | HEART RATE: 78 BPM | TEMPERATURE: 97.4 F | SYSTOLIC BLOOD PRESSURE: 127 MMHG

## 2024-08-08 DIAGNOSIS — J18.9 PNEUMONIA OF LEFT LOWER LOBE DUE TO INFECTIOUS ORGANISM: ICD-10-CM

## 2024-08-08 DIAGNOSIS — N18.2 TYPE 2 DIABETES MELLITUS WITH STAGE 2 CHRONIC KIDNEY DISEASE, WITHOUT LONG-TERM CURRENT USE OF INSULIN (HCC): ICD-10-CM

## 2024-08-08 DIAGNOSIS — I45.81 PROLONGED QT SYNDROME: ICD-10-CM

## 2024-08-08 DIAGNOSIS — I10 ESSENTIAL (PRIMARY) HYPERTENSION: ICD-10-CM

## 2024-08-08 DIAGNOSIS — Z09 HOSPITAL DISCHARGE FOLLOW-UP: Primary | ICD-10-CM

## 2024-08-08 DIAGNOSIS — E11.22 TYPE 2 DIABETES MELLITUS WITH STAGE 2 CHRONIC KIDNEY DISEASE, WITHOUT LONG-TERM CURRENT USE OF INSULIN (HCC): ICD-10-CM

## 2024-08-08 DIAGNOSIS — U07.1 COVID-19: ICD-10-CM

## 2024-08-08 NOTE — PATIENT INSTRUCTIONS
Appt August 15 @ 11 AM.  [facility] 6th Floor  [address].    This is to put on the monitor by a technician.  Get instructions on how to return it.    Get repeat CXR Aug 26, 2024 at [facility].

## 2024-08-08 NOTE — PROGRESS NOTES
Chief Complaint   Patient presents with    Follow-Up from Hospital     /70   Pulse 78   Temp 97.4 °F (36.3 °C)   Resp 16   Ht 1.626 m (5' 4\")   Wt 63 kg (138 lb 12.8 oz)   LMP  (LMP Unknown)   SpO2 95%   BMI 23.82 kg/m²   \"Have you been to the ER, urgent care clinic since your last visit?  Hospitalized since your last visit?\"    YES    “Have you seen or consulted any other health care providers outside of [facility] since your last visit?”    NO            Click Here for Release of Records Request    
tablet  Commonly known as: GLUCOPHAGE-XR  Take 1 tablet by mouth daily (with breakfast)     metoprolol tartrate 25 MG tablet  Commonly known as: LOPRESSOR  TAKE 1 TABLET BY MOUTH 2 TIMES A DAY     nystatin 187320 UNIT/GM powder  Commonly known as: MYCOSTATIN  Apply topically as needed (for itching)     ondansetron 4 MG disintegrating tablet  Commonly known as: ZOFRAN-ODT  Take 1 tablet by mouth 3 times daily as needed for Nausea or Vomiting     OneTouch Ultra strip  Generic drug: blood glucose test strips  USE ONE STRIP TO TEST DAILY. E11.9     SITagliptin 50 MG tablet  Commonly known as: Januvia  Take 1 tablet by mouth daily                Medications marked \"taking\" at this time  Outpatient Medications Marked as Taking for the 8/8/24 encounter (Office Visit) with Bekah Juan MD   Medication Sig Dispense Refill    amLODIPine (NORVASC) 10 MG tablet TAKE 1 TABLET BY MOUTH DAILY 90 tablet 0    losartan (COZAAR) 25 MG tablet TAKE 1 TABLET BY MOUTH DAILY 90 tablet 0    ondansetron (ZOFRAN-ODT) 4 MG disintegrating tablet Take 1 tablet by mouth 3 times daily as needed for Nausea or Vomiting 21 tablet 0    metoprolol tartrate (LOPRESSOR) 25 MG tablet TAKE 1 TABLET BY MOUTH 2 TIMES A  tablet 1    FARXIGA 10 MG tablet TAKE 1 TABLET BY MOUTH EVERY MORNING 90 tablet 1    metFORMIN (GLUCOPHAGE-XR) 500 MG extended release tablet Take 1 tablet by mouth daily (with breakfast) 90 tablet 1    atorvastatin (LIPITOR) 10 MG tablet TAKE 1 TABLET BY MOUTH DAILY 90 tablet 0    glipiZIDE (GLUCOTROL XL) 5 MG extended release tablet TAKE 1 TABLET BY MOUTH DAILY 90 tablet 0    azelastine (ASTELIN) 0.1 % nasal spray 1 spray by Nasal route 2 times daily Use in each nostril as directed 30 mL 5    cetirizine (ZYRTEC) 10 MG tablet Take 1 tablet by mouth daily 30 tablet 0    SITagliptin (JANUVIA) 50 MG tablet Take 1 tablet by mouth daily 90 tablet 1    nystatin (MYCOSTATIN) 775595 UNIT/GM powder Apply topically as needed (for itching)

## 2024-08-09 ENCOUNTER — TELEPHONE (OUTPATIENT)
Age: 74
End: 2024-08-09

## 2024-08-14 ENCOUNTER — HOSPITAL ENCOUNTER (OUTPATIENT)
Facility: HOSPITAL | Age: 74
Discharge: HOME | End: 2024-08-17
Attending: FAMILY MEDICINE
Payer: MEDICARE

## 2024-08-14 ENCOUNTER — HOSPITAL ENCOUNTER (OUTPATIENT)
Facility: HOSPITAL | Age: 74
Discharge: HOME | End: 2024-08-17
Attending: INTERNAL MEDICINE
Payer: MEDICARE

## 2024-08-14 DIAGNOSIS — J18.9 PNEUMONIA OF LEFT LOWER LOBE DUE TO INFECTIOUS ORGANISM: ICD-10-CM

## 2024-08-14 DIAGNOSIS — Z78.0 POST-MENOPAUSE: ICD-10-CM

## 2024-08-14 DIAGNOSIS — Z13.820 SCREENING FOR OSTEOPOROSIS: ICD-10-CM

## 2024-08-14 PROCEDURE — 77080 DXA BONE DENSITY AXIAL: CPT

## 2024-08-14 PROCEDURE — 71046 X-RAY EXAM CHEST 2 VIEWS: CPT

## 2024-08-14 NOTE — PROGRESS NOTES
Physician Progress Note      PATIENT:               YARELI HUNT  CSN #:                  [id number]  :                       1950  ADMIT DATE:       2024 8:08 PM  DISCH DATE:        2024 6:09 PM  RESPONDING  PROVIDER #:        Alexis Lopez MD          QUERY TEXT:    Dr. Lopez:    Pt admitted with Chest pain with associated N/V/D.  Pt noted to have   \"COVID-pneumonia\" per IM progress note dated . Per H&P, \" Patient defers   using Paxlovid at this time\"; and was treated with Rocephin and Zithromax. If   possible, please document in the progress notes and discharge summary if you   are evaluating and/or treating any of the following:    Note: CAP and HCAP indicate where the pneumonia was acquired, not a specific   type.    The medical record reflects the following:  Risk Factors: C/o Chest pain w/ associated N/V/D  Clinical Indicators: HR: 115; 94; 96; 93; RR: 20; WBC: 8.6; Trop. hs: 5, 8;   Procal: <0.05; BCx: NG x  1 day; Influenza A/B: Neg; SARS-CoV-2, PCR: Detected     AP: \"Pneumonia of left lower lobe due to infectious organism  -COVID-19 virus infection\".  ....AP noted: \"Patient was informed of risk, benefits, potential complications   of Paxlovid (which she may her home supply as was outpatient prescription was   already filled).  Patient defers using Paxlovid at this time\".     Cards PN: \"COVID infection/pneumonia\".     IM PN: \"Admitted for COVID-pneumonia\".  ....Noted: \"Ceftriaxone 1000 mg IV every 24 hours  Azithromycin 500 mg IV every 24 hours  Not hypoxic, does not meet criteria for steroids or baricitinib\".    Treatment: Labs; Procal; BCx; R/o Influenza A/B; R/o COVID; IV ABx;    Thank you,    [email or web address]  Options provided:  -- COVID-19 with viral pneumonia  -- Community acquired pneumonia superimposed upon COVID-19  -- COVID-19 Pneumonia POA, Only  -- Other - I will add my own diagnosis  -- Disagree - Not applicable / Not valid  --

## 2024-08-15 ENCOUNTER — ANCILLARY PROCEDURE (OUTPATIENT)
Age: 74
End: 2024-08-15
Payer: MEDICARE

## 2024-08-15 PROCEDURE — 93242 EXT ECG>48HR<7D RECORDING: CPT | Performed by: INTERNAL MEDICINE

## 2024-08-16 ENCOUNTER — TELEPHONE (OUTPATIENT)
Age: 74
End: 2024-08-16

## 2024-08-16 NOTE — TELEPHONE ENCOUNTER
I called patient and relayed results for labs and xray. She understood.    X Size Of Lesion In Cm (Optional): 0 Introduction Text (Please End With A Colon): * Detail Level: Detailed

## 2024-08-16 NOTE — TELEPHONE ENCOUNTER
----- Message from Dr. Amanda Espinoza MD sent at 8/15/2024  6:36 PM EDT -----  Patient was found to have osteopenia on results   OSTEOPENIA RECOMMENDATIONS:     - Vitamin D = 4916-0210 IU/day   - Calcium = 1200 mg/day   - Gentle weight bearing exercises   - Bone density screening every 2-years.     A pharmacist should be able to show you the best combination of these drugs. We will discuss this more after your follow up visit.     Thanks!

## 2024-09-03 ENCOUNTER — TELEPHONE (OUTPATIENT)
Age: 74
End: 2024-09-03

## 2024-09-03 NOTE — TELEPHONE ENCOUNTER
Ana Cortes MD   to Nargis Shepard         9/3/24  7:54 AM  Your monitor looks great without any evidence of abnormal arrhythmias. Great results overall. I hope you're doing well!      Attempted to return patient call.  No answer.  Voicemail full and unable to leave a message.  Will try again at a later time.

## 2024-09-03 NOTE — TELEPHONE ENCOUNTER
Called patient, ID verified using two patient identifiers.  Notified patient of normal heart monitor results.  Patient wishes to cancel upcoming appointment with NP at this time.  She will call back if she feels like she needs to be seen.

## 2024-09-17 RX ORDER — ATORVASTATIN CALCIUM 10 MG/1
10 TABLET, FILM COATED ORAL DAILY
Qty: 90 TABLET | Refills: 0 | Status: SHIPPED | OUTPATIENT
Start: 2024-09-17

## 2024-09-17 RX ORDER — GLIPIZIDE 5 MG/1
5 TABLET, FILM COATED, EXTENDED RELEASE ORAL DAILY
Qty: 90 TABLET | Refills: 0 | Status: SHIPPED | OUTPATIENT
Start: 2024-09-17

## 2024-11-11 RX ORDER — AMLODIPINE BESYLATE 10 MG/1
10 TABLET ORAL DAILY
Qty: 90 TABLET | Refills: 0 | Status: SHIPPED | OUTPATIENT
Start: 2024-11-11

## 2024-11-12 ENCOUNTER — OFFICE VISIT (OUTPATIENT)
Age: 74
End: 2024-11-12
Payer: MEDICARE

## 2024-11-12 VITALS
OXYGEN SATURATION: 93 % | DIASTOLIC BLOOD PRESSURE: 74 MMHG | SYSTOLIC BLOOD PRESSURE: 149 MMHG | TEMPERATURE: 97.9 F | WEIGHT: 141 LBS | BODY MASS INDEX: 24.2 KG/M2 | HEART RATE: 73 BPM | RESPIRATION RATE: 16 BRPM

## 2024-11-12 DIAGNOSIS — M05.741 RHEUMATOID ARTHRITIS WITH RHEUMATOID FACTOR OF RIGHT HAND WITHOUT ORGAN OR SYSTEMS INVOLVEMENT (HCC): Primary | ICD-10-CM

## 2024-11-12 LAB
ALBUMIN SERPL-MCNC: 4.2 G/DL (ref 3.5–5)
ALBUMIN/GLOB SERPL: 1.2 (ref 1.1–2.2)
ALP SERPL-CCNC: 53 U/L (ref 45–117)
ALT SERPL-CCNC: 26 U/L (ref 12–78)
ANION GAP SERPL CALC-SCNC: 3 MMOL/L (ref 2–12)
AST SERPL-CCNC: 13 U/L (ref 15–37)
BASOPHILS # BLD: 0 K/UL (ref 0–0.1)
BASOPHILS NFR BLD: 1 % (ref 0–1)
BILIRUB SERPL-MCNC: 0.7 MG/DL (ref 0.2–1)
BUN SERPL-MCNC: 23 MG/DL (ref 6–20)
BUN/CREAT SERPL: 20 (ref 12–20)
CALCIUM SERPL-MCNC: 10.7 MG/DL (ref 8.5–10.1)
CHLORIDE SERPL-SCNC: 109 MMOL/L (ref 97–108)
CO2 SERPL-SCNC: 29 MMOL/L (ref 21–32)
CREAT SERPL-MCNC: 1.13 MG/DL (ref 0.55–1.02)
DIFFERENTIAL METHOD BLD: ABNORMAL
EOSINOPHIL # BLD: 0 K/UL (ref 0–0.4)
EOSINOPHIL NFR BLD: 1 % (ref 0–7)
ERYTHROCYTE [DISTWIDTH] IN BLOOD BY AUTOMATED COUNT: 12.2 % (ref 11.5–14.5)
GLOBULIN SER CALC-MCNC: 3.4 G/DL (ref 2–4)
GLUCOSE SERPL-MCNC: 80 MG/DL (ref 65–100)
HCT VFR BLD AUTO: 37.4 % (ref 35–47)
HGB BLD-MCNC: 12.2 G/DL (ref 11.5–16)
IMM GRANULOCYTES # BLD AUTO: 0 K/UL (ref 0–0.04)
IMM GRANULOCYTES NFR BLD AUTO: 0 % (ref 0–0.5)
LYMPHOCYTES # BLD: 1 K/UL (ref 0.8–3.5)
LYMPHOCYTES NFR BLD: 29 % (ref 12–49)
MCH RBC QN AUTO: 30 PG (ref 26–34)
MCHC RBC AUTO-ENTMCNC: 32.6 G/DL (ref 30–36.5)
MCV RBC AUTO: 92.1 FL (ref 80–99)
MONOCYTES # BLD: 0.4 K/UL (ref 0–1)
MONOCYTES NFR BLD: 12 % (ref 5–13)
NEUTS SEG # BLD: 2 K/UL (ref 1.8–8)
NEUTS SEG NFR BLD: 57 % (ref 32–75)
NRBC # BLD: 0 K/UL (ref 0–0.01)
NRBC BLD-RTO: 0 PER 100 WBC
PLATELET # BLD AUTO: 192 K/UL (ref 150–400)
PMV BLD AUTO: 11.3 FL (ref 8.9–12.9)
POTASSIUM SERPL-SCNC: 4.4 MMOL/L (ref 3.5–5.1)
PROT SERPL-MCNC: 7.6 G/DL (ref 6.4–8.2)
RBC # BLD AUTO: 4.06 M/UL (ref 3.8–5.2)
SODIUM SERPL-SCNC: 141 MMOL/L (ref 136–145)
WBC # BLD AUTO: 3.4 K/UL (ref 3.6–11)

## 2024-11-12 PROCEDURE — G8420 CALC BMI NORM PARAMETERS: HCPCS | Performed by: PEDIATRICS

## 2024-11-12 PROCEDURE — G8427 DOCREV CUR MEDS BY ELIG CLIN: HCPCS | Performed by: PEDIATRICS

## 2024-11-12 PROCEDURE — 3078F DIAST BP <80 MM HG: CPT | Performed by: PEDIATRICS

## 2024-11-12 PROCEDURE — 1090F PRES/ABSN URINE INCON ASSESS: CPT | Performed by: PEDIATRICS

## 2024-11-12 PROCEDURE — G8484 FLU IMMUNIZE NO ADMIN: HCPCS | Performed by: PEDIATRICS

## 2024-11-12 PROCEDURE — 1036F TOBACCO NON-USER: CPT | Performed by: PEDIATRICS

## 2024-11-12 PROCEDURE — 1123F ACP DISCUSS/DSCN MKR DOCD: CPT | Performed by: PEDIATRICS

## 2024-11-12 PROCEDURE — 3017F COLORECTAL CA SCREEN DOC REV: CPT | Performed by: PEDIATRICS

## 2024-11-12 PROCEDURE — G8399 PT W/DXA RESULTS DOCUMENT: HCPCS | Performed by: PEDIATRICS

## 2024-11-12 PROCEDURE — 1125F AMNT PAIN NOTED PAIN PRSNT: CPT | Performed by: PEDIATRICS

## 2024-11-12 PROCEDURE — 99214 OFFICE O/P EST MOD 30 MIN: CPT | Performed by: PEDIATRICS

## 2024-11-12 PROCEDURE — 1159F MED LIST DOCD IN RCRD: CPT | Performed by: PEDIATRICS

## 2024-11-12 PROCEDURE — 3077F SYST BP >= 140 MM HG: CPT | Performed by: PEDIATRICS

## 2024-11-12 RX ORDER — LEFLUNOMIDE 20 MG/1
20 TABLET ORAL DAILY
Qty: 90 TABLET | Refills: 1 | Status: SHIPPED | OUTPATIENT
Start: 2024-11-12

## 2024-11-12 ASSESSMENT — PATIENT HEALTH QUESTIONNAIRE - PHQ9
SUM OF ALL RESPONSES TO PHQ9 QUESTIONS 1 & 2: 0
2. FEELING DOWN, DEPRESSED OR HOPELESS: NOT AT ALL
SUM OF ALL RESPONSES TO PHQ QUESTIONS 1-9: 0
SUM OF ALL RESPONSES TO PHQ QUESTIONS 1-9: 0
1. LITTLE INTEREST OR PLEASURE IN DOING THINGS: NOT AT ALL
SUM OF ALL RESPONSES TO PHQ QUESTIONS 1-9: 0
SUM OF ALL RESPONSES TO PHQ QUESTIONS 1-9: 0

## 2024-11-13 RX ORDER — LOSARTAN POTASSIUM 25 MG/1
25 TABLET ORAL DAILY
Qty: 90 TABLET | Refills: 0 | Status: SHIPPED | OUTPATIENT
Start: 2024-11-13

## 2024-12-03 DIAGNOSIS — E11.21 TYPE 2 DIABETES MELLITUS WITH DIABETIC NEPHROPATHY, WITHOUT LONG-TERM CURRENT USE OF INSULIN (HCC): ICD-10-CM

## 2024-12-03 RX ORDER — METFORMIN HYDROCHLORIDE 500 MG/1
500 TABLET, EXTENDED RELEASE ORAL DAILY
Qty: 90 TABLET | Refills: 1 | Status: SHIPPED | OUTPATIENT
Start: 2024-12-03

## 2024-12-22 RX ORDER — ATORVASTATIN CALCIUM 10 MG/1
10 TABLET, FILM COATED ORAL DAILY
Qty: 90 TABLET | Refills: 0 | Status: SHIPPED | OUTPATIENT
Start: 2024-12-22

## 2024-12-22 RX ORDER — GLIPIZIDE 5 MG/1
5 TABLET, FILM COATED, EXTENDED RELEASE ORAL DAILY
Qty: 90 TABLET | Refills: 0 | Status: SHIPPED | OUTPATIENT
Start: 2024-12-22

## 2024-12-23 ENCOUNTER — OFFICE VISIT (OUTPATIENT)
Age: 74
End: 2024-12-23

## 2024-12-23 VITALS
SYSTOLIC BLOOD PRESSURE: 138 MMHG | TEMPERATURE: 98.5 F | RESPIRATION RATE: 14 BRPM | WEIGHT: 139 LBS | HEIGHT: 64 IN | DIASTOLIC BLOOD PRESSURE: 81 MMHG | HEART RATE: 93 BPM | BODY MASS INDEX: 23.73 KG/M2

## 2024-12-23 DIAGNOSIS — J01.90 ACUTE BACTERIAL SINUSITIS: ICD-10-CM

## 2024-12-23 DIAGNOSIS — R03.0 ELEVATED BLOOD PRESSURE READING: Primary | ICD-10-CM

## 2024-12-23 DIAGNOSIS — B96.89 ACUTE BACTERIAL SINUSITIS: ICD-10-CM

## 2024-12-23 DIAGNOSIS — R68.89 FLU-LIKE SYMPTOMS: ICD-10-CM

## 2024-12-23 LAB
INFLUENZA A ANTIGEN, POC: NEGATIVE
INFLUENZA B ANTIGEN, POC: NEGATIVE
VALID INTERNAL CONTROL, POC: NORMAL

## 2024-12-23 NOTE — PROGRESS NOTES
Nargis Shepard (:  1950) is a 74 y.o. female,New patient, here for evaluation of the following chief complaint(s):  Pharyngitis, Ear Pain, and Congestion (Pt c/o sore throat, bilateral ear pain, sinus pain, coughing up brown mucus ongoing for three days. She's taken robitussin, allegra with no relief.)       ASSESSMENT/PLAN:  1. Elevated blood pressure reading  2. Flu-like symptoms  -     AMB POC INFLUENZA A  AND B REAL-TIME RT-PCR  3. Acute bacterial sinusitis  -     amoxicillin-clavulanate (AUGMENTIN) 875-125 MG per tablet; Take 1 tablet by mouth 2 times daily for 10 days, Disp-20 tablet, R-0Normal      Your flu test was negative today.  Please take antibiotic prescribed for acute sinus infection.  Please finish all this medication.  .  Follow-up with your PCP in 7 days or less.   Call or return to clinic if no improvement or any worsening  Patient comfortable with plan         SUBJECTIVE/OBJECTIVE:    History provided by:  Patient   used: No    Pharyngitis  Associated symptoms: ear pain    Ear Pain           74 y.o. female presents with symptoms of acute bacterial sinus infection, lungs are clear, normal S1-S2 with no murmurs gallops or rubs.  She complains of sinus pain and pressure and foul rhinorrhea.  She does have evidence of otitis media, erythematous and edematous oropharynx.  I think it is appropriate to treat her for an acute bacterial sinus infection at this time.  Antibiotic should cover otitis media.  Daughter has concern for strep throat.  I explained the medication on prescribing her for her sinus infection will also cover strep throat if she does have that.  However upon inspection, the throat does not appear to be strep like.         Vitals:    24 1107 24 1112 24 1214   BP: (!) 146/83 (!) 149/87 138/81   Site: Right Upper Arm Right Upper Arm Right Upper Arm   Position: Sitting Sitting Sitting   Cuff Size: Medium Adult Medium Adult Medium Adult

## 2024-12-23 NOTE — PATIENT INSTRUCTIONS
Your flu test was negative today.  Please take antibiotic prescribed for acute sinus infection.  Please finish all this medication.  .  Follow-up with your PCP in 7 days or less.    Call or return to clinic if no improvement or any worsening

## 2025-01-13 ENCOUNTER — OFFICE VISIT (OUTPATIENT)
Age: 75
End: 2025-01-13
Payer: MEDICARE

## 2025-01-13 VITALS
OXYGEN SATURATION: 96 % | HEIGHT: 64 IN | RESPIRATION RATE: 16 BRPM | SYSTOLIC BLOOD PRESSURE: 136 MMHG | TEMPERATURE: 97.9 F | DIASTOLIC BLOOD PRESSURE: 74 MMHG | WEIGHT: 134 LBS | HEART RATE: 72 BPM | BODY MASS INDEX: 22.88 KG/M2

## 2025-01-13 DIAGNOSIS — S89.92XA INJURY OF LEFT KNEE, INITIAL ENCOUNTER: Primary | ICD-10-CM

## 2025-01-13 DIAGNOSIS — M05.741 RHEUMATOID ARTHRITIS WITH RHEUMATOID FACTOR OF RIGHT HAND WITHOUT ORGAN OR SYSTEMS INVOLVEMENT (HCC): ICD-10-CM
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
## 2025-01-13 DIAGNOSIS — E11.22 TYPE 2 DIABETES MELLITUS WITH STAGE 2 CHRONIC KIDNEY DISEASE, WITHOUT LONG-TERM CURRENT USE OF INSULIN (HCC): ICD-10-CM

## 2025-01-13 DIAGNOSIS — R53.83 MALAISE AND FATIGUE: ICD-10-CM

## 2025-01-13 DIAGNOSIS — E55.9 VITAMIN D DEFICIENCY: ICD-10-CM

## 2025-01-13 DIAGNOSIS — N18.2 TYPE 2 DIABETES MELLITUS WITH STAGE 2 CHRONIC KIDNEY DISEASE, WITHOUT LONG-TERM CURRENT USE OF INSULIN (HCC): ICD-10-CM

## 2025-01-13 DIAGNOSIS — R53.81 MALAISE AND FATIGUE: ICD-10-CM

## 2025-01-13 DIAGNOSIS — E78.2 MIXED HYPERLIPIDEMIA: ICD-10-CM

## 2025-01-13 DIAGNOSIS — N18.30 STAGE 3 CHRONIC KIDNEY DISEASE, UNSPECIFIED WHETHER STAGE 3A OR 3B CKD (HCC): ICD-10-CM

## 2025-01-13 PROCEDURE — 1090F PRES/ABSN URINE INCON ASSESS: CPT | Performed by: INTERNAL MEDICINE

## 2025-01-13 PROCEDURE — 3075F SYST BP GE 130 - 139MM HG: CPT | Performed by: INTERNAL MEDICINE

## 2025-01-13 PROCEDURE — G8399 PT W/DXA RESULTS DOCUMENT: HCPCS | Performed by: INTERNAL MEDICINE

## 2025-01-13 PROCEDURE — 3017F COLORECTAL CA SCREEN DOC REV: CPT | Performed by: INTERNAL MEDICINE

## 2025-01-13 PROCEDURE — 99214 OFFICE O/P EST MOD 30 MIN: CPT | Performed by: INTERNAL MEDICINE

## 2025-01-13 PROCEDURE — 3046F HEMOGLOBIN A1C LEVEL >9.0%: CPT | Performed by: INTERNAL MEDICINE

## 2025-01-13 PROCEDURE — 3078F DIAST BP <80 MM HG: CPT | Performed by: INTERNAL MEDICINE

## 2025-01-13 PROCEDURE — G8427 DOCREV CUR MEDS BY ELIG CLIN: HCPCS | Performed by: INTERNAL MEDICINE

## 2025-01-13 PROCEDURE — 1036F TOBACCO NON-USER: CPT | Performed by: INTERNAL MEDICINE

## 2025-01-13 PROCEDURE — 1159F MED LIST DOCD IN RCRD: CPT | Performed by: INTERNAL MEDICINE

## 2025-01-13 PROCEDURE — 1123F ACP DISCUSS/DSCN MKR DOCD: CPT | Performed by: INTERNAL MEDICINE

## 2025-01-13 PROCEDURE — G8420 CALC BMI NORM PARAMETERS: HCPCS | Performed by: INTERNAL MEDICINE

## 2025-01-13 PROCEDURE — 2022F DILAT RTA XM EVC RTNOPTHY: CPT | Performed by: INTERNAL MEDICINE

## 2025-01-13 SDOH — ECONOMIC STABILITY: FOOD INSECURITY: WITHIN THE PAST 12 MONTHS, THE FOOD YOU BOUGHT JUST DIDN'T LAST AND YOU DIDN'T HAVE MONEY TO GET MORE.: NEVER TRUE

## 2025-01-13 SDOH — ECONOMIC STABILITY: FOOD INSECURITY: WITHIN THE PAST 12 MONTHS, YOU WORRIED THAT YOUR FOOD WOULD RUN OUT BEFORE YOU GOT MONEY TO BUY MORE.: NEVER TRUE

## 2025-01-13 ASSESSMENT — PATIENT HEALTH QUESTIONNAIRE - PHQ9
5. POOR APPETITE OR OVEREATING: NOT AT ALL
SUM OF ALL RESPONSES TO PHQ QUESTIONS 1-9: 0
7. TROUBLE CONCENTRATING ON THINGS, SUCH AS READING THE NEWSPAPER OR WATCHING TELEVISION: NOT AT ALL
2. FEELING DOWN, DEPRESSED OR HOPELESS: NOT AT ALL
6. FEELING BAD ABOUT YOURSELF - OR THAT YOU ARE A FAILURE OR HAVE LET YOURSELF OR YOUR FAMILY DOWN: NOT AT ALL
4. FEELING TIRED OR HAVING LITTLE ENERGY: NOT AT ALL
SUM OF ALL RESPONSES TO PHQ QUESTIONS 1-9: 0
SUM OF ALL RESPONSES TO PHQ QUESTIONS 1-9: 0
3. TROUBLE FALLING OR STAYING ASLEEP: NOT AT ALL
SUM OF ALL RESPONSES TO PHQ9 QUESTIONS 1 & 2: 0
9. THOUGHTS THAT YOU WOULD BE BETTER OFF DEAD, OR OF HURTING YOURSELF: NOT AT ALL
1. LITTLE INTEREST OR PLEASURE IN DOING THINGS: NOT AT ALL
8. MOVING OR SPEAKING SO SLOWLY THAT OTHER PEOPLE COULD HAVE NOTICED. OR THE OPPOSITE, BEING SO FIGETY OR RESTLESS THAT YOU HAVE BEEN MOVING AROUND A LOT MORE THAN USUAL: NOT AT ALL
SUM OF ALL RESPONSES TO PHQ QUESTIONS 1-9: 0

## 2025-01-13 NOTE — ASSESSMENT & PLAN NOTE
Chronic, at goal (stable), continue current treatment plan  Lab Results   Component Value Date    LABA1C 6.1 (H) 02/07/2024    LABA1C 6.6 (H) 10/31/2023    LABA1C 6.0 (H) 04/19/2023     Lab Results   Component Value Date    CREATININE 1.13 (H) 11/12/2024

## 2025-01-13 NOTE — PROGRESS NOTES
[facility]   Acute Visit Progress Note  Patient: Nargis Shepard  1950, 74 y.o., female  Encounter Date: 1/13/25    CHIEF COMPLAINT:  Chief Complaint   Patient presents with    Fall     Patient had a fall on 1/5/2025. When she fell she got dizzy. Patient is having left knee and hip pain, the side the fell on. Started to feel tingling in her hands when she fell.    Sinusitis     Pt just got over a sinus infection, went to  on 12/23    question     Pt has a question about the \"Type 2 diabetes mellitus with stage 2 chronic kidney disease\" on office note from 8/8/2024    Medication Refill     Voltaren gel        ASSESSMENT & PLAN:  1. Injury of left knee, initial encounter  -     XR KNEE LEFT (3 VIEWS); Future  2. Fall, initial encounter   On the basis of positive falls risk screening, assessment and plan is as follows: home safety tips provided.   3. Malaise and fatigue  -     T4, Free; Future  -     TSH; Future  4. Mixed hyperlipidemia  -     CBC with Auto Differential; Future  -     Comprehensive Metabolic Panel; Future  -     Lipid Panel; Future  5. Vitamin D deficiency  -     Vitamin D 25 Hydroxy; Future  6. Type 2 diabetes mellitus with stage 2 chronic kidney disease, without long-term current use of insulin (HCC)  Assessment & Plan:  Chronic, at goal (stable), continue current treatment plan  Lab Results   Component Value Date    LABA1C 6.1 (H) 02/07/2024    LABA1C 6.6 (H) 10/31/2023    LABA1C 6.0 (H) 04/19/2023     Lab Results   Component Value Date    CREATININE 1.13 (H) 11/12/2024   Orders:  -     Hemoglobin A1C; Future  -     Albumin/Creatinine Ratio, Urine; Future  7. Rheumatoid arthritis with rheumatoid factor of right hand without organ or systems involvement (HCC)  Assessment & Plan:  Monitored by specialist- no acute findings meriting change in the plan  She is still seeing the rheumatologist. She will need to find a new one.   She will be transferring to the NP in June 2025. 
could have noticed. Or the opposite - being so fidgety or restless that you have been moving around a lot more than usual 0         Thoughts that you would be better off dead, or of hurting yourself in some way 0         PHQ-9 Total Score 0 0 0 0 0 0 0        Fall Risk Assessment:  :         11/12/2024     1:27 PM 4/24/2024    10:55 AM 6/9/2023     1:55 PM 5/30/2023     1:32 PM   Fall Risk   Do you feel unsteady or are you worried about falling?  no no no no   2 or more falls in past year? no no no no   Fall with injury in past year? no no no no        Abuse Screening:  :          No data to display                 Coordination of Care Questionnaire:  :     \"Have you been to the ER, urgent care clinic since your last visit?  Hospitalized since your last visit?\"    [facility] on 12/23    “Have you seen or consulted any other health care providers outside our system since your last visit?”    NO        Click Here for Release of Records Request

## 2025-01-13 NOTE — ASSESSMENT & PLAN NOTE
Monitored by specialist- no acute findings meriting change in the plan  She is still seeing the rheumatologist. She will need to find a new one.   She will be transferring to the NP in June 2025.

## 2025-01-13 NOTE — ASSESSMENT & PLAN NOTE
Chronic, at goal (stable), continue current treatment plan  Lab Results   Component Value Date    CREATININE 1.13 (H) 11/12/2024    BUN 23 (H) 11/12/2024     11/12/2024    K 4.4 11/12/2024     (H) 11/12/2024    CO2 29 11/12/2024

## 2025-01-14 ASSESSMENT — ENCOUNTER SYMPTOMS: BACK PAIN: 1

## 2025-01-16 ENCOUNTER — HOSPITAL ENCOUNTER (OUTPATIENT)
Facility: HOSPITAL | Age: 75
Discharge: HOME | End: 2025-01-19
Payer: MEDICARE

## 2025-01-16 DIAGNOSIS — S89.92XA INJURY OF LEFT KNEE, INITIAL ENCOUNTER: ICD-10-CM

## 2025-01-16 PROCEDURE — 73562 X-RAY EXAM OF KNEE 3: CPT

## 2025-01-17 ENCOUNTER — LAB (OUTPATIENT)
Age: 75
End: 2025-01-17

## 2025-01-17 DIAGNOSIS — E11.22 TYPE 2 DIABETES MELLITUS WITH STAGE 2 CHRONIC KIDNEY DISEASE, WITHOUT LONG-TERM CURRENT USE OF INSULIN (HCC): ICD-10-CM

## 2025-01-17 DIAGNOSIS — E78.2 MIXED HYPERLIPIDEMIA: ICD-10-CM

## 2025-01-17 DIAGNOSIS — N18.2 TYPE 2 DIABETES MELLITUS WITH STAGE 2 CHRONIC KIDNEY DISEASE, WITHOUT LONG-TERM CURRENT USE OF INSULIN (HCC): ICD-10-CM

## 2025-01-17 DIAGNOSIS — R53.83 MALAISE AND FATIGUE: ICD-10-CM

## 2025-01-17 DIAGNOSIS — R53.81 MALAISE AND FATIGUE: ICD-10-CM

## 2025-01-17 DIAGNOSIS — E55.9 VITAMIN D DEFICIENCY: ICD-10-CM

## 2025-01-18 LAB
25(OH)D3 SERPL-MCNC: 81.5 NG/ML (ref 30–100)
ALBUMIN SERPL-MCNC: 4 G/DL (ref 3.5–5)
ALBUMIN/GLOB SERPL: 1 (ref 1.1–2.2)
ALP SERPL-CCNC: 62 U/L (ref 45–117)
ALT SERPL-CCNC: 23 U/L (ref 12–78)
ANION GAP SERPL CALC-SCNC: 7 MMOL/L (ref 2–12)
AST SERPL-CCNC: 14 U/L (ref 15–37)
BASOPHILS # BLD: 0.03 K/UL (ref 0–0.1)
BASOPHILS NFR BLD: 0.9 % (ref 0–1)
BILIRUB SERPL-MCNC: 0.7 MG/DL (ref 0.2–1)
BUN SERPL-MCNC: 29 MG/DL (ref 6–20)
BUN/CREAT SERPL: 21 (ref 12–20)
CALCIUM SERPL-MCNC: 10.1 MG/DL (ref 8.5–10.1)
CHLORIDE SERPL-SCNC: 109 MMOL/L (ref 97–108)
CHOLEST SERPL-MCNC: 137 MG/DL
CO2 SERPL-SCNC: 24 MMOL/L (ref 21–32)
CREAT SERPL-MCNC: 1.38 MG/DL (ref 0.55–1.02)
CREAT UR-MCNC: 58.8 MG/DL
DIFFERENTIAL METHOD BLD: ABNORMAL
EOSINOPHIL # BLD: 0.1 K/UL (ref 0–0.4)
EOSINOPHIL NFR BLD: 3.1 % (ref 0–7)
ERYTHROCYTE [DISTWIDTH] IN BLOOD BY AUTOMATED COUNT: 12.1 % (ref 11.5–14.5)
EST. AVERAGE GLUCOSE BLD GHB EST-MCNC: 114 MG/DL
GLOBULIN SER CALC-MCNC: 4 G/DL (ref 2–4)
GLUCOSE SERPL-MCNC: 175 MG/DL (ref 65–100)
HBA1C MFR BLD: 5.6 % (ref 4–5.6)
HCT VFR BLD AUTO: 36.7 % (ref 35–47)
HDLC SERPL-MCNC: 59 MG/DL
HDLC SERPL: 2.3 (ref 0–5)
HGB BLD-MCNC: 12.2 G/DL (ref 11.5–16)
IMM GRANULOCYTES # BLD AUTO: 0 K/UL (ref 0–0.04)
IMM GRANULOCYTES NFR BLD AUTO: 0 % (ref 0–0.5)
LDLC SERPL CALC-MCNC: 64.2 MG/DL (ref 0–100)
LYMPHOCYTES # BLD: 0.77 K/UL (ref 0.8–3.5)
LYMPHOCYTES NFR BLD: 24.1 % (ref 12–49)
MCH RBC QN AUTO: 30.6 PG (ref 26–34)
MCHC RBC AUTO-ENTMCNC: 33.2 G/DL (ref 30–36.5)
MCV RBC AUTO: 92 FL (ref 80–99)
MICROALBUMIN UR-MCNC: 0.62 MG/DL
MICROALBUMIN/CREAT UR-RTO: 11 MG/G (ref 0–30)
MONOCYTES # BLD: 0.37 K/UL (ref 0–1)
MONOCYTES NFR BLD: 11.6 % (ref 5–13)
NEUTS SEG # BLD: 1.93 K/UL (ref 1.8–8)
NEUTS SEG NFR BLD: 60.3 % (ref 32–75)
NRBC # BLD: 0 K/UL (ref 0–0.01)
NRBC BLD-RTO: 0 PER 100 WBC
PLATELET # BLD AUTO: 188 K/UL (ref 150–400)
PMV BLD AUTO: 11.7 FL (ref 8.9–12.9)
POTASSIUM SERPL-SCNC: 5 MMOL/L (ref 3.5–5.1)
PROT SERPL-MCNC: 8 G/DL (ref 6.4–8.2)
RBC # BLD AUTO: 3.99 M/UL (ref 3.8–5.2)
RBC MORPH BLD: ABNORMAL
SODIUM SERPL-SCNC: 140 MMOL/L (ref 136–145)
SPECIMEN HOLD: NORMAL
T4 FREE SERPL-MCNC: 1 NG/DL (ref 0.8–1.5)
TRIGL SERPL-MCNC: 69 MG/DL
TSH SERPL DL<=0.05 MIU/L-ACNC: 2.74 UIU/ML (ref 0.36–3.74)
VLDLC SERPL CALC-MCNC: 13.8 MG/DL
WBC # BLD AUTO: 3.2 K/UL (ref 3.6–11)

## 2025-01-21 ENCOUNTER — TELEPHONE (OUTPATIENT)
Age: 75
End: 2025-01-21

## 2025-01-21 DIAGNOSIS — N28.9 RENAL INSUFFICIENCY: Primary | ICD-10-CM

## 2025-01-21 NOTE — TELEPHONE ENCOUNTER
----- Message from Dr. Amanda Espinoza MD sent at 1/21/2025  2:27 PM EST -----  Please let patient know that her xray of the L-knee showed moderate unchanged osteoarthritis.   I am happy to give her a referral to orthopedics.   Thanks!

## 2025-01-21 NOTE — TELEPHONE ENCOUNTER
Called pt and relayed results, she understood. She would like a referral for [facility] in [place] if possible

## 2025-01-21 NOTE — TELEPHONE ENCOUNTER
----- Message from Dr. Amanda Espinoza MD sent at 1/21/2025  8:59 AM EST -----  Please let patient know that her labs are showing some mild renal insufficiency and changes. This could be due to dehydration. Her other labs are excellent and showed no worrisome findings.   I would like to have her hydrate well and repeat the CMP in the NON fasting state. I have placed orders.   She can have them rechecked in 2-weeks.   Continue to be seen every 3-4 months for diabetic care.   Labs have been ordered. Recheck in 1-2 weeks after hydrating.     Thanks!

## 2025-01-21 NOTE — TELEPHONE ENCOUNTER
Called pt and relayed results and recommendations, she understood. Pt has lab only appointment scheduled

## 2025-01-24 DIAGNOSIS — S89.92XA INJURY OF LEFT KNEE, INITIAL ENCOUNTER: Primary | ICD-10-CM
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
## 2025-02-03 DIAGNOSIS — N18.30 STAGE 3 CHRONIC KIDNEY DISEASE, UNSPECIFIED WHETHER STAGE 3A OR 3B CKD (HCC): ICD-10-CM

## 2025-02-03 DIAGNOSIS — E11.21 TYPE 2 DIABETES MELLITUS WITH DIABETIC NEPHROPATHY, WITHOUT LONG-TERM CURRENT USE OF INSULIN (HCC): ICD-10-CM

## 2025-02-03 RX ORDER — DAPAGLIFLOZIN 10 MG/1
10 TABLET, FILM COATED ORAL EVERY MORNING
Qty: 90 TABLET | Refills: 0 | Status: SHIPPED | OUTPATIENT
Start: 2025-02-03

## 2025-02-03 RX ORDER — METOPROLOL TARTRATE 25 MG/1
25 TABLET, FILM COATED ORAL 2 TIMES DAILY
Qty: 180 TABLET | Refills: 0 | Status: SHIPPED | OUTPATIENT
Start: 2025-02-03

## 2025-02-03 NOTE — TELEPHONE ENCOUNTER
metoprolol tartrate (LOPRESSOR) 25 MG tablet    FARXIGA 10 MG tablet      Pharmacy  [facility] [id number] - [place], [place] - [address] [address] P [phone] - F [phone] [address] [facility] [address]  Phone: [phone]  Fax: [phone]

## 2025-02-04 ENCOUNTER — LAB (OUTPATIENT)
Age: 75
End: 2025-02-04

## 2025-02-04 DIAGNOSIS — N28.9 RENAL INSUFFICIENCY: ICD-10-CM

## 2025-02-05 LAB
ALBUMIN SERPL-MCNC: 4 G/DL (ref 3.5–5)
ALBUMIN/GLOB SERPL: 1.3 (ref 1.1–2.2)
ALP SERPL-CCNC: 57 U/L (ref 45–117)
ALT SERPL-CCNC: 19 U/L (ref 12–78)
ANION GAP SERPL CALC-SCNC: 8 MMOL/L (ref 2–12)
AST SERPL-CCNC: 9 U/L (ref 15–37)
BILIRUB SERPL-MCNC: 0.5 MG/DL (ref 0.2–1)
BUN SERPL-MCNC: 22 MG/DL (ref 6–20)
BUN/CREAT SERPL: 21 (ref 12–20)
CALCIUM SERPL-MCNC: 10.4 MG/DL (ref 8.5–10.1)
CHLORIDE SERPL-SCNC: 107 MMOL/L (ref 97–108)
CO2 SERPL-SCNC: 26 MMOL/L (ref 21–32)
CREAT SERPL-MCNC: 1.04 MG/DL (ref 0.55–1.02)
GLOBULIN SER CALC-MCNC: 3.2 G/DL (ref 2–4)
GLUCOSE SERPL-MCNC: 108 MG/DL (ref 65–100)
POTASSIUM SERPL-SCNC: 4.2 MMOL/L (ref 3.5–5.1)
PROT SERPL-MCNC: 7.2 G/DL (ref 6.4–8.2)
SODIUM SERPL-SCNC: 141 MMOL/L (ref 136–145)

## 2025-02-06 ENCOUNTER — TELEPHONE (OUTPATIENT)
Age: 75
End: 2025-02-06

## 2025-02-06 NOTE — TELEPHONE ENCOUNTER
----- Message from Dr. Amanda Espinoza MD sent at 2/6/2025  8:24 AM EST -----  Please let patient know that there has been improvement in her labs. Kidney function almost back to normal.   Follow up as discussed (every 3-4 months for diabetes). Next visit in 2-months.   Thanks!

## 2025-02-07 ENCOUNTER — TELEPHONE (OUTPATIENT)
Age: 75
End: 2025-02-07

## 2025-02-10 DIAGNOSIS — M05.741 RHEUMATOID ARTHRITIS WITH RHEUMATOID FACTOR OF RIGHT HAND WITHOUT ORGAN OR SYSTEMS INVOLVEMENT (HCC): ICD-10-CM

## 2025-02-10 RX ORDER — LEFLUNOMIDE 20 MG/1
20 TABLET ORAL DAILY
Qty: 90 TABLET | Refills: 0 | Status: SHIPPED | OUTPATIENT
Start: 2025-02-10

## 2025-02-10 NOTE — TELEPHONE ENCOUNTER
PT called and stated that  informed her that he'd put in a prescription for   leflunomide (ARAVA) 20 MG tablet  but her pharmacy is stating that they don't see it. Informed that last prescription was 1/24/24 and PT stated she understood. Informed per CATE Fernando, that due to  being gone there would be nothing we could do at this moment. PT requested for a message to be sent to JOHANNE Rodrigez requesting for a prescription. PT stated that she will not be able to go without meds until appt in 5/13/25 . PT requesting call back from nurse on home phone ( provided in chart)

## 2025-02-10 NOTE — TELEPHONE ENCOUNTER
Last office visit 11/12/2024  Next office visit 5/13/2025  Lab Results   Component Value Date    WBC 3.2 (L) 01/17/2025    HGB 12.2 01/17/2025    HCT 36.7 01/17/2025    MCV 92.0 01/17/2025     01/17/2025     Lab Results   Component Value Date     02/04/2025    K 4.2 02/04/2025     02/04/2025    CO2 26 02/04/2025    BUN 22 (H) 02/04/2025    CREATININE 1.04 (H) 02/04/2025    GLUCOSE 108 (H) 02/04/2025    CALCIUM 10.4 (H) 02/04/2025    BILITOT 0.5 02/04/2025    ALKPHOS 57 02/04/2025    AST 9 (L) 02/04/2025    ALT 19 02/04/2025    LABGLOM 57 (L) 02/04/2025    GFRAA 54 (L) 07/26/2022    AGRATIO 1.4 04/19/2023    GLOB 3.2 02/04/2025

## 2025-02-15 RX ORDER — LOSARTAN POTASSIUM 25 MG/1
25 TABLET ORAL DAILY
Qty: 90 TABLET | Refills: 0 | Status: SHIPPED | OUTPATIENT
Start: 2025-02-15

## 2025-02-15 RX ORDER — AMLODIPINE BESYLATE 10 MG/1
10 TABLET ORAL DAILY
Qty: 90 TABLET | Refills: 0 | Status: SHIPPED | OUTPATIENT
Start: 2025-02-15

## 2025-03-17 RX ORDER — ATORVASTATIN CALCIUM 10 MG/1
10 TABLET, FILM COATED ORAL DAILY
Qty: 90 TABLET | Refills: 0 | Status: SHIPPED | OUTPATIENT
Start: 2025-03-17

## 2025-03-17 RX ORDER — GLIPIZIDE 5 MG/1
5 TABLET, FILM COATED, EXTENDED RELEASE ORAL DAILY
Qty: 90 TABLET | Refills: 0 | Status: SHIPPED | OUTPATIENT
Start: 2025-03-17

## 2025-03-31 ENCOUNTER — TELEPHONE (OUTPATIENT)
Age: 75
End: 2025-03-31

## 2025-05-04 RX ORDER — METOPROLOL TARTRATE 25 MG/1
25 TABLET, FILM COATED ORAL 2 TIMES DAILY
Qty: 180 TABLET | Refills: 0 | Status: SHIPPED | OUTPATIENT
Start: 2025-05-04

## 2025-05-08 DIAGNOSIS — M05.741 RHEUMATOID ARTHRITIS WITH RHEUMATOID FACTOR OF RIGHT HAND WITHOUT ORGAN OR SYSTEMS INVOLVEMENT (HCC): ICD-10-CM

## 2025-05-08 DIAGNOSIS — N18.30 STAGE 3 CHRONIC KIDNEY DISEASE, UNSPECIFIED WHETHER STAGE 3A OR 3B CKD (HCC): ICD-10-CM

## 2025-05-08 DIAGNOSIS — E11.21 TYPE 2 DIABETES MELLITUS WITH DIABETIC NEPHROPATHY, WITHOUT LONG-TERM CURRENT USE OF INSULIN (HCC): ICD-10-CM

## 2025-05-08 RX ORDER — LEFLUNOMIDE 20 MG/1
20 TABLET ORAL DAILY
Qty: 90 TABLET | Refills: 0 | OUTPATIENT
Start: 2025-05-08

## 2025-05-08 NOTE — TELEPHONE ENCOUNTER
Last office visit 11/12/2025  Next office visit 5/13/2025  Lab Results   Component Value Date    WBC 3.2 (L) 01/17/2025    HGB 12.2 01/17/2025    HCT 36.7 01/17/2025    MCV 92.0 01/17/2025     01/17/2025     Lab Results   Component Value Date     02/04/2025    K 4.2 02/04/2025     02/04/2025    CO2 26 02/04/2025    BUN 22 (H) 02/04/2025    CREATININE 1.04 (H) 02/04/2025    GLUCOSE 108 (H) 02/04/2025    CALCIUM 10.4 (H) 02/04/2025    BILITOT 0.5 02/04/2025    ALKPHOS 57 02/04/2025    AST 9 (L) 02/04/2025    ALT 19 02/04/2025    LABGLOM 57 (L) 02/04/2025    GFRAA 54 (L) 07/26/2022    AGRATIO 1.4 04/19/2023    GLOB 3.2 02/04/2025

## 2025-05-09 RX ORDER — DAPAGLIFLOZIN 10 MG/1
10 TABLET, FILM COATED ORAL EVERY MORNING
Qty: 90 TABLET | Refills: 0 | Status: SHIPPED | OUTPATIENT
Start: 2025-05-09

## 2025-05-12 ASSESSMENT — ENCOUNTER SYMPTOMS
ABDOMINAL PAIN: 0
BLOOD IN STOOL: 0
VOMITING: 0
TROUBLE SWALLOWING: 0
SHORTNESS OF BREATH: 0
DIARRHEA: 0
SORE THROAT: 0
CONSTIPATION: 0
EYE PAIN: 0
EYE REDNESS: 0
NAUSEA: 0
COUGH: 0

## 2025-05-12 NOTE — PROGRESS NOTES
Nargis Shepard (:  1950) is a 74 y.o. female    2010 VERONICA neg    Subjective   HPI  The patient is a 74 y.o. female here for a follow up visit for rheumatoid arthritis. Her last visit was 2024 with Dr. Correia. She is currently taking Arava 20mg daily and Voltaren gel prn.  She denies joint pain and swelling. She reports if she does something \"extreme\" she might get MCP swelling. She has a history of back surgery in  and reports right lateral hip pain that travel downs her leg and states she finished PT earlier this year.  She has morning stiffness that lasts 1 hour. She denies recent infections or antibiotics.     Review of Systems   Constitutional:  Negative for chills and fever.   HENT:  Negative for mouth sores, sore throat and trouble swallowing.         Denies nasals sores  Denies dry mouth   Eyes:  Negative for pain and redness.        Denies dry eyes   Respiratory:  Negative for cough and shortness of breath.    Cardiovascular:  Negative for chest pain.   Gastrointestinal:  Negative for abdominal pain, blood in stool, constipation, diarrhea, nausea and vomiting.        Denies dark, tarry stools   Genitourinary:  Negative for dysuria and hematuria.   Musculoskeletal:  Negative for arthralgias and joint swelling.   Skin:  Negative for rash.     Current Outpatient Medications   Medication Sig Dispense Refill    FARXIGA 10 MG tablet TAKE 1 TABLET BY MOUTH EVERY MORNING 90 tablet 0    metoprolol tartrate (LOPRESSOR) 25 MG tablet TAKE 1 TABLET BY MOUTH 2 TIMES A  tablet 0    glipiZIDE (GLUCOTROL XL) 5 MG extended release tablet TAKE 1 TABLET BY MOUTH DAILY 90 tablet 0    atorvastatin (LIPITOR) 10 MG tablet TAKE 1 TABLET BY MOUTH DAILY 90 tablet 0    amLODIPine (NORVASC) 10 MG tablet TAKE 1 TABLET BY MOUTH DAILY 90 tablet 0    losartan (COZAAR) 25 MG tablet TAKE 1 TABLET BY MOUTH DAILY 90 tablet 0    leflunomide (ARAVA) 20 MG tablet Take 1 tablet by mouth daily 90 tablet 0

## 2025-05-13 ENCOUNTER — OFFICE VISIT (OUTPATIENT)
Age: 75
End: 2025-05-13
Payer: MEDICARE

## 2025-05-13 VITALS
DIASTOLIC BLOOD PRESSURE: 74 MMHG | RESPIRATION RATE: 16 BRPM | OXYGEN SATURATION: 93 % | HEART RATE: 69 BPM | TEMPERATURE: 98 F | BODY MASS INDEX: 23.52 KG/M2 | SYSTOLIC BLOOD PRESSURE: 148 MMHG | WEIGHT: 137 LBS

## 2025-05-13 DIAGNOSIS — M05.741 RHEUMATOID ARTHRITIS WITH RHEUMATOID FACTOR OF RIGHT HAND WITHOUT ORGAN OR SYSTEMS INVOLVEMENT (HCC): ICD-10-CM

## 2025-05-13 DIAGNOSIS — Z51.81 MEDICATION MONITORING ENCOUNTER: ICD-10-CM

## 2025-05-13 DIAGNOSIS — Z01.89 ENCOUNTER FOR OTHER SPECIFIED SPECIAL EXAMINATIONS: ICD-10-CM

## 2025-05-13 DIAGNOSIS — M06.9 RHEUMATOID ARTHRITIS INVOLVING MULTIPLE SITES, UNSPECIFIED WHETHER RHEUMATOID FACTOR PRESENT (HCC): Primary | ICD-10-CM

## 2025-05-13 PROCEDURE — G8427 DOCREV CUR MEDS BY ELIG CLIN: HCPCS | Performed by: NURSE PRACTITIONER

## 2025-05-13 PROCEDURE — 1159F MED LIST DOCD IN RCRD: CPT | Performed by: NURSE PRACTITIONER

## 2025-05-13 PROCEDURE — 1125F AMNT PAIN NOTED PAIN PRSNT: CPT | Performed by: NURSE PRACTITIONER

## 2025-05-13 PROCEDURE — 3077F SYST BP >= 140 MM HG: CPT | Performed by: NURSE PRACTITIONER

## 2025-05-13 PROCEDURE — 1036F TOBACCO NON-USER: CPT | Performed by: NURSE PRACTITIONER

## 2025-05-13 PROCEDURE — 3078F DIAST BP <80 MM HG: CPT | Performed by: NURSE PRACTITIONER

## 2025-05-13 PROCEDURE — 99214 OFFICE O/P EST MOD 30 MIN: CPT | Performed by: NURSE PRACTITIONER

## 2025-05-13 PROCEDURE — G8399 PT W/DXA RESULTS DOCUMENT: HCPCS | Performed by: NURSE PRACTITIONER

## 2025-05-13 PROCEDURE — 1090F PRES/ABSN URINE INCON ASSESS: CPT | Performed by: NURSE PRACTITIONER

## 2025-05-13 PROCEDURE — 3017F COLORECTAL CA SCREEN DOC REV: CPT | Performed by: NURSE PRACTITIONER

## 2025-05-13 PROCEDURE — G8420 CALC BMI NORM PARAMETERS: HCPCS | Performed by: NURSE PRACTITIONER

## 2025-05-13 PROCEDURE — 1123F ACP DISCUSS/DSCN MKR DOCD: CPT | Performed by: NURSE PRACTITIONER

## 2025-05-13 PROCEDURE — 1160F RVW MEDS BY RX/DR IN RCRD: CPT | Performed by: NURSE PRACTITIONER

## 2025-05-13 RX ORDER — LEFLUNOMIDE 20 MG/1
20 TABLET ORAL DAILY
Qty: 30 TABLET | Refills: 0 | Status: SHIPPED | OUTPATIENT
Start: 2025-05-13

## 2025-05-13 ASSESSMENT — PATIENT HEALTH QUESTIONNAIRE - PHQ9
SUM OF ALL RESPONSES TO PHQ QUESTIONS 1-9: 0
1. LITTLE INTEREST OR PLEASURE IN DOING THINGS: NOT AT ALL
2. FEELING DOWN, DEPRESSED OR HOPELESS: NOT AT ALL

## 2025-05-13 NOTE — PATIENT INSTRUCTIONS
Thank you for visiting [facility]!      For future medication refills, we require updated lab results and an upcoming appointment to be in our system to refill prescriptions.  Without these two things, your refill will be DENIED.  If you miss your upcoming appointment, your refill will also be DENIED.      We appreciate your understanding of this critical clinical aspect of our practice. - JOHANNE Clemente

## 2025-05-13 NOTE — PROGRESS NOTES
Chief Complaint   Patient presents with    New Patient     1. Have you been to the ER, urgent care clinic since your last visit?  Hospitalized since your last visit?No    2. Have you seen or consulted any other health care providers outside of the [facility] since your last visit?  Include any pap smears or colon screening. No

## 2025-05-20 ENCOUNTER — LAB (OUTPATIENT)
Age: 75
End: 2025-05-20

## 2025-05-20 DIAGNOSIS — Z51.81 MEDICATION MONITORING ENCOUNTER: ICD-10-CM

## 2025-05-20 DIAGNOSIS — Z01.89 ENCOUNTER FOR OTHER SPECIFIED SPECIAL EXAMINATIONS: ICD-10-CM

## 2025-05-20 DIAGNOSIS — M06.9 RHEUMATOID ARTHRITIS INVOLVING MULTIPLE SITES, UNSPECIFIED WHETHER RHEUMATOID FACTOR PRESENT (HCC): ICD-10-CM

## 2025-05-20 LAB
COMMENT:: NORMAL
SPECIMEN HOLD: NORMAL

## 2025-05-20 RX ORDER — LOSARTAN POTASSIUM 25 MG/1
25 TABLET ORAL DAILY
Qty: 90 TABLET | Refills: 0 | Status: SHIPPED | OUTPATIENT
Start: 2025-05-20

## 2025-05-20 RX ORDER — AMLODIPINE BESYLATE 10 MG/1
10 TABLET ORAL DAILY
Qty: 90 TABLET | Refills: 0 | Status: SHIPPED | OUTPATIENT
Start: 2025-05-20

## 2025-05-21 LAB
-: NORMAL
ALBUMIN SERPL-MCNC: 4.3 G/DL (ref 3.5–5)
ALBUMIN/GLOB SERPL: 1.3 (ref 1.1–2.2)
ALP SERPL-CCNC: 59 U/L (ref 45–117)
ALT SERPL-CCNC: 34 U/L (ref 12–78)
ANION GAP SERPL CALC-SCNC: 6 MMOL/L (ref 2–12)
AST SERPL-CCNC: 13 U/L (ref 15–37)
BASOPHILS # BLD: 0.02 K/UL (ref 0–0.1)
BASOPHILS NFR BLD: 0.5 % (ref 0–1)
BILIRUB SERPL-MCNC: 0.7 MG/DL (ref 0.2–1)
BUN SERPL-MCNC: 24 MG/DL (ref 6–20)
BUN/CREAT SERPL: 21 (ref 12–20)
CALCIUM SERPL-MCNC: 10.7 MG/DL (ref 8.5–10.1)
CHLORIDE SERPL-SCNC: 111 MMOL/L (ref 97–108)
CO2 SERPL-SCNC: 27 MMOL/L (ref 21–32)
CREAT SERPL-MCNC: 1.16 MG/DL (ref 0.55–1.02)
CRP SERPL-MCNC: <0.29 MG/DL (ref 0–0.3)
DIFFERENTIAL METHOD BLD: NORMAL
EOSINOPHIL # BLD: 0.06 K/UL (ref 0–0.4)
EOSINOPHIL NFR BLD: 1.5 % (ref 0–7)
ERYTHROCYTE [DISTWIDTH] IN BLOOD BY AUTOMATED COUNT: 12 % (ref 11.5–14.5)
ERYTHROCYTE [SEDIMENTATION RATE] IN BLOOD: 109 MM/HR (ref 0–30)
GLOBULIN SER CALC-MCNC: 3.3 G/DL (ref 2–4)
GLUCOSE SERPL-MCNC: 187 MG/DL (ref 65–100)
HAV IGM SER QL: NONREACTIVE
HBV CORE IGM SER QL: NONREACTIVE
HBV SURFACE AG SER QL: <0.1 INDEX
HBV SURFACE AG SER QL: NEGATIVE
HCT VFR BLD AUTO: 38.8 % (ref 35–47)
HCV AB SER IA-ACNC: 0.04 INDEX
HCV AB SERPL QL IA: NONREACTIVE
HGB BLD-MCNC: 12.4 G/DL (ref 11.5–16)
IMM GRANULOCYTES # BLD AUTO: 0.01 K/UL (ref 0–0.04)
IMM GRANULOCYTES NFR BLD AUTO: 0.2 % (ref 0–0.5)
LYMPHOCYTES # BLD: 1.08 K/UL (ref 0.8–3.5)
LYMPHOCYTES NFR BLD: 26.4 % (ref 12–49)
MCH RBC QN AUTO: 30.8 PG (ref 26–34)
MCHC RBC AUTO-ENTMCNC: 32 G/DL (ref 30–36.5)
MCV RBC AUTO: 96.3 FL (ref 80–99)
MONOCYTES # BLD: 0.38 K/UL (ref 0–1)
MONOCYTES NFR BLD: 9.3 % (ref 5–13)
NEUTS SEG # BLD: 2.54 K/UL (ref 1.8–8)
NEUTS SEG NFR BLD: 62.1 % (ref 32–75)
NRBC # BLD: 0 K/UL (ref 0–0.01)
NRBC BLD-RTO: 0 PER 100 WBC
PLATELET # BLD AUTO: 197 K/UL (ref 150–400)
PMV BLD AUTO: 11.8 FL (ref 8.9–12.9)
POTASSIUM SERPL-SCNC: 4.3 MMOL/L (ref 3.5–5.1)
PROT SERPL-MCNC: 7.6 G/DL (ref 6.4–8.2)
RBC # BLD AUTO: 4.03 M/UL (ref 3.8–5.2)
SODIUM SERPL-SCNC: 144 MMOL/L (ref 136–145)
WBC # BLD AUTO: 4.1 K/UL (ref 3.6–11)

## 2025-05-22 DIAGNOSIS — R70.0 ELEVATED SED RATE: ICD-10-CM

## 2025-05-22 DIAGNOSIS — M06.9 RHEUMATOID ARTHRITIS INVOLVING MULTIPLE SITES, UNSPECIFIED WHETHER RHEUMATOID FACTOR PRESENT (HCC): Primary | ICD-10-CM

## 2025-05-22 NOTE — PROGRESS NOTES
Spoke with patient HIPAA verified. Advised patient per Chyna Rodrigez NP that  recheck her sed rate in the next month. The order has been entered into the computer.. patient verbalized understanding

## 2025-05-23 LAB — CCP IGA+IGG SERPL IA-ACNC: >250 UNITS (ref 0–19)

## 2025-05-30 LAB — RHEUMATOID FACTOR: 32 IU/ML

## 2025-05-31 DIAGNOSIS — E11.21 TYPE 2 DIABETES MELLITUS WITH DIABETIC NEPHROPATHY, WITHOUT LONG-TERM CURRENT USE OF INSULIN (HCC): ICD-10-CM

## 2025-06-02 RX ORDER — METFORMIN HYDROCHLORIDE 500 MG/1
500 TABLET, EXTENDED RELEASE ORAL DAILY
Qty: 90 TABLET | Refills: 1 | Status: SHIPPED | OUTPATIENT
Start: 2025-06-02

## 2025-06-09 DIAGNOSIS — M05.741 RHEUMATOID ARTHRITIS WITH RHEUMATOID FACTOR OF RIGHT HAND WITHOUT ORGAN OR SYSTEMS INVOLVEMENT (HCC): ICD-10-CM

## 2025-06-09 RX ORDER — LEFLUNOMIDE 20 MG/1
20 TABLET ORAL DAILY
Qty: 90 TABLET | Refills: 0 | Status: SHIPPED | OUTPATIENT
Start: 2025-06-09

## 2025-06-09 NOTE — TELEPHONE ENCOUNTER
Last office visit 05/13/2025  Next office visit 9/17/2025  Lab Results   Component Value Date    WBC 4.1 05/20/2025    HGB 12.4 05/20/2025    HCT 38.8 05/20/2025    MCV 96.3 05/20/2025     05/20/2025     Lab Results   Component Value Date     05/20/2025    K 4.3 05/20/2025     (H) 05/20/2025    CO2 27 05/20/2025    BUN 24 (H) 05/20/2025    CREATININE 1.16 (H) 05/20/2025    GLUCOSE 187 (H) 05/20/2025    CALCIUM 10.7 (H) 05/20/2025    BILITOT 0.7 05/20/2025    ALKPHOS 59 05/20/2025    AST 13 (L) 05/20/2025    ALT 34 05/20/2025    LABGLOM 50 (L) 05/20/2025    GFRAA 54 (L) 07/26/2022    AGRATIO 1.4 04/19/2023    GLOB 3.3 05/20/2025

## 2025-06-11 ENCOUNTER — OFFICE VISIT (OUTPATIENT)
Age: 75
End: 2025-06-11
Payer: MEDICARE

## 2025-06-11 ENCOUNTER — LAB (OUTPATIENT)
Age: 75
End: 2025-06-11
Payer: MEDICARE

## 2025-06-11 VITALS
HEART RATE: 60 BPM | RESPIRATION RATE: 16 BRPM | OXYGEN SATURATION: 99 % | BODY MASS INDEX: 23.81 KG/M2 | HEIGHT: 63 IN | WEIGHT: 134.4 LBS | SYSTOLIC BLOOD PRESSURE: 138 MMHG | TEMPERATURE: 97.7 F | DIASTOLIC BLOOD PRESSURE: 62 MMHG

## 2025-06-11 DIAGNOSIS — E78.2 MIXED HYPERLIPIDEMIA: ICD-10-CM

## 2025-06-11 DIAGNOSIS — M79.651 RIGHT THIGH PAIN: ICD-10-CM

## 2025-06-11 DIAGNOSIS — N18.2 TYPE 2 DIABETES MELLITUS WITH STAGE 2 CHRONIC KIDNEY DISEASE, WITHOUT LONG-TERM CURRENT USE OF INSULIN (HCC): ICD-10-CM

## 2025-06-11 DIAGNOSIS — M05.742 RHEUMATOID ARTHRITIS WITH RHEUMATOID FACTOR OF LEFT HAND WITHOUT ORGAN OR SYSTEMS INVOLVEMENT (HCC): ICD-10-CM

## 2025-06-11 DIAGNOSIS — R53.83 MALAISE AND FATIGUE: ICD-10-CM

## 2025-06-11 DIAGNOSIS — Z00.00 MEDICARE ANNUAL WELLNESS VISIT, SUBSEQUENT: Primary | ICD-10-CM

## 2025-06-11 DIAGNOSIS — E11.22 TYPE 2 DIABETES MELLITUS WITH STAGE 2 CHRONIC KIDNEY DISEASE, WITHOUT LONG-TERM CURRENT USE OF INSULIN (HCC): ICD-10-CM

## 2025-06-11 DIAGNOSIS — R53.81 MALAISE AND FATIGUE: ICD-10-CM

## 2025-06-11 DIAGNOSIS — E55.9 VITAMIN D DEFICIENCY: ICD-10-CM

## 2025-06-11 LAB
COMMENT:: NORMAL
SPECIMEN HOLD: NORMAL

## 2025-06-11 PROCEDURE — G8427 DOCREV CUR MEDS BY ELIG CLIN: HCPCS | Performed by: INTERNAL MEDICINE

## 2025-06-11 PROCEDURE — 3044F HG A1C LEVEL LT 7.0%: CPT | Performed by: INTERNAL MEDICINE

## 2025-06-11 PROCEDURE — 2022F DILAT RTA XM EVC RTNOPTHY: CPT | Performed by: INTERNAL MEDICINE

## 2025-06-11 PROCEDURE — G0439 PPPS, SUBSEQ VISIT: HCPCS | Performed by: INTERNAL MEDICINE

## 2025-06-11 PROCEDURE — 99213 OFFICE O/P EST LOW 20 MIN: CPT | Performed by: INTERNAL MEDICINE

## 2025-06-11 PROCEDURE — 3075F SYST BP GE 130 - 139MM HG: CPT | Performed by: INTERNAL MEDICINE

## 2025-06-11 PROCEDURE — 3078F DIAST BP <80 MM HG: CPT | Performed by: INTERNAL MEDICINE

## 2025-06-11 PROCEDURE — 1090F PRES/ABSN URINE INCON ASSESS: CPT | Performed by: INTERNAL MEDICINE

## 2025-06-11 PROCEDURE — 3017F COLORECTAL CA SCREEN DOC REV: CPT | Performed by: INTERNAL MEDICINE

## 2025-06-11 PROCEDURE — 1160F RVW MEDS BY RX/DR IN RCRD: CPT | Performed by: INTERNAL MEDICINE

## 2025-06-11 PROCEDURE — 1036F TOBACCO NON-USER: CPT | Performed by: INTERNAL MEDICINE

## 2025-06-11 PROCEDURE — G8420 CALC BMI NORM PARAMETERS: HCPCS | Performed by: INTERNAL MEDICINE

## 2025-06-11 PROCEDURE — G8399 PT W/DXA RESULTS DOCUMENT: HCPCS | Performed by: INTERNAL MEDICINE

## 2025-06-11 PROCEDURE — 1159F MED LIST DOCD IN RCRD: CPT | Performed by: INTERNAL MEDICINE

## 2025-06-11 PROCEDURE — 1123F ACP DISCUSS/DSCN MKR DOCD: CPT | Performed by: INTERNAL MEDICINE

## 2025-06-11 ASSESSMENT — PATIENT HEALTH QUESTIONNAIRE - PHQ9
6. FEELING BAD ABOUT YOURSELF - OR THAT YOU ARE A FAILURE OR HAVE LET YOURSELF OR YOUR FAMILY DOWN: NOT AT ALL
7. TROUBLE CONCENTRATING ON THINGS, SUCH AS READING THE NEWSPAPER OR WATCHING TELEVISION: NOT AT ALL
2. FEELING DOWN, DEPRESSED OR HOPELESS: NOT AT ALL
SUM OF ALL RESPONSES TO PHQ QUESTIONS 1-9: 0
SUM OF ALL RESPONSES TO PHQ QUESTIONS 1-9: 0
3. TROUBLE FALLING OR STAYING ASLEEP: NOT AT ALL
SUM OF ALL RESPONSES TO PHQ QUESTIONS 1-9: 0
5. POOR APPETITE OR OVEREATING: NOT AT ALL
SUM OF ALL RESPONSES TO PHQ QUESTIONS 1-9: 0
10. IF YOU CHECKED OFF ANY PROBLEMS, HOW DIFFICULT HAVE THESE PROBLEMS MADE IT FOR YOU TO DO YOUR WORK, TAKE CARE OF THINGS AT HOME, OR GET ALONG WITH OTHER PEOPLE: NOT DIFFICULT AT ALL
1. LITTLE INTEREST OR PLEASURE IN DOING THINGS: NOT AT ALL
4. FEELING TIRED OR HAVING LITTLE ENERGY: NOT AT ALL
8. MOVING OR SPEAKING SO SLOWLY THAT OTHER PEOPLE COULD HAVE NOTICED. OR THE OPPOSITE, BEING SO FIGETY OR RESTLESS THAT YOU HAVE BEEN MOVING AROUND A LOT MORE THAN USUAL: NOT AT ALL
9. THOUGHTS THAT YOU WOULD BE BETTER OFF DEAD, OR OF HURTING YOURSELF: NOT AT ALL

## 2025-06-11 NOTE — PROGRESS NOTES
Identified pt with two pt identifiers(name and )    Chief Complaint   Patient presents with    Medicare AWV     Fasting    Fatigue    balance problem     Pt states she has some trouble with her balance        Health Maintenance Due   Topic    Shingles vaccine (1 of 2)    Respiratory Syncytial Virus (RSV) Pregnant or age 60 yrs+ (1 - Risk 60-74 years 1-dose series)    COVID-19 Vaccine ( season)    Colorectal Cancer Screen     Diabetic foot exam     Annual Wellness Visit (Medicare)     Breast cancer screen        Wt Readings from Last 3 Encounters:   25 61 kg (134 lb 6.4 oz)   25 62.1 kg (137 lb)   25 60.8 kg (134 lb)     Temp Readings from Last 3 Encounters:   25 97.7 °F (36.5 °C) (Temporal)   25 98 °F (36.7 °C) (Oral)   25 97.9 °F (36.6 °C) (Temporal)     BP Readings from Last 3 Encounters:   25 138/62   25 (!) 148/74   25 136/74     Pulse Readings from Last 3 Encounters:   25 60   25 69   25 72           Depression Screening:  :         2025     9:56 AM 2025     1:20 PM 2025     3:21 PM 2024     1:27 PM 2024     1:54 PM 2024     9:39 AM 2024     1:22 PM   PHQ-9 Questionaire   Little interest or pleasure in doing things 0 0 0 0 0 0 0   Feeling down, depressed, or hopeless 0 0 0 0 0 0 0   Trouble falling or staying asleep, or sleeping too much 0  0       Feeling tired or having little energy 0  0       Poor appetite or overeating 0  0       Feeling bad about yourself - or that you are a failure or have let yourself or your family down 0  0       Trouble concentrating on things, such as reading the newspaper or watching television 0  0       Moving or speaking so slowly that other people could have noticed. Or the opposite - being so fidgety or restless that you have been moving around a lot more than usual 0  0       Thoughts that you would be better off dead, or of hurting yourself in some way 0  
S2, no murmurs, rubs, clicks or gallops.  Abdominal exam: soft, nontender, nondistended, no masses or organomegaly.  Exam of extremities: peripheral pulses normal, no pedal edema, no clubbing or cyanosis  Skin exam - normal coloration and turgor, no rashes, no suspicious skin lesions noted.  Neurological exam reveals alert, oriented, normal speech, no focal findings or movement disorder noted.            No Known Allergies  Prior to Visit Medications    Medication Sig Taking? Authorizing Provider   leflunomide (ARAVA) 20 MG tablet TAKE 1 TABLET BY MOUTH DAILY Yes Chyna Rodrigez APRN - NP   metFORMIN (GLUCOPHAGE-XR) 500 MG extended release tablet TAKE 1 TABLET BY MOUTH DAILY WITH BREAKFAST Yes Amanda Espinoza MD   amLODIPine (NORVASC) 10 MG tablet TAKE 1 TABLET BY MOUTH DAILY Yes Amanda Espinoza MD   losartan (COZAAR) 25 MG tablet TAKE 1 TABLET BY MOUTH DAILY Yes Amanda Espinoza MD   FARXIGA 10 MG tablet TAKE 1 TABLET BY MOUTH EVERY MORNING Yes Amanda Espinoza MD   metoprolol tartrate (LOPRESSOR) 25 MG tablet TAKE 1 TABLET BY MOUTH 2 TIMES A DAY Yes Amanda Espinoza MD   glipiZIDE (GLUCOTROL XL) 5 MG extended release tablet TAKE 1 TABLET BY MOUTH DAILY Yes Amanda Espinoza MD   atorvastatin (LIPITOR) 10 MG tablet TAKE 1 TABLET BY MOUTH DAILY Yes Amanda Espinoza MD   ondansetron (ZOFRAN-ODT) 4 MG disintegrating tablet Take 1 tablet by mouth 3 times daily as needed for Nausea or Vomiting Yes Jarvis Cooley MD   azelastine (ASTELIN) 0.1 % nasal spray 1 spray by Nasal route 2 times daily Use in each nostril as directed Yes Aranza Pollard MD   nystatin (MYCOSTATIN) 642658 UNIT/GM powder Apply topically as needed (for itching) Yes Amanda Espinoza MD   blood glucose test strips (ONETOUCH ULTRA) strip USE ONE STRIP TO TEST DAILY. E11.9 Yes Amanda Espinoza MD   albuterol sulfate HFA (PROVENTIL;VENTOLIN;PROAIR) 108 (90 Base) MCG/ACT inhaler Inhale 2 puffs into the lungs every 4 hours as needed Yes

## 2025-06-12 LAB
25(OH)D3 SERPL-MCNC: 90.9 NG/ML (ref 30–100)
ALBUMIN SERPL-MCNC: 4.1 G/DL (ref 3.5–5)
ALBUMIN/GLOB SERPL: 1.2 (ref 1.1–2.2)
ALP SERPL-CCNC: 66 U/L (ref 45–117)
ALT SERPL-CCNC: 41 U/L (ref 12–78)
ANION GAP SERPL CALC-SCNC: 6 MMOL/L (ref 2–12)
AST SERPL-CCNC: 23 U/L (ref 15–37)
BASOPHILS # BLD: 0.03 K/UL (ref 0–0.1)
BASOPHILS NFR BLD: 1 % (ref 0–1)
BILIRUB SERPL-MCNC: 0.6 MG/DL (ref 0.2–1)
BUN SERPL-MCNC: 29 MG/DL (ref 6–20)
BUN/CREAT SERPL: 25 (ref 12–20)
CALCIUM SERPL-MCNC: 9.9 MG/DL (ref 8.5–10.1)
CHLORIDE SERPL-SCNC: 108 MMOL/L (ref 97–108)
CHOLEST SERPL-MCNC: 152 MG/DL
CO2 SERPL-SCNC: 26 MMOL/L (ref 21–32)
CREAT SERPL-MCNC: 1.17 MG/DL (ref 0.55–1.02)
DIFFERENTIAL METHOD BLD: ABNORMAL
EOSINOPHIL # BLD: 0 K/UL (ref 0–0.4)
EOSINOPHIL NFR BLD: 0 % (ref 0–7)
ERYTHROCYTE [DISTWIDTH] IN BLOOD BY AUTOMATED COUNT: 11.8 % (ref 11.5–14.5)
ERYTHROCYTE [SEDIMENTATION RATE] IN BLOOD: 12 MM/HR (ref 0–30)
EST. AVERAGE GLUCOSE BLD GHB EST-MCNC: 140 MG/DL
GLOBULIN SER CALC-MCNC: 3.4 G/DL (ref 2–4)
GLUCOSE SERPL-MCNC: 189 MG/DL (ref 65–100)
HBA1C MFR BLD: 6.5 % (ref 4–5.6)
HCT VFR BLD AUTO: 40.2 % (ref 35–47)
HDLC SERPL-MCNC: 57 MG/DL
HDLC SERPL: 2.7 (ref 0–5)
HGB BLD-MCNC: 12.7 G/DL (ref 11.5–16)
IMM GRANULOCYTES # BLD AUTO: 0 K/UL
IMM GRANULOCYTES NFR BLD AUTO: 0 %
LDLC SERPL CALC-MCNC: 75 MG/DL (ref 0–100)
LYMPHOCYTES # BLD: 0.65 K/UL (ref 0.8–3.5)
LYMPHOCYTES NFR BLD: 19 % (ref 12–49)
MCH RBC QN AUTO: 30.5 PG (ref 26–34)
MCHC RBC AUTO-ENTMCNC: 31.6 G/DL (ref 30–36.5)
MCV RBC AUTO: 96.6 FL (ref 80–99)
MONOCYTES # BLD: 0.34 K/UL (ref 0–1)
MONOCYTES NFR BLD: 10 % (ref 5–13)
NEUTS BAND NFR BLD MANUAL: 1 % (ref 0–6)
NEUTS SEG # BLD: 2.38 K/UL (ref 1.8–8)
NEUTS SEG NFR BLD: 69 % (ref 32–75)
NRBC # BLD: 0 K/UL (ref 0–0.01)
NRBC BLD-RTO: 0 PER 100 WBC
PLATELET # BLD AUTO: 173 K/UL (ref 150–400)
PMV BLD AUTO: 12.1 FL (ref 8.9–12.9)
POTASSIUM SERPL-SCNC: 4.4 MMOL/L (ref 3.5–5.1)
PROT SERPL-MCNC: 7.5 G/DL (ref 6.4–8.2)
RBC # BLD AUTO: 4.16 M/UL (ref 3.8–5.2)
RBC MORPH BLD: ABNORMAL
SODIUM SERPL-SCNC: 140 MMOL/L (ref 136–145)
T4 FREE SERPL-MCNC: 0.9 NG/DL (ref 0.8–1.5)
TRIGL SERPL-MCNC: 100 MG/DL
TSH SERPL DL<=0.05 MIU/L-ACNC: 1.91 UIU/ML (ref 0.36–3.74)
VLDLC SERPL CALC-MCNC: 20 MG/DL
WBC # BLD AUTO: 3.4 K/UL (ref 3.6–11)

## 2025-06-13 ENCOUNTER — RESULTS FOLLOW-UP (OUTPATIENT)
Age: 75
End: 2025-06-13

## 2025-06-13 RX ORDER — ATORVASTATIN CALCIUM 10 MG/1
10 TABLET, FILM COATED ORAL DAILY
Qty: 90 TABLET | Refills: 0 | Status: SHIPPED | OUTPATIENT
Start: 2025-06-13

## 2025-06-13 RX ORDER — GLIPIZIDE 5 MG/1
5 TABLET, FILM COATED, EXTENDED RELEASE ORAL DAILY
Qty: 90 TABLET | Refills: 0 | Status: SHIPPED | OUTPATIENT
Start: 2025-06-13

## 2025-06-13 NOTE — TELEPHONE ENCOUNTER
----- Message from Dr. Amanda Espinoza MD sent at 6/13/2025  8:54 AM EDT -----  Please let patient know that her labs are stable. Her diabetes is not as well controlled as previously. We will discuss at her follow up appointment. It is still in the low 6.5 range. Other labs are stable normal. No change in medications at this time. Will discuss further at her upcoming appointment in July.   Thanks!

## 2025-06-16 ENCOUNTER — TELEPHONE (OUTPATIENT)
Age: 75
End: 2025-06-16

## 2025-06-16 NOTE — TELEPHONE ENCOUNTER
Patient is calling she was told that she needed to come in for a lab pedrito this week for medication adjustment, per patient she wanted to know does she still need to come in for labs she just had labs done on 6/11/25 by her PCP per patient her PCP is a [facility] doctor. Patient can be reached back at [phone]    Patient upcoming pedrito 9/17/25

## 2025-06-18 ENCOUNTER — OFFICE VISIT (OUTPATIENT)
Age: 75
End: 2025-06-18
Payer: MEDICARE

## 2025-06-18 ENCOUNTER — TELEPHONE (OUTPATIENT)
Age: 75
End: 2025-06-18

## 2025-06-18 VITALS — WEIGHT: 138 LBS | HEIGHT: 63 IN | BODY MASS INDEX: 24.45 KG/M2

## 2025-06-18 DIAGNOSIS — M25.551 RIGHT HIP PAIN: Primary | ICD-10-CM

## 2025-06-18 DIAGNOSIS — M05.741 RHEUMATOID ARTHRITIS WITH RHEUMATOID FACTOR OF RIGHT HAND WITHOUT ORGAN OR SYSTEMS INVOLVEMENT (HCC): ICD-10-CM

## 2025-06-18 DIAGNOSIS — M70.61 TROCHANTERIC BURSITIS, RIGHT HIP: ICD-10-CM

## 2025-06-18 PROCEDURE — 1159F MED LIST DOCD IN RCRD: CPT | Performed by: ORTHOPAEDIC SURGERY

## 2025-06-18 PROCEDURE — 20610 DRAIN/INJ JOINT/BURSA W/O US: CPT | Performed by: ORTHOPAEDIC SURGERY

## 2025-06-18 PROCEDURE — G8420 CALC BMI NORM PARAMETERS: HCPCS | Performed by: ORTHOPAEDIC SURGERY

## 2025-06-18 PROCEDURE — 1123F ACP DISCUSS/DSCN MKR DOCD: CPT | Performed by: ORTHOPAEDIC SURGERY

## 2025-06-18 PROCEDURE — 1125F AMNT PAIN NOTED PAIN PRSNT: CPT | Performed by: ORTHOPAEDIC SURGERY

## 2025-06-18 PROCEDURE — 99203 OFFICE O/P NEW LOW 30 MIN: CPT | Performed by: ORTHOPAEDIC SURGERY

## 2025-06-18 PROCEDURE — 1036F TOBACCO NON-USER: CPT | Performed by: ORTHOPAEDIC SURGERY

## 2025-06-18 PROCEDURE — G8399 PT W/DXA RESULTS DOCUMENT: HCPCS | Performed by: ORTHOPAEDIC SURGERY

## 2025-06-18 PROCEDURE — G8427 DOCREV CUR MEDS BY ELIG CLIN: HCPCS | Performed by: ORTHOPAEDIC SURGERY

## 2025-06-18 PROCEDURE — 3017F COLORECTAL CA SCREEN DOC REV: CPT | Performed by: ORTHOPAEDIC SURGERY

## 2025-06-18 PROCEDURE — 1090F PRES/ABSN URINE INCON ASSESS: CPT | Performed by: ORTHOPAEDIC SURGERY

## 2025-06-18 RX ORDER — BETAMETHASONE SODIUM PHOSPHATE AND BETAMETHASONE ACETATE 3; 3 MG/ML; MG/ML
6 INJECTION, SUSPENSION INTRA-ARTICULAR; INTRALESIONAL; INTRAMUSCULAR; SOFT TISSUE ONCE
Status: COMPLETED | OUTPATIENT
Start: 2025-06-18 | End: 2025-06-18

## 2025-06-18 RX ORDER — LEFLUNOMIDE 20 MG/1
20 TABLET ORAL DAILY
Qty: 90 TABLET | Refills: 1 | Status: SHIPPED | OUTPATIENT
Start: 2025-06-18

## 2025-06-18 RX ADMIN — BETAMETHASONE SODIUM PHOSPHATE AND BETAMETHASONE ACETATE 6 MG: 3; 3 INJECTION, SUSPENSION INTRA-ARTICULAR; INTRALESIONAL; INTRAMUSCULAR; SOFT TISSUE at 17:01

## 2025-06-18 NOTE — TELEPHONE ENCOUNTER
Patient is calling advised patient per JOHANNE Rodrigez that no labs need to be updated patient verbally acknowledge and agreed, but patient also states that she will be running out of her Leflunomide soon if a refill can be sent to her pharmacy. Patient uses [facility] on file.

## 2025-06-18 NOTE — TELEPHONE ENCOUNTER
Last office visit 05/13/2025  Next office visit 9/17/2025  Lab Results   Component Value Date    WBC 3.4 (L) 06/11/2025    HGB 12.7 06/11/2025    HCT 40.2 06/11/2025    MCV 96.6 06/11/2025     06/11/2025     Lab Results   Component Value Date     06/11/2025    K 4.4 06/11/2025     06/11/2025    CO2 26 06/11/2025    BUN 29 (H) 06/11/2025    CREATININE 1.17 (H) 06/11/2025    GLUCOSE 189 (H) 06/11/2025    CALCIUM 9.9 06/11/2025    BILITOT 0.6 06/11/2025    ALKPHOS 66 06/11/2025    AST 23 06/11/2025    ALT 41 06/11/2025    LABGLOM 49 (L) 06/11/2025    GFRAA 54 (L) 07/26/2022    AGRATIO 1.4 04/19/2023    GLOB 3.4 06/11/2025

## 2025-06-18 NOTE — PROGRESS NOTES
Identified pt with two pt identifiers (name and ). Reviewed chart in preparation for visit and have obtained necessary documentation.    Nargis Shepard is a 74 y.o. female Hip Pain (NP RT hip pain// hard time sleeping )  .    Vitals:    25 1511   Weight: 62.6 kg (138 lb)   Height: 1.6 m (5' 3\")          1. Have you been to the ER, urgent care clinic since your last visit?  Hospitalized since your last visit?  no     2. Have you seen or consulted any other health care providers outside of the [facility] since your last visit?  Include any pap smears or colon screening.  no

## 2025-06-18 NOTE — PROGRESS NOTES
6/18/2025        Assessment: Peritrochanteric pain syndrome, right hip    Plan:  This patient and I did discuss at length the anatomy, which I drew out in office.  We discussed the potential causes of the issue, as well as the treatment options, which are largely nonoperative.  We discussed that a mixture of anti-inflammatory analgesics, cortisone injections, as well as consistent physical therapy for 4-6 weeks is the standard of care of this issue. Evidence indicates that over 90% of patients can begin to resolve their issues in that time.   The patient will proceed with injection, TENS unit, therapy.  The patient will then follow up in 2 weeks for a clinical check.     Chief Complaint: Right hip pain    HPI: This is a 74-year-old female who complains of right hip pain which is activity dependent. The patient has had activity dependent pain for several months.     The pain is located in the lateral aspect of the hip, it radiates somewhat distally and laterally, it is severe in intensity.  The patient complains of difficulty sleeping on the right side, limitation in the normal activities of daily living.   The patient has tried activity modification,  over the counter pain medications, some physical therapy, injections have not been done.  No other surgery or pertinent history to this hip.      Past Medical History:   Diagnosis Date    Acid reflux     Colitis 04/16/2015    Colitis     Diabetes mellitus, type II (HCC)     Diverticulosis of colon 04/20/2015    Hyperlipemia     Hypertension     Osteopenia     of the spine Dexa done 2014, 2017 and due 2019    RA (rheumatoid arthritis) (HCC)        Past Surgical History:   Procedure Laterality Date    BIOPSY OF BREAST, NEEDLE CORE  2017    benign findings - Fibrosis    COLONOSCOPY  04/20/2015    Due 2023    GYN      HYSTERECTOMY (CERVIX STATUS UNKNOWN)      HYSTERECTOMY, TOTAL ABDOMINAL (CERVIX REMOVED)      SD UNLISTED PROCEDURE ABDOMEN PERITONEUM & OMENTUM

## 2025-06-19 ENCOUNTER — TELEPHONE (OUTPATIENT)
Age: 75
End: 2025-06-19

## 2025-07-07 ENCOUNTER — HOSPITAL ENCOUNTER (OUTPATIENT)
Facility: HOSPITAL | Age: 75
Discharge: HOME | End: 2025-07-10
Attending: INTERNAL MEDICINE
Payer: MEDICARE

## 2025-07-07 VITALS — BODY MASS INDEX: 24.45 KG/M2 | WEIGHT: 138 LBS

## 2025-07-07 DIAGNOSIS — Z12.31 VISIT FOR SCREENING MAMMOGRAM: ICD-10-CM

## 2025-07-07 PROCEDURE — 77063 BREAST TOMOSYNTHESIS BI: CPT

## 2025-07-08 ENCOUNTER — OFFICE VISIT (OUTPATIENT)
Age: 75
End: 2025-07-08
Payer: MEDICARE

## 2025-07-08 VITALS
HEART RATE: 75 BPM | BODY MASS INDEX: 23.6 KG/M2 | DIASTOLIC BLOOD PRESSURE: 70 MMHG | SYSTOLIC BLOOD PRESSURE: 132 MMHG | HEIGHT: 63 IN | WEIGHT: 133.2 LBS | OXYGEN SATURATION: 99 % | RESPIRATION RATE: 16 BRPM | TEMPERATURE: 97.8 F

## 2025-07-08 DIAGNOSIS — B35.1 ONYCHOMYCOSIS: ICD-10-CM

## 2025-07-08 DIAGNOSIS — E11.21 TYPE 2 DIABETES MELLITUS WITH DIABETIC NEPHROPATHY, WITHOUT LONG-TERM CURRENT USE OF INSULIN (HCC): Primary | ICD-10-CM

## 2025-07-08 DIAGNOSIS — Z12.11 SCREENING FOR COLON CANCER: ICD-10-CM

## 2025-07-08 PROCEDURE — 1036F TOBACCO NON-USER: CPT | Performed by: INTERNAL MEDICINE

## 2025-07-08 PROCEDURE — 2022F DILAT RTA XM EVC RTNOPTHY: CPT | Performed by: INTERNAL MEDICINE

## 2025-07-08 PROCEDURE — 1090F PRES/ABSN URINE INCON ASSESS: CPT | Performed by: INTERNAL MEDICINE

## 2025-07-08 PROCEDURE — G8427 DOCREV CUR MEDS BY ELIG CLIN: HCPCS | Performed by: INTERNAL MEDICINE

## 2025-07-08 PROCEDURE — 99214 OFFICE O/P EST MOD 30 MIN: CPT | Performed by: INTERNAL MEDICINE

## 2025-07-08 PROCEDURE — 3017F COLORECTAL CA SCREEN DOC REV: CPT | Performed by: INTERNAL MEDICINE

## 2025-07-08 PROCEDURE — G8420 CALC BMI NORM PARAMETERS: HCPCS | Performed by: INTERNAL MEDICINE

## 2025-07-08 PROCEDURE — 1123F ACP DISCUSS/DSCN MKR DOCD: CPT | Performed by: INTERNAL MEDICINE

## 2025-07-08 PROCEDURE — 1160F RVW MEDS BY RX/DR IN RCRD: CPT | Performed by: INTERNAL MEDICINE

## 2025-07-08 PROCEDURE — 3044F HG A1C LEVEL LT 7.0%: CPT | Performed by: INTERNAL MEDICINE

## 2025-07-08 PROCEDURE — G8399 PT W/DXA RESULTS DOCUMENT: HCPCS | Performed by: INTERNAL MEDICINE

## 2025-07-08 PROCEDURE — 3078F DIAST BP <80 MM HG: CPT | Performed by: INTERNAL MEDICINE

## 2025-07-08 PROCEDURE — 1159F MED LIST DOCD IN RCRD: CPT | Performed by: INTERNAL MEDICINE

## 2025-07-08 PROCEDURE — 3075F SYST BP GE 130 - 139MM HG: CPT | Performed by: INTERNAL MEDICINE

## 2025-07-08 RX ORDER — METFORMIN HYDROCHLORIDE 500 MG/1
1000 TABLET, EXTENDED RELEASE ORAL DAILY
Qty: 180 TABLET | Refills: 1 | Status: SHIPPED | OUTPATIENT
Start: 2025-07-08 | End: 2026-01-04

## 2025-07-08 ASSESSMENT — PATIENT HEALTH QUESTIONNAIRE - PHQ9
SUM OF ALL RESPONSES TO PHQ QUESTIONS 1-9: 0
2. FEELING DOWN, DEPRESSED OR HOPELESS: NOT AT ALL
1. LITTLE INTEREST OR PLEASURE IN DOING THINGS: NOT AT ALL
SUM OF ALL RESPONSES TO PHQ QUESTIONS 1-9: 0

## 2025-07-08 NOTE — PROGRESS NOTES
Chief Complaint   Patient presents with    Follow-up    Discuss Labs       ASSESSMENT:  Diabetes Mellitus: stable     ASSESSMENT & PLAN:  1. Type 2 diabetes mellitus with diabetic nephropathy, without long-term current use of insulin (HCC)  -     metFORMIN (GLUCOPHAGE-XR) 500 MG extended release tablet; Take 2 tablets by mouth daily, Disp-180 tablet, R-1Normal  -     HM DIABETES FOOT EXAM  2. Screening for colon cancer  -     Cologuard (Fecal DNA Colorectal Cancer Screening)  3. Onychomycosis  -     AFL - Grayson Morejon DPM, Podiatry, [place] ([facility])     I have discussed the diagnosis with the patient and the intended treatment plan as seen in the above orders. The patient has received an after-visit summary and questions were answered concerning future plans. Asked to return should symptoms worsen or not improve with treatment. Any pending labs and studies will be relayed to patient when they become available.     Pt verbalizes understanding of plan of care and denies further questions or concerns at this time.     Follow up:  Return in 3 months (on 10/8/2025), or if symptoms worsen or fail to improve, for 3-month diabetic follow up.    SUBJECTIVE:  74 y.o. female for follow up of diabetes. Diabetic Review of Systems - medication compliance: compliant most of the time, diabetic diet compliance: compliant most of the time, home glucose monitoring: is performed regularly.  Other symptoms and concerns: thickening and cracking of toenails..    Diabetic Report Card  Diabetic Goals:  HgA1C <7,  LDL cholesterol <100, Blood pressure <140/80.    I also recommended yearly eye exams and daily foot care.     Lab Results   Component Value Date    LABA1C 6.5 (H) 06/11/2025    LABA1C 5.6 01/17/2025    LABA1C 6.1 (H) 02/07/2024     Lab Results   Component Value Date    CREATININE 1.17 (H) 06/11/2025     Current Outpatient Medications   Medication Sig Dispense Refill    leflunomide (ARAVA) 20 MG tablet Take 1 tablet by

## 2025-07-08 NOTE — PROGRESS NOTES
Identified pt with two pt identifiers(name and )    Chief Complaint   Patient presents with    Follow-up    Discuss Labs        Health Maintenance Due   Topic    Shingles vaccine (1 of 2)    Respiratory Syncytial Virus (RSV) Pregnant or age 60 yrs+ (1 - Risk 60-74 years 1-dose series)    Diabetic retinal exam     COVID-19 Vaccine ( season)    Colorectal Cancer Screen     Diabetic foot exam        Wt Readings from Last 3 Encounters:   25 60.4 kg (133 lb 3.2 oz)   25 62.6 kg (138 lb)   25 62.6 kg (138 lb)     Temp Readings from Last 3 Encounters:   25 97.8 °F (36.6 °C) (Temporal)   25 97.7 °F (36.5 °C) (Temporal)   25 98 °F (36.7 °C) (Oral)     BP Readings from Last 3 Encounters:   25 132/70   25 138/62   25 (!) 148/74     Pulse Readings from Last 3 Encounters:   25 75   25 60   25 69           Depression Screening:  :         2025    11:22 AM 2025     9:56 AM 2025     1:20 PM 2025     3:21 PM 2024     1:27 PM 2024     1:54 PM 2024     9:39 AM   PHQ-9 Questionaire   Little interest or pleasure in doing things 0 0 0 0 0 0 0   Feeling down, depressed, or hopeless 0 0 0 0 0 0 0   Trouble falling or staying asleep, or sleeping too much  0  0      Feeling tired or having little energy  0  0      Poor appetite or overeating  0  0      Feeling bad about yourself - or that you are a failure or have let yourself or your family down  0  0      Trouble concentrating on things, such as reading the newspaper or watching television  0  0      Moving or speaking so slowly that other people could have noticed. Or the opposite - being so fidgety or restless that you have been moving around a lot more than usual  0  0      Thoughts that you would be better off dead, or of hurting yourself in some way  0  0      PHQ-9 Total Score 0 0 0 0 0 0 0   If you checked off any problems, how difficult have these problems made

## 2025-07-08 NOTE — PATIENT INSTRUCTIONS
Diabetes Goals:    Blood sugar goals:  Hemoglobin A1c under 7 is best. >65 7-8 is acceptable.   Fasting blood sugar   Blood sugar 2 hours after a meal under 180, 4 hours after a meal under 120  No hypoglycemia (sugars under 70 and symptomatic low sugars)     Blood sugar control with diet and exercise:  Exercise 45 minutes per day.  This makes your insulin work better.  It also allows insulin levels to fall helping with weight loss.  Every night, try to fast from your evening meal to breakfast (at least 12 hours) without eating anything.  This uses stored energy in your liver and makes insulin work better.  Avoid simples sugars such as table sugar in drinks (sodas, lemonade, sweet tea, wine), desserts, candy.  Also avoid fruit juices and high fructose corn syrup.    Avoid frequent consumption of fruit especially grapes and bananas.    A single serving of fruit daily is all you should have.    Drink less milk which has milk sugar in it.  Control your starch intake.    Men should have 3-4 carb portions per meal.    Women should have 2-3 carb portions per meal.    A carb serving is the equivalent of a slice of bread.    Control your blood pressure and cholesterol.    These problems are common in diabetes.  AND, don't smoke.    All of these problems contribute to heart disease and stroke risk.     Get a yearly eye exam and flu shot.    Make sure your vaccines are up to date particularly the pneumonia vaccines.     Inspect your feet daily.    Wear comfortable protective shoes and clean socks.    Seek medical care if there are sore, calluses or numbness and burning of your feet.     Diabetic Follow up:  Every 3-4 months.

## 2025-07-11 RX ORDER — BLOOD SUGAR DIAGNOSTIC
STRIP MISCELLANEOUS
Qty: 50 STRIP | Refills: 11 | Status: SHIPPED | OUTPATIENT
Start: 2025-07-11

## 2025-07-15 ENCOUNTER — ANESTHESIA EVENT (OUTPATIENT)
Facility: HOSPITAL | Age: 75
End: 2025-07-15
Payer: MEDICARE

## 2025-07-15 ENCOUNTER — HOSPITAL ENCOUNTER (OUTPATIENT)
Facility: HOSPITAL | Age: 75
Setting detail: OUTPATIENT SURGERY
Discharge: HOME | End: 2025-07-15
Attending: INTERNAL MEDICINE | Admitting: INTERNAL MEDICINE
Payer: MEDICARE

## 2025-07-15 ENCOUNTER — ANESTHESIA (OUTPATIENT)
Facility: HOSPITAL | Age: 75
End: 2025-07-15
Payer: MEDICARE

## 2025-07-15 VITALS
WEIGHT: 135.6 LBS | DIASTOLIC BLOOD PRESSURE: 79 MMHG | RESPIRATION RATE: 18 BRPM | HEIGHT: 63 IN | OXYGEN SATURATION: 98 % | BODY MASS INDEX: 24.03 KG/M2 | HEART RATE: 84 BPM | TEMPERATURE: 98 F | SYSTOLIC BLOOD PRESSURE: 136 MMHG

## 2025-07-15 PROCEDURE — 2709999900 HC NON-CHARGEABLE SUPPLY: Performed by: INTERNAL MEDICINE

## 2025-07-15 PROCEDURE — 6360000002 HC RX W HCPCS

## 2025-07-15 PROCEDURE — 3700000001 HC ADD 15 MINUTES (ANESTHESIA): Performed by: INTERNAL MEDICINE

## 2025-07-15 PROCEDURE — 3600007512: Performed by: INTERNAL MEDICINE

## 2025-07-15 PROCEDURE — 7100000011 HC PHASE II RECOVERY - ADDTL 15 MIN: Performed by: INTERNAL MEDICINE

## 2025-07-15 PROCEDURE — 2580000003 HC RX 258

## 2025-07-15 PROCEDURE — 7100000010 HC PHASE II RECOVERY - FIRST 15 MIN: Performed by: INTERNAL MEDICINE

## 2025-07-15 PROCEDURE — 3600007502: Performed by: INTERNAL MEDICINE

## 2025-07-15 PROCEDURE — 3700000000 HC ANESTHESIA ATTENDED CARE: Performed by: INTERNAL MEDICINE

## 2025-07-15 RX ORDER — SODIUM CHLORIDE 9 MG/ML
INJECTION, SOLUTION INTRAVENOUS PRN
Status: DISCONTINUED | OUTPATIENT
Start: 2025-07-15 | End: 2025-07-15 | Stop reason: HOSPADM

## 2025-07-15 RX ORDER — SODIUM CHLORIDE 9 MG/ML
INJECTION, SOLUTION INTRAVENOUS
Status: DISCONTINUED | OUTPATIENT
Start: 2025-07-15 | End: 2025-07-15 | Stop reason: SDUPTHER

## 2025-07-15 RX ORDER — LIDOCAINE HYDROCHLORIDE 20 MG/ML
INJECTION, SOLUTION EPIDURAL; INFILTRATION; INTRACAUDAL; PERINEURAL
Status: DISCONTINUED | OUTPATIENT
Start: 2025-07-15 | End: 2025-07-15 | Stop reason: SDUPTHER

## 2025-07-15 RX ORDER — SODIUM CHLORIDE 0.9 % (FLUSH) 0.9 %
5-40 SYRINGE (ML) INJECTION PRN
Status: DISCONTINUED | OUTPATIENT
Start: 2025-07-15 | End: 2025-07-15 | Stop reason: HOSPADM

## 2025-07-15 RX ORDER — SODIUM CHLORIDE 0.9 % (FLUSH) 0.9 %
5-40 SYRINGE (ML) INJECTION EVERY 12 HOURS SCHEDULED
Status: DISCONTINUED | OUTPATIENT
Start: 2025-07-15 | End: 2025-07-15 | Stop reason: HOSPADM

## 2025-07-15 RX ORDER — SODIUM CHLORIDE 9 MG/ML
INJECTION, SOLUTION INTRAVENOUS CONTINUOUS
Status: DISCONTINUED | OUTPATIENT
Start: 2025-07-15 | End: 2025-07-15 | Stop reason: HOSPADM

## 2025-07-15 RX ADMIN — SODIUM CHLORIDE: 9 INJECTION, SOLUTION INTRAVENOUS at 09:04

## 2025-07-15 RX ADMIN — PROPOFOL 40 MG: 10 INJECTION, EMULSION INTRAVENOUS at 09:09

## 2025-07-15 RX ADMIN — PROPOFOL 60 MG: 10 INJECTION, EMULSION INTRAVENOUS at 09:06

## 2025-07-15 RX ADMIN — PROPOFOL 30 MG: 10 INJECTION, EMULSION INTRAVENOUS at 09:19

## 2025-07-15 RX ADMIN — LIDOCAINE HYDROCHLORIDE 20 MG: 20 INJECTION, SOLUTION EPIDURAL; INFILTRATION; INTRACAUDAL; PERINEURAL at 09:06

## 2025-07-15 RX ADMIN — PROPOFOL 30 MG: 10 INJECTION, EMULSION INTRAVENOUS at 09:15

## 2025-07-15 RX ADMIN — PROPOFOL 40 MG: 10 INJECTION, EMULSION INTRAVENOUS at 09:12

## 2025-07-15 ASSESSMENT — PAIN - FUNCTIONAL ASSESSMENT: PAIN_FUNCTIONAL_ASSESSMENT: NONE - DENIES PAIN

## 2025-07-15 NOTE — ANESTHESIA POSTPROCEDURE EVALUATION
Department of Anesthesiology  Postprocedure Note    Patient: Nargis Shepard  MRN: [id number]  YOB: 1950  Date of evaluation: 7/15/2025    Procedure Summary       Date: 07/15/25 Room / Location: [facility] ENDO 01 / [facility] ENDOSCOPY    Anesthesia Start: 0903 Anesthesia Stop: 0922    Procedure: COLONOSCOPY (Lower GI Region) Diagnosis:       Special screening for malignant neoplasms, colon      (Special screening for malignant neoplasms, colon [Z12.11])    Surgeons: Ho Gomes MD Responsible Provider: Bryon Law MD    Anesthesia Type: MAC ASA Status: 2            Anesthesia Type: MAC    Rose Phase I: Rose Score: 10    Rose Phase II:      Anesthesia Post Evaluation    Patient location during evaluation: bedside  Nausea & Vomiting: no nausea  Cardiovascular status: blood pressure returned to baseline  Respiratory status: acceptable  Hydration status: euvolemic    No notable events documented.

## 2025-07-15 NOTE — H&P
chills, negative weight loss  Eyes:   negative visual changes  ENT:   negative sore throat, tongue or lip swelling  Respiratory:  negative cough, negative dyspnea  Cards:  negative for chest pain, palpitations, lower extremity edema  GI:   See HPI  :  negative for frequency, dysuria  Integument:  negative for rash and pruritus  Heme:  negative for easy bruising and gum/nose bleeding  Musculoskel: negative for myalgias,  back pain and muscle weakness  Neuro: negative for headaches, dizziness, vertigo  Psych:  negative for feelings of anxiety, depression       Objective:   Patient Vitals for the past 8 hrs:   BP Pulse Resp SpO2 Height Weight   07/15/25 0820 (!) 176/63 79 14 98 % 1.6 m (5' 3\") 61.5 kg (135 lb 9.6 oz)     No intake/output data recorded.  No intake/output data recorded.    EXAM:     NEURO-a&o   HEENT-wnl   LUNGS-clear    COR-regular rate and rhythym     ABD-soft , no tenderness, no rebound, good bs     EXT-no edema     Data Review     No results for input(s): \"WBC\", \"HGB\", \"HCT\", \"PLT\" in the last 72 hours.  No results for input(s): \"NA\", \"K\", \"CL\", \"CO2\", \"BUN\", \"GLU\", \"PHOS\" in the last 72 hours.    Invalid input(s): \"CREA\", \"CA\"  No results for input(s): \"TP\", \"GLOB\", \"GGT\" in the last 72 hours.    Invalid input(s): \"SGOT\", \"GPT\", \"AP\", \"TBIL\", \"ALB\", \"AML\", \"AMYP\", \"LPSE\", \"HLPSE\"  No results for input(s): \"INR\", \"APTT\" in the last 72 hours.    Invalid input(s): \"PTP\"       Assessment:     Colon cancer screening     Patient Active Problem List   Diagnosis    Type 2 diabetes with nephropathy (HCC)    Benign essential hypertension    Anemia    Diverticulosis    Osteopenia of both lower legs    Rheumatoid arthritis involving multiple sites with positive rheumatoid factor (HCC)    Anemia associated with acute blood loss    History of rheumatoid arthritis    Bulging of lumbar intervertebral disc    Infectious gastroenteritis and colitis    Diabetes mellitus type II, controlled (HCC)    Sciatica of left

## 2025-07-15 NOTE — OP NOTE
[facility] [address] [address]        Colonoscopy Operative Report    Nargis Shepard  [id number]  1950      Procedure Type:   Colonoscopy --screening     Indications:  Family history of colon cancer      Pre-operative Diagnosis: see indication above    Post-operative Diagnosis:  See findings below    :  Ho Gomes MD    Staff: Circulator: Yuliana García RN  Endoscopy Technician: Long Ybarra     Referring Provider: Amanda Espinoza MD      Sedation:  MAC      Procedure Details:  After informed consent was obtained with all risks and benefits of procedure explained and preoperative exam completed, the patient was taken to the endoscopy suite and placed in the left lateral decubitus position.  Upon sequential sedation as per above, a digital rectal exam was performed demonstrating internal hemorrhoids.  The Olympus pediatric videocolonoscope  was inserted in the rectum and carefully advanced to the terminal ileum.  The cecum was identified by the ileocecal valve and appendiceal orifice.  The quality of preparation was good.  The colonoscope was slowly withdrawn with careful evaluation between folds. Retroflexion in the rectum was completed .     Findings:   Mild pan-diverticulosis. Otherwise normal colon mucosa.      Specimen Removed:  none    Complications: None.     EBL:  None.    Impression:     As above    Recommendations:  High fiber diet education  Repeat colonoscopy in 5 years    Signed By: Ho Gomes MD     7/15/2025  9:27 AM

## 2025-07-15 NOTE — DISCHARGE INSTRUCTIONS
[facility]  [address] [place], [place] [address]    COLON DISCHARGE INSTRUCTIONS    Nargis Shepard  [id number]  1950    Discomfort:  Redness at IV site- apply warm compress to area; if redness or soreness persist- contact your physician  There may be a slight amount of blood passed from the rectum  Gaseous discomfort- walking, belching will help relieve any discomfort  You may not operate a vehicle for 12 hours  You may not engage in an occupation involving machinery or appliances for rest of today  You may not drink alcoholic beverages for at least 12 hours  Avoid making any critical decisions for at least 24 hour  DIET:  You may resume your regular diet - however -  remember your colon is empty and a heavy meal will produce gas.  Avoid these foods:  vegetables, fried / greasy foods, carbonated drinks     ACTIVITY:  You may  resume your normal daily activities it is recommended that you spend the remainder of the day resting -  avoid any strenuous activity.    CALL M.D.  ANY SIGN OF:   Increasing pain, nausea, vomiting  Abdominal distension (swelling)  New increased bleeding (oral or rectal)  Fever (chills)  Pain in chest area  Bloody discharge from nose or mouth  Shortness of breath      Follow-up Instructions:   Call Dr. Ho Gomes for any questions or problems at [phone]          ENDOSCOPY FINDINGS:   Your colonoscopy showed mild diverticulosis and small internal hemorrhoids. No polyps were identified. Your next colonoscopy will be due in 5 years due to your family history of colon cancer. Please maintain a high fiber diet.  Telephone # [phone]      Signed By: Ho Gomes MD     7/15/2025  9:25 AM

## 2025-07-15 NOTE — PROGRESS NOTES
Initial RN admission and assessment performed and documented in Endoscopy navigator.     Patient evaluated by anesthesia in pre-procedure holding.     All procedural vital signs, airway assessment, and level of consciousness information monitored and recorded by anesthesia staff on the anesthesia record.     Report received from CRNA post procedure.  Patient transported to recovery area by RN.    Endoscopy post procedure time out was performed and specimens were verified with physician.    Endoscope was pre-cleaned at bedside immediately following procedure by derrek tech..

## 2025-07-22 LAB — NONINV COLON CA DNA+OCC BLD SCRN STL QL: NEGATIVE

## 2025-07-24 RX ORDER — BLOOD SUGAR DIAGNOSTIC
STRIP MISCELLANEOUS
Qty: 50 STRIP | Refills: 11 | Status: SHIPPED | OUTPATIENT
Start: 2025-07-24

## 2025-08-01 RX ORDER — METOPROLOL TARTRATE 25 MG/1
25 TABLET, FILM COATED ORAL 2 TIMES DAILY
Qty: 180 TABLET | Refills: 0 | Status: SHIPPED | OUTPATIENT
Start: 2025-08-01

## 2025-08-01 NOTE — TELEPHONE ENCOUNTER
metoprolol tartrate (LOPRESSOR) 25 MG tablet [[id number]]      [facility] [id number] - [place], [place] - [address] [facility] [place] - P [phone] - F [phone] [address] [facility] [address]  Phone: [phone]  Fax: [phone]

## 2025-08-18 RX ORDER — LOSARTAN POTASSIUM 25 MG/1
25 TABLET ORAL DAILY
Qty: 90 TABLET | Refills: 1 | Status: SHIPPED | OUTPATIENT
Start: 2025-08-18

## 2025-08-18 RX ORDER — AMLODIPINE BESYLATE 10 MG/1
10 TABLET ORAL DAILY
Qty: 90 TABLET | Refills: 1 | Status: SHIPPED | OUTPATIENT
Start: 2025-08-18

## 2025-08-19 ENCOUNTER — TELEPHONE (OUTPATIENT)
Age: 75
End: 2025-08-19

## 2025-08-20 DIAGNOSIS — N18.30 STAGE 3 CHRONIC KIDNEY DISEASE, UNSPECIFIED WHETHER STAGE 3A OR 3B CKD (HCC): ICD-10-CM

## 2025-08-20 DIAGNOSIS — E11.21 TYPE 2 DIABETES MELLITUS WITH DIABETIC NEPHROPATHY, WITHOUT LONG-TERM CURRENT USE OF INSULIN (HCC): ICD-10-CM

## 2025-08-20 RX ORDER — DAPAGLIFLOZIN 10 MG/1
10 TABLET, FILM COATED ORAL EVERY MORNING
Qty: 90 TABLET | Refills: 1 | Status: SHIPPED | OUTPATIENT
Start: 2025-08-20

## (undated) DEVICE — TOOL 14MH30 LEGEND 14CM 3MM: Brand: MIDAS REX ™

## (undated) DEVICE — SOLUTION SURG PREP 26 CC PURPREP

## (undated) DEVICE — STERILE-Z SURGICAL PATIENT COVERS CLEAR POLYETHYLENE STERILE UNIVERSAL FIT 10 PER CASE: Brand: STERILE-Z

## (undated) DEVICE — SPONGE GZ W4XL4IN COT 12 PLY TYP VII WVN C FLD DSGN

## (undated) DEVICE — COVER LT HNDL PLAS RIG 1 PER PK

## (undated) DEVICE — COVER,TABLE,HEAVY DUTY,60"X90",STRL: Brand: MEDLINE

## (undated) DEVICE — BIPOLAR IRRIGATOR INTEGRATED TUBING AND BIPOLAR CORD SET, DISPOSABLE

## (undated) DEVICE — INFECTION CONTROL KIT SYS

## (undated) DEVICE — SUTURE VCRL SZ 0 L18IN ABSRB VLT L36MM CT-1 1/2 CIR J740D

## (undated) DEVICE — TUBING, SUCTION, 1/4" X 12', STRAIGHT: Brand: MEDLINE

## (undated) DEVICE — TOTAL TRAY, 16FR 10ML SIL FOLEY, URN: Brand: MEDLINE

## (undated) DEVICE — GARMENT,MEDLINE,DVT,INT,CALF,MED, GEN2: Brand: MEDLINE

## (undated) DEVICE — CANNULA INJ L2.5IN BLNT TIP 3MM CLR BODY W/ 1 W VLV DLP

## (undated) DEVICE — INTENDED FOR TISSUE SEPARATION, AND OTHER PROCEDURES THAT REQUIRE A SHARP SURGICAL BLADE TO PUNCTURE OR CUT.: Brand: BARD-PARKER ® CARBON RIB-BACK BLADES

## (undated) DEVICE — SURGIFOAM SPNG SZ 100

## (undated) DEVICE — POSITIONER HD REST FOAM CMFRT TCH

## (undated) DEVICE — SUTURE VCRL SZ 2-0 L18IN ABSRB UD L26MM CP-2 1/2 CIR REV J762D

## (undated) DEVICE — STERILE POLYISOPRENE POWDER-FREE SURGICAL GLOVES WITH EMOLLIENT COATING: Brand: PROTEXIS

## (undated) DEVICE — MARKER SKIN XR REFLCT LF SPHR DISP

## (undated) DEVICE — STRAP,POSITIONING,KNEE/BODY,FOAM,4X60": Brand: MEDLINE

## (undated) DEVICE — SOLUTION IV 250ML 0.9% SOD CHL CLR INJ FLX BG CONT PRT CLSR

## (undated) DEVICE — LAMINECTOMY RICHMOND-LF: Brand: MEDLINE INDUSTRIES, INC.

## (undated) DEVICE — Device

## (undated) DEVICE — SUTURE MCRYL SZ 4-0 L27IN ABSRB UD L19MM PS-2 1/2 CIR PRIM Y426H

## (undated) DEVICE — BONE WAX WHITE: Brand: BONE WAX WHITE

## (undated) DEVICE — ASTOUND STANDARD SURGICAL GOWN, XXL: Brand: CONVERTORS

## (undated) DEVICE — SUPPLEMENT DIGESTIVE H2O SOL GI-EASE

## (undated) DEVICE — FLOSEAL HEMOSTATIC MATRIX, 10ML: Brand: FLOSEAL HEMOSTATIC MATRIX

## (undated) DEVICE — TELFA ADHESIVE ISLAND DRESSING: Brand: TELFA